# Patient Record
Sex: FEMALE | Race: WHITE | Employment: OTHER | URBAN - METROPOLITAN AREA
[De-identification: names, ages, dates, MRNs, and addresses within clinical notes are randomized per-mention and may not be internally consistent; named-entity substitution may affect disease eponyms.]

---

## 2017-01-24 ENCOUNTER — OFFICE VISIT (OUTPATIENT)
Dept: CARDIOLOGY | Facility: CLINIC | Age: 82
End: 2017-01-24
Attending: NURSE PRACTITIONER
Payer: MEDICARE

## 2017-01-24 VITALS
DIASTOLIC BLOOD PRESSURE: 60 MMHG | WEIGHT: 179 LBS | SYSTOLIC BLOOD PRESSURE: 118 MMHG | BODY MASS INDEX: 32.94 KG/M2 | HEART RATE: 68 BPM | HEIGHT: 62 IN

## 2017-01-24 DIAGNOSIS — I48.0 PAROXYSMAL ATRIAL FIBRILLATION (H): ICD-10-CM

## 2017-01-24 PROCEDURE — 99213 OFFICE O/P EST LOW 20 MIN: CPT | Mod: 25 | Performed by: INTERNAL MEDICINE

## 2017-01-24 PROCEDURE — 93000 ELECTROCARDIOGRAM COMPLETE: CPT | Performed by: INTERNAL MEDICINE

## 2017-01-24 NOTE — PROGRESS NOTES
HPI and Plan:   See dictation    Orders Placed This Encounter   Procedures     Follow-Up with Cardiac Advanced Practice Provider     EKG 12-lead complete w/read - Clinics (to be scheduled)       No orders of the defined types were placed in this encounter.       There are no discontinued medications.      Encounter Diagnosis   Name Primary?     Paroxysmal atrial fibrillation (H)        CURRENT MEDICATIONS:  Current Outpatient Prescriptions   Medication Sig Dispense Refill     rivaroxaban ANTICOAGULANT (XARELTO) 20 MG TABS tablet Take 1 tablet (20 mg) by mouth daily (with dinner) 90 tablet 0     flecainide acetate 150 MG TABS 1/2 tab bid 90 tablet 1     levothyroxine (SYNTHROID) 75 MCG tablet Take 1 tablet (75 mcg) by mouth daily 90 tablet 3     metoprolol (LOPRESSOR) 50 MG tablet Take 1.5 tablets (75 mg) by mouth 2 times daily 270 tablet 3     furosemide (LASIX) 20 MG tablet Take 1 tablet (20 mg) by mouth daily Pt taking one tab of 20mg furosemide every day and two tabs every other day if needed 181 tablet 3     traMADol (ULTRAM) 50 MG tablet Take 1 tablet (50 mg) by mouth every 6 hours as needed for pain 90 tablet 1     budesonide-formoterol (SYMBICORT) 160-4.5 MCG/ACT inhaler Inhale 2 puffs into the lungs 2 times daily         ALLERGIES     Allergies   Allergen Reactions     Clindamycin Hcl Other (See Comments)     Difficulty swallowing     Ampicillin Potassium      Rash nausea and vomiting       Celebrex [Celecoxib]      rash     Ciprofloxacin      rash     Erythromycin      rash     Indocin      Ended up [facility]( [facility] with severe head ache     Penicillins      Rash,nausea and vomiting     Vibramycin [Doxycycline Hyclate]      Rash      Xylocaine-Epinephrine [Epinephrine-Lidocaine-Na Metabisulfite]      Xylocaine with epi caused a rapid heart beat       PAST MEDICAL HISTORY:  Past Medical History   Diagnosis Date     Cysts, breast 1980s     bilat mastectomies     Hypothyroid 1995     Dr. Ortiz      Environmental allergies      Arthritis 99          Chest pain 2000     neg est echo, neg ct chest abd, pelvis Confucianism     PMR (polymyalgia rheumatica) (H) 2004     not active in years     Pacemaker 2011     afib with pauses and syncope     Treadmill stress test negative for angina pectoris 2011     nuclear est     Palpitations 2009     neg est     Urosepsis 2009     hospitalized     Urine incontinence      Dr. Westfall     Hematuria 2004     neg cysto and us     Dysphagia 2005     egd nl     nausea 2006     ct abd and ;pelvis neg     Dizzy 2007     ct neg, carotid us neg     Hemoptyses 2008     ct neg, bronch neg 2009     TIA (transient ischaemic attack) 2009     carotids neg, mri neg     Vision changes 2011     eval by neuro and ophtho and no cause found     Atrial fibrillation (H) 2011 and 2009     neg nuc est 2009, Dr. Vazquez, on coumadin     Osteopenia      fu dexa better 2011     Elevated blood sugar      Chronic LBP      HTN (hypertension)      COPD (chronic obstructive pulmonary disease) (H)      seen by pulmonary md Dr. Whitt, and given inhaler     Supraventricular premature beats      Hyperlipidemia      Hypertension      SOB (shortness of breath) 5/15     echo nl ef, 2+mr, cxr clear, seen by pulm and given inhaler     Mitral regurgitation 6/15     on echo       PAST SURGICAL HISTORY:  Past Surgical History   Procedure Laterality Date     Arthroscopy knee  1997     Mastectomy  1980     bilat, cysts     Breast implants       4x     Matthew not bso  70's     not for ca     Arthroplasty patello-femoral (knee)  1998 and 2002     Foot surgery  2003     Cataract  2011     Biopsy breast Right 9/23/2014     Procedure: BIOPSY BREAST;  Surgeon: David Carter MD;  Location: [facility]       FAMILY HISTORY:  Family History   Problem Relation Age of Onset     C.A.D. Father      Lymphoma Father      CANCER Mother      Lymphoma Brother      Breast Cancer Sister      OSTEOPOROSIS Sister      Myocardial Infarction  Sister      Unknown/Adopted Maternal Grandmother      Unknown/Adopted Maternal Grandfather      Unknown/Adopted Paternal Grandmother      Unknown/Adopted Paternal Grandfather        SOCIAL HISTORY:  Social History     Social History     Marital Status:      Spouse Name: N/A     Number of Children: 4     Years of Education: N/A     Social History Main Topics     Smoking status: Former Smoker     Types: Cigarettes     Quit date: 02/01/1955     Smokeless tobacco: Never Used      Comment: quit in 1955   had smoked about 2 cigarettes a day or more     Alcohol Use: 0.0 oz/week     0 Standard drinks or equivalent per week      Comment: occ wine     Drug Use: No     Sexual Activity: Not Currently     Other Topics Concern     Caffeine Concern No     coffee: 1 cup a week.  Occ green tea     Sleep Concern No     Stress Concern No     Weight Concern No     Special Diet No     Exercise Yes     walking daily     Seat Belt Yes     Social History Narrative       Review of Systems:  Skin:  Negative       Eyes:  Positive for glasses    ENT:  Negative      Respiratory:  Positive for dyspnea on exertion stairs   Cardiovascular:  Negative      Gastroenterology: Negative      Genitourinary:  Negative      Musculoskeletal:  Positive for arthritis;back pain    Neurologic:  Negative      Psychiatric:  Negative      Heme/Lymph/Imm:  Negative      Endocrine:  Positive for thyroid disorder      [id number]

## 2017-01-24 NOTE — Clinical Note
"1/24/2017    Jeffery Sherwood MD  [facility]   [address]  [facility] [address]    RE: Lindsey Canalesid Hol       Dear Colleague,    It was my pleasure seeing Ms. Lindsey Erickson, a delightful 89-year-old female, in follow-up of symptomatic paroxysmal atrial fibrillation, sinus node dysfunction with pacemaker and dyspnea on exertion.  For her AF, she was briefly on amiodarone which was discontinued because of abnormal pulmonary function tests.  She is currently on flecainide 75 mg b.i.d. and metoprolol 75 mg b.i.d.  She is on rivaroxaban for anticoagulation.      In coming in today, Ms. Erickson stated she felt well.  She came in the clinic with her daughter.  She lives independently and is able to take care of herself.  Her kids keep an eye on her.  She has had no palpitation, syncope or near-syncope.  She did have a 24-hour period of chest discomfort about 3 weeks ago.  The sensation was steady and lasted for 24 hours.  She went to bed with it, but by the next morning it was much better.  It was not aggravated by exertion and has not recurred since then.      PHYSICAL EXAMINATION:   VITAL SIGNS:  Blood pressure 118/60, pulse 68 and regular, weight 81 kg, height 160 cm.   GENERAL:  She is a delightful elderly woman who is alert, oriented and somewhat hard of hearing.   NECK:  Supple without apparent bruits.   LUNGS:  Clear.   CARDIOVASCULAR:  Regular rhythm.  No gallop, murmur or rub.   ABDOMEN:  Soft, nontender.   EXTREMITIES:  They are \"puffy\", although there is no pitting edema this morning.   NEUROLOGIC: alert.     DIAGNOSTIC STUDIES:    Her 12-lead ECG today showed an AV paced rhythm at 69 beats per minute.    Interrogations of her pacemaker over the past year have shown no significant episodes of atrial fibrillation.     Outpatient Encounter Prescriptions as of 1/24/2017   Medication Sig Dispense Refill     rivaroxaban ANTICOAGULANT (XARELTO) 20 MG TABS tablet Take 1 tablet (20 mg) by mouth " daily (with dinner) 90 tablet 0     flecainide acetate 150 MG TABS 1/2 tab bid 90 tablet 1     levothyroxine (SYNTHROID) 75 MCG tablet Take 1 tablet (75 mcg) by mouth daily 90 tablet 3     metoprolol (LOPRESSOR) 50 MG tablet Take 1.5 tablets (75 mg) by mouth 2 times daily 270 tablet 3     furosemide (LASIX) 20 MG tablet Take 1 tablet (20 mg) by mouth daily Pt taking one tab of 20mg furosemide every day and two tabs every other day if needed 181 tablet 3     traMADol (ULTRAM) 50 MG tablet Take 1 tablet (50 mg) by mouth every 6 hours as needed for pain 90 tablet 1     budesonide-formoterol (SYMBICORT) 160-4.5 MCG/ACT inhaler Inhale 2 puffs into the lungs 2 times daily       No facility-administered encounter medications on file as of 1/24/2017.      IMPRESSION:   1.  Paroxysmal atrial fibrillation.  Ms. Erickson is doing well on flecainide and metoprolol with virtually no atrial fibrillation in the past year.  She is on rivaroxaban for anticoagulation and will continue this at the current dose of 20 mg daily.  Her creatinine is still normal at 0.74.   2.  Episode of chest discomfort 3 weeks ago.  The cause is unclear.  The description of the discomfort did not suggest angina.  It has not recurred since and given her 89 years of age, I will not pursue extensive evaluation for this isolated event.  I did encourage the patient to call us if she has a recurrence.   3.  Sick sinus syndrome.  Her pacemaker is functioning well, projected battery longevity between 7 and 8 years.  Routine follow-up has been established in the [facility].      RECOMMENDATIONS:  Follow up in 1 year with Zaina and a 12-lead ECG.      It was my pleasure seeing Ms. Hale today.  Please feel free to contact me with questions or concerns.     Sincerely,    Efra Vazquez MD     [facility]

## 2017-01-24 NOTE — PROGRESS NOTES
"HISTORY OF PRESENT ILLNESS:    It was my pleasure seeing Ms. Lindsey Erickson, a delightful 89-year-old female, in follow-up of symptomatic paroxysmal atrial fibrillation, sinus node dysfunction with pacemaker and dyspnea on exertion.  For her AF, she was briefly on amiodarone which was discontinued because of abnormal pulmonary function tests.  She is currently on flecainide 75 mg b.i.d. and metoprolol 75 mg b.i.d.  She is on rivaroxaban for anticoagulation.      In coming in today, Ms. Erickson stated she felt well.  She came in the clinic with her daughter.  She lives independently and is able to take care of herself.  Her kids keep an eye on her.  She has had no palpitation, syncope or near-syncope.  She did have a 24-hour period of chest discomfort about 3 weeks ago.  The sensation was steady and lasted for 24 hours.  She went to bed with it, but by the next morning it was much better.  It was not aggravated by exertion and has not recurred since then.      PHYSICAL EXAMINATION:   VITAL SIGNS:  Blood pressure 118/60, pulse 68 and regular, weight 81 kg, height 160 cm.   GENERAL:  She is a delightful elderly woman who is alert, oriented and somewhat hard of hearing.   NECK:  Supple without apparent bruits.   LUNGS:  Clear.   CARDIOVASCULAR:  Regular rhythm.  No gallop, murmur or rub.   ABDOMEN:  Soft, nontender.   EXTREMITIES:  They are \"puffy\", although there is no pitting edema this morning.   NEUROLOGIC: alert.     DIAGNOSTIC STUDIES:    Her 12-lead ECG today showed an AV paced rhythm at 69 beats per minute.    Interrogations of her pacemaker over the past year have shown no significant episodes of atrial fibrillation.      IMPRESSION:   1.  Paroxysmal atrial fibrillation.  Ms. Erickson is doing well on flecainide and metoprolol with virtually no atrial fibrillation in the past year.  She is on rivaroxaban for anticoagulation and will continue this at the current dose of 20 mg daily.  Her creatinine is still normal at " 0.74.   2.  Episode of chest discomfort 3 weeks ago.  The cause is unclear.  The description of the discomfort did not suggest angina.  It has not recurred since and given her 89 years of age, I will not pursue extensive evaluation for this isolated event.  I did encourage the patient to call us if she has a recurrence.   3.  Sick sinus syndrome.  Her pacemaker is functioning well, projected battery longevity between 7 and 8 years.  Routine follow-up has been established in the Device Clinic.      RECOMMENDATIONS:  Follow up in 1 year with Zaina and a 12-lead ECG.      It was my pleasure seeing Ms. Priest today.  Please feel free to contact me with questions or concerns.         TORI JULIEN MD, [facility]             D: 2017 10:23   T: 2017 11:17   MT: RONALD      Name:     CASIMIRO PRIEST   MRN:      [id number]        Account:      [id number]   :      1925           Service Date: 2017      Document: [id number]

## 2017-02-21 DIAGNOSIS — I48.91 ATRIAL FIBRILLATION (H): ICD-10-CM

## 2017-02-22 RX ORDER — METOPROLOL TARTRATE 50 MG
75 TABLET ORAL 2 TIMES DAILY
Qty: 270 TABLET | Refills: 0 | Status: SHIPPED | OUTPATIENT
Start: 2017-02-22 | End: 2017-05-23

## 2017-03-08 ENCOUNTER — ALLIED HEALTH/NURSE VISIT (OUTPATIENT)
Dept: CARDIOLOGY | Facility: CLINIC | Age: 82
End: 2017-03-08
Payer: MEDICARE

## 2017-03-08 DIAGNOSIS — Z95.0 CARDIAC PACEMAKER IN SITU: Primary | ICD-10-CM

## 2017-03-08 PROCEDURE — 93296 REM INTERROG EVL PM/IDS: CPT | Performed by: INTERNAL MEDICINE

## 2017-03-08 PROCEDURE — 93294 REM INTERROG EVL PM/LDLS PM: CPT | Performed by: INTERNAL MEDICINE

## 2017-03-08 NOTE — PROGRESS NOTES
[facility] (D) Remote PPM Device Check  AP: 38 % : >99 %  Mode: DDDR        Presenting Rhythm: AS/, AP/  Heart Rate: Adequate rates per histogram  Sensing: Stable    Pacing Threshold: Stable    Impedance: Stable  Battery Status: 7 years  Atrial Arrhythmia: 1 brief mode switch episode. EGM shows As>Vs lasting 20 seconds, ventricular rates controlled.   Ventricular Arrhythmia: None     Care Plan: F/u PPM Merlin q 3 months. Gave patient results over the phone. Russel,CVT

## 2017-03-08 NOTE — MR AVS SNAPSHOT
After Visit Summary   3/8/2017    Lindsey Hale    MRN: [id number]           Patient Information     Date Of Birth          9/16/1925        Visit Information        Provider Department      3/8/2017 4:45 PM BANKS TECH1 [facility]        Today's Diagnoses     Cardiac pacemaker in situ    -  1       Follow-ups after your visit        Your next 10 appointments already scheduled     Mar 08, 2017  4:45 PM CST   Remote PPM Check with BANKS TECH1   [facility] HE[facility] ([facility])    [address]  [facility] [id number] [phone]           This appointment is for a remote check of your pacemaker.  This is not an appointment at the office.            Mar 09, 2017  1:00 PM CST   Office Visit with Jeffery Sherwood MD   [facility] ([facility])    [address] [id number] [phone]           Bring a current list of meds and any records pertaining to this visit.  For Physicals, please bring immunization records and any forms needing to be filled out.  Please arrive 10 minutes early to complete paperwork.            Apr 05, 2017  1:00 PM CDT   Evaluation with Bebe Tenorio OT   [facility]ational Therapy ([facility])    [address]  [address]  [facility] [id number] [phone]              Who to contact     If you have questions or need follow up information about today's clinic visit or your schedule please contact [facility] directly at [phone].  Normal or non-critical lab and imaging results will be communicated to you by MyChart, letter or phone within 4 business days after the clinic has received the results. If you do not hear from us within 7 days, please contact the clinic through MyChart or phone. If you have a critical or abnormal lab result, we will notify you by phone as  "soon as possible.  Submit refill requests through IPLogic or call your pharmacy and they will forward the refill request to us. Please allow 3 business days for your refill to be completed.          Additional Information About Your Visit        Mogihart Information     IPLogic lets you send messages to your doctor, view your test results, renew your prescriptions, schedule appointments and more. To sign up, go to [email or web address] . Click on \"Log in\" on the left side of the screen, which will take you to the Welcome page. Then click on \"Sign up Now\" on the right side of the page.     You will be asked to enter the access code listed below, as well as some personal information. Please follow the directions to create your username and password.     Your access code is: [email or web address]  Expires: 2017  3:21 PM     Your access code will  in 90 days. If you need help or a new code, please call your [place] clinic or [phone].        Care EveryWhere ID     This is your Care EveryWhere ID. This could be used by other organizations to access your [place] medical records  [id number]         Blood Pressure from Last 3 Encounters:   17 118/60   16 145/66   16 144/66    Weight from Last 3 Encounters:   17 81.2 kg (179 lb)   16 80.7 kg (178 lb)   16 80.7 kg (177 lb 14.4 oz)              We Performed the Following     INTERROGATION DEVICE EVAL REMOTE, PACER/ICD (57475)     PM DEVICE INTERROGATE REMOTE (32099)        Primary Care Provider Office Phone # Fax #    Jeffery Sherwood [phone]       [facility] [address]  [facility] [address]        Thank you!     Thank you for choosing [facility] [place]  for your care. Our goal is always to provide you with excellent care. Hearing back from our patients is one way we can continue to improve our services. Please take a few minutes to complete the " written survey that you may receive in the mail after your visit with us. Thank you!             Your Updated Medication List - Protect others around you: Learn how to safely use, store and throw away your medicines at www.disposemymeds.org.          This list is accurate as of: 3/8/17  3:21 PM.  Always use your most recent med list.                   Brand Name Dispense Instructions for use    budesonide-formoterol 160-4.5 MCG/ACT Inhaler    SYMBICORT     Inhale 2 puffs into the lungs 2 times daily       flecainide acetate 150 MG Tabs     90 tablet    1/2 tab bid       furosemide 20 MG tablet    LASIX    181 tablet    Take 1 tablet (20 mg) by mouth daily Pt taking one tab of 20mg furosemide every day and two tabs every other day if needed       levothyroxine 75 MCG tablet    SYNTHROID    90 tablet    Take 1 tablet (75 mcg) by mouth daily       metoprolol 50 MG tablet    LOPRESSOR    270 tablet    Take 1.5 tablets (75 mg) by mouth 2 times daily PLEASE SCHEDULE FOLLOW UP VISIT WITH PROVIDER FOR FURTHER REFILLS       rivaroxaban ANTICOAGULANT 20 MG Tabs tablet    XARELTO    90 tablet    Take 1 tablet (20 mg) by mouth daily (with dinner)       traMADol 50 MG tablet    ULTRAM    90 tablet    Take 1 tablet (50 mg) by mouth every 6 hours as needed for pain

## 2017-03-09 ENCOUNTER — OFFICE VISIT (OUTPATIENT)
Dept: FAMILY MEDICINE | Facility: CLINIC | Age: 82
End: 2017-03-09
Payer: MEDICARE

## 2017-03-09 VITALS
HEART RATE: 67 BPM | WEIGHT: 178 LBS | HEIGHT: 62 IN | SYSTOLIC BLOOD PRESSURE: 136 MMHG | TEMPERATURE: 97.9 F | OXYGEN SATURATION: 97 % | DIASTOLIC BLOOD PRESSURE: 60 MMHG | BODY MASS INDEX: 32.76 KG/M2

## 2017-03-09 DIAGNOSIS — J44.9 CHRONIC OBSTRUCTIVE PULMONARY DISEASE, UNSPECIFIED COPD TYPE (H): ICD-10-CM

## 2017-03-09 DIAGNOSIS — M35.3 PMR (POLYMYALGIA RHEUMATICA) (H): Primary | ICD-10-CM

## 2017-03-09 DIAGNOSIS — I48.0 PAROXYSMAL ATRIAL FIBRILLATION (H): ICD-10-CM

## 2017-03-09 DIAGNOSIS — H53.8 BLURRED VISION: ICD-10-CM

## 2017-03-09 DIAGNOSIS — R06.02 SOB (SHORTNESS OF BREATH): ICD-10-CM

## 2017-03-09 DIAGNOSIS — E03.9 HYPOTHYROIDISM, UNSPECIFIED TYPE: ICD-10-CM

## 2017-03-09 DIAGNOSIS — R73.9 ELEVATED BLOOD SUGAR: ICD-10-CM

## 2017-03-09 LAB
ANION GAP SERPL CALCULATED.3IONS-SCNC: 8 MMOL/L (ref 3–14)
BUN SERPL-MCNC: 25 MG/DL (ref 7–30)
CALCIUM SERPL-MCNC: 9.4 MG/DL (ref 8.5–10.1)
CHLORIDE SERPL-SCNC: 104 MMOL/L (ref 94–109)
CO2 SERPL-SCNC: 28 MMOL/L (ref 20–32)
CREAT SERPL-MCNC: 0.69 MG/DL (ref 0.52–1.04)
CRP SERPL-MCNC: <2.9 MG/L (ref 0–8)
ERYTHROCYTE [DISTWIDTH] IN BLOOD BY AUTOMATED COUNT: 13.5 % (ref 10–15)
ERYTHROCYTE [SEDIMENTATION RATE] IN BLOOD BY WESTERGREN METHOD: 23 MM/H (ref 0–30)
GFR SERPL CREATININE-BSD FRML MDRD: 79 ML/MIN/1.7M2
GLUCOSE SERPL-MCNC: 161 MG/DL (ref 70–99)
HBA1C MFR BLD: 6.1 % (ref 4.3–6)
HCT VFR BLD AUTO: 40.4 % (ref 35–47)
HGB BLD-MCNC: 13.1 G/DL (ref 11.7–15.7)
MCH RBC QN AUTO: 33.2 PG (ref 26.5–33)
MCHC RBC AUTO-ENTMCNC: 32.4 G/DL (ref 31.5–36.5)
MCV RBC AUTO: 102 FL (ref 78–100)
PLATELET # BLD AUTO: 298 10E9/L (ref 150–450)
POTASSIUM SERPL-SCNC: 4.2 MMOL/L (ref 3.4–5.3)
RBC # BLD AUTO: 3.95 10E12/L (ref 3.8–5.2)
SODIUM SERPL-SCNC: 140 MMOL/L (ref 133–144)
TSH SERPL DL<=0.005 MIU/L-ACNC: 0.59 MU/L (ref 0.4–4)
WBC # BLD AUTO: 10 10E9/L (ref 4–11)

## 2017-03-09 PROCEDURE — 85652 RBC SED RATE AUTOMATED: CPT | Performed by: INTERNAL MEDICINE

## 2017-03-09 PROCEDURE — 86140 C-REACTIVE PROTEIN: CPT | Performed by: INTERNAL MEDICINE

## 2017-03-09 PROCEDURE — 99214 OFFICE O/P EST MOD 30 MIN: CPT | Performed by: INTERNAL MEDICINE

## 2017-03-09 PROCEDURE — 80048 BASIC METABOLIC PNL TOTAL CA: CPT | Performed by: INTERNAL MEDICINE

## 2017-03-09 PROCEDURE — 83036 HEMOGLOBIN GLYCOSYLATED A1C: CPT | Performed by: INTERNAL MEDICINE

## 2017-03-09 PROCEDURE — 85027 COMPLETE CBC AUTOMATED: CPT | Performed by: INTERNAL MEDICINE

## 2017-03-09 PROCEDURE — 84443 ASSAY THYROID STIM HORMONE: CPT | Performed by: INTERNAL MEDICINE

## 2017-03-09 PROCEDURE — 36415 COLL VENOUS BLD VENIPUNCTURE: CPT | Performed by: INTERNAL MEDICINE

## 2017-03-09 ASSESSMENT — ANXIETY QUESTIONNAIRES
5. BEING SO RESTLESS THAT IT IS HARD TO SIT STILL: NOT AT ALL
1. FEELING NERVOUS, ANXIOUS, OR ON EDGE: NOT AT ALL
IF YOU CHECKED OFF ANY PROBLEMS ON THIS QUESTIONNAIRE, HOW DIFFICULT HAVE THESE PROBLEMS MADE IT FOR YOU TO DO YOUR WORK, TAKE CARE OF THINGS AT HOME, OR GET ALONG WITH OTHER PEOPLE: NOT DIFFICULT AT ALL
7. FEELING AFRAID AS IF SOMETHING AWFUL MIGHT HAPPEN: NOT AT ALL
GAD7 TOTAL SCORE: 0
3. WORRYING TOO MUCH ABOUT DIFFERENT THINGS: NOT AT ALL
2. NOT BEING ABLE TO STOP OR CONTROL WORRYING: NOT AT ALL
6. BECOMING EASILY ANNOYED OR IRRITABLE: NOT AT ALL

## 2017-03-09 ASSESSMENT — PATIENT HEALTH QUESTIONNAIRE - PHQ9: 5. POOR APPETITE OR OVEREATING: NOT AT ALL

## 2017-03-09 NOTE — PATIENT INSTRUCTIONS
I will let you know the results from today    If you start having more headaches or new symptoms call    Jeffery Sherwood M.D.

## 2017-03-09 NOTE — MR AVS SNAPSHOT
After Visit Summary   3/9/2017    Lindsey Hale    MRN: [id number]           Patient Information     Date Of Birth          9/16/1925        Visit Information        Provider Department      3/9/2017 1:00 PM Jeffery Sherwood MD [facility]        Today's Diagnoses     PMR (polymyalgia rheumatica) (H)    -  1    Chronic obstructive pulmonary disease, unspecified COPD type (H)        Paroxysmal atrial fibrillation (H)        SOB (shortness of breath)        Hypothyroidism, unspecified type        Elevated blood sugar        Blurred vision          Care Instructions    I will let you know the results from today    If you start having more headaches or new symptoms call    Jeffery Sherwood M.D.          Follow-ups after your visit        Your next 10 appointments already scheduled     Apr 05, 2017  1:00 PM CDT   Evaluation with Bebe Tenorio OT   [facility] Occupational Therapy ([facility])    [address]  [address]  [facility] [id number] [phone]            Jun 14, 2017  3:00 PM CDT   Remote PPM Check with BANKS TECH1   [facility] [place] ([facility])    [address] [place] [address] [address]  [facility] [id number] [phone]           This appointment is for a remote check of your pacemaker.  This is not an appointment at the office.              Who to contact     If you have questions or need follow up information about today's clinic visit or your schedule please contact [facility] directly at [phone].  Normal or non-critical lab and imaging results will be communicated to you by MyChart, letter or phone within 4 business days after the clinic has received the results. If you do not hear from us within 7 days, please contact the clinic through MyChart or phone. If you have a critical or abnormal lab result, we will notify you by phone as soon as possible.  Submit refill requests through Phloronolt or  "call your pharmacy and they will forward the refill request to us. Please allow 3 business days for your refill to be completed.          Additional Information About Your Visit        MyChart Information     [employer] Systemshart lets you send messages to your doctor, view your test results, renew your prescriptions, schedule appointments and more. To sign up, go to [email or web address] . Click on \"Log in\" on the left side of the screen, which will take you to the Welcome page. Then click on \"Sign up Now\" on the right side of the page.     You will be asked to enter the access code listed below, as well as some personal information. Please follow the directions to create your username and password.     Your access code is: [email or web address]  Expires: 2017  3:21 PM     Your access code will  in 90 days. If you need help or a new code, please call your [place] clinic or [phone].        Care EveryWhere ID     This is your [facility] EveryWhere ID. This could be used by other organizations to access your [place] medical records  [id number]        Your Vitals Were     Pulse Temperature Height Pulse Oximetry Breastfeeding? BMI (Body Mass Index)    67 97.9  F (36.6  C) (Oral) 5' 1.5\" (1.562 m) 97% No 33.09 kg/m2       Blood Pressure from Last 3 Encounters:   17 136/60   17 118/60   16 145/66    Weight from Last 3 Encounters:   17 178 lb (80.7 kg)   17 179 lb (81.2 kg)   16 178 lb (80.7 kg)              We Performed the Following     Basic metabolic panel     CBC with platelets     CRP, inflammation     ESR: Erythrocyte sedimentation rate     Hemoglobin A1c     TSH with free T4 reflex        Primary Care Provider Office Phone # Fax #    Jeffery Sherwood [phone]       [facility] [address] [facility] [address]  [place] [address]        Thank you!     Thank you for choosing [facility]  for your care. Our goal is always to provide you with excellent " care. Hearing back from our patients is one way we can continue to improve our services. Please take a few minutes to complete the written survey that you may receive in the mail after your visit with us. Thank you!             Your Updated Medication List - Protect others around you: Learn how to safely use, store and throw away your medicines at www.disposemymeds.org.          This list is accurate as of: 3/9/17  1:17 PM.  Always use your most recent med list.                   Brand Name Dispense Instructions for use    budesonide-formoterol 160-4.5 MCG/ACT Inhaler    SYMBICORT     Inhale 2 puffs into the lungs 2 times daily       flecainide acetate 150 MG Tabs     90 tablet    1/2 tab bid       furosemide 20 MG tablet    LASIX    181 tablet    Take 1 tablet (20 mg) by mouth daily Pt taking one tab of 20mg furosemide every day and two tabs every other day if needed       levothyroxine 75 MCG tablet    SYNTHROID    90 tablet    Take 1 tablet (75 mcg) by mouth daily       metoprolol 50 MG tablet    LOPRESSOR    270 tablet    Take 1.5 tablets (75 mg) by mouth 2 times daily PLEASE SCHEDULE FOLLOW UP VISIT WITH PROVIDER FOR FURTHER REFILLS       rivaroxaban ANTICOAGULANT 20 MG Tabs tablet    XARELTO    90 tablet    Take 1 tablet (20 mg) by mouth daily (with dinner)       traMADol 50 MG tablet    ULTRAM    90 tablet    Take 1 tablet (50 mg) by mouth every 6 hours as needed for pain

## 2017-03-09 NOTE — NURSING NOTE
"Chief Complaint   Patient presents with     Eye Problem       Initial /68  Pulse 67  Temp 97.9  F (36.6  C) (Oral)  Ht 5' 1.5\" (1.562 m)  Wt 178 lb (80.7 kg)  SpO2 97%  Breastfeeding? No  BMI 33.09 kg/m2 Estimated body mass index is 33.09 kg/(m^2) as calculated from the following:    Height as of this encounter: 5' 1.5\" (1.562 m).    Weight as of this encounter: 178 lb (80.7 kg).  Medication Reconciliation: complete   Deanna  KEREN, CMA\    "

## 2017-03-09 NOTE — PROGRESS NOTES
Lindsey Hale is a 89 year old female who presents for vision change, ha's.  The patient has prior h/o pmr as noted but not active for years.  She recently has had more visual blurring, left more then right eye and was seen by ophtho, Dr. Lo for that.  Not clear what was found. No diplopia.  No other neuro symptoms x slight ha's this weekend, better today.  NO temporal artery tend.  No f,c,s.  No jaw karlene or shoulder aches, hip pain.  NO med changes.  Has been fatigue.  No gi c/o.  NO f,c,s or weight loss.  No chest pain.  Has dyspnea on exertion which is not new but somewhat more.    In terms of the vision she notes it is blurry, does not sound like visual field cuts but she is vague.    No other c/o on review of systems.               Past Medical History:      Past Medical History   Diagnosis Date     Arthritis 99          Atrial fibrillation (H) 2011 and 2009     neg nuc est 2009, Dr. Vazquez, on coumadin     Chest pain 2000     neg est echo, neg ct chest abd, pelvis [facility]     Chronic LBP      COPD (chronic obstructive pulmonary disease) (H)      seen by pulmonary md Dr. Whitt, and given inhaler     Cysts, breast 1980s     bilat mastectomies     Dizzy 2007     ct neg, carotid us neg     Dysphagia 2005     egd nl     Elevated blood sugar      Environmental allergies      Hematuria 2004     neg cysto and us     Hemoptyses 2008     ct neg, bronch neg 2009     HTN (hypertension)      Hyperlipidemia      Hypertension      Hypothyroid 1995     Dr. Ortiz     Mitral regurgitation 6/15     on echo     nausea 2006     ct abd and ;pelvis neg     Osteopenia      fu dexa better 2011     Pacemaker 2011     afib with pauses and syncope     Palpitations 2009     neg est     PMR (polymyalgia rheumatica) (H) 2004     not active in years     SOB (shortness of breath) 5/15     echo nl ef, 2+mr, cxr clear, seen by pulm and given inhaler     Supraventricular premature beats      TIA (transient ischaemic  attack) 2009     carotids neg, mri neg     Treadmill stress test negative for angina pectoris 2011     nuclear est     Urine incontinence      Dr. Westfall     Urosepsis 2009     hospitalized     Vision changes 2011     eval by neuro and ophtho and no cause found             Past Surgical History:      Past Surgical History   Procedure Laterality Date     Arthroscopy knee  1997     Mastectomy  1980     bilat, cysts     Breast implants       4x     Matthew not bso  70's     not for ca     Arthroplasty patello-femoral (knee)  1998 and 2002     Foot surgery  2003     Cataract  2011     Biopsy breast Right 9/23/2014     Procedure: BIOPSY BREAST;  Surgeon: David Carter MD;  Location: [facility]             Social History:     Social History     Social History     Marital status:      Spouse name: N/A     Number of children: 4     Years of education: N/A     Occupational History     Not on file.     Social History Main Topics     Smoking status: Former Smoker     Types: Cigarettes     Quit date: 2/1/1955     Smokeless tobacco: Never Used      Comment: quit in 1955   had smoked about 2 cigarettes a day or more     Alcohol use 0.0 oz/week     0 Standard drinks or equivalent per week      Comment: occ wine     Drug use: No     Sexual activity: Not Currently     Other Topics Concern     Caffeine Concern No     coffee: 1 cup a week.  Occ green tea     Sleep Concern No     Stress Concern No     Weight Concern No     Special Diet No     Exercise Yes     walking daily     Seat Belt Yes     Social History Narrative             Family History:   reviewed         Allergies:     Allergies   Allergen Reactions     Clindamycin Hcl Other (See Comments)     Difficulty swallowing     Ampicillin Potassium      Rash nausea and vomiting       Celebrex [Celecoxib]      rash     Ciprofloxacin      rash     Erythromycin      rash     Indocin      Ended [facility]( [facility] with [facility] ache     Penicillins      Rash,nausea and  "vomiting     Vibramycin [Doxycycline Hyclate]      Rash      Xylocaine-Epinephrine [Epinephrine-Lidocaine-Na Metabisulfite]      Xylocaine with epi caused a rapid heart beat             Medications:     Current Outpatient Prescriptions   Medication Sig Dispense Refill     metoprolol (LOPRESSOR) 50 MG tablet Take 1.5 tablets (75 mg) by mouth 2 times daily PLEASE SCHEDULE FOLLOW UP VISIT WITH PROVIDER FOR FURTHER REFILLS 270 tablet 0     rivaroxaban ANTICOAGULANT (XARELTO) 20 MG TABS tablet Take 1 tablet (20 mg) by mouth daily (with dinner) 90 tablet 0     flecainide acetate 150 MG TABS 1/2 tab bid 90 tablet 1     levothyroxine (SYNTHROID) 75 MCG tablet Take 1 tablet (75 mcg) by mouth daily 90 tablet 3     furosemide (LASIX) 20 MG tablet Take 1 tablet (20 mg) by mouth daily Pt taking one tab of 20mg furosemide every day and two tabs every other day if needed 181 tablet 3     traMADol (ULTRAM) 50 MG tablet Take 1 tablet (50 mg) by mouth every 6 hours as needed for pain 90 tablet 1     budesonide-formoterol (SYMBICORT) 160-4.5 MCG/ACT inhaler Inhale 2 puffs into the lungs 2 times daily                 Review of Systems:   The 10 point Review of Systems is negative other than noted in the HPI           Physical Exam:   Blood pressure 136/60, pulse 67, temperature 97.9  F (36.6  C), temperature source Oral, height 5' 1.5\" (1.562 m), weight 178 lb (80.7 kg), SpO2 97 %, not currently breastfeeding.    Exam:  Constitutional: healthy appearing, alert and in no distress  Heent: Normocephalic. Head without obvious masses or lesions. PERRLDC, EOMI. Mouth exam within normal limits: tongue, mucous membranes, posterior pharynx all normal, no lesions or abnormalities seen.  Tm's and canals within normal limits bilaterally. Neck supple, no nuchal rigidity or masses. No supraclavicular, or cervical adenopathy. Thyroid symmetric, no masses.  Cardiovascular: irregular rate and rhythm, 1/6 sytolic murmer, rub or gallops.  JVP not " elevated, no edema.  Carotids within normal limits bilaterally, no bruits.  Respiratory: Normal respiratory effort.  Lungs clear, normal flow, no wheezing or crackles.  Gastrointestinal: Normal active bowel sounds.   Soft, not tender, no masses, guarding or rebound.  No hepatosplenomegaly.   Musculoskeletal: extremities normal, no gross deformities noted.  Skin: no suspicious lesions or rashes   Neurologic: Mental status within normal limits.  Speech fluent.  No gross motor abnormalities and gait intact.  Psychiatric: mentation appears normal and affect normal.         Data:   Labs sent        Assessment:   1. Blurred vision, ha, I doubt pmr or temporal arteritis, also doubt cvi.  I suspect more retinal cause.  I have call out to Dr. Lo to see what she saw.  Also check labs today  2. Copd, a bit more but not significantly worse  3. Afib, on xarelto, rate fine  4. Elevated sugar, hypothyroid, follow up labs  5. Prior tia         Plan:   Labs today  Call if changes  Awaiting call from Dr. Teresita Sherwood M.D.

## 2017-03-10 ASSESSMENT — PATIENT HEALTH QUESTIONNAIRE - PHQ9: SUM OF ALL RESPONSES TO PHQ QUESTIONS 1-9: 0

## 2017-03-10 ASSESSMENT — ANXIETY QUESTIONNAIRES: GAD7 TOTAL SCORE: 0

## 2017-03-13 ENCOUNTER — TELEPHONE (OUTPATIENT)
Dept: FAMILY MEDICINE | Facility: CLINIC | Age: 82
End: 2017-03-13

## 2017-03-13 NOTE — TELEPHONE ENCOUNTER
I called patient and left message to return our call at [phone].  Deanna Hills, [facility]    labs all normal, inflammatory tests normal.  I am awaiting call back from Dr. Lo, but I doubt this is due to polymylagia rheumatic or temporal arteritis.  If worsens call me

## 2017-03-14 ENCOUNTER — TELEPHONE (OUTPATIENT)
Dept: FAMILY MEDICINE | Facility: CLINIC | Age: 82
End: 2017-03-14

## 2017-03-14 DIAGNOSIS — H53.9 VISION CHANGES: Primary | ICD-10-CM

## 2017-03-14 NOTE — TELEPHONE ENCOUNTER
Please call patient re vision changes.  I received call from eye md, Dr. Lo re patient's vision changes and she rec doing mri brain to make sure no stroke as cause of vision changes.  I will order it.  If more vision changes call right away.  See me Thur or Friday for follow up office visit.    Jeffery Sherwood M.D.

## 2017-03-14 NOTE — TELEPHONE ENCOUNTER
I spoke with Dr. Lo re recent vision issues, she did not find signs of tia/cvi, likely macular related.  Given this I will not eval further.    Jeffery Sherwood M.D.

## 2017-03-15 NOTE — TELEPHONE ENCOUNTER
Patient called, transferred to nurse.  Reviewed order with patient, radiology to call her to schedule.  She will call us if any further changes to vision.  Patient has no questions.  Neida Crawford RN

## 2017-03-15 NOTE — TELEPHONE ENCOUNTER
Call to patient to discuss orders, advise call if more vision changes.  No answer, left message to call back.  Neida Crawford RN

## 2017-03-16 ENCOUNTER — TELEPHONE (OUTPATIENT)
Dept: FAMILY MEDICINE | Facility: CLINIC | Age: 82
End: 2017-03-16

## 2017-03-16 NOTE — TELEPHONE ENCOUNTER
Please see notes in chart re this, yes it was ordered, mri brain, needs to be done soon please    Jeffery Sherwood M.D.

## 2017-03-16 NOTE — TELEPHONE ENCOUNTER
Called patient states she can not have MRI done due to pacemaker.  Please advise with further recommendations for patient for testing.  Shauna Becker RN  [employer]

## 2017-03-16 NOTE — TELEPHONE ENCOUNTER
Reason for call: Other   Patient called regarding (reason for call): returning call  Additional comments: Pt states she thinks she received a call from Dr. Sherwood and that it may be regarding an MRI or Opthalmology. Please advise.     Phone Number Pt can be reached at: Home number on file [phone] (home)  Best Time: Open  Can we leave a detailed message on this number? YES

## 2017-03-17 ENCOUNTER — OFFICE VISIT (OUTPATIENT)
Dept: FAMILY MEDICINE | Facility: CLINIC | Age: 82
End: 2017-03-17
Payer: MEDICARE

## 2017-03-17 VITALS
WEIGHT: 178 LBS | HEIGHT: 62 IN | TEMPERATURE: 97.8 F | BODY MASS INDEX: 32.76 KG/M2 | SYSTOLIC BLOOD PRESSURE: 152 MMHG | HEART RATE: 74 BPM | DIASTOLIC BLOOD PRESSURE: 73 MMHG | OXYGEN SATURATION: 95 %

## 2017-03-17 DIAGNOSIS — G45.3 AMAUROSIS FUGAX: ICD-10-CM

## 2017-03-17 DIAGNOSIS — H53.9 VISION CHANGES: Primary | ICD-10-CM

## 2017-03-17 PROCEDURE — 99213 OFFICE O/P EST LOW 20 MIN: CPT | Performed by: INTERNAL MEDICINE

## 2017-03-17 NOTE — NURSING NOTE
"Chief Complaint   Patient presents with     RECHECK       Initial /73  Pulse 74  Temp 97.8  F (36.6  C) (Oral)  Ht 5' 1.5\" (1.562 m)  Wt 178 lb (80.7 kg)  SpO2 95%  Breastfeeding? No  BMI 33.09 kg/m2 Estimated body mass index is 33.09 kg/(m^2) as calculated from the following:    Height as of this encounter: 5' 1.5\" (1.562 m).    Weight as of this encounter: 178 lb (80.7 kg).  Medication Reconciliation: complete   Deanna VELIZ CMA      "

## 2017-03-17 NOTE — PROGRESS NOTES
Lindsey Hale is a 89 year old female who presents with neighbor for follow up vision change.  Patient is vague, sounds like feels as if there if a veil over her vision all the time, not focal, both eyes, no dipolpia.  Has ha top and back of head, not temp area, no f,c,s.  No chest pain or shortness of breath, no gi c/o, no motor changes.  Twice describes ?amaurosis, not clear that is what she is having.  Has pacer.  I spoke with Dr. Lo of ophtho and patient to follow up with Dr. Tomas hill for retinal eval.  Prior visoin changes 2011 and sounds ?similar, no cause found.  Is on xarelto.  No other c/o.  Prior labs fine as noted, normal signs vasculitis.    Past Medical History   Diagnosis Date     Arthritis 99          Atrial fibrillation (H) 2011 and 2009     neg nuc est 2009, Dr. Vazquez, on coumadin     Chest pain 2000     neg est echo, neg ct chest abd, pelvis [employer]     Chronic LBP      COPD (chronic obstructive pulmonary disease) (H)      seen by pulmonary md Dr. Whitt, and given inhaler     Cysts, breast 1980s     bilat mastectomies     Dizzy 2007     ct neg, carotid us neg     Dysphagia 2005     egd nl     Elevated blood sugar      Environmental allergies      Hematuria 2004     neg cysto and us     Hemoptyses 2008     ct neg, bronch neg 2009     HTN (hypertension)      Hyperlipidemia      Hypertension      Hypothyroid 1995     Dr. Ortiz     Mitral regurgitation 6/15     on echo     nausea 2006     ct abd and ;pelvis neg     Osteopenia      fu dexa better 2011     Pacemaker 2011     afib with pauses and syncope     Palpitations 2009     neg est     PMR (polymyalgia rheumatica) (H) 2004     not active in years     SOB (shortness of breath) 5/15     echo nl ef, 2+mr, cxr clear, seen by pulm and given inhaler     Supraventricular premature beats      TIA (transient ischaemic attack) 2009     carotids neg, mri neg     Treadmill stress test negative for angina pectoris 2011     nuclear  est     Urine incontinence      Dr. Westfall     Urosepsis 2009     hospitalized     Vision changes 2011     eval by neuro and ophtho and no cause found     Past Surgical History   Procedure Laterality Date     Arthroscopy knee  1997     Mastectomy  1980     bilat, cysts     Breast implants       4x     Matthew not bso  70's     not for ca     Arthroplasty patello-femoral (knee)  1998 and 2002     Foot surgery  2003     Cataract  2011     Biopsy breast Right 9/23/2014     Procedure: BIOPSY BREAST;  Surgeon: David Carter MD;  Location: [facility]     Social History     Social History     Marital status:      Spouse name: N/A     Number of children: 4     Years of education: N/A     Occupational History     Not on file.     Social History Main Topics     Smoking status: Former Smoker     Types: Cigarettes     Quit date: 2/1/1955     Smokeless tobacco: Never Used      Comment: quit in 1955   had smoked about 2 cigarettes a day or more     Alcohol use 0.0 oz/week     0 Standard drinks or equivalent per week      Comment: occ wine     Drug use: No     Sexual activity: Not Currently     Other Topics Concern     Caffeine Concern No     coffee: 1 cup a week.  Occ green tea     Sleep Concern No     Stress Concern No     Weight Concern No     Special Diet No     Exercise Yes     walking daily     Seat Belt Yes     Social History Narrative     Current Outpatient Prescriptions   Medication Sig Dispense Refill     metoprolol (LOPRESSOR) 50 MG tablet Take 1.5 tablets (75 mg) by mouth 2 times daily PLEASE SCHEDULE FOLLOW UP VISIT WITH PROVIDER FOR FURTHER REFILLS 270 tablet 0     rivaroxaban ANTICOAGULANT (XARELTO) 20 MG TABS tablet Take 1 tablet (20 mg) by mouth daily (with dinner) 90 tablet 0     flecainide acetate 150 MG TABS 1/2 tab bid 90 tablet 1     levothyroxine (SYNTHROID) 75 MCG tablet Take 1 tablet (75 mcg) by mouth daily 90 tablet 3     furosemide (LASIX) 20 MG tablet Take 1 tablet (20 mg) by mouth daily Pt taking  "one tab of 20mg furosemide every day and two tabs every other day if needed 181 tablet 3     traMADol (ULTRAM) 50 MG tablet Take 1 tablet (50 mg) by mouth every 6 hours as needed for pain 90 tablet 1     budesonide-formoterol (SYMBICORT) 160-4.5 MCG/ACT inhaler Inhale 2 puffs into the lungs 2 times daily       Allergies   Allergen Reactions     Clindamycin Hcl Other (See Comments)     Difficulty swallowing     Ampicillin Potassium      Rash nausea and vomiting       Celebrex [Celecoxib]      rash     Ciprofloxacin      rash     Erythromycin      rash     Indocin      Ended up going to( [facility] with severe head ache     Penicillins      Rash,nausea and vomiting     Vibramycin [Doxycycline Hyclate]      Rash      Xylocaine-Epinephrine [Epinephrine-Lidocaine-Na Metabisulfite]      Xylocaine with epi caused a rapid heart beat     FAMILY HISTORY NOTED AND REVIEWED    REVIEW OF SYSTEMS: above    PHYSICAL EXAM    /73  Pulse 74  Temp 97.8  F (36.6  C) (Oral)  Ht 5' 1.5\" (1.562 m)  Wt 178 lb (80.7 kg)  SpO2 95%  Breastfeeding? No  BMI 33.09 kg/m2    Patient appears non toxic  Carotids within normal limits  Lungs clear  cv reglar rate and rhythm no jvp or edema  Abdomen non-tender  Neuro non focal  No head tend or temp artery tend or enlargment    ASSESSMENT:  Vision change, doubt amaurosis, vasculitis, suspect more eye related, doubt post circ issue.  Hard to eval for given pacer    PLAN:  To er if more  Ct head  Cardiac echo  Follow up 3 weeks    Jeffery Sherwood M.D.        "

## 2017-03-17 NOTE — MR AVS SNAPSHOT
After Visit Summary   3/17/2017    Lindsey Hale    MRN: [id number]           Patient Information     Date Of Birth          9/16/1925        Visit Information        Provider Department      3/17/2017 2:00 PM Jeffery Sherwood MD [facility]        Today's Diagnoses     Vision changes    -  1      Care Instructions    Let's have you see Dr. Marin    I will have someone call you for the ct and the heart ultrasound.     If you have changing vision let me know    Follow up with me in 3 to 4 weeks    Jeffery Sherwood M.D.          Follow-ups after your visit        Your next 10 appointments already scheduled     Apr 05, 2017  1:00 PM CDT   Evaluation with Bebe Tenorio OT   [facility] ([facility])    [address]  [address]  [facility] [id number] [phone]            Jun 14, 2017  3:00 PM CDT   Remote PPM Check with BANKS TECH1   [facility] [place] ([facility])    [address]  [facility] [id number] [phone]           This appointment is for a remote check of your pacemaker.  This is not an appointment at the office.              Future tests that were ordered for you today     Open Future Orders        Priority Expected Expires Ordered    CT Head w/o Contrast Routine  3/17/2018 3/17/2017            Who to contact     If you have questions or need follow up information about today's clinic visit or your schedule please contact [facility] directly at [phone].  Normal or non-critical lab and imaging results will be communicated to you by MyChart, letter or phone within 4 business days after the clinic has received the results. If you do not hear from us within 7 days, please contact the clinic through MyChart or phone. If you have a critical or abnormal lab result, we will notify you by phone as soon as possible.  Submit refill requests through [employer] or call  "your pharmacy and they will forward the refill request to us. Please allow 3 business days for your refill to be completed.          Additional Information About Your Visit        MyChart Information     [employer] lets you send messages to your doctor, view your test results, renew your prescriptions, schedule appointments and more. To sign up, go to [email or web address] . Click on \"Log in\" on the left side of the screen, which will take you to the Welcome page. Then click on \"Sign up Now\" on the right side of the page.     You will be asked to enter the access code listed below, as well as some personal information. Please follow the directions to create your username and password.     Your access code is: [email or web address]  Expires: 2017  4:21 PM     Your access code will  in 90 days. If you need help or a new code, please call your [place] clinic or [phone].        Care EveryWhere ID     This is your [facility] EveryWhere ID. This could be used by other organizations to access your [place] medical records  [id number]        Your Vitals Were     Pulse Temperature Height Pulse Oximetry Breastfeeding? BMI (Body Mass Index)    74 97.8  F (36.6  C) (Oral) 5' 1.5\" (1.562 m) 95% No 33.09 kg/m2       Blood Pressure from Last 3 Encounters:   17 152/73   17 136/60   17 118/60    Weight from Last 3 Encounters:   17 178 lb (80.7 kg)   17 178 lb (80.7 kg)   17 179 lb (81.2 kg)               Primary Care Provider Office Phone # Fax #    Jeffery Sherwood [phone]       [facility] [address]  [facility] [address]        Thank you!     Thank you for choosing [facility]  for your care. Our goal is always to provide you with excellent care. Hearing back from our patients is one way we can continue to improve our services. Please take a few minutes to complete the written survey that you may receive in the mail after your visit " with us. Thank you!             Your Updated Medication List - Protect others around you: Learn how to safely use, store and throw away your medicines at www.disposemymeds.org.          This list is accurate as of: 3/17/17  2:23 PM.  Always use your most recent med list.                   Brand Name Dispense Instructions for use    budesonide-formoterol 160-4.5 MCG/ACT Inhaler    SYMBICORT     Inhale 2 puffs into the lungs 2 times daily       flecainide acetate 150 MG Tabs     90 tablet    1/2 tab bid       furosemide 20 MG tablet    LASIX    181 tablet    Take 1 tablet (20 mg) by mouth daily Pt taking one tab of 20mg furosemide every day and two tabs every other day if needed       levothyroxine 75 MCG tablet    SYNTHROID    90 tablet    Take 1 tablet (75 mcg) by mouth daily       metoprolol 50 MG tablet    LOPRESSOR    270 tablet    Take 1.5 tablets (75 mg) by mouth 2 times daily PLEASE SCHEDULE FOLLOW UP VISIT WITH PROVIDER FOR FURTHER REFILLS       rivaroxaban ANTICOAGULANT 20 MG Tabs tablet    XARELTO    90 tablet    Take 1 tablet (20 mg) by mouth daily (with dinner)       traMADol 50 MG tablet    ULTRAM    90 tablet    Take 1 tablet (50 mg) by mouth every 6 hours as needed for pain

## 2017-03-17 NOTE — PATIENT INSTRUCTIONS
Let's have you see Dr. Marin    I will have someone call you for the ct and the heart ultrasound.     If you have changing vision let me know    Follow up with me in 3 to 4 weeks    Jeffery Sherwood M.D.

## 2017-03-22 ENCOUNTER — HOSPITAL ENCOUNTER (OUTPATIENT)
Dept: CT IMAGING | Facility: CLINIC | Age: 82
Discharge: HOME | End: 2017-03-22
Attending: INTERNAL MEDICINE | Admitting: INTERNAL MEDICINE
Payer: MEDICARE

## 2017-03-22 DIAGNOSIS — H53.9 VISION CHANGES: ICD-10-CM

## 2017-03-22 PROCEDURE — 70450 CT HEAD/BRAIN W/O DYE: CPT

## 2017-03-22 NOTE — PROGRESS NOTES
Please call and let pt know that her CT brain is stable/shows nothing acute.  I believe she has an appointment coming up with her eye doctor

## 2017-03-22 NOTE — LETTER
[address]  [place] [address]  [phone]                                                                                               Date: 3/23/2017    Lindsey Nuñez [facility]                                                                               [address] [place] [address]              Dear Lindsey,    Your CT brain is stable/shows nothing acute  Enclosed is a copy of your results.      It was a pleasure to see you at your last appointment. If you have any questions, please feel free to call myself or my nurse at [phone].          Sincerely,    Jeffery Sherwood MD/ Deanna VELIZ CMA  Results for orders placed or performed during the hospital encounter of 03/22/17   CT Head w/o Contrast    Narrative    CT SCAN OF THE HEAD WITHOUT CONTRAST   3/22/2017 1:06 PM     HISTORY: Unspecified visual disturbance.    TECHNIQUE:  Axial images of the head and coronal reformations without  IV contrast material.  Radiation dose for this scan was reduced using  automated exposure control, adjustment of the mA and/or kV according  to patient size, or iterative reconstruction technique.    COMPARISON: 2/7/2012.    FINDINGS: Mild to moderate cerebral atrophy is present. Minimal patchy  white matter changes are present in both hemispheres without mass  effect. There is no evidence for intracranial hemorrhage, mass effect,  acute infarct, or skull fracture. Vascular calcifications are noted at  the skull base.      Impression    IMPRESSION: Chronic changes. No evidence for intracranial hemorrhage  or any acute process.    SYDNI ROMERO MD

## 2017-03-30 DIAGNOSIS — I48.91 ATRIAL FIBRILLATION (H): ICD-10-CM

## 2017-03-30 RX ORDER — FLECAINIDE ACETATE 150 MG/1
TABLET ORAL
Qty: 90 TABLET | Refills: 3 | Status: SHIPPED | OUTPATIENT
Start: 2017-03-30 | End: 2018-03-26

## 2017-03-30 NOTE — TELEPHONE ENCOUNTER
Received refill request for:  Flecainide  Last OV was: 1/24/2017 with Dr. Vazquez  Labs/EKG: last EKG 1/24/2017  F/U scheduled: orders in Epic for 2/2018  New script sent to: [facility]

## 2017-04-03 ENCOUNTER — TELEPHONE (OUTPATIENT)
Dept: FAMILY MEDICINE | Facility: CLINIC | Age: 82
End: 2017-04-03

## 2017-04-03 ENCOUNTER — HOSPITAL ENCOUNTER (OUTPATIENT)
Dept: CARDIOLOGY | Facility: CLINIC | Age: 82
Discharge: HOME | End: 2017-04-03
Attending: INTERNAL MEDICINE | Admitting: INTERNAL MEDICINE
Payer: MEDICARE

## 2017-04-03 DIAGNOSIS — G45.3 AMAUROSIS FUGAX: ICD-10-CM

## 2017-04-03 PROBLEM — R93.1 ABNORMAL ECHOCARDIOGRAM: Status: ACTIVE | Noted: 2017-04-01

## 2017-04-03 PROCEDURE — 93306 TTE W/DOPPLER COMPLETE: CPT

## 2017-04-03 PROCEDURE — 93306 TTE W/DOPPLER COMPLETE: CPT | Mod: 26 | Performed by: INTERNAL MEDICINE

## 2017-04-03 NOTE — TELEPHONE ENCOUNTER
Please call and tell patient her echo is fine, no signs of blood clots or any significant heart issues.  Be sure to follow up with me in next 2 to 3 weeks    Thanks      Jeffery Sherwood M.D.

## 2017-04-05 DIAGNOSIS — I48.91 ATRIAL FIBRILLATION (H): ICD-10-CM

## 2017-06-02 DIAGNOSIS — E03.9 HYPOTHYROIDISM, UNSPECIFIED TYPE: ICD-10-CM

## 2017-06-02 RX ORDER — LEVOTHYROXINE SODIUM 75 UG/1
TABLET ORAL
Qty: 90 TABLET | Refills: 2 | Status: SHIPPED | OUTPATIENT
Start: 2017-06-02 | End: 2017-11-17

## 2017-06-02 NOTE — TELEPHONE ENCOUNTER
Pending Prescriptions:                       Disp   Refills    levothyroxine (SYNTHROID/LEVOTHROID) 75 MC*90 tab*0        Sig: TAKE 1 TABLET BY MOUTH DAILY         Last Written Prescription Date: 05/16/2016  Last Quantity: 90, # refills: .  Last Office Visit with [facility], [facility] or [facility] prescribing provider: 03/17/2017        TSH   Date Value Ref Range Status   03/09/2017 0.59 0.40 - 4.00 mU/L Final

## 2017-06-02 NOTE — TELEPHONE ENCOUNTER
Prescription approved per [facility] Refill Protocol.    Isaura Jerry RN

## 2017-06-14 ENCOUNTER — ALLIED HEALTH/NURSE VISIT (OUTPATIENT)
Dept: CARDIOLOGY | Facility: CLINIC | Age: 82
End: 2017-06-14
Payer: MEDICARE

## 2017-06-14 DIAGNOSIS — Z95.0 CARDIAC PACEMAKER IN SITU: Primary | ICD-10-CM

## 2017-06-14 PROCEDURE — 93294 REM INTERROG EVL PM/LDLS PM: CPT | Performed by: INTERNAL MEDICINE

## 2017-06-14 PROCEDURE — 93296 REM INTERROG EVL PM/IDS: CPT | Performed by: INTERNAL MEDICINE

## 2017-06-14 NOTE — MR AVS SNAPSHOT
"              After Visit Summary   6/14/2017    Lindsey Hale    MRN: [id number]           Patient Information     Date Of Birth          9/16/1925        Visit Information        Provider Department      6/14/2017 3:00 PM [facility] TECH1 [facility] HEART AT [place]        Today's Diagnoses     Cardiac pacemaker in situ    -  1       Follow-ups after your visit        Additional Services     Follow-Up with Device Clinic                 Future tests that were ordered for you today     Open Future Orders        Priority Expected Expires Ordered    Follow-Up with Device Clinic Routine 9/15/2017 6/15/2018 6/15/2017            Who to contact     If you have questions or need follow up information about today's clinic visit or your schedule please contact [facility] directly at [phone].  Normal or non-critical lab and imaging results will be communicated to you by IActionablehart, letter or phone within 4 business days after the clinic has received the results. If you do not hear from us within 7 days, please contact the clinic through IActionablehart or phone. If you have a critical or abnormal lab result, we will notify you by phone as soon as possible.  Submit refill requests through [employer] or call your pharmacy and they will forward the refill request to us. Please allow 3 business days for your refill to be completed.          Additional Information About Your Visit        IActionablehart Information     [employer] lets you send messages to your doctor, view your test results, renew your prescriptions, schedule appointments and more. To sign up, go to [email or web address] . Click on \"Log in\" on the left side of the screen, which will take you to the Welcome page. Then click on \"Sign up Now\" on the right side of the page.     You will be asked to enter the access code listed below, as well as some personal information. Please follow the directions to create your " username and password.     Your access code is: [email or web address]  Expires: 2017  8:41 AM     Your access code will  in 90 days. If you need help or a new code, please call your [place] clinic or [phone].        Care EveryWhere ID     This is your Care EveryWhere ID. This could be used by other organizations to access your [place] medical records  [id number]         Blood Pressure from Last 3 Encounters:   17 152/73   17 136/60   17 118/60    Weight from Last 3 Encounters:   17 80.7 kg (178 lb)   17 80.7 kg (178 lb)   17 81.2 kg (179 lb)              We Performed the Following     INTERROGATION DEVICE EVAL REMOTE, PACER/ICD (16850)     PM DEVICE INTERROGATE REMOTE (18097)        Primary Care Provider Office Phone # Fax #    Jeffery Sherwood [phone]       [facility] [address]  [facility] [address]        Thank you!     Thank you for choosing [facility] PHYSICIANS HEART AT [place]  for your care. Our goal is always to provide you with excellent care. Hearing back from our patients is one way we can continue to improve our services. Please take a few minutes to complete the written survey that you may receive in the mail after your visit with us. Thank you!             Your Updated Medication List - Protect others around you: Learn how to safely use, store and throw away your medicines at www.disposemymeds.org.          This list is accurate as of: 17 11:59 PM.  Always use your most recent med list.                   Brand Name Dispense Instructions for use    budesonide-formoterol 160-4.5 MCG/ACT Inhaler    SYMBICORT     Inhale 2 puffs into the lungs 2 times daily       flecainide acetate 150 MG Tabs     90 tablet    1/2 tab bid       furosemide 20 MG tablet    LASIX    181 tablet    Take 1 tablet (20 mg) by mouth daily Pt taking one tab of 20mg furosemide every day and two tabs every other day if  needed       levothyroxine 75 MCG tablet    SYNTHROID/LEVOTHROID    90 tablet    TAKE 1 TABLET BY MOUTH DAILY       metoprolol 50 MG tablet    LOPRESSOR    270 tablet    TAKE 1 AND 1/2 TABLETS BY MOUTH TWICE DAILY       rivaroxaban ANTICOAGULANT 20 MG Tabs tablet    XARELTO    90 tablet    Take 1 tablet (20 mg) by mouth daily (with dinner)       traMADol 50 MG tablet    ULTRAM    90 tablet    Take 1 tablet (50 mg) by mouth every 6 hours as needed for pain

## 2017-06-15 NOTE — PROGRESS NOTES
[facility] [employer] (D) Remote PPM Device Check  AP: 47 % : >99 %  Mode: DDDR        Presenting Rhythm: AP/, AS/  Heart Rate: Adequate rates per histogram  Sensing: Stable    Pacing Threshold: Stable    Impedance: Stable  Battery Status: 7 years  Atrial Arrhythmia: None  Ventricular Arrhythmia: None     Care Plan: F/u Annual threshold in 3 months. LM with results.  RusselCVT     I have reviewed and interpreted the device interrogation, settings, programming and summary. The device is functioning within normal device parameters. I agree with the current findings, assessment and plan.

## 2017-07-18 ENCOUNTER — TELEPHONE (OUTPATIENT)
Dept: FAMILY MEDICINE | Facility: CLINIC | Age: 82
End: 2017-07-18

## 2017-07-18 ENCOUNTER — OFFICE VISIT (OUTPATIENT)
Dept: FAMILY MEDICINE | Facility: CLINIC | Age: 82
End: 2017-07-18
Payer: MEDICARE

## 2017-07-18 VITALS
OXYGEN SATURATION: 94 % | HEART RATE: 59 BPM | HEIGHT: 62 IN | WEIGHT: 180 LBS | SYSTOLIC BLOOD PRESSURE: 114 MMHG | BODY MASS INDEX: 33.13 KG/M2 | RESPIRATION RATE: 18 BRPM | TEMPERATURE: 98.1 F | DIASTOLIC BLOOD PRESSURE: 56 MMHG

## 2017-07-18 DIAGNOSIS — I48.0 PAROXYSMAL ATRIAL FIBRILLATION (H): ICD-10-CM

## 2017-07-18 DIAGNOSIS — R30.0 DYSURIA: Primary | ICD-10-CM

## 2017-07-18 DIAGNOSIS — N39.0 UTI (URINARY TRACT INFECTION), UNCOMPLICATED: ICD-10-CM

## 2017-07-18 LAB
ALBUMIN UR-MCNC: NEGATIVE MG/DL
APPEARANCE UR: CLEAR
BACTERIA #/AREA URNS HPF: ABNORMAL /HPF
BILIRUB UR QL STRIP: NEGATIVE
COLOR UR AUTO: YELLOW
GLUCOSE UR STRIP-MCNC: NEGATIVE MG/DL
HGB UR QL STRIP: ABNORMAL
KETONES UR STRIP-MCNC: NEGATIVE MG/DL
LEUKOCYTE ESTERASE UR QL STRIP: ABNORMAL
NITRATE UR QL: POSITIVE
PH UR STRIP: 5.5 PH (ref 5–7)
RBC #/AREA URNS AUTO: ABNORMAL /HPF (ref 0–2)
SP GR UR STRIP: 1.02 (ref 1–1.03)
URN SPEC COLLECT METH UR: ABNORMAL
UROBILINOGEN UR STRIP-ACNC: 0.2 EU/DL (ref 0.2–1)
WBC #/AREA URNS AUTO: ABNORMAL /HPF (ref 0–2)

## 2017-07-18 PROCEDURE — 81001 URINALYSIS AUTO W/SCOPE: CPT | Performed by: INTERNAL MEDICINE

## 2017-07-18 PROCEDURE — 99213 OFFICE O/P EST LOW 20 MIN: CPT | Performed by: INTERNAL MEDICINE

## 2017-07-18 PROCEDURE — 87086 URINE CULTURE/COLONY COUNT: CPT | Performed by: INTERNAL MEDICINE

## 2017-07-18 PROCEDURE — 87186 SC STD MICRODIL/AGAR DIL: CPT | Performed by: INTERNAL MEDICINE

## 2017-07-18 PROCEDURE — 87088 URINE BACTERIA CULTURE: CPT | Performed by: INTERNAL MEDICINE

## 2017-07-18 RX ORDER — SULFAMETHOXAZOLE/TRIMETHOPRIM 800-160 MG
1 TABLET ORAL 2 TIMES DAILY
Qty: 6 TABLET | Refills: 0 | Status: SHIPPED | OUTPATIENT
Start: 2017-07-18 | End: 2017-11-17

## 2017-07-18 NOTE — TELEPHONE ENCOUNTER
Reason for call:  Patient reporting a symptom    Symptom or request: Possible bladder infection. Using the bathroom frequently. Some pain associated with urination.    Duration (how long have symptoms been present): about 4 days.     Have you been treated for this before? Yes    Additional comments: Daughter Oxana hoping to get Lindsey in sometime this week. Had trouble finding open appointments.     Phone Number patient can be reached at:  Other phone number:  Stanford Daigle: [phone]    Best Time:  any    Can we leave a detailed message on this number:  YES    Call taken on 7/18/2017 at 12:20 PM by Lexie Robertson

## 2017-07-18 NOTE — TELEPHONE ENCOUNTER
Daughter santosh states Patient is having urinary frequency and discomfort with urination.  Recommended appt which daughter agrees and appt scheduled today.  Aurora Linn RN

## 2017-07-18 NOTE — PATIENT INSTRUCTIONS
Your urine test is positive for UTI.  Take antibiotics as prescribed  Follow up with your PCP as needed

## 2017-07-18 NOTE — PROGRESS NOTES
SUBJECTIVE:                                                    Lindsey Hale is a 89 year old female who presents to clinic today for the following health issues:    Pt. Presented to clinic with daughter, but is currently living independently  Pt. Says she get regular physical activity by swimming  Says she's been having symptoms for 4 days  Felt like she had to go to the bathroom constantly over the weekend  Pt. Has been experiencing chills  Reviewed medication        URINARY TRACT SYMPTOMS      Duration: 4 days    Description  dysuria and frequency    Intensity:  mild    Accompanying signs and symptoms:  Fever/chills: no   Flank pain no   Nausea and vomiting: no   Vaginal symptoms: Pressure and 1 episode incontinence  Abdominal/Pelvic Pain: no     History  History of frequent UTI's: no   History of kidney stones: no   Sexually Active: no   Possibility of pregnancy: No    Precipitating or alleviating factors: None    Therapies tried and outcome: increase fluid intake   Outcome: No relief           Problem list and histories reviewed & adjusted, as indicated.  Additional history: as documented    Patient Active Problem List   Diagnosis     Urine incontinence     Transient cerebral ischemia     Arthritis     Osteopenia     Pacemaker     Hyperlipidemia LDL goal <160     Elevated blood sugar     Chronic low back pain     Benign essential hypertension     PMR (polymyalgia rheumatica) (H)     Advanced directives, counseling/discussion     SOB (shortness of breath)     Mitral regurgitation     Hypothyroidism, unspecified type     Chronic obstructive pulmonary disease, unspecified COPD type (H)     Paroxysmal atrial fibrillation (H)     Abnormal echocardiogram     Past Surgical History:   Procedure Laterality Date     ARTHROPLASTY PATELLO-FEMORAL (KNEE)  1998 and 2002     ARTHROSCOPY KNEE  1997     BIOPSY BREAST Right 9/23/2014    Procedure: BIOPSY BREAST;  Surgeon: David Carter MD;  Location: [facility]      breast implants      4x     cataract  2011     FOOT SURGERY  2003     MASTECTOMY  1980    bilat, cysts     nj not bso  70's    not for ca       Social History   Substance Use Topics     Smoking status: Former Smoker     Types: Cigarettes     Quit date: 2/1/1955     Smokeless tobacco: Never Used      Comment: quit in 1955   had smoked about 2 cigarettes a day or more     Alcohol use 0.0 oz/week     0 Standard drinks or equivalent per week      Comment: occ wine     Family History   Problem Relation Age of Onset     C.A.D. Father      Lymphoma Father      CANCER Mother      Lymphoma Brother      Breast Cancer Sister      OSTEOPOROSIS Sister      Myocardial Infarction Sister      Unknown/Adopted Maternal Grandmother      Unknown/Adopted Maternal Grandfather      Unknown/Adopted Paternal Grandmother      Unknown/Adopted Paternal Grandfather          Current Outpatient Prescriptions   Medication Sig Dispense Refill     sulfamethoxazole-trimethoprim (BACTRIM DS/SEPTRA DS) 800-160 MG per tablet Take 1 tablet by mouth 2 times daily 6 tablet 0     levothyroxine (SYNTHROID/LEVOTHROID) 75 MCG tablet TAKE 1 TABLET BY MOUTH DAILY 90 tablet 2     metoprolol (LOPRESSOR) 50 MG tablet TAKE 1 AND 1/2 TABLETS BY MOUTH TWICE DAILY 270 tablet 0     rivaroxaban ANTICOAGULANT (XARELTO) 20 MG TABS tablet Take 1 tablet (20 mg) by mouth daily (with dinner) 90 tablet 2     flecainide acetate 150 MG TABS 1/2 tab bid 90 tablet 3     furosemide (LASIX) 20 MG tablet Take 1 tablet (20 mg) by mouth daily Pt taking one tab of 20mg furosemide every day and two tabs every other day if needed 181 tablet 3     traMADol (ULTRAM) 50 MG tablet Take 1 tablet (50 mg) by mouth every 6 hours as needed for pain 90 tablet 1     budesonide-formoterol (SYMBICORT) 160-4.5 MCG/ACT inhaler Inhale 2 puffs into the lungs 2 times daily       Allergies   Allergen Reactions     Clindamycin Hcl Other (See Comments)     Difficulty swallowing     Ampicillin Potassium       "Rash nausea and vomiting       Celebrex [Celecoxib]      rash     Ciprofloxacin      rash     Erythromycin      rash     Indocin      Ended up going to( [facility] hospital with severe head ache     Penicillins      Rash,nausea and vomiting     Vibramycin [Doxycycline Hyclate]      Rash      Xylocaine-Epinephrine [Epinephrine-Lidocaine-Na Metabisulfite]      Xylocaine with epi caused a rapid heart beat       ROS:  Constitutional, HEENT, cardiovascular, pulmonary, gi and gu systems are negative, except as otherwise noted.    This document serves as a record of the services and decisions personally performed and made by Anita Adamson MD. It was created on her behalf by Julisa Kong, a trained medical scribe. The creation of this document is based the provider's statements to the medical scribe.    Scribe Julisa Kong 4:02 PM, July 18, 2017      OBJECTIVE:                                                    /56 (BP Location: Right arm, Patient Position: Chair, Cuff Size: Adult Regular)  Pulse 59  Temp 98.1  F (36.7  C) (Oral)  Resp 18  Ht 1.562 m (5' 1.5\")  Wt 81.6 kg (180 lb)  SpO2 94%  BMI 33.46 kg/m2  Body mass index is 33.46 kg/(m^2).      GENERAL APPEARANCE: healthy, alert and no distress  Results for orders placed or performed in visit on 07/18/17   *UA reflex to Microscopic and Culture ([place] [facility] (except [place] and [place])   Result Value Ref Range    Color Urine Yellow     Appearance Urine Clear     Glucose Urine Negative NEG mg/dL    Bilirubin Urine Negative NEG    Ketones Urine Negative NEG mg/dL    Specific Gravity Urine 1.020 1.003 - 1.035    Blood Urine Trace (A) NEG    pH Urine 5.5 5.0 - 7.0 pH    Protein Albumin Urine Negative NEG mg/dL    Urobilinogen Urine 0.2 0.2 - 1.0 EU/dL    Nitrite Urine Positive (A) NEG    Leukocyte Esterase Urine Small (A) NEG    Source Midstream Urine    Urine Microscopic   Result Value Ref Range    WBC Urine 25-50  CLUMPS   (A) 0 - 2 /HPF    " RBC Urine O - 2 0 - 2 /HPF    Bacteria Urine Many (A) NEG /HPF                ASSESSMENT/PLAN:                                                        Lindsey was seen today for uti.    Diagnoses and all orders for this visit:    Dysuria  -     *UA reflex to Microscopic and Culture ([place] [facility] (except [place] and [place])  -     Urine Microscopic  -     Urine Culture Aerobic Bacterial    UTI (urinary tract infection), uncomplicated  -     sulfamethoxazole-trimethoprim (BACTRIM DS/SEPTRA DS) 800-160 MG per tablet; Take 1 tablet by mouth 2 times daily  Patient has taken that in the past and it worked  Educated her about that .    Paroxysmal atrial fibrillation (H):  HR under control and she is on xarelto.              Patient Instructions   Your urine test is positive for UTI.  Take antibiotics as prescribed  Follow up with your PCP as needed      The information in this document, created by the medical scribe for me, accurately reflects the services I personally performed and the decisions made by me. I have reviewed and approved this document for accuracy prior to leaving the patient care area.  Anita Adamson MD  4:02 PM, 07/18/17      Anita Adamson MD  [facility]

## 2017-07-18 NOTE — MR AVS SNAPSHOT
"              After Visit Summary   7/18/2017    Lindsey Hale    MRN: [id number]           Patient Information     Date Of Birth          9/16/1925        Visit Information        Provider Department      7/18/2017 3:30 PM Anita Adamson MD [facility]        Today's Diagnoses     Dysuria    -  1    UTI (urinary tract infection), uncomplicated        Paroxysmal atrial fibrillation (H)          Care Instructions    Your urine test is positive for UTI.  Take antibiotics as prescribed  Follow up with your PCP as needed          Follow-ups after your visit        Your next 10 appointments already scheduled     Sep 21, 2017  9:45 AM CDT   Pacemaker Check with DARREN VERDUGO   [facility] PHYSICIANS HEART AT [place] ([facility])    [address] [place], [place] [id number]  [facility] [address] [phone]              Who to contact     If you have questions or need follow up information about today's clinic visit or your schedule please contact [facility] directly at [phone].  Normal or non-critical lab and imaging results will be communicated to you by Conductivhart, letter or phone within 4 business days after the clinic has received the results. If you do not hear from us within 7 days, please contact the clinic through Conductivhart or phone. If you have a critical or abnormal lab result, we will notify you by phone as soon as possible.  Submit refill requests through [employer] or call your pharmacy and they will forward the refill request to us. Please allow 3 business days for your refill to be completed.          Additional Information About Your Visit        Conductivhart Information     [employer] lets you send messages to your doctor, view your test results, renew your prescriptions, schedule appointments and more. To sign up, go to [email or web address] . Click on \"Log in\" on the left side of the screen, which will take you to the Welcome page. Then click on \"Sign up Now\" on the right " "side of the page.     You will be asked to enter the access code listed below, as well as some personal information. Please follow the directions to create your username and password.     Your access code is: [email or web address]  Expires: 2017  8:41 AM     Your access code will  in 90 days. If you need help or a new code, please call your [place] clinic or [phone].        Care EveryWhere ID     This is your Care EveryWhere ID. This could be used by other organizations to access your [place] medical records  [id number]        Your Vitals Were     Pulse Temperature Respirations Height Pulse Oximetry BMI (Body Mass Index)    59 98.1  F (36.7  C) (Oral) 18 5' 1.5\" (1.562 m) 94% 33.46 kg/m2       Blood Pressure from Last 3 Encounters:   17 114/56   17 152/73   17 136/60    Weight from Last 3 Encounters:   17 180 lb (81.6 kg)   17 178 lb (80.7 kg)   17 178 lb (80.7 kg)              We Performed the Following     *UA reflex to Microscopic and Culture ([place] [facility] (except [place] and [place])     Urine Culture Aerobic Bacterial     Urine Microscopic          Today's Medication Changes          These changes are accurate as of: 17  4:12 PM.  If you have any questions, ask your nurse or doctor.               Start taking these medicines.        Dose/Directions    sulfamethoxazole-trimethoprim 800-160 MG per tablet   Commonly known as:  BACTRIM DS/SEPTRA DS   Used for:  UTI (urinary tract infection), uncomplicated   Started by:  Anita Adamson MD        Dose:  1 tablet   Take 1 tablet by mouth 2 times daily   Quantity:  6 tablet   Refills:  0            Where to get your medicines      These medications were sent to [facility] [place], [place] - [address], [address]  [address], [facility] [address], [facility] [address]     Phone:  [phone]     sulfamethoxazole-trimethoprim 800-160 MG per tablet                Primary Care " Provider Office Phone # Fax #    Jeffery Sherwood [phone]       [facility] [address] [facility] [address]  [facility] [address]        Equal Access to Services     PALLAVI TERRY : Hadii aad ku hadjayo Sorheaali, waaxda luqadaha, qaybta kaalmada adeegyada, waxdonal hunterin jaredn jovon snell judy quesada. So [facility] [phone].    ATENCIÓN: Si habla español, tiene a rolon disposición servicios gratuitos de asistencia lingüística. Llame al [phone].    We comply with applicable federal civil rights laws and [place] laws. We do not discriminate on the basis of race, color, national origin, age, disability sex, sexual orientation or gender identity.            Thank you!     Thank you for choosing [facility]  for your care. Our goal is always to provide you with excellent care. Hearing back from our patients is one way we can continue to improve our services. Please take a few minutes to complete the written survey that you may receive in the mail after your visit with us. Thank you!             Your Updated Medication List - Protect others around you: Learn how to safely use, store and throw away your medicines at www.disposemymeds.org.          This list is accurate as of: 7/18/17  4:12 PM.  Always use your most recent med list.                   Brand Name Dispense Instructions for use Diagnosis    budesonide-formoterol 160-4.5 MCG/ACT Inhaler    SYMBICORT     Inhale 2 puffs into the lungs 2 times daily        flecainide acetate 150 MG Tabs     90 tablet    1/2 tab bid    Atrial fibrillation (H)       furosemide 20 MG tablet    LASIX    181 tablet    Take 1 tablet (20 mg) by mouth daily Pt taking one tab of 20mg furosemide every day and two tabs every other day if needed    Atrial fibrillation, unspecified       levothyroxine 75 MCG tablet    SYNTHROID/LEVOTHROID    90 tablet    TAKE 1 TABLET BY MOUTH DAILY    Hypothyroidism, unspecified type       metoprolol 50 MG tablet     LOPRESSOR    270 tablet    TAKE 1 AND 1/2 TABLETS BY MOUTH TWICE DAILY    Paroxysmal atrial fibrillation (H)       rivaroxaban ANTICOAGULANT 20 MG Tabs tablet    XARELTO    90 tablet    Take 1 tablet (20 mg) by mouth daily (with dinner)    Atrial fibrillation (H)       sulfamethoxazole-trimethoprim 800-160 MG per tablet    BACTRIM DS/SEPTRA DS    6 tablet    Take 1 tablet by mouth 2 times daily    UTI (urinary tract infection), uncomplicated       traMADol 50 MG tablet    ULTRAM    90 tablet    Take 1 tablet (50 mg) by mouth every 6 hours as needed for pain    Arthritis

## 2017-07-18 NOTE — NURSING NOTE
"Chief Complaint   Patient presents with     UTI       Initial /56 (BP Location: Right arm, Patient Position: Chair, Cuff Size: Adult Regular)  Pulse 59  Temp 98.1  F (36.7  C) (Oral)  Resp 18  Ht 5' 1.5\" (1.562 m)  Wt 180 lb (81.6 kg)  SpO2 94%  BMI 33.46 kg/m2 Estimated body mass index is 33.46 kg/(m^2) as calculated from the following:    Height as of this encounter: 5' 1.5\" (1.562 m).    Weight as of this encounter: 180 lb (81.6 kg).  Medication Reconciliation: complete   Ro Balderas CMA (AAMA)      "

## 2017-07-19 LAB
BACTERIA SPEC CULT: ABNORMAL
MICRO REPORT STATUS: ABNORMAL
MICROORGANISM SPEC CULT: ABNORMAL
SPECIMEN SOURCE: ABNORMAL

## 2017-07-20 NOTE — PROGRESS NOTES
Please Notify the patient  Urine culture grew E.coli  Bactrim(sulfa drug) will work   Take bactrim as prescribed

## 2017-08-26 DIAGNOSIS — I48.0 PAROXYSMAL ATRIAL FIBRILLATION (H): ICD-10-CM

## 2017-08-26 NOTE — TELEPHONE ENCOUNTER
Pending Prescriptions:                       Disp   Refills    metoprolol (LOPRESSOR) 50 MG tablet [Phar*270 ta*0            Sig: TAKE 1 AND 1/2 TABLETS BY MOUTH TWICE DAILY          Last Written Prescription Date: 5/23/17  Last Fill Quantity: 270, # refills: 0    [facility] Office Visit with [facility] or [facility] prescribing provider:  7/18/17 Soomar   Future Office Visit:        BP Readings from Last 3 Encounters:   07/18/17 114/56   03/17/17 152/73   03/09/17 136/60     RT Geronimo(R)

## 2017-08-29 NOTE — TELEPHONE ENCOUNTER
Routing to TC to contact patient to schedule OV with PCP (per 4/3/17 encounter, pt to follow up with PCP 2-3 weeks)    Rasheeda VELIZ RN

## 2017-08-30 RX ORDER — METOPROLOL TARTRATE 50 MG
TABLET ORAL
Qty: 270 TABLET | Refills: 2 | Status: SHIPPED | OUTPATIENT
Start: 2017-08-30 | End: 2017-11-17

## 2017-08-30 NOTE — TELEPHONE ENCOUNTER
PCP,  You wanted pt follow up in 2-3 weeks on 4/3/17 due to Echocardiogram. Pt has since seen team for uti.  Are you still wanting a face to face with this pt, or anything we can follow up on?  Did just fill Metoprolol RX per protocol.  Iris Child RN

## 2017-08-30 NOTE — TELEPHONE ENCOUNTER
The patients daughter Oxana Briggs called to find out why her mother needed to schedule an appointment for the refill  Please call her at [phone]  Consent to communicate on file but I could not open it

## 2017-08-31 ENCOUNTER — HOSPITAL ENCOUNTER (OUTPATIENT)
Dept: OCCUPATIONAL THERAPY | Facility: CLINIC | Age: 82
Setting detail: THERAPIES SERIES
End: 2017-08-31
Payer: MEDICARE

## 2017-08-31 PROCEDURE — 97535 SELF CARE MNGMENT TRAINING: CPT | Mod: GO | Performed by: REHABILITATION PRACTITIONER

## 2017-08-31 PROCEDURE — G8987 SELF CARE CURRENT STATUS: HCPCS | Mod: GO,CJ | Performed by: REHABILITATION PRACTITIONER

## 2017-08-31 PROCEDURE — 97166 OT EVAL MOD COMPLEX 45 MIN: CPT | Mod: GO | Performed by: REHABILITATION PRACTITIONER

## 2017-08-31 PROCEDURE — G8988 SELF CARE GOAL STATUS: HCPCS | Mod: GO,CI | Performed by: REHABILITATION PRACTITIONER

## 2017-08-31 PROCEDURE — 40000249 ZZH STATISTIC VISIT LOW VISION CLINIC: Performed by: REHABILITATION PRACTITIONER

## 2017-08-31 ASSESSMENT — VISUAL ACUITY
OD: 20/30
OS: 20/25

## 2017-08-31 ASSESSMENT — ACTIVITIES OF DAILY LIVING (ADL): PRIOR_ADL/IADL_STATUS: IMPAIRED PRIOR TO ONSET

## 2017-08-31 NOTE — PROGRESS NOTES
[facility]          OUTPATIENT OCCUPATIONALTHERAPY  EVALUATION  PLAN OF TREATMENT FOR OUTPATIENT REHABILITATION  (COMPLETE FOR INITIAL CLAIMS ONLY)  Patient's Last Name, First Name, M.I.  YOB: 1925  Lindsey Hale      Provider's Name  [facility] Medical Record No.  [id number]   Onset Date:  2/27/17 Start of Care Date:  08/31/17   Type:     ___PT  _X_OT   ___SLP Medical Diagnosis:  Mild vision loss   Therapy Diagnosis: Impaired ADL/IADL with deficits in Reading based ADL, Written communication, Home management, Financial management (Community reintegration)    Visits from SOC: 1     _________________________________________________________________________________  Plan of Treatment/Functional Goals:  Planned Interventions: Scotoma awareness, Low vision compensatory training for reading, Low vision compensatory training for written communication, Low vision compensatory trainging for financial management, Low vision compensatory training for object identification, Instruction in environmental adaptations for glare, Instruction in environmental adaptations for contrast, Instruction in environmental adaptations for lighting, Optical device/ADL device instruction and training, Instruction in community resources   Plan Comments: Pt will benefit from skilled low vision services to improve I with ADL's.     Goals  1. Patient will verbalize awareness of visual field Loss and demonstrate improved use of visual skills/adaptive equipment for increased independence in reading-based activities of daily living, written communication and detail ADL tasks.         Target Date: 10/26/17      2.                  3.  Patient will demonstrate understanding of the impact of lighting, contrast and glare on ADL activities, in conjunction with environmentally-based ADL modifications          Target Date: 10/26/17      4. Patient will verbalize awareness of community resources for the following:, Audio access to print materials, Access to large print materials, Access to low vision devices, Transportation alternatives         Target Date: 10/26/17      5.                   6.                    7.  Pt will demonstrate modified I with writing note to self, reading written note and writing out check with environmental adaptations and AE.     Target Date: 10/26/17        8.                            Therapy Frequency/Duration:  1x/week x 8 weeks    Bebe Tenorio OT       I CERTIFY THE NEED FOR THESE SERVICES FURNISHED UNDER        THIS PLAN OF TREATMENT AND WHILE UNDER MY CARE .             Physician Signature               Date    X_____________________________________________________                          Certification date from: 08/31/17 Certification date to: 10/26/17          Referring Physician: Dr. Lo     Initial Assessment        See Epic Evaluation Start Of Care Date: 08/31/17     Bebe Tenorio

## 2017-08-31 NOTE — PROGRESS NOTES
08/31/17 0900   Visit Type   Type of Visit Initial       Present No   General Information   Start Of Care Date 08/31/17   Referring Physician Dr. Lo   Orders Evaluate And Treat As Indicated   Orders Comment Visual Rehab Services   Date of Order 02/27/17   Medical Diagnosis AMD   Onset Of Illness/injury Or Date Of Surgery 2/27/17   Surgical/Medical history reviewed Yes   Additional Occupational Profile Info/Pertinent History of Current Problem Pt has COPD and bilateral knee replacements   Prior ADL/IADL status Impaired prior to onset   Prior Status Comment Impaired balance due to knee arthritis, unable to get down into the tub to bathe.    Others present at visit Child (elena)  ([place])   Patient/family Goals Statement Improve reading, be able to read writing   General information comments Pt has an appointment with Dr. Crespo in 2 weeks for another shot.  Pt reports she has had 2 shots in her right eye resulting in fluctuation of vision.     Social History/Home Environment   Living Environment [place]   Current Community Support Family/friend caregiver   Patient Role/employment History  Retired   Avocational Pt used to walk a lot and does not now, loves to read (can't any longer), used to enjoy cooking.  Pt reports she has overall slowed down all over.     Social/Environment Comment Pt has been living in home for 53 years.  Pt drives within 12 blocks and has self limited driving.  Pt reports she does not drive when she isn't seeing well on a particular day   Pain Assessment   Pain Reported Yes   Pain Location back   Pain Scale 6/10   Comments Stenosis in the back   Fall Risk Screen   Fall screen completed by OT   Have you fallen 2 or more times in the last year? No   Have you fallen and had an injury in the past year? No   Comments No falls in past 2 years   Cognitive/Behavioral   Communication Impaired   Cognitive Status Intact for evaluation process   Behavior Appropriate    Patient/family aware of diagnosis Yes   How well do you understand your eye condition? Well   Vision related restrictions on visiting with family/friends None   Reported emotional impact of visual impairment Mild   Adjustment to disability Good   Cognitive/Behavioral Comment Pt is [place]   Physical Status/Equipment   Physical Status Weakness   Physical Status/Equipment comments Uses walk in shower.   Visual Report   Functional Complaints Reading;Writing   Visual Complaints Scotoma Hindrance right eye;Scotoma Hindrance left eye   Darryl Bonnet Symptoms? No   Reading glasses Trifocal   Technology (tv, cell phone)   Equipment comments Pt owns jitterbug   Lighting and Glare   Is your lighting adequate? Yes/ at home   Visual Acuity   Acuity right eye 20/30   Acuity left eye 20/25   Acuity both eyes together Reading acuity is 20/80   Contrast Sensitivity   Contrast sensitivity (score/25) 15/25   Preferred Retinal Locus   Right eye Ring scotoma   Left eye Central scotoma   Left eye eccentric viewing position Superior   [place] Read   Smallest print size read 1.3M   Critical print size 2.0M   Words per minute at critical print size 107.9   Functional Reading Screen   Current optical aids used Reading glasses   Identifies medication bottles No   Reads bills No   Reads recipes No   Reads directions Yes   SRAFVP SCORING   # relevant measures 37   Composite score 59   Percentage of disability (%) 20.27   Clinical Impression, OT Eval   Criteria for Skilled Therapeutic Interventions Met yes;treatment indicated   Therapy  Diagnosis: Impaired ADL/IADL with deficits in Reading based ADL;Written communication;Home management;Financial management  (Community reintegration)   Assessment of Occupational Performance 3-5 Performance Deficits   Identified Performance Deficits Impaired safety with mobility, impaired community mobility, safety with transfers, reading based ADL, writing based ADL   Clinical Decision Making (Complexity) Moderate  complexity   Clinical Impression Comments Eval modified due to vision loss   OT Visual Rehabilitation Evaluation Plan   Therapy Plan Occupational therapy intervention   Planned Interventions Scot[place] awareness;Low vision compensatory training for reading;Low vision compensatory training for written communication;Low vision compensatory trainging for financial management;Low vision compensatory training for object identification;Instruction in environmental adaptations for glare;Instruction in environmental adaptations for contrast;Instruction in environmental adaptations for lighting;Optical device/ADL device instruction and training;Instruction in community resources   Frequency / Duration 1x/week x 8 weeks   Risks and Benefits of Treatment have been explained. Yes   Patient, Family in agreement with plan of care Yes   Plan Comments Pt will benefit from skilled low vision services to improve I with ADL's.    GOALS   Goals Environmental Modification;Resource Education;Near Vision;7   Goal 1 - Near Vision   Goal Description: Near Vision Patient will verbalize awareness of visual field Loss and demonstrate improved use of visual skills/adaptive equipment for increased independence in reading-based activities of daily living, written communication and detail ADL tasks.   Target Date 10/26/17   Goal 3 - Environmental modification   Goal Description: Environment modification Patient will demonstrate understanding of the impact of lighting, contrast and glare on ADL activities, in conjunction with environmentally-based ADL modifications   Target Date 10/26/17   Goal 4 - Resource education   Goal Description: Resource education Patient will verbalize awareness of community resources for the following:;Audio access to print materials;Access to large print materials;Access to low vision devices;Transportation alternatives   Target Date 10/26/17   Goal 7   Goal Description Pt will demonstrate modified I with writing note to  self, reading written note and writing out check with environmental adaptations and AE.   Target Date 10/26/17   Total Evaluation Time   Total Evaluation Time 30   Therapy Certification   Certification date from 08/31/17   Certification date to 10/26/17   Medical Diagnosis Mild vision loss   Certification I certify the need for these services furnished under this plan of treatment and while under my care.  (Physician co-signature of this document indicates review and certification of the therapy plan).

## 2017-09-21 ENCOUNTER — ALLIED HEALTH/NURSE VISIT (OUTPATIENT)
Dept: CARDIOLOGY | Facility: CLINIC | Age: 82
End: 2017-09-21
Payer: MEDICARE

## 2017-09-21 ENCOUNTER — TRANSFERRED RECORDS (OUTPATIENT)
Dept: HEALTH INFORMATION MANAGEMENT | Facility: CLINIC | Age: 82
End: 2017-09-21

## 2017-09-21 DIAGNOSIS — I49.5 SSS (SICK SINUS SYNDROME) (H): Primary | ICD-10-CM

## 2017-09-21 DIAGNOSIS — Z95.0 CARDIAC PACEMAKER IN SITU: ICD-10-CM

## 2017-09-21 PROCEDURE — 93280 PM DEVICE PROGR EVAL DUAL: CPT | Performed by: INTERNAL MEDICINE

## 2017-09-21 NOTE — PROGRESS NOTES
[employer] [facility] Accent (D) Pacemaker Device Check  AP: 44% : >99 %  Mode: DDDR         Underlying Rhythm: CHB with no junctional escape at VVI 30 - this is a change from 2016.   Heart Rate: stable with good variability   Sensing: A-WNL, V-dependent      Pacing Threshold: WNL   Impedance: WNL  Battery Status: estimated 7 years longevity remaining  Atrial Arrhythmia: 0  Ventricular Arrhythmia: 0  Setting Change: no changes made today    Care Plan: Return to Q3 month remote checks in December. Osmani/Katie

## 2017-09-25 ENCOUNTER — HOSPITAL ENCOUNTER (OUTPATIENT)
Dept: OCCUPATIONAL THERAPY | Facility: CLINIC | Age: 82
Setting detail: THERAPIES SERIES
End: 2017-09-25
Payer: MEDICARE

## 2017-09-25 PROCEDURE — 97535 SELF CARE MNGMENT TRAINING: CPT | Mod: GO | Performed by: REHABILITATION PRACTITIONER

## 2017-09-25 PROCEDURE — 40000249 ZZH STATISTIC VISIT LOW VISION CLINIC: Performed by: REHABILITATION PRACTITIONER

## 2017-10-19 ENCOUNTER — RADIANT APPOINTMENT (OUTPATIENT)
Dept: GENERAL RADIOLOGY | Facility: CLINIC | Age: 82
End: 2017-10-19
Attending: NURSE PRACTITIONER
Payer: MEDICARE

## 2017-10-19 ENCOUNTER — OFFICE VISIT (OUTPATIENT)
Dept: FAMILY MEDICINE | Facility: CLINIC | Age: 82
End: 2017-10-19
Payer: MEDICARE

## 2017-10-19 VITALS
SYSTOLIC BLOOD PRESSURE: 150 MMHG | OXYGEN SATURATION: 98 % | TEMPERATURE: 98.3 F | DIASTOLIC BLOOD PRESSURE: 66 MMHG | HEART RATE: 73 BPM | WEIGHT: 177 LBS | BODY MASS INDEX: 32.9 KG/M2

## 2017-10-19 DIAGNOSIS — J44.9 CHRONIC OBSTRUCTIVE PULMONARY DISEASE, UNSPECIFIED COPD TYPE (H): ICD-10-CM

## 2017-10-19 DIAGNOSIS — R05.9 COUGH: Primary | ICD-10-CM

## 2017-10-19 DIAGNOSIS — R05.9 COUGH: ICD-10-CM

## 2017-10-19 PROCEDURE — 71020 XR CHEST 2 VW: CPT

## 2017-10-19 PROCEDURE — 99213 OFFICE O/P EST LOW 20 MIN: CPT | Performed by: NURSE PRACTITIONER

## 2017-10-19 RX ORDER — IPRATROPIUM BROMIDE AND ALBUTEROL SULFATE 2.5; .5 MG/3ML; MG/3ML
1 SOLUTION RESPIRATORY (INHALATION) EVERY 4 HOURS PRN
Qty: 90 VIAL | Refills: 0 | Status: SHIPPED | OUTPATIENT
Start: 2017-10-19 | End: 2017-11-17

## 2017-10-19 RX ORDER — PREDNISONE 20 MG/1
40 TABLET ORAL DAILY
Qty: 10 TABLET | Refills: 0 | Status: SHIPPED | OUTPATIENT
Start: 2017-10-19 | End: 2017-10-24

## 2017-10-19 RX ORDER — DOXYCYCLINE 100 MG/1
100 CAPSULE ORAL 2 TIMES DAILY
Qty: 14 CAPSULE | Refills: 0 | Status: SHIPPED | OUTPATIENT
Start: 2017-10-19 | End: 2017-10-26

## 2017-10-19 RX ORDER — IPRATROPIUM BROMIDE AND ALBUTEROL SULFATE 2.5; .5 MG/3ML; MG/3ML
1 SOLUTION RESPIRATORY (INHALATION) ONCE
Qty: 1 VIAL | Refills: 0
Start: 2017-10-19 | End: 2017-10-19

## 2017-10-19 NOTE — NURSING NOTE
The following nebulizer treatment was given:     MEDICATION: Duoneb  : [employer]  LOT #: [id number]  EXPIRATION DATE:  5/2019  NDC # 0699-9106-82  TRI Parisi

## 2017-10-19 NOTE — MR AVS SNAPSHOT
After Visit Summary   10/19/2017    Lindsey Hale    MRN: [id number]           Patient Information     Date Of Birth          9/16/1925        Visit Information        Provider Department      10/19/2017 2:30 PM Dakota Ledbetter APRN CNP [facility]        Today's Diagnoses     Cough    -  1    Chronic obstructive pulmonary disease, unspecified COPD type (H)          Care Instructions    Use the nebulizer every 4 hours until your cough improved   STart the prednisone tomorrow if your symptoms are not getting any better  Wait over the weekend and start the antibiotic on Monday if you are not getting better.   If anything becomes severe and you get fevers, start the antibiotic earlier or do not hesitate to go to the ER           Follow-ups after your visit        Your next 10 appointments already scheduled     Oct 24, 2017 10:30 AM CDT   L/Vision Treatment with Bebe Tenorio OT   [facility] ([facility])    [address]  [address]  [facility] [id number] [phone]            Dec 28, 2017  2:00 PM CST   Remote PPM Check with BANKS TECH1   [facility] [place] ([facility])    [address] [place] [address] [address]  [facility] [address]   [phone]           This appointment is for a remote check of your pacemaker.  This is not an appointment at the office.              Who to contact     If you have questions or need follow up information about today's clinic visit or your schedule please contact [facility] directly at [phone].  Normal or non-critical lab and imaging results will be communicated to you by MyChart, letter or phone within 4 business days after the clinic has received the results. If you do not hear from us within 7 days, please contact the clinic through MyChart or phone. If you have a critical or abnormal lab result, we will notify you by phone as soon as possible.  Submit  "refill requests through [employer] or call your pharmacy and they will forward the refill request to us. Please allow 3 business days for your refill to be completed.          Additional Information About Your Visit        [employer] Information     [employer] lets you send messages to your doctor, view your test results, renew your prescriptions, schedule appointments and more. To sign up, go to [email or web address] . Click on \"Log in\" on the left side of the screen, which will take you to the Welcome page. Then click on \"Sign up Now\" on the right side of the page.     You will be asked to enter the access code listed below, as well as some personal information. Please follow the directions to create your username and password.     Your access code is: [email or web address]  Expires: 2017 10:18 AM     Your access code will  in 90 days. If you need help or a new code, please call your [place] clinic or [phone].        Care EveryWhere ID     This is your Care EveryWhere ID. This could be used by other organizations to access your [place] medical records  [id number]        Your Vitals Were     Pulse Temperature Pulse Oximetry Breastfeeding? BMI (Body Mass Index)       73 98.3  F (36.8  C) (Oral) 98% No 32.9 kg/m2        Blood Pressure from Last 3 Encounters:   10/19/17 150/66   17 114/56   17 152/73    Weight from Last 3 Encounters:   10/19/17 177 lb (80.3 kg)   17 180 lb (81.6 kg)   17 178 lb (80.7 kg)                 Today's Medication Changes          These changes are accurate as of: 10/19/17  3:30 PM.  If you have any questions, ask your nurse or doctor.               Start taking these medicines.        Dose/Directions    doxycycline monohydrate 100 MG capsule   Used for:  Cough, Chronic obstructive pulmonary disease, unspecified COPD type (H)   Started by:  Dakota Ledbetter APRN CNP        Dose:  100 mg   Take 1 capsule (100 mg) by mouth 2 times daily for 7 days   Quantity:  " 14 capsule   Refills:  0       * ipratropium - albuterol 0.5 mg/2.5 mg/3 mL 0.5-2.5 (3) MG/3ML neb solution   Commonly known as:  DUONEB   Used for:  Cough   Started by:  Dakota Ledbetter APRN CNP        Dose:  1 vial   Take 1 vial (3 mLs) by nebulization once for 1 dose   Quantity:  1 vial   Refills:  0       * ipratropium - albuterol 0.5 mg/2.5 mg/3 mL 0.5-2.5 (3) MG/3ML neb solution   Commonly known as:  DUONEB   Used for:  Chronic obstructive pulmonary disease, unspecified COPD type (H)   Started by:  Dakota Ledbetter APRN CNP        Dose:  1 vial   Take 1 vial (3 mLs) by nebulization every 4 hours as needed for shortness of breath / dyspnea or wheezing   Quantity:  90 vial   Refills:  0       order for DME   Used for:  Cough, Chronic obstructive pulmonary disease, unspecified COPD type (H)   Started by:  Dakota Ledbetter APRN CNP        Nebulizer machine with tubing   Quantity:  1 each   Refills:  0       predniSONE 20 MG tablet   Commonly known as:  DELTASONE   Used for:  Cough, Chronic obstructive pulmonary disease, unspecified COPD type (H)   Started by:  Dakota Ledbetter APRN CNP        Dose:  40 mg   Take 2 tablets (40 mg) by mouth daily for 5 days   Quantity:  10 tablet   Refills:  0       * Notice:  This list has 2 medication(s) that are the same as other medications prescribed for you. Read the directions carefully, and ask your doctor or other care provider to review them with you.         Where to get your medicines      These medications were sent to [facility] [address]  [address], [place] [address], [facility] [address]     Phone:  [phone]     ipratropium - albuterol 0.5 mg/2.5 mg/3 mL 0.5-2.5 (3) MG/3ML neb solution    predniSONE 20 MG tablet         Some of these will need a paper prescription and others can be bought over the counter.  Ask your nurse if you have questions.     Bring a paper prescription for each of  these medications     doxycycline monohydrate 100 MG capsule    order for DME       You don't need a prescription for these medications     ipratropium - albuterol 0.5 mg/2.5 mg/3 mL 0.5-2.5 (3) MG/3ML neb solution                Primary Care Provider Office Phone # Fax #    Jeffery Marvin Sherwood [phone] [address] [facility] [place] [address] [address]        Equal Access to Services     PALLAVI TERRY : Hadii aad ku hadasho Soomaali, waaxda luqadaha, qaybta kaalmada adeegyada, waxay idiin hayaan adeeg sigridarash la'andren . So [facility] [phone].    ATENCIÓN: Si habla español, tiene a rolon disposición servicios gratuitos de asistencia lingüística. [facility] [phone].    We comply with applicable federal civil rights laws and [place] laws. We do not discriminate on the basis of race, color, national origin, age, disability, sex, sexual orientation, or gender identity.            Thank you!     Thank you for choosing [facility]  for your care. Our goal is always to provide you with excellent care. Hearing back from our patients is one way we can continue to improve our services. Please take a few minutes to complete the written survey that you may receive in the mail after your visit with us. Thank you!             Your Updated Medication List - Protect others around you: Learn how to safely use, store and throw away your medicines at www.disposemymeds.org.          This list is accurate as of: 10/19/17  3:30 PM.  Always use your most recent med list.                   Brand Name Dispense Instructions for use Diagnosis    budesonide-formoterol 160-4.5 MCG/ACT Inhaler    SYMBICORT     Inhale 2 puffs into the lungs 2 times daily        doxycycline monohydrate 100 MG capsule     14 capsule    Take 1 capsule (100 mg) by mouth 2 times daily for 7 days    Cough, Chronic obstructive pulmonary disease, unspecified COPD type (H)       flecainide acetate 150 MG Tabs     90 tablet    1/2 tab bid     Atrial fibrillation (H)       furosemide 20 MG tablet    LASIX    181 tablet    Take 1 tablet (20 mg) by mouth daily Pt taking one tab of 20mg furosemide every day and two tabs every other day if needed    Atrial fibrillation, unspecified       * ipratropium - albuterol 0.5 mg/2.5 mg/3 mL 0.5-2.5 (3) MG/3ML neb solution    DUONEB    1 vial    Take 1 vial (3 mLs) by nebulization once for 1 dose    Cough       * ipratropium - albuterol 0.5 mg/2.5 mg/3 mL 0.5-2.5 (3) MG/3ML neb solution    DUONEB    90 vial    Take 1 vial (3 mLs) by nebulization every 4 hours as needed for shortness of breath / dyspnea or wheezing    Chronic obstructive pulmonary disease, unspecified COPD type (H)       levothyroxine 75 MCG tablet    SYNTHROID/LEVOTHROID    90 tablet    TAKE 1 TABLET BY MOUTH DAILY    Hypothyroidism, unspecified type       metoprolol 50 MG tablet    LOPRESSOR    270 tablet    TAKE 1 AND 1/2 TABLETS BY MOUTH TWICE DAILY    Paroxysmal atrial fibrillation (H)       order for DME     1 each    Nebulizer machine with tubing    Cough, Chronic obstructive pulmonary disease, unspecified COPD type (H)       predniSONE 20 MG tablet    DELTASONE    10 tablet    Take 2 tablets (40 mg) by mouth daily for 5 days    Cough, Chronic obstructive pulmonary disease, unspecified COPD type (H)       rivaroxaban ANTICOAGULANT 20 MG Tabs tablet    XARELTO    90 tablet    Take 1 tablet (20 mg) by mouth daily (with dinner)    Atrial fibrillation (H)       sulfamethoxazole-trimethoprim 800-160 MG per tablet    BACTRIM DS/SEPTRA DS    6 tablet    Take 1 tablet by mouth 2 times daily    UTI (urinary tract infection), uncomplicated       traMADol 50 MG tablet    ULTRAM    90 tablet    Take 1 tablet (50 mg) by mouth every 6 hours as needed for pain    Arthritis       * Notice:  This list has 2 medication(s) that are the same as other medications prescribed for you. Read the directions carefully, and ask your doctor or other care provider to review  them with you.

## 2017-10-19 NOTE — PROGRESS NOTES
HPI    SUBJECTIVE:   Lindsey Hale is a 89 year old female who presents to clinic today for the following health issues:      RESPIRATORY SYMPTOMS      Duration: started 1.5 weeks     Description  cough, fatigue/malaise and mucus     Severity: severe    Accompanying signs and symptoms: None    History (predisposing factors):  COPD    Precipitating or alleviating factors: None    Therapies tried and outcome:  oral decongestant, robitussin and Symbicort        Started with throat pain, nasal drainage about 10 days ago   Cough started a couple days later   The last couple days have been bad   SOB worse than normal. Has baseline SOB with COPD   No fever       Past Medical History:   Diagnosis Date     Abnormal echocardiogram 04/2017    mild mr, ar, mitral stenosis, nl ef, lvh.  Done for episode of vision change     Arthritis 99         Atrial fibrillation (H) 2011 and 2009    neg nuc est 2009, Dr. Vazquze, on coumadin     Chest pain 2000    neg est echo, neg ct chest abd, pelvis Zoroastrian     Chronic LBP      COPD (chronic obstructive pulmonary disease) (H)     seen by pulmonary md Dr. Whitt, and given inhaler     Cysts, breast 1980s    bilat mastectomies     Dizzy 2007    ct neg, carotid us neg     Dysphagia 2005    egd nl     Elevated blood sugar      Environmental allergies      Hematuria 2004    neg cysto and us     Hemoptyses 2008    ct neg, bronch neg 2009     HTN (hypertension)      Hyperlipidemia      Hypertension      Hypothyroid 1995    Dr. Ortiz     Mitral regurgitation 6/15    on echo     nausea 2006    ct abd and ;pelvis neg     Osteopenia     fu dexa better 2011     Pacemaker 2011    afib with pauses and syncope     Palpitations 2009    neg est     PMR (polymyalgia rheumatica) (H) 2004    not active in years     SOB (shortness of breath) 5/15    echo nl ef, 2+mr, cxr clear, seen by pulm and given inhaler     Supraventricular premature beats      TIA (transient ischaemic attack) 2009     carotids neg, mri neg     Treadmill stress test negative for angina pectoris 2011    nuclear est     Urine incontinence     Dr. Westfall     Urosepsis 2009    hospitalized     Vision changes 2011    eval by neuro and ophtho and no cause found     Past Surgical History:   Procedure Laterality Date     ARTHROPLASTY PATELLO-FEMORAL (KNEE)  1998 and 2002     ARTHROSCOPY KNEE  1997     BIOPSY BREAST Right 9/23/2014    Procedure: BIOPSY BREAST;  Surgeon: David Carter MD;  Location: [facility]     breast implants      4x     cataract  2011     FOOT SURGERY  2003     MASTECTOMY  1980    bilat, cysts     nj not bso  70's    not for ca     Social History   Substance Use Topics     Smoking status: Former Smoker     Types: Cigarettes     Quit date: 2/1/1955     Smokeless tobacco: Never Used      Comment: quit in 1955   had smoked about 2 cigarettes a day or more     Alcohol use 0.0 oz/week     0 Standard drinks or equivalent per week      Comment: occ wine     Current Outpatient Prescriptions   Medication Sig Dispense Refill     order for DME Nebulizer machine with tubing 1 each 0     ipratropium - albuterol 0.5 mg/2.5 mg/3 mL (DUONEB) 0.5-2.5 (3) MG/3ML neb solution Take 1 vial (3 mLs) by nebulization every 4 hours as needed for shortness of breath / dyspnea or wheezing 90 vial 0     metoprolol (LOPRESSOR) 50 MG tablet TAKE 1 AND 1/2 TABLETS BY MOUTH TWICE DAILY 270 tablet 2     sulfamethoxazole-trimethoprim (BACTRIM DS/SEPTRA DS) 800-160 MG per tablet Take 1 tablet by mouth 2 times daily 6 tablet 0     levothyroxine (SYNTHROID/LEVOTHROID) 75 MCG tablet TAKE 1 TABLET BY MOUTH DAILY 90 tablet 2     rivaroxaban ANTICOAGULANT (XARELTO) 20 MG TABS tablet Take 1 tablet (20 mg) by mouth daily (with dinner) 90 tablet 2     flecainide acetate 150 MG TABS 1/2 tab bid 90 tablet 3     furosemide (LASIX) 20 MG tablet Take 1 tablet (20 mg) by mouth daily Pt taking one tab of 20mg furosemide every day and two tabs every other day if  needed 181 tablet 3     traMADol (ULTRAM) 50 MG tablet Take 1 tablet (50 mg) by mouth every 6 hours as needed for pain 90 tablet 1     budesonide-formoterol (SYMBICORT) 160-4.5 MCG/ACT inhaler Inhale 2 puffs into the lungs 2 times daily       Allergies   Allergen Reactions     Clindamycin Hcl Other (See Comments)     Difficulty swallowing     Ampicillin Potassium      Rash nausea and vomiting       Celebrex [Celecoxib]      rash     Ciprofloxacin      rash     Erythromycin      rash     Indocin      [facility] with [facility]     Penicillins      Rash,nausea and vomiting     Vibramycin [Doxycycline Hyclate]      Rash      Xylocaine-Epinephrine [Epinephrine-Lidocaine-Na Metabisulfite]      Xylocaine with epi caused a rapid heart beat       Reviewed and updated as needed this visit by clinical staff and provider      ROS  Detailed as above       /66 (BP Location: Right arm, Cuff Size: Adult Large)  Pulse 73  Temp 98.3  F (36.8  C) (Oral)  Wt 177 lb (80.3 kg)  SpO2 98%  Breastfeeding? No  BMI 32.9 kg/m2      Physical Exam   Constitutional: She is well-developed, well-nourished, and in no distress.   HENT:   Head: Normocephalic.   Eyes: Conjunctivae are normal.   Cardiovascular: Normal rate, regular rhythm and normal heart sounds.    No murmur heard.  Pulmonary/Chest: Effort normal. No respiratory distress. She has wheezes (throughout).   Harsh moist cough   Neurological: She is alert.   Psychiatric: Mood and affect normal.   Vitals reviewed.      Assessment and Plan:       ICD-10-CM    1. Cough R05 XR Chest 2 Views     ipratropium - albuterol 0.5 mg/2.5 mg/3 mL (DUONEB) 0.5-2.5 (3) MG/3ML neb solution     order for DME     predniSONE (DELTASONE) 20 MG tablet     doxycycline monohydrate 100 MG capsule   2. Chronic obstructive pulmonary disease, unspecified COPD type (H) J44.9 XR Chest 2 Views     order for DME     ipratropium - albuterol 0.5 mg/2.5 mg/3 mL (DUONEB) 0.5-2.5 (3)  MG/3ML neb solution     predniSONE (DELTASONE) 20 MG tablet     doxycycline monohydrate 100 MG capsule       Symptoms, subjective and objective, improved after neb in office  Sent with instructions below   CXR without acute findings.    Call or rtc with no improvement in 1 week        Patient Instructions   Use the nebulizer every 4 hours until your cough improved   STart the prednisone tomorrow if your symptoms are not getting any better  Wait over the weekend and start the antibiotic on Monday if you are not getting better.   If anything becomes severe and you get fevers, start the antibiotic earlier or do not hesitate to go to the ER         MERCEDEZ Campa, CNP  [facility]

## 2017-10-19 NOTE — PATIENT INSTRUCTIONS
Use the nebulizer every 4 hours until your cough improved   STart the prednisone tomorrow if your symptoms are not getting any better  Wait over the weekend and start the antibiotic on Monday if you are not getting better.   If anything becomes severe and you get fevers, start the antibiotic earlier or do not hesitate to go to the ER

## 2017-10-19 NOTE — NURSING NOTE
"Chief Complaint   Patient presents with     Cough       Initial /66 (BP Location: Right arm, Cuff Size: Adult Large)  Pulse 73  Temp 98.3  F (36.8  C) (Oral)  Wt 177 lb (80.3 kg)  SpO2 98%  Breastfeeding? No  BMI 32.9 kg/m2 Estimated body mass index is 32.9 kg/(m^2) as calculated from the following:    Height as of 7/18/17: 5' 1.5\" (1.562 m).    Weight as of this encounter: 177 lb (80.3 kg).  Medication Reconciliation: complete.  TRI Parisi      "

## 2017-11-01 ENCOUNTER — TRANSFERRED RECORDS (OUTPATIENT)
Dept: HEALTH INFORMATION MANAGEMENT | Facility: CLINIC | Age: 82
End: 2017-11-01

## 2017-11-17 ENCOUNTER — OFFICE VISIT (OUTPATIENT)
Dept: FAMILY MEDICINE | Facility: CLINIC | Age: 82
End: 2017-11-17
Payer: MEDICARE

## 2017-11-17 VITALS
BODY MASS INDEX: 32.02 KG/M2 | OXYGEN SATURATION: 98 % | HEART RATE: 69 BPM | WEIGHT: 174 LBS | HEIGHT: 62 IN | DIASTOLIC BLOOD PRESSURE: 64 MMHG | TEMPERATURE: 98.5 F | SYSTOLIC BLOOD PRESSURE: 152 MMHG

## 2017-11-17 DIAGNOSIS — M35.3 PMR (POLYMYALGIA RHEUMATICA) (H): Primary | ICD-10-CM

## 2017-11-17 DIAGNOSIS — I48.91 ATRIAL FIBRILLATION, UNSPECIFIED TYPE (H): ICD-10-CM

## 2017-11-17 DIAGNOSIS — J44.9 CHRONIC OBSTRUCTIVE PULMONARY DISEASE, UNSPECIFIED COPD TYPE (H): ICD-10-CM

## 2017-11-17 DIAGNOSIS — I10 BENIGN ESSENTIAL HYPERTENSION: ICD-10-CM

## 2017-11-17 DIAGNOSIS — R73.9 ELEVATED BLOOD SUGAR: ICD-10-CM

## 2017-11-17 DIAGNOSIS — E03.9 HYPOTHYROIDISM, UNSPECIFIED TYPE: ICD-10-CM

## 2017-11-17 DIAGNOSIS — E78.5 HYPERLIPIDEMIA LDL GOAL <160: ICD-10-CM

## 2017-11-17 LAB
ERYTHROCYTE [DISTWIDTH] IN BLOOD BY AUTOMATED COUNT: 14.4 % (ref 10–15)
HBA1C MFR BLD: 7.1 % (ref 4.3–6)
HCT VFR BLD AUTO: 39.5 % (ref 35–47)
HGB BLD-MCNC: 13.2 G/DL (ref 11.7–15.7)
MCH RBC QN AUTO: 33.9 PG (ref 26.5–33)
MCHC RBC AUTO-ENTMCNC: 33.4 G/DL (ref 31.5–36.5)
MCV RBC AUTO: 102 FL (ref 78–100)
PLATELET # BLD AUTO: 296 10E9/L (ref 150–450)
RBC # BLD AUTO: 3.89 10E12/L (ref 3.8–5.2)
WBC # BLD AUTO: 13.5 10E9/L (ref 4–11)

## 2017-11-17 PROCEDURE — 99214 OFFICE O/P EST MOD 30 MIN: CPT | Performed by: INTERNAL MEDICINE

## 2017-11-17 PROCEDURE — 85027 COMPLETE CBC AUTOMATED: CPT | Performed by: INTERNAL MEDICINE

## 2017-11-17 PROCEDURE — 36415 COLL VENOUS BLD VENIPUNCTURE: CPT | Performed by: INTERNAL MEDICINE

## 2017-11-17 PROCEDURE — 83036 HEMOGLOBIN GLYCOSYLATED A1C: CPT | Performed by: INTERNAL MEDICINE

## 2017-11-17 PROCEDURE — 84443 ASSAY THYROID STIM HORMONE: CPT | Performed by: INTERNAL MEDICINE

## 2017-11-17 PROCEDURE — 80048 BASIC METABOLIC PNL TOTAL CA: CPT | Performed by: INTERNAL MEDICINE

## 2017-11-17 RX ORDER — LEVOTHYROXINE SODIUM 75 UG/1
75 TABLET ORAL DAILY
Qty: 90 TABLET | Refills: 3 | Status: SHIPPED | OUTPATIENT
Start: 2017-11-17 | End: 2018-11-11

## 2017-11-17 RX ORDER — METOPROLOL TARTRATE 50 MG
TABLET ORAL
Qty: 270 TABLET | Refills: 3 | Status: SHIPPED | OUTPATIENT
Start: 2017-11-17 | End: 2018-11-11

## 2017-11-17 NOTE — LETTER
[place] [address] [facility]  [address]  [place]  [address]  Tel: [phone]    November 20, 2017    Lindsey Hale  [address] [place] [address]        Dear Ms. Hale,    It was a pleasure seeing you.  I wanted to get back to you with your test results.  I have enclosed a copy for your records.    Overall your labs are fine.  Your thyroid test is normal and your kidney tests or creatinine are fine.  Your diabetes test or a1c is a bit higher this time but not a problem.  I will monitor that at your next office visit.      Your white count is high which I suspect is due to your recent steroid use and the carbon dioxide is also high.  I am not worried about either of these, but to be safe let's have you come back to the lab just to repeat these in 3 to 4 weeks.  You do not need to see me or fast but call ahead of time.    If you have any questions please call me.      Sincerely,    Jeffery Sherwood MD/ave    Results for orders placed or performed in visit on 11/17/17   CBC with platelets   Result Value Ref Range    WBC 13.5 (H) 4.0 - 11.0 10e9/L    RBC Count 3.89 3.8 - 5.2 10e12/L    Hemoglobin 13.2 11.7 - 15.7 g/dL    Hematocrit 39.5 35.0 - 47.0 %     (H) 78 - 100 fl    MCH 33.9 (H) 26.5 - 33.0 pg    MCHC 33.4 31.5 - 36.5 g/dL    RDW 14.4 10.0 - 15.0 %    Platelet Count 296 150 - 450 10e9/L   Basic metabolic panel   Result Value Ref Range    Sodium 137 133 - 144 mmol/L    Potassium 4.0 3.4 - 5.3 mmol/L    Chloride 103 94 - 109 mmol/L    Carbon Dioxide 19 (L) 20 - 32 mmol/L    Anion Gap 15 (H) 3 - 14 mmol/L    Glucose 157 (H) 70 - 99 mg/dL    Urea Nitrogen 16 7 - 30 mg/dL    Creatinine 0.70 0.52 - 1.04 mg/dL    GFR Estimate 79 >60 mL/min/1.7m2    GFR Estimate If Black >90 >60 mL/min/1.7m2    Calcium 8.9 8.5 - 10.1 mg/dL   Hemoglobin A1c   Result Value Ref Range    Hemoglobin A1C 7.1 (H) 4.3 - 6.0 %   TSH with free T4 reflex   Result Value Ref Range    TSH 0.87 0.40 - 4.00 mU/L            Enclosure: Lab Results

## 2017-11-17 NOTE — NURSING NOTE
"Chief Complaint   Patient presents with     Recheck Medication       Initial /74 (BP Location: Right arm, Patient Position: Sitting, Cuff Size: Adult Regular)  Pulse 69  Temp 98.5  F (36.9  C) (Oral)  Ht 5' 1.5\" (1.562 m)  Wt 174 lb (78.9 kg)  SpO2 98%  Breastfeeding? No  BMI 32.34 kg/m2 Estimated body mass index is 32.34 kg/(m^2) as calculated from the following:    Height as of this encounter: 5' 1.5\" (1.562 m).    Weight as of this encounter: 174 lb (78.9 kg).  Medication Reconciliation: complete   Siomara Yates- PRAFUL      "

## 2017-11-17 NOTE — PROGRESS NOTES
Lindsey Hale is a 89 year old female who presents for follow up mmp    Here with daughter today    1. Follow up cough, had for 5 weeks, seen here and then by [place] [facility], notes reviewed, on prednisone taper now and mostly gone, no f,c,s.  NO sinus symptoms, ear pain or s.t.  No chest pain or shortness of breath    2. Follow up pmr, prior steroid use, off it for long time.  No ha, no fevers, chills or night sweats or weight loss, no jt pains    3. Follow up copd, as noted above recent cough, using symbicort and stable    4. Follow up hypertension, blood pressure a bit high    5. Follow up chronic low back paiin, using infreq tramadol, not new, no signs abuse, no side effects with it    6. Follow up elevated sugar, to get labs today    She otherwise feels fine, has hearing loss and mac degen.  No chest pain or fever.  Some slight bowel movement change, thinner, smaller, no abdomen pain or nausea or vomiting, no b/b stools.      Past Medical History:   Diagnosis Date     Abnormal echocardiogram 04/2017    mild mr, ar, mitral stenosis, nl ef, lvh.  Done for episode of vision change     Arthritis 99         Atrial fibrillation (H) 2011 and 2009    neg nuc est 2009, Dr. Vazquez, on coumadin     Chest pain 2000    neg est echo, neg ct chest abd, pelvis Christianity     Chronic LBP      COPD (chronic obstructive pulmonary disease) (H)     seen by pulmonary md Dr. Whitt, and given inhaler     Cysts, breast 1980s    bilat mastectomies     Dizzy 2007    ct neg, carotid us neg     Dysphagia 2005    egd nl     Elevated blood sugar      Environmental allergies      Hematuria 2004    neg cysto and us     Hemoptyses 2008    ct neg, bronch neg 2009     HTN (hypertension)      Hyperlipidemia      Hypertension      Hypothyroid 1995    Dr. Ortiz     Mitral regurgitation 6/15    on echo     nausea 2006    ct abd and ;pelvis neg     Osteopenia     fu dexa better 2011     Pacemaker 2011    afib with pauses and syncope      Palpitations 2009    neg est     PMR (polymyalgia rheumatica) (H) 2004    not active in years     SOB (shortness of breath) 5/15    echo nl ef, 2+mr, cxr clear, seen by pulm and given inhaler     Supraventricular premature beats      TIA (transient ischaemic attack) 2009    carotids neg, mri neg     Treadmill stress test negative for angina pectoris 2011    nuclear est     Urine incontinence     Dr. Westfall     Urosepsis 2009    hospitalized     Vision changes 2011    eval by neuro and ophtho and no cause found     Past Surgical History:   Procedure Laterality Date     ARTHROPLASTY PATELLO-FEMORAL (KNEE)  1998 and 2002     ARTHROSCOPY KNEE  1997     BIOPSY BREAST Right 9/23/2014    Procedure: BIOPSY BREAST;  Surgeon: David Carter MD;  Location: [facility]     breast implants      4x     cataract  2011     FOOT SURGERY  2003     MASTECTOMY  1980    bilat, cysts     nj not bso  70's    not for ca     Social History     Social History     Marital status:      Spouse name: N/A     Number of children: 4     Years of education: N/A     Occupational History     Not on file.     Social History Main Topics     Smoking status: Former Smoker     Types: Cigarettes     Quit date: 2/1/1955     Smokeless tobacco: Never Used      Comment: quit in 1955   had smoked about 2 cigarettes a day or more     Alcohol use 0.0 oz/week     0 Standard drinks or equivalent per week      Comment: occ wine     Drug use: No     Sexual activity: Not Currently     Other Topics Concern     Caffeine Concern No     coffee: 1 cup a week.  Occ green tea     Sleep Concern No     Stress Concern No     Weight Concern No     Special Diet No     Exercise Yes     walking daily     Seat Belt Yes     Social History Narrative     Current Outpatient Prescriptions   Medication Sig Dispense Refill     metoprolol (LOPRESSOR) 50 MG tablet TAKE 1 AND 1/2 TABLETS BY MOUTH TWICE DAILY 270 tablet 3     levothyroxine (SYNTHROID/LEVOTHROID) 75 MCG tablet Take 1  "tablet (75 mcg) by mouth daily 90 tablet 3     rivaroxaban ANTICOAGULANT (XARELTO) 20 MG TABS tablet Take 1 tablet (20 mg) by mouth daily (with dinner) 90 tablet 2     flecainide acetate 150 MG TABS 1/2 tab bid 90 tablet 3     furosemide (LASIX) 20 MG tablet Take 1 tablet (20 mg) by mouth daily Pt taking one tab of 20mg furosemide every day and two tabs every other day if needed 181 tablet 3     traMADol (ULTRAM) 50 MG tablet Take 1 tablet (50 mg) by mouth every 6 hours as needed for pain 90 tablet 1     budesonide-formoterol (SYMBICORT) 160-4.5 MCG/ACT inhaler Inhale 2 puffs into the lungs 2 times daily       [DISCONTINUED] metoprolol (LOPRESSOR) 50 MG tablet TAKE 1 AND 1/2 TABLETS BY MOUTH TWICE DAILY 270 tablet 2     [DISCONTINUED] levothyroxine (SYNTHROID/LEVOTHROID) 75 MCG tablet TAKE 1 TABLET BY MOUTH DAILY 90 tablet 2     [DISCONTINUED] rivaroxaban ANTICOAGULANT (XARELTO) 20 MG TABS tablet Take 1 tablet (20 mg) by mouth daily (with dinner) 90 tablet 2     Allergies   Allergen Reactions     Clindamycin Hcl Other (See Comments)     Difficulty swallowing     Ampicillin Potassium      Rash nausea and vomiting       Celebrex [Celecoxib]      rash     Ciprofloxacin      rash     Erythromycin      rash     Indocin      Ended up [facility] to( [facility] with severe head ache     Penicillins      Rash,nausea and vomiting     Vibramycin [Doxycycline Hyclate]      Rash      Xylocaine-Epinephrine [Epinephrine-Lidocaine-Na Metabisulfite]      Xylocaine with epi caused a rapid heart beat     FAMILY HISTORY NOTED AND REVIEWED    REVIEW OF SYSTEMS: above    PHYSICAL EXAM    /64  Pulse 69  Temp 98.5  F (36.9  C) (Oral)  Ht 5' 1.5\" (1.562 m)  Wt 174 lb (78.9 kg)  SpO2 98%  Breastfeeding? No  BMI 32.34 kg/m2    Patient appears non toxic  Mouth and eyes within normal limits  Neck within normal limits  No supraclavicular, cervical or axillary lymphadenopathy  Lungs clear  cv reglar rate and rhythm, 1/6 sm, no jvp, " trace bilat ankle edema  Abdomen non-tender, no mgr, no hepatosplenomegaly    Labs sent    ASSESSMENT:  1. Pmr, no signs active dz  2. Afib, reg, on xarelto  3. Hypothyroid, follow up tsh  4. Copd, stable  5. Elevated sugar, follow up labs  6. Hypertension, no need for too tight control  7. Bowel movement change, discussed with patient eval but does not want    PLAN:  Labs today  Up to date flu shot  Call if changes  Office visit 6 months    Jeffery Sherwood M.D.

## 2017-11-17 NOTE — MR AVS SNAPSHOT
After Visit Summary   11/17/2017    Lindsey Hale    MRN: [id number]           Patient Information     Date Of Birth          9/16/1925        Visit Information        Provider Department      11/17/2017 11:00 AM Jeffery Sherwood MD [facility]        Today's Diagnoses     PMR (polymyalgia rheumatica) (H)    -  1    Atrial fibrillation, unspecified type (H)        Chronic obstructive pulmonary disease, unspecified COPD type (H)        Hypothyroidism, unspecified type        Benign essential hypertension        Elevated blood sugar        Hyperlipidemia LDL goal <160           Follow-ups after your visit        Your next 10 appointments already scheduled     Dec 28, 2017  2:00 PM CST   Remote PPM Check with BANKS TECH1   [facility] ([facility])    [address]  [place] [place] [facility] [place] [address] [phone]           This appointment is for a remote check of your pacemaker.  This is not an appointment at the office.              Who to contact     If you have questions or need follow up information about today's clinic visit or your schedule please contact [facility] directly at [phone].  Normal or non-critical lab and imaging results will be communicated to you by [employer], letter or phone within 4 business days after the clinic has received the results. If you do not hear from us within 7 days, please contact the clinic through [employer] or phone. If you have a critical or abnormal lab result, we will notify you by phone as soon as possible.  Submit refill requests through [facility] or call your pharmacy and they will forward the refill request to us. Please allow 3 business days for your refill to be completed.          Additional Information About Your Visit        [employer] Information     [facility] lets you send messages to your doctor, view your test results, renew your prescriptions, schedule appointments and more.  "To sign up, go to [email or web address] . Click on \"Log in\" on the left side of the screen, which will take you to the Welcome page. Then click on \"Sign up Now\" on the right side of the page.     You will be asked to enter the access code listed below, as well as some personal information. Please follow the directions to create your username and password.     Your access code is: [email or web address]  Expires: 2017  9:18 AM     Your access code will  in 90 days. If you need help or a new code, please call your [facility] clinic or [phone].        Care EveryWhere ID     This is your Care EveryWhere ID. This could be used by other organizations to access your [facility] medical records  [id number]        Your Vitals Were     Pulse Temperature Height Pulse Oximetry Breastfeeding? BMI (Body Mass Index)    69 98.5  F (36.9  C) (Oral) 5' 1.5\" (1.562 m) 98% No 32.34 kg/m2       Blood Pressure from Last 3 Encounters:   17 152/64   10/19/17 150/66   17 114/56    Weight from Last 3 Encounters:   17 174 lb (78.9 kg)   10/19/17 177 lb (80.3 kg)   17 180 lb (81.6 kg)              We Performed the Following     Basic metabolic panel     CBC with platelets     Hemoglobin A1c     TSH with free T4 reflex          Today's Medication Changes          These changes are accurate as of: 17 11:24 AM.  If you have any questions, ask your nurse or doctor.               These medicines have changed or have updated prescriptions.        Dose/Directions    levothyroxine 75 MCG tablet   Commonly known as:  SYNTHROID/LEVOTHROID   This may have changed:  See the new instructions.   Used for:  Hypothyroidism, unspecified type   Changed by:  Jeffery Sherwood MD        Dose:  75 mcg   Take 1 tablet (75 mcg) by mouth daily   Quantity:  90 tablet   Refills:  3       metoprolol 50 MG tablet   Commonly known as:  LOPRESSOR   This may have changed:  See the new instructions.   Used for:  Atrial fibrillation, " unspecified type (H), Benign essential hypertension   Changed by:  Jeffery Sherwood MD        TAKE 1 AND 1/2 TABLETS BY MOUTH TWICE DAILY   Quantity:  270 tablet   Refills:  3            Where to get your medicines      These medications were sent to [id number] - [place], [place] - [address] AT [facility] [address]  [address], [place] [address]     Phone:  [phone]     levothyroxine 75 MCG tablet    metoprolol 50 MG tablet    rivaroxaban ANTICOAGULANT 20 MG Tabs tablet                Primary Care Provider Office Phone # Fax #    Jeffery Sherwood [phone] [address]  [place] [address]        Equal Access to Services     PALLAVI TERRY AH: Hadii darrian ruiz hadasho Soomaali, waaxda luqadaha, qaybta kaalmada adeegyada, waxdonal hunterin haypalak kim . So [facility] [phone].    ATENCIÓN: Si habla español, tiene a rolon disposición servicios gratuitos de asistencia lingüística. [facility] [phone].    We comply with applicable federal civil rights laws and [place] laws. We do not discriminate on the basis of race, color, national origin, age, disability, sex, sexual orientation, or gender identity.            Thank you!     Thank you for choosing [facility]  for your care. Our goal is always to provide you with excellent care. Hearing back from our patients is one way we can continue to improve our services. Please take a few minutes to complete the written survey that you may receive in the mail after your visit with us. Thank you!             Your Updated Medication List - Protect others around you: Learn how to safely use, store and throw away your medicines at www.disposemymeds.org.          This list is accurate as of: 11/17/17 11:24 AM.  Always use your most recent med list.                   Brand Name Dispense Instructions for use Diagnosis    budesonide-formoterol 160-4.5 MCG/ACT Inhaler    SYMBICORT     Inhale 2  puffs into the lungs 2 times daily        flecainide acetate 150 MG Tabs     90 tablet    1/2 tab bid    Atrial fibrillation (H)       furosemide 20 MG tablet    LASIX    181 tablet    Take 1 tablet (20 mg) by mouth daily Pt taking one tab of 20mg furosemide every day and two tabs every other day if needed    Atrial fibrillation, unspecified       levothyroxine 75 MCG tablet    SYNTHROID/LEVOTHROID    90 tablet    Take 1 tablet (75 mcg) by mouth daily    Hypothyroidism, unspecified type       metoprolol 50 MG tablet    LOPRESSOR    270 tablet    TAKE 1 AND 1/2 TABLETS BY MOUTH TWICE DAILY    Atrial fibrillation, unspecified type (H), Benign essential hypertension       rivaroxaban ANTICOAGULANT 20 MG Tabs tablet    XARELTO    90 tablet    Take 1 tablet (20 mg) by mouth daily (with dinner)    Atrial fibrillation, unspecified type (H)       traMADol 50 MG tablet    ULTRAM    90 tablet    Take 1 tablet (50 mg) by mouth every 6 hours as needed for pain    Arthritis

## 2017-11-18 LAB
ANION GAP SERPL CALCULATED.3IONS-SCNC: 15 MMOL/L (ref 3–14)
BUN SERPL-MCNC: 16 MG/DL (ref 7–30)
CALCIUM SERPL-MCNC: 8.9 MG/DL (ref 8.5–10.1)
CHLORIDE SERPL-SCNC: 103 MMOL/L (ref 94–109)
CO2 SERPL-SCNC: 19 MMOL/L (ref 20–32)
CREAT SERPL-MCNC: 0.7 MG/DL (ref 0.52–1.04)
GFR SERPL CREATININE-BSD FRML MDRD: 79 ML/MIN/1.7M2
GLUCOSE SERPL-MCNC: 157 MG/DL (ref 70–99)
POTASSIUM SERPL-SCNC: 4 MMOL/L (ref 3.4–5.3)
SODIUM SERPL-SCNC: 137 MMOL/L (ref 133–144)
TSH SERPL DL<=0.005 MIU/L-ACNC: 0.87 MU/L (ref 0.4–4)

## 2017-11-19 NOTE — PROGRESS NOTES
It was a pleasure seeing you.  I wanted to get back to you with your test results.  I have enclosed a copy for your records.    Overall your labs are fine.  Your thyroid test is normal and your kidney tests or creatinine are fine.  Your diabetes test or a1c is a bit higher this time but not a problem.  I will monitor that at your next office visit.      Your white count is high which I suspect is due to your recent steroid use and the carbon dioxide is also high.  I am not worried about either of these, but to be safe let's have you come back to the lab just to repeat these in 3 to 4 weeks.  You do not need to see me or fast but call ahead of time.    If you have any questions please call me.

## 2017-12-04 ENCOUNTER — APPOINTMENT (OUTPATIENT)
Dept: GENERAL RADIOLOGY | Facility: CLINIC | Age: 82
DRG: 470 | End: 2017-12-04
Attending: EMERGENCY MEDICINE
Payer: MEDICARE

## 2017-12-04 ENCOUNTER — HOSPITAL ENCOUNTER (INPATIENT)
Facility: CLINIC | Age: 82
LOS: 4 days | Discharge: NURSING, REHAB OR LONG-TERM CARE | DRG: 470 | End: 2017-12-08
Attending: EMERGENCY MEDICINE | Admitting: INTERNAL MEDICINE
Payer: MEDICARE

## 2017-12-04 DIAGNOSIS — S72.002A CLOSED FRACTURE OF NECK OF LEFT FEMUR, INITIAL ENCOUNTER (H): ICD-10-CM

## 2017-12-04 LAB
ABO + RH BLD: NORMAL
ABO + RH BLD: NORMAL
ANION GAP SERPL CALCULATED.3IONS-SCNC: 10 MMOL/L (ref 3–14)
BLD GP AB SCN SERPL QL: NORMAL
BLOOD BANK CMNT PATIENT-IMP: NORMAL
BUN SERPL-MCNC: 17 MG/DL (ref 7–30)
CALCIUM SERPL-MCNC: 8.9 MG/DL (ref 8.5–10.1)
CHLORIDE SERPL-SCNC: 106 MMOL/L (ref 94–109)
CO2 SERPL-SCNC: 24 MMOL/L (ref 20–32)
CREAT SERPL-MCNC: 0.78 MG/DL (ref 0.52–1.04)
ERYTHROCYTE [DISTWIDTH] IN BLOOD BY AUTOMATED COUNT: 14.6 % (ref 10–15)
GFR SERPL CREATININE-BSD FRML MDRD: 69 ML/MIN/1.7M2
GLUCOSE BLDC GLUCOMTR-MCNC: 114 MG/DL (ref 70–99)
GLUCOSE BLDC GLUCOMTR-MCNC: 127 MG/DL (ref 70–99)
GLUCOSE SERPL-MCNC: 196 MG/DL (ref 70–99)
HCT VFR BLD AUTO: 35.9 % (ref 35–47)
HGB BLD-MCNC: 11.9 G/DL (ref 11.7–15.7)
INR PPP: 1.24 (ref 0.86–1.14)
INTERPRETATION ECG - MUSE: NORMAL
MCH RBC QN AUTO: 33.3 PG (ref 26.5–33)
MCHC RBC AUTO-ENTMCNC: 33.1 G/DL (ref 31.5–36.5)
MCV RBC AUTO: 101 FL (ref 78–100)
PLATELET # BLD AUTO: 340 10E9/L (ref 150–450)
POTASSIUM SERPL-SCNC: 4.1 MMOL/L (ref 3.4–5.3)
RBC # BLD AUTO: 3.57 10E12/L (ref 3.8–5.2)
SODIUM SERPL-SCNC: 140 MMOL/L (ref 133–144)
SPECIMEN EXP DATE BLD: NORMAL
WBC # BLD AUTO: 6.5 10E9/L (ref 4–11)

## 2017-12-04 PROCEDURE — 86850 RBC ANTIBODY SCREEN: CPT | Performed by: EMERGENCY MEDICINE

## 2017-12-04 PROCEDURE — 86901 BLOOD TYPING SEROLOGIC RH(D): CPT | Performed by: EMERGENCY MEDICINE

## 2017-12-04 PROCEDURE — 99285 EMERGENCY DEPT VISIT HI MDM: CPT | Mod: 25

## 2017-12-04 PROCEDURE — 96361 HYDRATE IV INFUSION ADD-ON: CPT

## 2017-12-04 PROCEDURE — A9270 NON-COVERED ITEM OR SERVICE: HCPCS | Mod: GY | Performed by: PHYSICIAN ASSISTANT

## 2017-12-04 PROCEDURE — 25000128 H RX IP 250 OP 636: Performed by: EMERGENCY MEDICINE

## 2017-12-04 PROCEDURE — 85027 COMPLETE CBC AUTOMATED: CPT | Performed by: EMERGENCY MEDICINE

## 2017-12-04 PROCEDURE — 96376 TX/PRO/DX INJ SAME DRUG ADON: CPT

## 2017-12-04 PROCEDURE — 96374 THER/PROPH/DIAG INJ IV PUSH: CPT

## 2017-12-04 PROCEDURE — 99207 ZZC CDG-CHARGE REQUIRED MANUAL ENTRY: CPT | Performed by: INTERNAL MEDICINE

## 2017-12-04 PROCEDURE — 00000146 ZZHCL STATISTIC GLUCOSE BY METER IP

## 2017-12-04 PROCEDURE — 72170 X-RAY EXAM OF PELVIS: CPT

## 2017-12-04 PROCEDURE — 99223 1ST HOSP IP/OBS HIGH 75: CPT | Mod: AI | Performed by: INTERNAL MEDICINE

## 2017-12-04 PROCEDURE — 86900 BLOOD TYPING SEROLOGIC ABO: CPT | Performed by: EMERGENCY MEDICINE

## 2017-12-04 PROCEDURE — 25000132 ZZH RX MED GY IP 250 OP 250 PS 637: Mod: GY | Performed by: PHYSICIAN ASSISTANT

## 2017-12-04 PROCEDURE — 12000000 ZZH R&B MED SURG/OB

## 2017-12-04 PROCEDURE — 73552 X-RAY EXAM OF FEMUR 2/>: CPT | Mod: LT

## 2017-12-04 PROCEDURE — 85610 PROTHROMBIN TIME: CPT | Performed by: EMERGENCY MEDICINE

## 2017-12-04 PROCEDURE — 25000128 H RX IP 250 OP 636: Performed by: PHYSICIAN ASSISTANT

## 2017-12-04 PROCEDURE — 80048 BASIC METABOLIC PNL TOTAL CA: CPT | Performed by: EMERGENCY MEDICINE

## 2017-12-04 PROCEDURE — 71010 XR CHEST 1 VW: CPT

## 2017-12-04 PROCEDURE — 99207 ZZC APP CREDIT; MD BILLING SHARED VISIT: CPT | Performed by: PHYSICIAN ASSISTANT

## 2017-12-04 RX ORDER — POTASSIUM CHLORIDE 29.8 MG/ML
20 INJECTION INTRAVENOUS
Status: DISCONTINUED | OUTPATIENT
Start: 2017-12-04 | End: 2017-12-04

## 2017-12-04 RX ORDER — HYDROMORPHONE HYDROCHLORIDE 1 MG/ML
0.2 INJECTION, SOLUTION INTRAMUSCULAR; INTRAVENOUS; SUBCUTANEOUS
Status: DISCONTINUED | OUTPATIENT
Start: 2017-12-04 | End: 2017-12-05

## 2017-12-04 RX ORDER — NICOTINE POLACRILEX 4 MG
15-30 LOZENGE BUCCAL
Status: DISCONTINUED | OUTPATIENT
Start: 2017-12-04 | End: 2017-12-08 | Stop reason: HOSPADM

## 2017-12-04 RX ORDER — SODIUM CHLORIDE 9 MG/ML
INJECTION, SOLUTION INTRAVENOUS ONCE
Status: COMPLETED | OUTPATIENT
Start: 2017-12-04 | End: 2017-12-04

## 2017-12-04 RX ORDER — SODIUM CHLORIDE 9 MG/ML
INJECTION, SOLUTION INTRAVENOUS CONTINUOUS
Status: DISCONTINUED | OUTPATIENT
Start: 2017-12-04 | End: 2017-12-05

## 2017-12-04 RX ORDER — AMOXICILLIN 250 MG
2 CAPSULE ORAL 2 TIMES DAILY PRN
Status: DISCONTINUED | OUTPATIENT
Start: 2017-12-04 | End: 2017-12-08 | Stop reason: HOSPADM

## 2017-12-04 RX ORDER — ONDANSETRON 2 MG/ML
4 INJECTION INTRAMUSCULAR; INTRAVENOUS EVERY 6 HOURS PRN
Status: DISCONTINUED | OUTPATIENT
Start: 2017-12-04 | End: 2017-12-05

## 2017-12-04 RX ORDER — POLYETHYLENE GLYCOL 3350 17 G/17G
17 POWDER, FOR SOLUTION ORAL DAILY PRN
Status: DISCONTINUED | OUTPATIENT
Start: 2017-12-04 | End: 2017-12-08 | Stop reason: HOSPADM

## 2017-12-04 RX ORDER — NALOXONE HYDROCHLORIDE 0.4 MG/ML
.1-.4 INJECTION, SOLUTION INTRAMUSCULAR; INTRAVENOUS; SUBCUTANEOUS
Status: DISCONTINUED | OUTPATIENT
Start: 2017-12-04 | End: 2017-12-08 | Stop reason: HOSPADM

## 2017-12-04 RX ORDER — B-COMPLEX WITH VITAMIN C
1 TABLET ORAL DAILY
COMMUNITY
End: 2018-12-22

## 2017-12-04 RX ORDER — MORPHINE SULFATE 2 MG/ML
2 INJECTION, SOLUTION INTRAMUSCULAR; INTRAVENOUS ONCE
Status: COMPLETED | OUTPATIENT
Start: 2017-12-04 | End: 2017-12-04

## 2017-12-04 RX ORDER — LIDOCAINE 40 MG/G
CREAM TOPICAL
Status: DISCONTINUED | OUTPATIENT
Start: 2017-12-04 | End: 2017-12-08 | Stop reason: HOSPADM

## 2017-12-04 RX ORDER — ACETAMINOPHEN 650 MG/1
650 SUPPOSITORY RECTAL EVERY 4 HOURS PRN
Status: DISCONTINUED | OUTPATIENT
Start: 2017-12-04 | End: 2017-12-08 | Stop reason: HOSPADM

## 2017-12-04 RX ORDER — VITS A,C,E/LUTEIN/MINERALS 300MCG-200
1 TABLET ORAL DAILY
COMMUNITY
End: 2018-01-11

## 2017-12-04 RX ORDER — ACETAMINOPHEN 325 MG/1
650 TABLET ORAL EVERY 4 HOURS PRN
Status: DISCONTINUED | OUTPATIENT
Start: 2017-12-04 | End: 2017-12-05

## 2017-12-04 RX ORDER — CARBOXYMETHYLCELLULOSE SODIUM 5 MG/ML
1 SOLUTION/ DROPS OPHTHALMIC 3 TIMES DAILY PRN
COMMUNITY
End: 2018-12-22

## 2017-12-04 RX ORDER — FLECAINIDE ACETATE 150 MG/1
75 TABLET ORAL 2 TIMES DAILY
Status: DISCONTINUED | OUTPATIENT
Start: 2017-12-04 | End: 2017-12-04

## 2017-12-04 RX ORDER — ONDANSETRON 4 MG/1
4 TABLET, ORALLY DISINTEGRATING ORAL EVERY 6 HOURS PRN
Status: DISCONTINUED | OUTPATIENT
Start: 2017-12-04 | End: 2017-12-05

## 2017-12-04 RX ORDER — LEVOTHYROXINE SODIUM 75 UG/1
75 TABLET ORAL DAILY
Status: DISCONTINUED | OUTPATIENT
Start: 2017-12-05 | End: 2017-12-08 | Stop reason: HOSPADM

## 2017-12-04 RX ORDER — POTASSIUM CHLORIDE 1500 MG/1
20-40 TABLET, EXTENDED RELEASE ORAL
Status: DISCONTINUED | OUTPATIENT
Start: 2017-12-04 | End: 2017-12-08 | Stop reason: HOSPADM

## 2017-12-04 RX ORDER — DEXTROSE MONOHYDRATE 25 G/50ML
25-50 INJECTION, SOLUTION INTRAVENOUS
Status: DISCONTINUED | OUTPATIENT
Start: 2017-12-04 | End: 2017-12-08 | Stop reason: HOSPADM

## 2017-12-04 RX ORDER — DOCUSATE SODIUM 100 MG/1
100 CAPSULE, LIQUID FILLED ORAL 2 TIMES DAILY PRN
COMMUNITY
End: 2017-12-12 | Stop reason: ALTCHOICE

## 2017-12-04 RX ORDER — POTASSIUM CL/LIDO/0.9 % NACL 10MEQ/0.1L
10 INTRAVENOUS SOLUTION, PIGGYBACK (ML) INTRAVENOUS
Status: DISCONTINUED | OUTPATIENT
Start: 2017-12-04 | End: 2017-12-08 | Stop reason: HOSPADM

## 2017-12-04 RX ORDER — POTASSIUM CHLORIDE 1.5 G/1.58G
20-40 POWDER, FOR SOLUTION ORAL
Status: DISCONTINUED | OUTPATIENT
Start: 2017-12-04 | End: 2017-12-08 | Stop reason: HOSPADM

## 2017-12-04 RX ORDER — AMOXICILLIN 250 MG
1 CAPSULE ORAL 2 TIMES DAILY PRN
Status: DISCONTINUED | OUTPATIENT
Start: 2017-12-04 | End: 2017-12-08 | Stop reason: HOSPADM

## 2017-12-04 RX ORDER — POTASSIUM CHLORIDE 7.45 MG/ML
10 INJECTION INTRAVENOUS
Status: DISCONTINUED | OUTPATIENT
Start: 2017-12-04 | End: 2017-12-08 | Stop reason: HOSPADM

## 2017-12-04 RX ORDER — BISACODYL 10 MG
10 SUPPOSITORY, RECTAL RECTAL DAILY PRN
Status: DISCONTINUED | OUTPATIENT
Start: 2017-12-04 | End: 2017-12-08 | Stop reason: HOSPADM

## 2017-12-04 RX ADMIN — MORPHINE SULFATE 2 MG: 2 INJECTION, SOLUTION INTRAMUSCULAR; INTRAVENOUS at 13:46

## 2017-12-04 RX ADMIN — Medication 75 MG: at 20:58

## 2017-12-04 RX ADMIN — Medication 5 MG: at 20:59

## 2017-12-04 RX ADMIN — ACETAMINOPHEN 650 MG: 325 TABLET, FILM COATED ORAL at 20:59

## 2017-12-04 RX ADMIN — ACETAMINOPHEN 650 MG: 325 TABLET, FILM COATED ORAL at 16:37

## 2017-12-04 RX ADMIN — Medication 0.2 MG: at 21:56

## 2017-12-04 RX ADMIN — SODIUM CHLORIDE: 9 INJECTION, SOLUTION INTRAVENOUS at 13:45

## 2017-12-04 RX ADMIN — MORPHINE SULFATE 2 MG: 2 INJECTION, SOLUTION INTRAMUSCULAR; INTRAVENOUS at 12:03

## 2017-12-04 RX ADMIN — METOPROLOL TARTRATE 75 MG: 50 TABLET ORAL at 20:59

## 2017-12-04 ASSESSMENT — ACTIVITIES OF DAILY LIVING (ADL)
BATHING: 0-->INDEPENDENT
COGNITION: 0 - NO COGNITION ISSUES REPORTED
RETIRED_EATING: 0-->INDEPENDENT
SWALLOWING: 0-->SWALLOWS FOODS/LIQUIDS WITHOUT DIFFICULTY
NUMBER_OF_TIMES_PATIENT_HAS_FALLEN_WITHIN_LAST_SIX_MONTHS: 1
RETIRED_COMMUNICATION: 0-->UNDERSTANDS/COMMUNICATES WITHOUT DIFFICULTY
TOILETING: 0-->INDEPENDENT
FALL_HISTORY_WITHIN_LAST_SIX_MONTHS: YES
DRESS: 0-->INDEPENDENT
AMBULATION: 1-->ASSISTIVE EQUIPMENT
TRANSFERRING: 0-->INDEPENDENT

## 2017-12-04 ASSESSMENT — ENCOUNTER SYMPTOMS: NUMBNESS: 0

## 2017-12-04 NOTE — IP AVS SNAPSHOT
[address] [facility]    [address] [place] [address]    Phone:  [phone]                                       After Visit Summary   12/4/2017    Lindsey Hale    MRN: [id number]           After Visit Summary Signature Page     I have received my discharge instructions, and my questions have been answered. I have discussed any challenges I see with this plan with the nurse or doctor.    ..........................................................................................................................................  Patient/Patient Representative Signature      ..........................................................................................................................................  Patient Representative Print Name and Relationship to Patient    ..................................................               ................................................  Date                                            Time    ..........................................................................................................................................  Reviewed by Signature/Title    ...................................................              ..............................................  Date                                                            Time

## 2017-12-04 NOTE — IP AVS SNAPSHOT
"          [place] [address] [facility]: [phone]                                              INTERAGENCY TRANSFER FORM - LAB / IMAGING / EKG / EMG RESULTS   2017                    Hospital Admission Date: 2017  CASIMIRO PRIEST   : 1925  Sex: Female        Attending Provider: Osito Crowder MD     Allergies:  Clindamycin Hcl, Ampicillin Potassium, Celebrex [Celecoxib], Ciprofloxacin, Erythromycin, Indocin, Penicillins, Vibramycin [Doxycycline Hyclate], Xylocaine-epinephrine [Epinephrine-lidocaine-na Metabisulfite]    Infection:  None   Service:  HOSPITALIST    Ht:  1.562 m (5' 1.5\")   Wt:  79.3 kg (174 lb 13.2 oz)   Admission Wt:  78.9 kg (174 lb)    BMI:  32.5 kg/m 2   BSA:  1.85 m 2            Patient PCP Information     Provider PCP Type    Jeffery Sherwood MD General         Lab Results - 3 Days      Transferrin [[id number]] (Abnormal)  Resulted: 17 1520, Result status: Final result    Ordering provider: Lisa Tavera PA-C  17 0710 Resulting lab: [facility]    Specimen Information    Type Source Collected On     17 0710          Components       Value Reference Range Flag Lab   Transferrin 174 210 - 360 mg/dL L 51            Glucose by meter [[id number]] (Abnormal)  Resulted: 17 1325, Result status: Final result    Ordering provider: Osito Crowder MD  17 1315 Resulting lab: [facility], GLUCOSE    Specimen Information    Type Source Collected On     17 1315          Components       Value Reference Range Flag Lab   Glucose 161 70 - 99 mg/dL H 170            Ferritin [[id number]] (Abnormal)  Resulted: 17 1028, Result status: Final result    Ordering provider: Lisa Tavera PA-C  17 0710 Resulting lab: [facility]    Specimen Information    Type Source Collected On     17 0710          Components       Value Reference Range Flag Lab "   Ferritin 302 8 - 252 ng/mL H FrStHsLb            Iron and iron binding capacity [[id number]] (Abnormal)  Resulted: 12/07/17 1028, Result status: Final result    Ordering provider: Lisa Tavera PA-C  12/07/17 0710 Resulting lab: [facility]    Specimen Information    Type Source Collected On     12/07/17 0710          Components       Value Reference Range Flag Lab   Iron 20 35 - 180 ug/dL L FrStHsLb   Iron Binding Cap 219 240 - 430 ug/dL L FrStHsLb   Iron Saturation Index 9 15 - 46 % L FrStHsLb            Glucose by meter [[id number]] (Abnormal)  Resulted: 12/07/17 0920, Result status: Final result    Ordering provider: Osito Crowder MD  12/07/17 0917 Resulting lab: [facility] CARE TEST, GLUCOSE    Specimen Information    Type Source Collected On     12/07/17 0917          Components       Value Reference Range Flag Lab   Glucose 129 70 - 99 mg/dL H 170            Creatinine [[id number]]  Resulted: 12/07/17 0744, Result status: Final result    Ordering provider: Osito Crowder MD  12/07/17 0000 Resulting lab: [facility]    Specimen Information    Type Source Collected On   Blood  12/07/17 0710          Components       Value Reference Range Flag Lab   Creatinine 0.71 0.52 - 1.04 mg/dL  FrStHsLb   GFR Estimate 77 >60 mL/min/1.7m2  FrStHsLb   Comment:  Non  GFR Calc   GFR Estimate If Black >90 >60 mL/min/1.7m2  FrStHsLb   Comment:   GFR Calc            Hemoglobin [[id number]] (Abnormal)  Resulted: 12/07/17 0723, Result status: Final result    Ordering provider: Lisa Tavera PA-C  12/07/17 0000 Resulting lab: [facility]    Specimen Information    Type Source Collected On   Blood  12/07/17 0710          Components       Value Reference Range Flag Lab   Hemoglobin 8.9 11.7 - 15.7 g/dL L FrStHsLb            Glucose by meter [[id number]] (Abnormal)  Resulted: 12/07/17 0211, Result status: Final result     Ordering provider: Osito Crowder MD  12/07/17 0205 Resulting lab: POINT OF CARE TEST, GLUCOSE    Specimen Information    Type Source Collected On     12/07/17 0205          Components       Value Reference Range Flag Lab   Glucose 171 70 - 99 mg/dL H 170            Glucose by meter [[id number]] (Abnormal)  Resulted: 12/06/17 2131, Result status: Final result    Ordering provider: Osito Crowder MD  12/06/17 2127 Resulting lab: [facility]ARE TEST, GLUCOSE    Specimen Information    Type Source Collected On     12/06/17 2127          Components       Value Reference Range Flag Lab   Glucose 142 70 - 99 mg/dL H 170            Glucose by meter [[id number]] (Abnormal)  Resulted: 12/06/17 1746, Result status: Final result    Ordering provider: Osito Crowder MD  12/06/17 1737 Resulting lab: [facility]INT [facility] CARE TEST, GLUCOSE    Specimen Information    Type Source Collected On     12/06/17 1737          Components       Value Reference Range Flag Lab   Glucose 156 70 - 99 mg/dL H 170            Glucose by meter [[id number]] (Abnormal)  Resulted: 12/06/17 1256, Result status: Final result    Ordering provider: Osito Crowder MD  12/06/17 1247 Resulting lab: [facility] CARE TEST, GLUCOSE    Specimen Information    Type Source Collected On     12/06/17 1247          Components       Value Reference Range Flag Lab   Glucose 164 70 - 99 mg/dL H 170            Glucose by meter [[id number]] (Abnormal)  Resulted: 12/06/17 0921, Result status: Final result    Ordering provider: Osito Crowder MD  12/06/17 0912 Resulting lab: [facility] CARE TEST, GLUCOSE    Specimen Information    Type Source Collected On     12/06/17 0912          Components       Value Reference Range Flag Lab   Glucose 107 70 - 99 mg/dL H 170            Hemoglobin [[id number]] (Abnormal)  Resulted: 12/06/17 0649, Result status: Final result    Ordering provider: Lisa Tavera PA-C  12/06/17 0001 Resulting lab: [facility]     Specimen Information    Type Source Collected On   Blood  12/06/17 0639          Components       Value Reference Range Flag Lab   Hemoglobin 9.8 11.7 - 15.7 g/dL L FrStHsLb            Glucose by meter [[id number]] (Abnormal)  Resulted: 12/06/17 0236, Result status: Final result    Ordering provider: Osito Crowder MD  12/06/17 0231 Resulting lab: POINT OF CARE TEST, GLUCOSE    Specimen Information    Type Source Collected On     12/06/17 0231          Components       Value Reference Range Flag Lab   Glucose 105 70 - 99 mg/dL H 170            Glucose by meter [[id number]] (Abnormal)  Resulted: 12/05/17 2136, Result status: Final result    Ordering provider: Osito Crowder MD  12/05/17 2131 Resulting lab: POINT OF CARE TEST, GLUCOSE    Specimen Information    Type Source Collected On     12/05/17 2131          Components       Value Reference Range Flag Lab   Glucose 110 70 - 99 mg/dL H 170            Glucose by meter [[id number]] (Abnormal)  Resulted: 12/05/17 1201, Result status: Final result    Ordering provider: Osito Crowder MD  12/05/17 1156 Resulting lab: POINT OF CARE TEST, GLUCOSE    Specimen Information    Type Source Collected On     12/05/17 1156          Components       Value Reference Range Flag Lab   Glucose 131 70 - 99 mg/dL H 170            Glucose by meter [[id number]] (Abnormal)  Resulted: 12/05/17 0855, Result status: Final result    Ordering provider: Osito Crowder MD  12/05/17 0846 Resulting lab: POINT OF CARE TEST, GLUCOSE    Specimen Information    Type Source Collected On     12/05/17 0846          Components       Value Reference Range Flag Lab   Glucose 117 70 - 99 mg/dL H 170            Glucose by meter [[id number]] (Abnormal)  Resulted: 12/05/17 0222, Result status: Final result    Ordering provider: Osito Crowder MD  12/05/17 0201 Resulting lab: POINT OF CARE TEST, GLUCOSE    Specimen Information    Type Source Collected On     12/05/17 0201           Components       Value Reference Range Flag Lab   Glucose 126 70 - 99 mg/dL H 170            Glucose by meter [[id number]] (Abnormal)  Resulted: 12/04/17 2135, Result status: Final result    Ordering provider: Osito Crowder MD  12/04/17 2125 Resulting lab: POINT OF CARE TEST, GLUCOSE    Specimen Information    Type Source Collected On     12/04/17 2125          Components       Value Reference Range Flag Lab   Glucose 114 70 - 99 mg/dL H 170            Glucose by meter [[id number]] (Abnormal)  Resulted: 12/04/17 1933, Result status: Final result    Ordering provider: Osito Crowder MD  12/04/17 1659 Resulting lab: POINT OF CARE TEST, GLUCOSE    Specimen Information    Type Source Collected On     12/04/17 1659          Components       Value Reference Range Flag Lab   Glucose 127 70 - 99 mg/dL H 170            Basic metabolic panel [[id number]] (Abnormal)  Resulted: 12/04/17 1321, Result status: Final result    Ordering provider: Brittanie Perez MD  12/04/17 1245 Resulting lab: [facility]    Specimen Information    Type Source Collected On   Blood  12/04/17 1146          Components       Value Reference Range Flag Lab   Sodium 140 133 - 144 mmol/L  FrStHsLb   Potassium 4.1 3.4 - 5.3 mmol/L  FrStHsLb   Chloride 106 94 - 109 mmol/L  FrStHsLb   Carbon Dioxide 24 20 - 32 mmol/L  FrStHsLb   Anion Gap 10 3 - 14 mmol/L  FrStHsLb   Glucose 196 70 - 99 mg/dL H FrStHsLb   Urea Nitrogen 17 7 - 30 mg/dL  FrStHsLb   Creatinine 0.78 0.52 - 1.04 mg/dL  FrStHsLb   GFR Estimate 69 >60 mL/min/1.7m2  FrStHsLb   Comment:  Non  GFR Calc   GFR Estimate If Black 83 >60 mL/min/1.7m2  FrStHsLb   Comment:  African American GFR Calc   Calcium 8.9 8.5 - 10.1 mg/dL  FrStHsLb            INR [[id number]] (Abnormal)  Resulted: 12/04/17 1307, Result status: Final result    Ordering provider: Brittanie Perez MD  12/04/17 1245 Resulting lab: [facility]    Specimen Information   "  Type Source Collected On   Blood  12/04/17 1146          Components       Value Reference Range Flag Lab   INR 1.24 0.86 - 1.14 H FrStHsLb            CBC (+ platelets, no diff) [[id number]] (Abnormal)  Resulted: 12/04/17 1257, Result status: Final result    Ordering provider: Brittanie Perez MD  12/04/17 1245 Resulting lab: [facility]    Specimen Information    Type Source Collected On   Blood  12/04/17 1146          Components       Value Reference Range Flag Lab   WBC 6.5 4.0 - 11.0 10e9/L  FrStHsLb   RBC Count 3.57 3.8 - 5.2 10e12/L L FrStHsLb   Hemoglobin 11.9 11.7 - 15.7 g/dL  FrStHsLb   Hematocrit 35.9 35.0 - 47.0 %  FrStHsLb    78 - 100 fl H FrStHsLb   MCH 33.3 26.5 - 33.0 pg H FrStHsLb   MCHC 33.1 31.5 - 36.5 g/dL  FrStHsLb   RDW 14.6 10.0 - 15.0 %  FrStHsLb   Platelet Count 340 150 - 450 10e9/L  FrStHsLb            Testing Performed By     Lab - Abbreviation Name Director Address Valid Date Range    14 - FrStHsLb [facility] Unknown [address] [place] [address] 05/08/15 1057 - Present    51 - Unknown [facility] Unknown [address] [facility] [address] 12/31/14 1010 - Present    170 - Unknown POINT OF CARE TEST, GLUCOSE Unknown Unknown 10/31/11 1114 - Present            Unresulted Labs (24h ago through future)    Start       Ordered    12/08/17 0600  Hemoglobin  AM DRAW,   Routine     Comments:  Last Lab Result: Hemoglobin (g/dL)       Date                     Value                 12/07/2017               8.9 (L)          ----------    12/07/17 0946    12/08/17 0600  Basic metabolic panel  AM DRAW,   Routine      12/07/17 1346    12/07/17 0600  Creatinine  EVERY 72 HOURS,   Timed     Comments:  Last Lab Result: Creatinine (mg/dL)       Date                     Value                 12/04/2017               0.78             ----------    12/05/17 1912    Unscheduled  Potassium  (Potassium Replacement - \"Standard\" - For " K levels less than 3.4 mmol/L - UU,UR,UA,RH,SH,PH,WY )  CONDITIONAL (SPECIFY),   Routine     Comments:  Obtain Potassium Level for these conditions:  *IF no potassium result within 24 hours before initiation of order set, draw potassium level with next lab collect.    *2 HOURS AFTER last IV potassium replacement dose and 4 hours after an oral replacement dose.  *Next morning after potassium dose.     Repeat Potassium Replacement if necessary.    12/04/17 1458    Unscheduled  Hemoglobin  CONDITIONAL X 2,   Routine     Comments:  IF morning Hemoglobin result is LESS than 8.0 on POD 1 and/or POD 2, release order and recheck Hemoglobin in the evening at 1700.  Notify Provider if second Hemoglobin result is LESS than 7.5.    12/05/17 1848         Imaging Results - 3 Days      XR Pelvis w Hip Port Left 1 View [[id number]]  Resulted: 12/06/17 1006, Result status: Final result    Ordering provider: Lisa Tavera PA-C  12/05/17 1712 Resulted by: Shane Galloway MD    Performed: 12/05/17 1754 - 12/05/17 1809 Resulting lab: RADIOLOGY RESULTS    Narrative:       XR PELVIS AND HIP PORTABLE LEFT 1 VIEW  12/5/2017 6:09 PM     HISTORY:  Post Op Hip Arthroplasty;     COMPARISON: Film dated 12/4/2017.    FINDINGS:  2 views. Left hip arthroplasty has been performed.  Components appear to be in proper position. No postoperative  complications.    GIORGI GALLOWAY MD      XR Chest 1 View [[id number]]  Resulted: 12/04/17 1244, Result status: Final result    Ordering provider: Brittanie Perez MD  12/04/17 1229 Resulted by: Cortez Lozano MD    Performed: 12/04/17 1230 - 12/04/17 1241 Resulting lab: RADIOLOGY RESULTS    Narrative:       XR CHEST 1 VW 12/4/2017 12:41 PM    HISTORY: Pain.    COMPARISON: October 19, 2017.      Impression:       IMPRESSION: Left-sided cardiac device in place with leads unchanged in  position. Mild diffuse bilateral interstitial prominence, possibly  mild edema. No pleural effusions or  pneumothorax. Mild cardiomegaly.    CORTEZ ROACH MD      XR Pelvis 1/2 Views [[id number]]  Resulted: 12/04/17 1244, Result status: Final result    Ordering provider: Brittanie Perez MD  12/04/17 1156 Resulted by: Cortez Roach MD    Performed: 12/04/17 1218 - 12/04/17 1241 Resulting lab: RADIOLOGY RESULTS    Narrative:       XR PELVIS 1/2 VW 12/4/2017 12:41 PM    HISTORY: Pain.    COMPARISON: None.      Impression:       IMPRESSION: Moderately angulated fracture of the left femoral neck.    CORTEZ ROACH MD      Femur XR,  2 views, left [[id number]]  Resulted: 12/04/17 1244, Result status: Final result    Ordering provider: Brittanie Perez MD  12/04/17 1156 Resulted by: Cortez Roach MD    Performed: 12/04/17 1218 - 12/04/17 1241 Resulting lab: RADIOLOGY RESULTS    Narrative:       XR FEMUR LT 2 VW 12/4/2017 12:41 PM    HISTORY: Pain.    COMPARISON: None.      Impression:       IMPRESSION: Moderately angulated fracture of the left femoral neck.    CORTEZ ROACH MD      Testing Performed By     Lab - Abbreviation Name Director Address Valid Date Range    104 - Rad Rslts RADIOLOGY RESULTS Unknown Unknown 02/16/05 1553 - Present            Encounter-Level Documents:     There are no encounter-level documents.      Order-Level Documents:     There are no order-level documents.

## 2017-12-04 NOTE — ED PROVIDER NOTES
"  History     Chief Complaint:  Hip Pain    HPI  Lindsey Hale is a 89 year old female, anticoagulated on Xarelto, who presents via EMS with hip pain. This morning, the patient was getting up off the toilet, and while bent over developed sudden left hip pain. Per son-in-law, she was initially able to walk, but pain worsened to the point that she was unable to bear weight. Here, she complains of significant left hip pain, but no other pain. Of note, the patient did the \"splits\" a few days ago but was able to walk after this. She was fine over the weekend, however, and able to do her normal activities. Denies numbness or tingling.     Allergies:  Clindamycin HCl  Ampicillin potassium  Celebrex  Ciprofloxacin  Erythromycin  Indocin  Penicillins  Vibramycin  Xylocaine     Medications:    Metoprolol  Levothyroxine  Xarelto  Flecainide acetate  Lasix  Ultram  Symbicort    Past Medical History:    Arthritis  Atrial fibrillation  COPD  HTN  HLD  Hypothyroid  Mitral regurgitation  Osteopenia  Polymyalgia rheumatica  TIA    Past Surgical History:    Knee arthroplasty  Breast biopsy  Breast implants  Cataract  Foot surgery  Mastectomy  STEFAN    Family History:    CAD  Lymphoma  Breast cancer  Osteoporosis  MI    Social History:  Relationship status:   Tobacco use: Former smoker (Quit 1955)  Alcohol use: Positive  The patient presents via EMS.      Review of Systems   Musculoskeletal:        Positive for hip pain.   Neurological: Negative for numbness.   All other systems reviewed and are negative.      Physical Exam   First Vitals:  BP: 144/76  Pulse: 76  Heart Rate: 76  Temp: 99.1  F (37.3  C)  Resp: 14  Weight: 78.9 kg (174 lb)  SpO2: 97 %    Physical Exam   General: Resting comfortably on the gurney  Eyes:  The pupils are equal and round    Conjunctivae and sclerae are normal  ENT:    No head trauma  Neck:  Normal range of motion  CV:  Regular rate and rhythm    Skin warm and well perfused     DP/PT pulses 2+ " bilaterally  Resp:  Lungs are clear    Non-labored    No rales    No wheezing   GI:  Abdomen is soft, there is no rigidity    No distension    No rebound tenderness    No abdominal tenderness   MS:  Normal muscular tone    Tenderness to the left hip and proximal left femur.     Left leg appears shortened and externally rotated.  Skin:  No rash or acute skin lesions noted  Neuro:   Awake, alert.      Speech is normal and fluent.    Face is symmetric.     Moves all extremities though moving left leg less due to pain    SILT on bilateral LE  Psych:  Normal affect.  Appropriate interactions.    Emergency Department Course     Imaging:  Radiographic findings were communicated with the patient who voiced understanding of the findings.    Chest XR, per radiology:  Left-sided cardiac device in place with leads unchanged in position. Mild diffuse bilateral interstitial prominence, possibly mild edema. No pleural effusions or pneumothorax. Mild cardiomegaly.    Left Femur XR, per radiology:  Moderately angulated fracture of the left femoral neck.    Pelvis XR, per radiology:   Moderately angulated fracture of the left femoral neck.    Laboratory:  CBC: WBC 6.5, HGB 11.9,   BMP: Glucose 196 (H), o/w WNL (Creatinine 0.78)  1146: INR: 1.24 (H)  ABO/Rh: ABO A, Rh Pos, Antibody screen negative    Interventions:  1203: Morphine, 2 mg, IV injection  1346: Morphine, 2 mg, IV injection  1345: Normal saline, 125 mL/hr, IV    Emergency Department Course:  Nursing notes and vitals reviewed.  I performed an exam of the patient as documented above.  The above workup was undertaken.   I rechecked the patient and discussed results.  1304: I discussed the patient with Dr. Nathan of orthopedics.   1307: I discussed the patient with Dr. Crowder of the hospitalist service.  1343: I rechecked the patient.    Findings and plan explained to the Patient who consents to admission. Discussed the patient with Dr. Crowder, who will admit the  patient to an ortho bed for further monitoring, evaluation, and treatment.    Impression & Plan      Medical Decision Making:  Lindsey Hale is a 89 year old female who presents to the ED with left hip pain. Patient complaining of pain in the left hip. XR shows femoral neck fracture. Unclear if this happened Friday or today. She is neurovascularly intact, and this is her only injury. She was mildly hypoxic after being given morphine for pain, but was not hypoxic before this. I spoke with orthopedics. May do surgery later tonight or tomorrow, will keep NPO till definitive time known. Recommended hospitalist admission, and spoke with the hospitalist, who agreed to accept the patient.     Diagnosis:    ICD-10-CM    1. Closed fracture of neck of left femur, initial encounter (H) S72.002A Basic metabolic panel     CBC (+ platelets, no diff)     INR     ABO/Rh type and screen     Disposition:  Admit to an ortho bed under the care of Dr. Crowder.    I, Sudhakar Pat, am serving as a scribe on 12/4/2017 at 11:51 AM to personally document services performed by Brittanie Perez MD, based on my observations and the provider's statements to me.     EMERGENCY DEPARTMENT       Brittanie Perez MD  12/04/17 3093

## 2017-12-04 NOTE — ED NOTES
Bed: ED23  Expected date:   Expected time:   Means of arrival:   Comments:  Mariela 1  Left hip pain  1140

## 2017-12-04 NOTE — IP AVS SNAPSHOT
` `     [facility] ORTHO SPECIALTY UNIT: [phone]                 INTERAGENCY TRANSFER FORM - NOTES (H&P, Discharge Summary, Consults, Procedures, Therapies)   2017                    Hospital Admission Date: 2017  LINDSEY HALE   : 1925  Sex: Female        Patient PCP Information     Provider PCP Type    Jeffery Sherwood MD General      History & Physicals     No notes of this type exist for this encounter.      Discharge Summaries     No notes of this type exist for this encounter.         Consult Notes      Consults signed by Darell Nathan MD at 2017 12:12 PM      Author:  Darell Nathan MD Service:  Orthopedics Author Type:  Physician    Filed:  2017 12:12 PM Date of Service:  2017 10:01 PM Creation Time:  2017 10:20 PM    Status:  Signed :  Darell Nathan MD (Physician)     Consult Orders:    1. Orthopedics IP Consult: Patient to be seen: Routine - within 24 hours; left femoral neck fracture; Consultant may enter orders: Yes [[id number]] ordered by Barthell, Joanna Kersten Ulmen, PA-C at 17 1335                ORTHOPEDIC SURGERY CONSULTATION       DATE OF SERVICE:  2017        CHIEF COMPLAINT:  Left hip pain.      REQUESTING PHYSICIAN:   Dr. Brittanie Perez from [facility] Emergency Department.      HISTORY OF PRESENT ILLNESS:  Lindsey Hale is a 89-year-old female with multiple medical comorbidities including history of atrial fibrillation, sick sinus syndrome, polymyalgia rheumatica and COPD who presents after having a mechanical fall at home.  She states that she fell approximately 4 days ago onto her left side.  She had little pain at that time.  Recently then today after standing she developed significant left-sided hip pain which progressively got worse, unable to bear weight.  She has been to the emergency department where x-rays were obtained which showed a displaced left femoral neck fracture.  She was admitted for  operative management.  Overall, she is doing well on the bed.  Her family is with her today.  She describes no other pain other than her hip pain.      PAST MEDICAL HISTORY:   1.  Atrial fibrillation.   2.  Sick sinus syndrome.   3.  Polymyalgia rheumatica.    4.  Chronic obstructive pulmonary disease.   5.  Hypertension.   6.  Moderate regurgitation.   7.  History of TIAs.    8.  Hypothyroidism.      PAST SURGICAL HISTORY:   1.  Hysterectomy.   2.  Mastectomy.   3.  Cataracts.   4.  Patellofemoral arthroplasties.      FAMILY HISTORY:  This is reviewed and noncontributory.      SOCIAL HISTORY:  The patient's quit smoking a long time ago.  She is .      ALLERGIES:   1.  Clindamycin.   2.  Ampicillin.   3.  Celebrex.   4.  Ciprofloxacin.   5.  Erythromycin.   6.  Simvastatin.   7.  Penicillin.   8.  Vibramycin.   9.  Xylocaine and epinephrine due to rapid heartbeat and heart rate.      REVIEW OF SYSTEMS:  A 10 point review of systems review was negative except for musculoskeletal.  Please see HPI.      MEDICATIONS:  Patient is on multiple medications.  Please see H&P for details.  Of note, the patient is on Xarelto.      PHYSICAL EXAMINATION:     GENERAL:  Shows a healthy-appearing of 89-year-old female in no acute distress.  Awake, alert and oriented x3.     EXTREMITIES:  Focused examination of the left hip shows shortening and externally rotated.  Neurovascular intact distally.  No pain with palpation over the distal femur, proximal tibia or ankle.      X-RAYS:  X-rays of her left hip showed displaced left femoral neck fracture.      IMPRESSION:  Left displaced femoral neck fracture.      PLAN:  The patient was admitted to the hospitalist service.  She will be n.p.o. after midnight tonight for surgical management tomorrow.  We discussed risks of surgery versus nonoperative management.  She would like to proceed with surgery.  This would be in the form of hemiarthroplasty.  We discussed the risks of surgery  including but not limited to infection, nerve or blood vessel injury, persistent pain, dislocation, limb length discrepancy, cardiovascular, respiratory complication related to anesthetic, DVT and death.  She understands these risks.  We will proceed with surgery tomorrow.  This will be delayed to allow for the Xarelto to clear.         CHELSEA BROWNE MD             D: 2017 22:01   T: 2017 22:19   MT: CD      Name:     CASIMIRO HALE   MRN:      [id number]        Account:       [id number]   :      1925           Consult Date:  2017      Document: [id number][BB1.1]         Revision History        User Key Date/Time User Provider Type Action    > BB1.1 2017 12:12 PM Chelsea Browne MD Physician Sign                     Progress Notes - Physician (Notes from 17 through 17)      Progress Notes by Ayanna Resendiz MSW at 2017  3:44 PM     Author:  Ayanna Resendiz MSW Service:  (none) Author Type:      Filed:  2017  3:44 PM Date of Service:  2017  3:44 PM Creation Time:  2017  3:44 PM    Status:  Signed :  Ayanna Resendiz MSW ()         SW    D: Patient was accepted at [facility], which is the family's first choice.     P: Continue to follow.[NO1.1]     Revision History        User Key Date/Time User Provider Type Action    > NO1.1 2017  3:44 PM Ayanna Resendiz MSW  Sign            Progress Notes by Lisa Tavera PA-C at 2017 10:56 AM     Author:  Lisa Tavera PA-C Service:  Orthopedics Author Type:  Physician Assistant - C    Filed:  2017 11:11 AM Date of Service:  2017 10:56 AM Creation Time:  2017 10:56 AM    Status:  Addendum :  Lisa Tavera PA-C (Physician Assistant - C)         Casimiro Hale  s/p left hip hermes-arthroplasty  DOS 17    S: Patient doing well post-operatively. Patient reports pain is well controlled on current  pain regimen. Patient reports physical therapy is going well. Patient performing therapy at this time. States she feels good sitting up. Complains of severe left knee pain today. Patient denies fevers, chills or night sweats.    O: @Temp: 98.7  F (37.1  C) Temp src: Oral BP: 138/75 Pulse: 76 Heart Rate: 82 Resp: 16 SpO2: 94 % O2 Device: Nasal cannula Oxygen Delivery: 1 LPM    Left hip dressings c/d/i  Moves all toes  Good cap refill in all toes  No calf tenderness bilaterally  Soft compartments    Labs:   Hemoglobin   Date Value Ref Range Status   12/07/2017 8.9 (L) 11.7 - 15.7 g/dL Final   ]    A:  POD #2 left hip hermes-arthroplasty    P:  - PT/OT, WBAT, strict posterior hip precautions x6 weeks    - falls precautions[KN1.1]  - XR of the left knee ([KN1.2]2[KN1.3] views) ordered due to patient pain level[KN1.2]  - Hgb   - dressing change[KN1.1] today[KN1.2] per nursing, island dressing  - Xarelto and SCDs for DVT prophylaxis x3 weeks post-op  - disp: Likely TCU in[KN1.1] 1-2[KN1.2] days pending medical clearance  - Follow up in office 4 weeks post-op       Ale Tavera PA-C  12/[KN1.1]7[KN1.2]/2017[KN1.1]         Revision History        User Key Date/Time User Provider Type Action    > KN1.3 12/7/2017 11:11 AM Lisa Tavera PA-C Physician Assistant - ADY Addend     KN1.2 12/7/2017 11:10 AM Lisa Tavera PA-C Physician Assistant Silver GARSIA Sign     KN1.1 12/7/2017 10:56 AM Lisa Tavera PA-C Physician Assistant - ADY             Progress Notes by Osito Crowder MD at 12/7/2017  9:45 AM     Author:  Osito Crowder MD Service:  Hospitalist Author Type:  Physician    Filed:  12/7/2017  9:50 AM Date of Service:  12/7/2017  9:45 AM Creation Time:  12/7/2017  9:45 AM    Status:  Signed :  Osito Crowder MD (Physician)         [facility]    Hospitalist Progress Note    Date of Service (when I saw the patient): 12/07/2017    Assessment & Plan   Lindsey  Savannah Hale is a very pleasant 89-year-old female with a history of hypothyroidism, pre-diabetes, TIA, polymyalgia rheumatica, HTN, mild-to-moderate mitral regurgitation, COPD, sick sinus node s/p PPM, and paroxysmal atrial fibrillation who presented with progressive left hip pain after a mechanical fall 4 days prior to admission found to have a moderately-angulated left femoral neck fracture.      Left femoral neck fracture secondary to mechanical fall s/p left hip hemiarthroplasty 12/5.  Tripped on rug while leaning to left to  hearing aid battery. Fxound on left femur/pelvis XRs. Ambulatory with 4-pronged cane at baseline. Remote hx left upper extremity DVT following bilateral mastectomy. EBL per OR report 200ml.  - Appreciate Orthopedic Surgery recommendations  - Pain control and DVT prophylaxis deferred to ortho post-operatively.  - Judicious use pain control to reduce risk for delirium.  - Hgb 9.8 to 8.9 12/7, no clinical bleeds, recheck in AM and iron studies ordered      Paroxysmal atrial fibrillation.  Sick sinus syndrome s/p PPM.  HTN.  Follows with Dr. Vazquez.  Last pacemaker check was approximately 2 months ago and appeared to be functioning well.  - c/w PTA flecainide and metoprolol.  - Lasix 20mg IV x 2 today, then resume PTA lasix 20mg po daily starting 12/8  - Xarelto reduced dose of 10mg daily per ortho.      COPD.  Last PFTs 11/2017 consistent with mild obstructive impairment.  Follows with [facility].  She had recent bronchitis, but that resolved approximately 2 weeks ago following two prednisone tapers.   - c/w PTA Symbicort.  - Albuterol nebulizer available PRN.  - Encourage IS and OOB post-op.      Type II diabetes.  Hgb A1c 7.1 (11/17/2017). Not on medication therapy given goals of care and age.  - SSI while admitted       Polymyalgia rheumatica.  Manifested as chronic low back pain.  The patient does not believe she has ever been on steroid agents for this.  Per recent office visit  notes this appears to have been rather inactive for quite some time.   - monitor  - PT/OT given above      Hypothyroidism.  Last TSH wnl 0.87.  - c/w PTA levothyroxine.     DVT Prophylaxis: Xarelto.  Code Status: DNR/DNI    Disposition: Expected discharge 12/8.    Osito Crowder MD    Interval History   No new complaints. Passing gas, no BM. Tolerating diet and therapy.    -Data reviewed today: I reviewed all new labs and imaging results over the last 24 hours. I personally reviewed no images or EKG's today.    Physical Exam   Temp: 98.7  F (37.1  C) Temp src: Oral BP: 138/75 Pulse: 76 Heart Rate: 82 Resp: 16 SpO2: 94 % O2 Device: Nasal cannula Oxygen Delivery: 1 LPM  Vitals:    12/04/17 1141 12/05/17 0614   Weight: 78.9 kg (174 lb) 79.3 kg (174 lb 13.2 oz)     Vital Signs with Ranges  Temp:  [98  F (36.7  C)-99  F (37.2  C)] 98.7  F (37.1  C)  Pulse:  [76] 76  Heart Rate:  [66-82] 82  Resp:  [16-18] 16  BP: (119-148)/(41-75) 138/75  SpO2:  [93 %-98 %] 94 %  I/O last 3 completed shifts:  In: 150 [P.O.:150]  Out: 570 [Urine:570]    Constitutional: Awake, alert, cooperative, no apparent distress  Respiratory: Clear to auscultation bilaterally, no crackles or wheezing  Cardiovascular: Regular rate and rhythm, normal S1 and S2, and no murmur noted  GI: Normal bowel sounds, soft, non-distended, non-tender  Skin/Integumen: No rashes, no cyanosis, trace pedal edema b/l    Medications[SN1.1]        furosemide  20 mg Intravenous Once     [START ON 12/8/2017] furosemide  20 mg Oral Daily     rivaroxaban ANTICOAGULANT  10 mg Oral Q24H     acetaminophen  975 mg Oral Q8H     senna-docusate  1-2 tablet Oral BID     sodium chloride (PF)  3 mL Intracatheter Q8H     insulin aspart  1-7 Units Subcutaneous TID AC     insulin aspart  1-5 Units Subcutaneous At Bedtime     fluticasone-vilanterol  1 puff Inhalation Daily     levothyroxine  75 mcg Oral Daily     metoprolol  75 mg Oral BID     flecainide  75 mg Oral BID[SN1.2]        Data[SN1.1]     Recent Labs  Lab 12/07/17  0710 12/06/17  0639 12/04/17  1146   WBC  --   --  6.5   HGB 8.9* 9.8* 11.9   MCV  --   --  101*   PLT  --   --  340   INR  --   --  1.24*   NA  --   --  140   POTASSIUM  --   --  4.1   CHLORIDE  --   --  106   CO2  --   --  24   BUN  --   --  17   CR 0.71  --  0.78   ANIONGAP  --   --  10   FRANCISCO  --   --  8.9   GLC  --   --  196*[SN1.2]       No results found for this or any previous visit (from the past 24 hour(s)).[SN1.1]     Revision History        User Key Date/Time User Provider Type Action    > SN1.2 12/7/2017  9:50 AM Osito Crowder MD Physician Sign     SN1.1 12/7/2017  9:45 AM Osito Crowder MD Physician             Progress Notes by Ranjit Lockhart PT at 12/6/2017 10:16 AM     Author:  Ranjit Lcokhart, PT Service:  (none) Author Type:  Physical Therapist    Filed:  12/6/2017  9:22 PM Date of Service:  12/6/2017 10:16 AM Creation Time:  12/6/2017  9:21 PM    Status:  Signed :  Ranjit Lockhart PT (Physical Therapist)          12/06/17 1016   Quick Adds   Type of Visit Initial PT Evaluation   Living Environment   Lives With alone   Living Arrangements house  (1 story with LL)   Home Accessibility ramps present at home  (no hand rail at present.)   Number of Stairs to Enter Home (Would be 4 steps without ramp.)   Number of Stairs Within Home (Full flight to LL)   Stair Railings at Home inside, present on left side   Transportation Available car;family or friend will provide   Self-Care   Dominant Hand right   Usual Activity Tolerance good   Current Activity Tolerance poor   Regular Exercise yes   Activity/Exercise Type swimming;walking   Exercise Amount/Frequency 30 mins;daily   Equipment Currently Used at Home cane, straight   Functional Level Prior   Ambulation 0-->independent   Transferring 0-->independent   Toileting 0-->independent   Bathing 0-->independent   Dressing 0-->independent   Eating 0-->independent   Communication  0-->understands/communicates without difficulty   Swallowing 0-->swallows foods/liquids without difficulty   Cognition 0 - no cognition issues reported   Fall history within last six months yes   Number of times patient has fallen within last six months 1   Which of the above functional risks had a recent onset or change? ambulation;transferring;toileting;bathing;dressing   General Information   Onset of Illness/Injury or Date of Surgery - Date 12/05/17   Referring Physician Darell Nathan   Patient/Family Goals Statement Disch to TCU prior to returning home alone.   Pertinent History of Current Problem (include personal factors and/or comorbidities that impact the POC) Patient fell and suffered L femoral neck fracture, for whiich she underwent L hip hemiarthroplasty on 12/5/17.   Precautions/Limitations fall precautions;left hip precautions  (P-L KSENIA precautions.)   Weight-Bearing Status - LLE weight-bearing as tolerated   Cognitive Status Examination   Orientation orientation to person, place and time   Level of Consciousness alert   Follows Commands and Answers Questions 100% of the time;able to follow multistep instructions   Personal Safety and Judgment intact   Memory intact   Pain Assessment   Patient Currently in Pain (5/10 L hip pain.)   Integumentary/Edema   Integumentary/Edema Comments Surgical site covered by postop dressing.   Posture    Posture Forward head position   Posture Comments Tends to slouch with sitting and standing.   Range of Motion (ROM)   ROM Comment L hip ROM limited by postop pain and edema.   Strength   Strength Comments L hip strength inhibited by postop pain and edema.   Bed Mobility   Bed Mobility Bed mobility analysis   Bed Mobility Comments Needs modA and vc for bed mobility, supine to sit at EOB.   Bed Mobility Analysis   Bed Mobility Limitations decreased ability to use legs for bridging/pushing   Impairments Contributing to Impaired Bed Mobility decreased  flexibility;pain;decreased ROM;decreased strength   Transfer Skills   Transfer Comments Needs modA and vc for transfers, sit to stand and back to sit, WBAT L, with FWW.   Gait   Gait Gait Analysis   Gait Comments Needs modA of 2 and vc for gait with FWW, WBAT L, 2' from EOB to w/c at patient's R.   Gait Analysis   Gait Pattern Used 3-point gait   Gait Deviations Noted decreased courtney;increased time in double stance;decreased velocity of limb motion;decreased step length   Impairments Contributing to Gait Deviations decreased flexibility;pain;decreased ROM;decreased strength   Balance   Balance other (describe)   Balance Comments Tends to be retropulsive with sitting and standing.   Sensory Examination   Sensory Perception no deficits were identified   Coordination   Coordination no deficits were identified   Muscle Tone   Muscle Tone no deficits were identified   Modality Interventions   Planned Modality Interventions Cryotherapy   General Therapy Interventions   Planned Therapy Interventions bed mobility training;gait training;ROM;strengthening;transfer training;risk factor education;home program guidelines;progressive activity/exercise   Clinical Impression   Criteria for Skilled Therapeutic Intervention yes, treatment indicated   PT Diagnosis Impaired mobility and gait.   Influenced by the following impairments Pain, edema, weakness.   Functional limitations due to impairments Limited mobility and gait.   Clinical Presentation Stable/Uncomplicated   Clinical Presentation Rationale Functional assessment and clinical judgement.   Clinical Decision Making (Complexity) Low complexity   Therapy Frequency` daily   Predicted Duration of Therapy Intervention (days/wks) 3 days.   Anticipated Equipment Needs at Discharge walker;front wheeled walker   Anticipated Discharge Disposition Transitional Care Facility   Risk & Benefits of therapy have been explained Yes   Patient, Family & other staff in agreement with plan of  "care Yes   [facility] AM-PAC TM \"6 Clicks\"   2016, Trustees of [facility], under license to [employer].  All rights reserved.   6 Clicks Short Forms Basic Mobility Inpatient Short Form   [facility] AM-PAC  \"6 Clicks\" V.2 Basic Mobility Inpatient Short Form   1. Turning from your back to your side while in a flat bed without using bedrails? 2 - A Lot   2. Moving from lying on your back to sitting on the side of a flat bed without using bedrails? 2 - A Lot   3. Moving to and from a bed to a chair (including a wheelchair)? 2 - A Lot   4. Standing up from a chair using your arms (e.g., wheelchair, or bedside chair)? 2 - A Lot   5. To walk in hospital room? 2 - A Lot   6. Climbing 3-5 steps with a railing? 1 - Total   Basic Mobility Raw Score (Score out of 24.Lower scores equate to lower levels of function) 11   Total Evaluation Time   Total Evaluation Time (Minutes) 15[CL1.1]        Revision History        User Key Date/Time User Provider Type Action    > CL1.1 12/6/2017  9:22 PM Ranjit Lockhart, PT Physical Therapist Sign            Progress Notes by Ayanna Resendiz MSW at 12/6/2017  4:37 PM     Author:  Ayanna Resendiz MSW Service:  (none) Author Type:      Filed:  12/6/2017  4:41 PM Date of Service:  12/6/2017  4:37 PM Creation Time:  12/6/2017  4:37 PM    Status:  Signed :  Ayanna Resendiz MSW ()         Care Transition Initial Assessment -   Reason For Consult: discharge planning  Met with: Reviewed medical record    Active Problems:    * No active hospital problems. *         DATA  Lives With: alone  Living Arrangements: house     Identified issues/concerns regarding health management: SW was consulted for discharge planning. Patient is Savannah, a 89 year-old female who was admitted on 12/4 for a left displaced femoral neck fracture. Her tentative discharge date is 12/8. SW reviewed medical record. Per medical record, the recommendation is TCU. Patient " gave bedside nurse her TCU choices: [facility] or [facility]. SW placed these referrals via DOD.        Transportation Available: car, family or friend will provide      ASSESSMENT  Cognitive Status:  Did not meet with patient.  Concerns to be addressed: Discharge planning.     PLAN  Financial costs for the patient includes N/A.  Patient given options and choices for discharge TCU choices.  Patient/family is agreeable to the plan?  Yes  Patient Goals and Preferences: TCU.  Patient anticipates discharging to:  TCU.[NO1.1]    Ayanna Resendiz[NO1.2], SHAN MARTINEZ[NO1.1]             Revision History        User Key Date/Time User Provider Type Action    > NO1.2 12/6/2017  4:41 PM Ayanna Resendiz MSW  Sign     NO1.1 12/6/2017  4:37 PM Ayanna Resendiz MSW              Progress Notes by Lisa Tavera PA-C at 12/6/2017 12:55 PM     Author:  Lisa Tavera PA-C Service:  Orthopedics Author Type:  Physician Assistant - C    Filed:  12/6/2017 12:57 PM Date of Service:  12/6/2017 12:55 PM Creation Time:  12/6/2017 12:55 PM    Status:  Signed :  Lisa Tavera PA-C (Physician Assistant - C)         Lindsey Hale  s/p left hip hermes-arthroplasty  DOS 12/05/17    S: Patient doing well post-operatively. Patient reports pain is well controlled on current pain regimen. Patient reports physical therapy is going well. Discussed hip motion precautions/restrictions. Patient denies fevers, chills or night sweats.    O: @Temp: 99  F (37.2  C) Temp src: Oral BP: 129/56 Pulse: 74 Heart Rate: 75 Resp: 18 SpO2: 94 % O2 Device: Nasal cannula Oxygen Delivery: 1 LPM    Left hip dressings c/d/i  Moves all toes  Good cap refill in all toes  No calf tenderness bilaterally  Soft compartments    Labs:   Hemoglobin   Date Value Ref Range Status   12/06/2017 9.8 (L) 11.7 - 15.7 g/dL Final   ]    A:  POD #1 left hip hermes-arthroplasty    P:  - PT/OT, WBAT, strict posterior hip precautions x6  weeks   - falls precautions  - Hgb   - dressing change POD 2 per nursing, island dressing  - Xarelto and SCDs for DVT prophylaxis x3 weeks post-op  - disp: Likely TCU in 2-3 days pending medical clearance  - Follow up in office 4 weeks post-op       Ale Tavera PA-C  12/6/2017[KN1.1]         Revision History        User Key Date/Time User Provider Type Action    > KN1.1 12/6/2017 12:57 PM Lisa Tavera PA-C Physician Assistant - C Sign            Progress Notes by Osito Crowder MD at 12/6/2017 10:30 AM     Author:  Osito Crowder MD Service:  Hospitalist Author Type:  Physician    Filed:  12/6/2017 10:38 AM Date of Service:  12/6/2017 10:30 AM Creation Time:  12/6/2017 10:30 AM    Status:  Signed :  Osito Crowder MD (Physician)         [facility]    Hospitalist Progress Note    Date of Service (when I saw the patient):[SN1.1] 12/06/2017    Assessment & Plan[SN1.2]   Lindsey Hale is a very pleasant 89-year-old female with a history of hypothyroidism, pre-diabetes, TIA, polymyalgia rheumatica, HTN, mild-to-moderate mitral regurgitation, COPD, sick sinus node s/p PPM, and paroxysmal atrial fibrillation who presented with progressive left hip pain after a mechanical fall 4 days prior to admission found to have a moderately-angulated left femoral neck fracture.     Left femoral neck fracture secondary to mechanical fall s/p left hip hemiarthroplasty 12/5.  Tripped on rug while leaning to left to  hearing aid battery. Fxound on left femur/pelvis XRs. Ambulatory with 4-pronged cane at baseline. Remote hx left upper extremity DVT following bilateral mastectomy. EBL per OR report 200ml.  - Appreciate Orthopedic Surgery recommendations  - Pain control and DVT prophylaxis deferred to ortho post-operatively.  - Judicious use pain control to reduce risk for delirium.  - Hgb 9.8 post-op, likely from IVF and stress response, recheck in AM, no clinical  bleeds     Paroxysmal atrial fibrillation.  Sick sinus syndrome s/p PPM.  HTN.  Follows with Dr. Vazquez.  Last pacemaker check was approximately 2 months ago and appeared to be functioning well.  - c/w PTA flecainide and metoprolol.  - Hold PTA Lasix, d/c'ed IVF 12/6 as tolerating diet so far  - Resume Xarelto when OK with ortho.     COPD.  Last PFTs 11/2017 consistent with mild obstructive impairment.  Follows with [facility].  She had recent bronchitis, but that resolved approximately 2 weeks ago following two prednisone tapers.   - c/w PTA Symbicort.  - Albuterol nebulizer available PRN.  - Encourage IS and OOB post-op.     Type II diabetes.  Hgb A1c 7.1 (11/17/2017). Not on medication therapy given goals of care and age.  - SSI while admitted      Polymyalgia rheumatica.  Manifested as chronic low back pain.  The patient does not believe she has ever been on steroid agents for this.  Per recent office visit notes this appears to have been rather inactive for quite some time.   - monitor  - PT/OT given above     Hypothyroidism.  Last TSH wnl 0.87.  - c/w PTA levothyroxine.     DVT Prophylaxis: Xarelto/  Code Status:[SN1.1] Prior[SN1.2]    Disposition: Expected discharge in 1-2 days.[SN1.1]    Osito Crowder[SN1.2], MD[SN1.1]    Interval History[SN1.2]   No new complaints. No BM but passing gas. Pain 5-6/10 in left hip.    -Data reviewed today: I reviewed all new labs and imaging results over the last 24 hours. I personally reviewed no images or EKG's today.[SN1.1]    Physical Exam   Temp: 99  F (37.2  C) Temp src: Oral BP: 129/56 Pulse: 74 Heart Rate: 75 Resp: 18 SpO2: 94 % O2 Device: Nasal cannula Oxygen Delivery: 1 LPM  Vitals:    12/04/17 1141 12/05/17 0614   Weight: 78.9 kg (174 lb) 79.3 kg (174 lb 13.2 oz)[SN1.2]     Vital Signs with Ranges[SN1.1]  Temp:  [97.4  F (36.3  C)-99  F (37.2  C)] 99  F (37.2  C)  Pulse:  [74] 74  Heart Rate:  [63-75] 75  Resp:  [10-24] 18  BP: (112-161)/()  129/56  SpO2:  [89 %-97 %] 94 %  I/O last 3 completed shifts:  In: 2930 [P.O.:350; I.V.:2580]  Out: 1775 [Urine:1575; Blood:200][SN1.2]    Constitutional: Awake, alert, cooperative, no apparent distress  Respiratory: Clear to auscultation bilaterally, no crackles or wheezing  Cardiovascular: Regular rate and rhythm, normal S1 and S2, and no murmur noted  GI: Normal bowel sounds, soft, non-distended, non-tender  Skin/Integumen: No rashes, no cyanosis, no edema  Other: Left hip bandage clean, no drainage.[SN1.1]    Medications     NaCl 75 mL/hr (12/06/17 0850)[SN1.2]       rivaroxaban ANTICOAGULANT  10 mg Oral Q24H     vancomycin (VANCOCIN) IV  1,000 mg Intravenous Q24H     acetaminophen  975 mg Oral Q8H     senna-docusate  1-2 tablet Oral BID     sodium chloride (PF)  3 mL Intracatheter Q8H     insulin aspart  1-7 Units Subcutaneous TID AC     insulin aspart  1-5 Units Subcutaneous At Bedtime     fluticasone-vilanterol  1 puff Inhalation Daily     levothyroxine  75 mcg Oral Daily     metoprolol  75 mg Oral BID     flecainide  75 mg Oral BID[SN1.3]       Data[SN1.2]     Recent Labs  Lab 12/06/17  0639 12/04/17  1146   WBC  --  6.5   HGB 9.8* 11.9   MCV  --  101*   PLT  --  340   INR  --  1.24*   NA  --  140   POTASSIUM  --  4.1   CHLORIDE  --  106   CO2  --  24   BUN  --  17   CR  --  0.78   ANIONGAP  --  10   FRANCISCO  --  8.9   GLC  --  196*[SN1.3]       Recent Results (from the past 24 hour(s))   XR Pelvis w Hip Port Left 1 View    Narrative    XR PELVIS AND HIP PORTABLE LEFT 1 VIEW  12/5/2017 6:09 PM     HISTORY:  Post Op Hip Arthroplasty;     COMPARISON: Film dated 12/4/2017.    FINDINGS:  2 views. Left hip arthroplasty has been performed.  Components appear to be in proper position. No postoperative  complications.    GIORGI HARMON MD[SN1.2]        Revision History        User Key Date/Time User Provider Type Action    > SN1.3 12/6/2017 10:38 AM Osito Crowder MD Physician Sign     SN1.2 12/6/2017 10:31 AM  Osito Crowder MD Physician      SN1.1 12/6/2017 10:30 AM Osito Crowder MD Physician             Progress Notes by Neri Agustin RN at 12/5/2017 11:53 AM     Author:  Neri Agustin RN Service:  Nursing Author Type:  Registered Nurse    Filed:  12/5/2017 11:53 AM Date of Service:  12/5/2017 11:53 AM Creation Time:  12/5/2017 11:53 AM    Status:  Signed :  Neri Agustin RN (Registered Nurse)         Report given to pre-op at 1153.[DC1.1]      Revision History        User Key Date/Time User Provider Type Action    > DC1.1 12/5/2017 11:53 AM Neri Agustin RN Registered Nurse Sign            Progress Notes by Hamilton Conn at 12/5/2017 11:11 AM     Author:  Hamilton Conn Service:  Spiritual Health Author Type:      Filed:  12/5/2017 11:11 AM Date of Service:  12/5/2017 11:11 AM Creation Time:  12/5/2017 11:11 AM    Status:  Signed :  Hamilton Conn ()         SPIRITUAL HEALTH SERVICES Progress Note  [facility]    SH, referral visit. The patient was sleeping.      will follow up today or tomorrow.        Hamilton Conn  Chaplain Resident[DC1.1]     Revision History        User Key Date/Time User Provider Type Action    > DC1.1 12/5/2017 11:11 AM Hamilton Conn  Sign            ED Provider Notes by Brittanie Perez MD at 12/4/2017 11:38 AM     Author:  Brittanie Perez MD Service:  Emergency Medicine Author Type:  Physician    Filed:  12/4/2017  5:48 PM Date of Service:  12/4/2017 11:38 AM Creation Time:  12/4/2017 11:51 AM    Status:  Signed :  Brittanie Perez MD (Physician)           History     Chief Complaint:  Hip Pain    HPI  Lindsey Hale is a 89 year old female[JP1.1], anticoagulated on Xarelto,[JP1.2] who presents via EMS with hip pain. This morning, the patient was getting up off the toilet, and while bent over developed sudden left hip pain. Per son-in-law, she was initially able to walk, but pain worsened to the point that she was  "unable to bear weight. Here, she complains of significant left hip pain, but no other pain. Of note, the patient[JP1.1] did the \"splits\" a few days ago but was able to walk after this.[MG1.1] She was fine over the weekend, however, and able to do her normal activities. Denies numbness or tingling.     Allergies:  Clindamycin HCl  Ampicillin potassium  Celebrex  Ciprofloxacin  Erythromycin  Indocin  Penicillins  Vibramycin  Xylocaine     Medications:    Metoprolol  Levothyroxine  Xarelto  Flecainide acetate  Lasix  Ultram  Symbicort    Past Medical History:    Arthritis  Atrial fibrillation  COPD  HTN  HLD  Hypothyroid  Mitral regurgitation  Osteopenia  Polymyalgia rheumatica  TIA    Past Surgical History:    Knee arthroplasty  Breast biopsy  Breast implants  Cataract  Foot surgery  Mastectomy  STEFAN    Family History:    CAD  Lymphoma  Breast cancer  Osteoporosis  MI    Social History:  Relationship status:   Tobacco use: Former smoker (Quit 1955)  Alcohol use: Positive  The patient presents via EMS.      Review of Systems   Musculoskeletal:        Positive for hip pain.   Neurological: Negative for numbness.   All other systems reviewed and are negative.      Physical Exam   First Vitals:  BP: 144/76  Pulse: 76  Heart Rate: 76  Temp: 99.1  F (37.3  C)  Resp: 14  Weight: 78.9 kg (174 lb)  SpO2: 97 %    Physical Exam[JP1.1]   General: Resting comfortably on the gurney  Eyes:  The pupils are equal and round    Conjunctivae and sclerae are normal  ENT:[JP1.3]    No head trauma[MG1.1]  Neck:  Normal range of motion  CV:  Regular rate and rhythm    Skin warm and well perfused[JP1.3]     DP/PT pulses 2+ bilaterally[MG1.1]  Resp:  Lungs are clear    Non-labored    No rales    No wheezing   GI:  Abdomen is soft, there is no rigidity    No distension    No rebound tenderness[JP1.3]    No abdominal tenderness[MG1.1]   MS:  Normal muscular tone    Tenderness to the left hip and proximal left femur.[JP1.3]     Left leg " appears shortened and externally rotated.[MG1.1]  Skin:  No rash or acute skin lesions noted  Neuro:   Awake, alert.      Speech is normal and fluent.    Face is symmetric.     Moves all extremities[JP1.3] though moving left leg less due to pain    SILT on bilateral LE[MG1.1]  Psych:  Normal affect.  Appropriate interactions.[JP1.3]    Emergency Department Course[JP1.1]     Imaging:  Radiographic findings were communicated with the patient who voiced understanding of the findings.    Chest XR, per radiology:  Left-sided cardiac device in place with leads unchanged in position. Mild diffuse bilateral interstitial prominence, possibly mild edema. No pleural effusions or pneumothorax. Mild cardiomegaly.    Left Femur XR, per radiology:  Moderately angulated fracture of the left femoral neck.    Pelvis XR, per radiology:   Moderately angulated fracture of the left femoral neck.[JP1.4]    Laboratory:[JP1.1]  CBC: WBC[JP1.2] 6.5[JP1.4], HGB[JP1.2] 11.9[JP1.4], PLT[JP1.2] 340[JP1.4]  BMP:[JP1.2] Glucose 196 (H)[JP1.4], o/w WNL (Creatinine[JP1.2] 0.78[JP1.4])[JP1.2]  1146[JP1.4]: INR:[JP1.2] 1.24 (H)[JP1.4]  ABO/Rh: ABO[JP1.2] A[JP1.3],[JP1.2] Rh Pos, Antibody screen negative[JP1.3]    Interventions:[JP1.1]  1203: Morphine, 2 mg, IV injection  1346: Morphine, 2 mg, IV injection  1345: Normal saline, 125 mL/hr, IV[JP1.5]    Emergency Department Course:  Nursing notes and vitals reviewed.  I performed an exam of the patient as documented above.  The above workup was undertaken.   I rechecked the patient and discussed results.[JP1.1]  1304: I discussed the patient with Dr. Nathan of orthopedics.   1307: I discussed the patient with Dr. Crowder of the hospitalist service.  1343: I rechecked the patient.    Findings and plan explained to the[JP1.5] Patient[JP1.3] who consents to admission. Discussed the patient with [JP1.5] Lakesha[JP1.3], who will admit the patient to a[JP1.5]n ortho[JP1.3] bed for further monitoring,  evaluation, and treatment.[JP1.5]    Impression & Plan      Medical Decision Making:[JP1.1]  Lindsey Hale is a 89 year old female who presents to the ED with left hip pain. Patient complaining of pain in the left hip. XR shows femoral neck fracture. Unclear if this happened Friday or today. She is neurovascularly intact, and this is her only injury. She was mildly hypoxic after being given morphine for pain, but was not hypoxic before this. I spoke with orthopedics[JP1.3]. May do surgery later tonight or tomorrow, will keep NPO till definitive time known.[MG1.1] Recommend[JP1.3]ed[MG1.1] hospital[JP1.3]ist[MG1.1] admission, and spoke with the hospitalist, who agreed to accept the patient.[JP1.3]     Diagnosis:[JP1.1]    ICD-10-CM    1. Closed fracture of neck of left femur, initial encounter (H) S72.002A Basic metabolic panel     CBC (+ platelets, no diff)     INR     ABO/Rh type and screen[JP1.3]     Disposition:[JP1.1]  Admit to an ortho bed under the care of Dr. Crowder.[JP1.3]    I, Sudhakar Pat, am serving as a scribe on 12/4/2017 at 11:51 AM to personally document services performed by Brittanie Perez MD, based on my observations and the provider's statements to me.     EMERGENCY DEPARTMENT[JP1.1]       Brittanie Perez MD  12/04/17 1748  [MG1.2]     Revision History        User Key Date/Time User Provider Type Action    > MG1.2 12/4/2017  5:48 PM Brittanie Perez MD Physician Sign     MG1.1 12/4/2017  5:45 PM Brittanie Perez MD Physician      JP1.3 12/4/2017  3:56 PM Sudhakar Pat Scribe Share     JP1.5 12/4/2017  2:12 PM Sudhakar Pat Scribe Share     JP1.4 12/4/2017  1:25 PM Sudhakar aPtibguille Share     JP1.2 12/4/2017  1:14 PM PollSudhakar ceballos     JP1.1 12/4/2017 11:59 AM Sudhakar Pat            Progress Notes by Barthell, Joanna Kersten Ulmen, PA-C at 12/4/2017  3:10 PM     Author:  Barthell, Joanna Kersten Ulmen, PA-C Service:   Hospitalist Author Type:  Physician Assistant    Filed:  12/4/2017  3:11 PM Date of Service:  12/4/2017  3:10 PM Creation Time:  12/4/2017  2:50 PM    Status:  Signed :  Barthell, Joanna Kersten Ulmen, PA-C (Physician Assistant)         Hospitalist admission note dictated. Confirmation #: [id number].    JoAnna Barthell, PA-C  12/4/2017   3:10 PM[JB1.1]     Revision History        User Key Date/Time User Provider Type Action    > JB1.1 12/4/2017  3:11 PM Barthell, Joanna Kersten Ulmen, PA-C Physician Assistant Sign            Progress Notes by Darell Nathan MD at 12/4/2017  1:55 PM     Author:  Darell Nathan MD Service:  Orthopedics Author Type:  Physician    Filed:  12/4/2017  1:57 PM Date of Service:  12/4/2017  1:55 PM Creation Time:  12/4/2017  1:55 PM    Status:  Signed :  Darell Nathan MD (Physician)         Ortho  Full consult note to follow    A:  89 yo female with left displaced femoral neck fracture    P:  - will see patient later tonight to discuss options.  Will recommend surgery   - surgery likely tomorrow afternoon due to patient taking her recent dose of Xarelto  - surgery would be a hemiarthroplasty  - NPO after midnight  - hold all anticoags    Darell Nathan MD[BB1.1]     Revision History        User Key Date/Time User Provider Type Action    > BB1.1 12/4/2017  1:57 PM Darell Nathan MD Physician Sign            ED Notes by Susi Shaffer RN at 12/4/2017  1:24 PM     Author:  Susi Shaffer RN Service:  (none) Author Type:  Registered Nurse    Filed:  12/4/2017  1:28 PM Date of Service:  12/4/2017  1:24 PM Creation Time:  12/4/2017  1:24 PM    Status:  Signed :  Susi Shaffer RN (Registered Nurse)         [facility]  ED Nurse Handoff Report    ED Chief complaint: Hip Pain (pt brought in by EMS. pt fell on friday and almost did the splits, pt was able to ambulate after. today pt was seated in chair and twisted and had sudden onset of left hip  "pain)      ED Diagnosis:   Final diagnoses:   Closed fracture of neck of left femur, initial encounter (H)       Code Status: Full Code    Allergies:   Allergies   Allergen Reactions     Clindamycin Hcl Other (See Comments)     Difficulty swallowing     Ampicillin Potassium      Rash nausea and vomiting       Celebrex [Celecoxib]      rash     Ciprofloxacin      rash     Erythromycin      rash     Indocin      Ended up going to( [facility] hospital with severe head ache     Penicillins      Rash,nausea and vomiting     Vibramycin [Doxycycline Hyclate]      Rash      Xylocaine-Epinephrine [Epinephrine-Lidocaine-Na Metabisulfite]      Xylocaine with epi caused a rapid heart beat       Activity level - Baseline/Home:  Independent    Activity Level - Current:   Unable to Assess     Needed?: No    Isolation: No  Infection: Not Applicable    Bariatric?: No    Vital Signs:   Vitals:    12/04/17 1141 12/04/17 1200 12/04/17 1300 12/04/17 1313   BP: 144/76  161/72    Pulse: 76      Resp: 14      Temp: 99.1  F (37.3  C)      SpO2: 97% 97% (!) 86% 93%   Weight: 78.9 kg (174 lb)          Cardiac Rhythm: ,        Pain level: 0-10 Pain Scale: 4    Is this patient confused?: No    Patient Report: Initial Complaint: Leg pain  Focused Assessment: Pt brought by EMS d/t L sudden hip pain.  Reported to have done \"the splits\" after slipping 3 days ago, patient states she was able to walk all weekend.  This AM she was getting off the toilet and when bending over had sudden L side hip pain.  XRAY +femoral head fx.  Given 2mg MS for pain.  Applied 2L NC d/t occ. Dropping sats.  [place] placed.  On xarelto for a fib.  Potential surgery tonight or tomorrow.  NPO since 0800.  Normally independent and lives alone in home.  Daughter and son-in-law @ beside.  Patient a/o x4.   Tests Performed: blood work/EKG/XR  Abnormal Results: XR (see above)  Treatments provided: MS for pain    Family Comments: @ bedside    OBS brochure/video " discussed/provided to patient: N/A    ED Medications:   Medications   morphine (PF) injection 2 mg (2 mg Intravenous Given 12/4/17 1203)       Drips infusing?:  No      ED NURSE PHONE NUMBER: *[id number][SS1.1]            Revision History        User Key Date/Time User Provider Type Action    > SS1.1 12/4/2017  1:28 PM Susi Shaffer, RN Registered Nurse Sign            ED Notes signed by Norbert Non-Provider at 12/4/2017  1:26 PM      Author:  Norbert, Non-Provider Service:  (none) Author Type:  (none)    Filed:  12/4/2017  1:26 PM Date of Service:  12/4/2017 12:34 PM Creation Time:  12/4/2017  1:26 PM    Status:  Signed :  Norbert Non-Provider     Scan on 12/4/2017  1:26 PM by Norbert Non-Provider : BRICE AARON DEPRATMENT 1          Revision History        User Key Date/Time User Provider Type Action    > [N/A] 12/4/2017  1:26 PM Scan, Non-Provider (none) Sign            ED Notes by Oxana Mccurdy RN at 12/4/2017 11:48 AM     Author:  Oxana Mccurdy RN Service:  (none) Author Type:  Registered Nurse    Filed:  12/4/2017 11:48 AM Date of Service:  12/4/2017 11:48 AM Creation Time:  12/4/2017 11:48 AM    Status:  Signed :  Oxana Mccurdy RN (Registered Nurse)         Bed: ED23  Expected date:   Expected time:   Means of arrival:   Comments:  [place] [facility]  Left hip pain  1140     Revision History        User Key Date/Time User Provider Type Action    > MV1.1 12/4/2017 11:48 AM Oxana Mccurdy, RN Registered Nurse Sign                  Procedure Notes     No notes of this type exist for this encounter.         Progress Notes - Therapies (Notes from 12/04/17 through 12/07/17)      Progress Notes by Ranjit Lockhart PT at 12/6/2017 10:16 AM     Author:  Ranjit Lockhart, PT Service:  (none) Author Type:  Physical Therapist    Filed:  12/6/2017  9:22 PM Date of Service:  12/6/2017 10:16 AM Creation Time:  12/6/2017  9:21 PM    Status:  Signed :  Ranjit Lockhart, PT (Physical Therapist)           12/06/17 1016   Quick Adds   Type of Visit Initial PT Evaluation   Living Environment   Lives With alone   Living Arrangements house  (1 story with LL)   Home Accessibility ramps present at home  (no hand rail at present.)   Number of Stairs to Enter Home (Would be 4 steps without ramp.)   Number of Stairs Within Home (Full flight to LL)   Stair Railings at Home inside, present on left side   Transportation Available car;family or friend will provide   Self-Care   Dominant Hand right   Usual Activity Tolerance good   Current Activity Tolerance poor   Regular Exercise yes   Activity/Exercise Type swimming;walking   Exercise Amount/Frequency 30 mins;daily   Equipment Currently Used at Home cane, straight   Functional Level Prior   Ambulation 0-->independent   Transferring 0-->independent   Toileting 0-->independent   Bathing 0-->independent   Dressing 0-->independent   Eating 0-->independent   Communication 0-->understands/communicates without difficulty   Swallowing 0-->swallows foods/liquids without difficulty   Cognition 0 - no cognition issues reported   Fall history within last six months yes   Number of times patient has fallen within last six months 1   Which of the above functional risks had a recent onset or change? ambulation;transferring;toileting;bathing;dressing   General Information   Onset of Illness/Injury or Date of Surgery - Date 12/05/17   Referring Physician Darell Nathan   Patient/Family Goals Statement Disch to TCU prior to returning home alone.   Pertinent History of Current Problem (include personal factors and/or comorbidities that impact the POC) Patient fell and suffered L femoral neck fracture, for whiich she underwent L hip hemiarthroplasty on 12/5/17.   Precautions/Limitations fall precautions;left hip precautions  (P-L KSENIA precautions.)   Weight-Bearing Status - LLE weight-bearing as tolerated   Cognitive Status Examination   Orientation orientation to person, place and time    Level of Consciousness alert   Follows Commands and Answers Questions 100% of the time;able to follow multistep instructions   Personal Safety and Judgment intact   Memory intact   Pain Assessment   Patient Currently in Pain (5/10 L hip pain.)   Integumentary/Edema   Integumentary/Edema Comments Surgical site covered by postop dressing.   Posture    Posture Forward head position   Posture Comments Tends to slouch with sitting and standing.   Range of Motion (ROM)   ROM Comment L hip ROM limited by postop pain and edema.   Strength   Strength Comments L hip strength inhibited by postop pain and edema.   Bed Mobility   Bed Mobility Bed mobility analysis   Bed Mobility Comments Needs modA and vc for bed mobility, supine to sit at EOB.   Bed Mobility Analysis   Bed Mobility Limitations decreased ability to use legs for bridging/pushing   Impairments Contributing to Impaired Bed Mobility decreased flexibility;pain;decreased ROM;decreased strength   Transfer Skills   Transfer Comments Needs modA and vc for transfers, sit to stand and back to sit, WBAT L, with FWW.   Gait   Gait Gait Analysis   Gait Comments Needs modA of 2 and vc for gait with FWW, WBAT L, 2' from EOB to w/c at patient's R.   Gait Analysis   Gait Pattern Used 3-point gait   Gait Deviations Noted decreased courtney;increased time in double stance;decreased velocity of limb motion;decreased step length   Impairments Contributing to Gait Deviations decreased flexibility;pain;decreased ROM;decreased strength   Balance   Balance other (describe)   Balance Comments Tends to be retropulsive with sitting and standing.   Sensory Examination   Sensory Perception no deficits were identified   Coordination   Coordination no deficits were identified   Muscle Tone   Muscle Tone no deficits were identified   Modality Interventions   Planned Modality Interventions Cryotherapy   General Therapy Interventions   Planned Therapy Interventions bed mobility training;gait  "training;ROM;strengthening;transfer training;risk factor education;home program guidelines;progressive activity/exercise   Clinical Impression   Criteria for Skilled Therapeutic Intervention yes, treatment indicated   PT Diagnosis Impaired mobility and gait.   Influenced by the following impairments Pain, edema, weakness.   Functional limitations due to impairments Limited mobility and gait.   Clinical Presentation Stable/Uncomplicated   Clinical Presentation Rationale Functional assessment and clinical judgement.   Clinical Decision Making (Complexity) Low complexity   Therapy Frequency` daily   Predicted Duration of Therapy Intervention (days/wks) 3 days.   Anticipated Equipment Needs at Discharge walker;front wheeled walker   Anticipated Discharge Disposition Transitional Care Facility   Risk & Benefits of therapy have been explained Yes   Patient, Family & other staff in agreement with plan of care Yes   [facility] TM \"6 Clicks\"   2016, Trustees of [facility], under license to [employer].  All rights reserved.   6 Clicks Short Forms Basic Mobility Inpatient Short Form   [facility] AM-PAC  \"6 Clicks\" V.2 Basic Mobility Inpatient Short Form   1. Turning from your back to your side while in a flat bed without using bedrails? 2 - A Lot   2. Moving from lying on your back to sitting on the side of a flat bed without using bedrails? 2 - A Lot   3. Moving to and from a bed to a chair (including a wheelchair)? 2 - A Lot   4. Standing up from a chair using your arms (e.g., wheelchair, or bedside chair)? 2 - A Lot   5. To walk in hospital room? 2 - A Lot   6. Climbing 3-5 steps with a railing? 1 - Total   Basic Mobility Raw Score (Score out of 24.Lower scores equate to lower levels of function) 11   Total Evaluation Time   Total Evaluation Time (Minutes) 15[CL1.1]        Revision History        User Key Date/Time User Provider Type Action    > CL1.1 12/6/2017  9:22 PM Ranjit Lockhart, PT " Physical Therapist Sign

## 2017-12-04 NOTE — PHARMACY-ADMISSION MEDICATION HISTORY
Admission medication history interview status for the 12/4/2017  admission is complete. See EPIC admission navigator for prior to admission medications     Medication history source reliability:Good    Actions taken by pharmacist (provider contacted, etc):interviewed patient and called [facility] for name/instructions for antibiotic for dental procedure.     Additional medication history information not noted on PTA med list :None    Medication reconciliation/reorder completed by provider prior to medication history? No    Time spent in this activity: 15 minutes    Prior to Admission medications    Medication Sig Last Dose Taking? Auth Provider   Carboxymethylcellulose Sod PF (REFRESH PLUS) 0.5 % SOLN ophthalmic solution 1 drop 3 times daily as needed for dry eyes Past Week at Unknown time Yes Unknown, Entered By History   multivitamin (OCUVITE) TABS tablet Take 1 tablet by mouth daily as needed Past Week at Unknown time Yes Unknown, Entered By History   IBUPROFEN PO Take 400 mg by mouth every 8 hours as needed for moderate pain 12/4/2017 at am Yes Unknown, Entered By History   docusate sodium (STOOL SOFTENER) 100 MG capsule Take 100 mg by mouth 2 times daily as needed for constipation 12/3/2017 at Unknown time Yes Unknown, Entered By History   VITAMIN D, CHOLECALCIFEROL, PO Take 1,000 Units by mouth daily 12/3/2017 at Unknown time Yes Unknown, Entered By History   VITAMIN B COMPLEX-C CAPS Take 1 capsule by mouth daily 12/3/2017 at Unknown time Yes Unknown, Entered By History   CEPHALEXIN PO Take 500 mg by mouth every other day This is a pre-procedure med. Instructions are to take 1 capsule every other day for 3 days starting 1 day prior to procedure. 12/4/2017 at am Yes Unknown, Entered By History   metoprolol (LOPRESSOR) 50 MG tablet TAKE 1 AND 1/2 TABLETS BY MOUTH TWICE DAILY 12/4/2017 at am Yes Jeffery Sherwood MD   levothyroxine (SYNTHROID/LEVOTHROID) 75 MCG tablet Take 1 tablet (75 mcg) by mouth daily  12/4/2017 at am Yes Jeffery Sherwood MD   rivaroxaban ANTICOAGULANT (XARELTO) 20 MG TABS tablet Take 1 tablet (20 mg) by mouth daily (with dinner) 12/3/2017 at pm Yes Jeffery Sherwood MD   flecainide acetate 150 MG TABS 1/2 tab bid 12/4/2017 at am Yes Efra Vazquez MD   furosemide (LASIX) 20 MG tablet Take 1 tablet (20 mg) by mouth daily Pt taking one tab of 20mg furosemide every day and two tabs every other day if needed Past Week at Unknown time Yes Jeffery Sherwood MD   traMADol (ULTRAM) 50 MG tablet Take 1 tablet (50 mg) by mouth every 6 hours as needed for pain Past Month at Unknown time Yes Jeffery Sherwood MD   budesonide-formoterol (SYMBICORT) 160-4.5 MCG/ACT inhaler Inhale 2 puffs into the lungs 2 times daily 12/4/2017 at am Yes Reported, Patient

## 2017-12-04 NOTE — IP AVS SNAPSHOT
` [place] [address] [facility]: [phone]            Medication Administration Report for Lindsey Hale as of 12/08/17 1329   Legend:    Given Hold Not Given Due Canceled Entry Other Actions    Time Time (Time) Time  Time-Action       Inactive    Active    Linked        Medications 12/02/17 12/03/17 12/04/17 12/05/17 12/06/17 12/07/17 12/08/17    acetaminophen (TYLENOL) Suppository 650 mg  Dose: 650 mg Freq: EVERY 4 HOURS PRN Route: RE  PRN Reason: mild pain  Start: 12/04/17 1458   Admin Instructions: Alternate ibuprofen (if ordered) with acetaminophen.  Maximum acetaminophen dose from all sources = 75 mg/kg/day not to exceed 4 grams/day.        1223-Auto Hold       1846-Unhold              albuterol neb solution 2.5 mg  Dose: 2.5 mg Freq: EVERY 6 HOURS PRN Route: NEBULIZATION  PRN Reason: wheezing  Start: 12/05/17 1848              bisacodyl (DULCOLAX) Suppository 10 mg  Dose: 10 mg Freq: DAILY PRN Route: RE  PRN Reason: constipation  Start: 12/04/17 1458   Admin Instructions: Hold for loose stools.  This is the third step of a three step constipation treatment protocol.        1223-Auto Hold       1846-Unhold              diphenhydrAMINE (BENADRYL) solution 12.5 mg  Dose: 12.5 mg Freq: EVERY 6 HOURS PRN Route: PO  PRN Reason: itching  Start: 12/05/17 1848   Admin Instructions: Caution to be used when administering multiple CNS depressing meds within a short time frame.              Or  diphenhydrAMINE (BENADRYL) injection 12.5 mg  Dose: 12.5 mg Freq: EVERY 6 HOURS PRN Route: IV  PRN Reason: itching  PRN Comment: Only give if patient unable to take PO.  Start: 12/05/17 1848   Admin Instructions: Caution to be used when administering multiple CNS depressing meds within a short time frame.  For ordered doses up to 50 mg, give IV Push undiluted. Give each 25mg over a minimum of 1 minute. Extend in non-emergency               flecainide (TAMBOCOR) half-tab 75 mg  Dose: 75 mg Freq: 2 TIMES  DAILY Route: PO  Start: 12/04/17 2100      2058 (75 mg)-Given        0831 (75 mg)-Given       1223-Auto Hold       1846-Unhold       2233 (75 mg)-Given        0940 (75 mg)-Given       2120 (75 mg)-Given        0943 (75 mg)-Given       2141 (75 mg)-Given        0837 (75 mg)-Given       [ ] 2100           fluticasone-vilanterol (BREO ELLIPTA) 200-25 MCG/INH oral inhaler 1 puff  Dose: 1 puff Freq: DAILY Route: IN  Start: 12/05/17 0900   Admin Instructions: *Do not use more frequently than twice daily.*  Rinse mouth after use.<br>Therapeutic interchange for Symbicort.<br>        0830 (1 puff)-Given       1223-Auto Hold       1846-Unhold        1146 (1 puff)-Given        1105 (1 puff)-Given        0841 (1 puff)-Given           furosemide (LASIX) tablet 20 mg  Dose: 20 mg Freq: DAILY Route: PO  Start: 12/08/17 0900   Admin Instructions: Hold for sbp<90           0838 (20 mg)-Given           glucose 40 % gel 15-30 g  Dose: 15-30 g Freq: EVERY 15 MIN PRN Route: PO  PRN Reason: low blood sugar  Start: 12/04/17 1513   Admin Instructions: Give 15 g for BG 51 to 69 mg/dL IF patient is conscious and able to swallow. Give 30 g for BG less than or equal to 50 mg/dL IF patient is conscious and able to swallow. Do NOT give glucose gel via enteral tube.  IF patient has enteral tube: give apple juice 120 mL (4 oz or 15 g of CHO) via enteral tube for BG 51 to 69 mg/dL.  Give apple juice 240 mL (8 oz or 30 g of CHO) via enteral tube for BG less than or equal to 50 mg/dL.    ~Oral gel is preferable for conscious and able to swallow patient.   ~IF gel unavailable or patient refuses may provide apple juice 120 mL (4 oz or 15 g of CHO). Document juice on I and O flowsheet.        1223-Auto Hold       1846-Unhold             Or  dextrose 50 % injection 25-50 mL  Dose: 25-50 mL Freq: EVERY 15 MIN PRN Route: IV  PRN Reason: low blood sugar  Start: 12/04/17 1513   Admin Instructions: Use if have IV access, BG less than 70 mg/dL and meet dose  criteria below:  Dose if conscious and alert (or disorientated) and NPO = 25 mL  Dose if unconscious / not alert = 50 mL  Vesicant. For ordered doses up to 25 mg, give IV Push undiluted. Give each 5g over 1 minute.        1223-Auto Hold       1846-Unhold             Or  glucagon injection 1 mg  Dose: 1 mg Freq: EVERY 15 MIN PRN Route: SC  PRN Reason: low blood sugar  PRN Comment: May repeat x 1 only  Start: 12/04/17 1513   Admin Instructions: May give SQ or IM. ONLY use glucagon IF patient has NO IV access AND is UNABLE to swallow AND blood glucose is LESS than or EQUAL to 50 mg/dL.  Give IV Push over 1 minute. Reconstitute with 1mL sterile water.        1223-Auto Hold       1846-Unhold              HYDROmorphone (PF) (DILAUDID) injection 0.2 mg  Dose: 0.2 mg Freq: EVERY 2 HOURS PRN Route: IV  PRN Reason: severe pain  PRN Comment: or if patient unable to take PO  Start: 12/05/17 1848   Admin Instructions: Hold while on PCA.  For ordered doses up to 4 mg give IV Push undiluted. Administer each 2mg over 2-5 minutes.               insulin aspart (NovoLOG) inj (RAPID ACTING)  Dose: 1-5 Units Freq: AT BEDTIME Route: SC  Start: 12/04/17 2200   Admin Instructions: MEDIUM INSULIN RESISTANCE DOSING    Do Not give Bedtime Correction Insulin if BG less than  200.   For  - 249 give 1 units.   For  - 299 give 2 units.   For  - 349 give 3 units.   For  -399 give 4 units.   For BG greater than or equal to 400 give 5 units.  Notify provider if glucose greater than or equal to 350 mg/dL after administration of correction dose.  If given at mealtime, must be administered 5 min before meal or immediately after.       (2334)-Not Given        1223-Auto Hold       1846-Unhold       (2143)-Not Given        (2202)-Not Given        (2141)-Not Given        [ ] 2200           insulin aspart (NovoLOG) inj (RAPID ACTING)  Dose: 1-7 Units Freq: 3 TIMES DAILY BEFORE MEALS Route: SC  Start: 12/04/17 1700   Admin  "Instructions: Correction Scale - MEDIUM INSULIN RESISTANCE DOSING     Do Not give Correction Insulin if Pre-Meal BG less than 140.   For Pre-Meal  - 189 give 1 unit.   For Pre-Meal  - 239 give 2 units.   For Pre-Meal  - 289 give 3 units.   For Pre-Meal  - 339 give 4 units.   For Pre-Meal - 399 give 5 units.   For Pre-Meal -449 give 6 units  For Pre-Meal BG greater than or equal to 450 give 7 units.   To be given with prandial insulin, and based on pre-meal blood glucose.    Notify provider if glucose greater than or equal to 350 mg/dL after administration of correction dose.  If given at mealtime, must be administered 5 min before meal or immediately after.       (1737)-Not Given [C]        (0846)-Not Given       (1222)-Not Given       1223-Auto Hold       1700-Automatically Held       1846-Unhold        (0930)-Not Given       1351 (1 Units)-Given       1748 (1 Units)-Given [C]        (0943)-Not Given       1316 (1 Units)-Given       (1835)-Not Given        (0840)-Not Given       1205 (1 Units)-Given       [ ] 1700           levothyroxine (SYNTHROID/LEVOTHROID) tablet 75 mcg  Dose: 75 mcg Freq: DAILY Route: PO  Start: 12/05/17 0900   Admin Instructions: Separate oral administration of iron- or calcium-containing products and levothyroxine by at least 4 hours.        0831 (75 mcg)-Given       1223-Auto Hold       1846-Unhold        0940 (75 mcg)-Given        0943 (75 mcg)-Given        0838 (75 mcg)-Given           lidocaine (LMX4) cream  Freq: EVERY 1 HOUR PRN Route: Top  PRN Reason: pain  PRN Comment: with VAD insertion or accessing implanted port.  Start: 12/04/17 1458   Admin Instructions: Do NOT give if patient has a history of allergy to any local anesthetic or any \"jovanna\" product.   Apply 30 minutes prior to VAD insertion or port access.  MAX Dose:  2.5 g (  of 5 g tube)        1223-Auto Hold       1846-Unhold              lidocaine 1 % 1 mL  Dose: 1 mL Freq: EVERY 1 HOUR PRN " "Route: OTHER  PRN Comment: mild pain with VAD insertion or accessing implanted port  Start: 12/04/17 1458   Admin Instructions: Do NOT give if patient has a history of allergy to any local anesthetic or any \"jovanna\" product. MAX dose 1 mL subcutaneous OR intradermal in divided doses.        1223-Auto Hold       1846-Unhold              melatonin tablet 1 mg  Dose: 1 mg Freq: AT BEDTIME PRN Route: PO  PRN Reason: sleep  Start: 12/04/17 1458   Admin Instructions: Do not give unless at least 6 hours of uninterrupted sleep is expected.        1223-Auto Hold       1846-Unhold              metoprolol (LOPRESSOR) tablet 75 mg  Dose: 75 mg Freq: 2 TIMES DAILY Route: PO  Start: 12/04/17 2100   Admin Instructions: Hold for SBP <95 or HR <55.       2059 (75 mg)-Given        0831 (75 mg)-Given       1223-Auto Hold       1846-Unhold       2143 (75 mg)-Given        0940 (75 mg)-Given       2121 (75 mg)-Given        0944 (75 mg)-Given       2141 (75 mg)-Given        0837 (75 mg)-Given       [ ] 2100           naloxone (NARCAN) injection 0.1-0.4 mg  Dose: 0.1-0.4 mg Freq: EVERY 2 MIN PRN Route: IV  PRN Reason: opioid reversal  Start: 12/04/17 1458   Admin Instructions: For respiratory rate LESS than or EQUAL to 8.  Partial reversal dose:  0.1 mg titrated q 2 minutes for Analgesia Side Effects Monitoring Sedation Level of 3 (frequently drowsy, arousable, drifts to sleep during conversation).Full reversal dose:  0.4 mg bolus for Analgesia Side Effects Monitoring Sedation Level of 4 (somnolent, minimal or no response to stimulation).  For ordered doses up to 2mg give IVP. Give each 0.4mg over 15 seconds in emergency situations. For non-emergent situations further dilute in 9mL of NS to facilitate titration of response.        1223-Auto Hold       1846-Unhold              ondansetron (ZOFRAN-ODT) ODT tab 4 mg  Dose: 4 mg Freq: EVERY 6 HOURS PRN Route: PO  PRN Reasons: nausea,vomiting  Start: 12/05/17 1848   Admin Instructions: This is " Step 1 of nausea and vomiting management.  If nausea not resolved in 15 minutes, go to Step 2 prochlorperazine (COMPAZINE). Do not push through foil backing. Peel back foil and gently remove. Place on tongue immediately. Administration with liquid unnecessary              Or  ondansetron (ZOFRAN) injection 4 mg  Dose: 4 mg Freq: EVERY 6 HOURS PRN Route: IV  PRN Reasons: nausea,vomiting  Start: 12/05/17 1848   Admin Instructions: This is Step 1 of nausea and vomiting management.  If nausea not resolved in 15 minutes, go to Step 2 prochlorperazine (COMPAZINE).  Irritant. For ordered doses up to 4 mg, give IV Push undiluted over 2-5 minutes.               oxyCODONE IR (ROXICODONE) tablet 5 mg  Dose: 5 mg Freq: EVERY 3 HOURS PRN Route: PO  PRN Reason: moderate to severe pain  Start: 12/05/17 1848   Admin Instructions: IF CrCl is UNKNOWN start at lowest end of dosing range.  Hold while on PCA or with regular IV opioid dosing.        1936 (5 mg)-Given       2233 (5 mg)-Given        0548 (5 mg)-Given       0940 (5 mg)-Given       1351 (5 mg)-Given        0105 (5 mg)-Given       0454 (5 mg)-Given       0943 (5 mg)-Given       1316 (5 mg)-Given       1942 (5 mg)-Given        0014 (5 mg)-Given       0607 (5 mg)-Given           polyethylene glycol (MIRALAX/GLYCOLAX) Packet 17 g  Dose: 17 g Freq: DAILY PRN Route: PO  PRN Reason: constipation  Start: 12/04/17 1458   Admin Instructions: Give in 8oz of  water, juice, or soda. Hold for loose stools.  This is the second step of a three step constipation treatment protocol.  1 Packet = 17 grams. Mixed prescribed dose in 8 ounces of water. Follow with 8 oz. of water.        1223-Auto Hold       1846-Unhold              potassium chloride (KLOR-CON) Packet 20-40 mEq  Dose: 20-40 mEq Freq: EVERY 2 HOURS PRN Route: ORAL OR FEED  PRN Reason: potassium supplementation  Start: 12/04/17 1458   Admin Instructions: Use if unable to tolerate tablets.  If Serum K+ 3.0-3.3, dose = 60 mEq po total  dose (40 mEq x1 followed in 2 hours by 20 mEq x1). Recheck K+ level 4 hours after dose and the next AM.  If Serum K+ 2.5-2.9, dose = 80 mEq po total dose (40 mEq Q2H x2). Recheck K+ level 4 hours after dose and the next AM.  If Serum K+ less than 2.5, See IV order.  Dissolve packet contents in 4-8 ounces of cold water or juice.        1223-Auto Hold       1846-Unhold              potassium chloride 10 mEq in 100 mL intermittent infusion with 10 mg lidocaine  Dose: 10 mEq Freq: EVERY 1 HOUR PRN Route: IV  PRN Reason: potassium supplementation  Start: 12/04/17 1458   Admin Instructions: Infuse via PERIPHERAL LINE. Use potassium with lidocaine for pain with peripheral administration.  If Serum K+ 3.0-3.3, dose = 10 mEq/hr x4 doses (40 mEq IV total dose). Recheck K+ level 2 hours after dose and the next AM.  If Serum K+ less than 3.0, dose = 10 mEq/hr x6 doses (60 mEq IV total dose). Recheck K+ level 2 hours after dose and the next AM.        1223-Auto Hold       1846-Unhold              potassium chloride 10 mEq in 100 mL sterile water intermittent infusion (premix)  Dose: 10 mEq Freq: EVERY 1 HOUR PRN Route: IV  PRN Reason: potassium supplementation  Start: 12/04/17 1458   Admin Instructions: Infuse via PERIPHERAL LINE or CENTRAL LINE. Use for central line replacement if patient weight less than 65 kg, if patient is on TPN with high potassium content or if unit does not stock 20 mEq bags.   If Serum K+ 3.0-3.3, dose = 10 mEq/hr x4 doses (40 mEq IV total dose). Recheck K+ level 2 hours after dose and the next AM.   If Serum K+ less than 3.0, dose = 10 mEq/hr x6 doses (60 mEq IV total dose). Recheck K+ level 2 hours after dose and the next AM.        1223-Auto Hold       1846-Unhold              potassium chloride SA (K-DUR/KLOR-CON M) CR tablet 20-40 mEq  Dose: 20-40 mEq Freq: EVERY 2 HOURS PRN Route: PO  PRN Reason: potassium supplementation  Start: 12/04/17 1458   Admin Instructions: Use if able to take PO.   If  Serum K+ 3.0-3.3, dose = 60 mEq po total dose (40 mEq x1 followed in 2 hours by 20 mEq x1). Recheck K+ level 4 hours after dose and the next AM.  If Serum K+ 2.5-2.9, dose = 80 mEq po total dose (40 mEq Q2H x2). Recheck K+ level 4 hours after dose and the next AM.  If Serum K+ less than 2.5, See IV order.  DO NOT CRUSH        1223-Auto Hold       1846-Unhold                Dose: 10 mg Freq: EVERY 24 HOURS Route: PO  Start: 12/06/17 1500   End: 12/08/17 1306   Admin Instructions: Not recommended if CrCl less than 30 mL/min.<br><br>         1536 (10 mg)-Given        1537 (10 mg)-Given        1306-Med Discontinued       rivaroxaban ANTICOAGULANT (XARELTO) tablet 20 mg  Dose: 20 mg Freq: EVERY 24 HOURS Route: PO  Start: 12/08/17 1500   Admin Instructions: Not recommended if CrCl less than 30 mL/min.<br><br>           1321 (20 mg)-Given                  senna-docusate (SENOKOT-S;PERICOLACE) 8.6-50 MG per tablet 1 tablet  Dose: 1 tablet Freq: 2 TIMES DAILY PRN Route: PO  PRN Reason: constipation  Start: 12/04/17 1458   Admin Instructions: If no bowel movement in 24 hours, increase to 2 tablets PO.  Hold for loose stools.        1223-Auto Hold       1846-Unhold             Or  senna-docusate (SENOKOT-S;PERICOLACE) 8.6-50 MG per tablet 2 tablet  Dose: 2 tablet Freq: 2 TIMES DAILY PRN Route: PO  PRN Reason: constipation  Start: 12/04/17 1458   Admin Instructions: Hold for loose stools.        1223-Auto Hold       1846-Unhold              senna-docusate (SENOKOT-S;PERICOLACE) 8.6-50 MG per tablet 1-2 tablet  Dose: 1-2 tablet Freq: 2 TIMES DAILY Route: PO  Start: 12/05/17 2100   Admin Instructions: Start with 1 tablet PO BID, If no bowel movement in 24 hours, increase to 2 tablets PO BID.  Hold for loose stools.        2143 (1 tablet)-Given        0940 (2 tablet)-Given       2120 (2 tablet)-Given        0944 (2 tablet)-Given       2140 (2 tablet)-Given        0837 (1 tablet)-Given       [ ] 2100           sodium chloride (PF)  0.9% PF flush 3 mL  Dose: 3 mL Freq: EVERY 8 HOURS Route: IK  Start: 12/04/17 1500   Admin Instructions: And Q1H PRN, to lock peripheral IV dormant line.       (1513)-Not Given        0145 (3 mL)-Given       (0640)-Not Given       1223-Auto Hold       1500-Automatically Held       1846-Unhold       2233 (3 mL)-Given               (1536)-Not Given       (2201)-Not Given        0556 (3 mL)-Given              (1835)-Not Given        0019 (3 mL)-Given       0607 (3 mL)-Given       0841 (3 mL)-Given              [ ] 2300           sodium chloride (PF) 0.9% PF flush 3 mL  Dose: 3 mL Freq: EVERY 1 HOUR PRN Route: IK  PRN Reason: line flush  PRN Comment: for peripheral IV flush post IV meds  Start: 12/04/17 1458       1223-Auto Hold       1846-Unhold         1106 (3 mL)-Given            traMADol (ULTRAM) tablet 50 mg  Dose: 50 mg Freq: EVERY 6 HOURS PRN Route: PO  PRN Reason: moderate pain  Start: 12/05/17 1848             Future Medications  Medications 12/02/17 12/03/17 12/04/17 12/05/17 12/06/17 12/07/17 12/08/17       acetaminophen (TYLENOL) tablet 650 mg  Dose: 650 mg Freq: EVERY 4 HOURS PRN Route: PO  PRN Reason: other  PRN Comment: surgical pain  Start: 12/08/17 2200   Admin Instructions: May give first dose 4 hours after last scheduled dose of acetaminophen.  Maximum acetaminophen dose from all sources = 75 mg/kg/day not to exceed 4 grams/day.              Completed Medications  Medications 12/02/17 12/03/17 12/04/17 12/05/17 12/06/17 12/07/17 12/08/17         Dose: 975 mg Freq: EVERY 8 HOURS Route: PO  Start: 12/05/17 2200   End: 12/08/17 1321   Admin Instructions: Do not use if patient has an active opioid/acetaminophen analgesic order for pain  Maximum acetaminophen dose from all sources = 75 mg/kg/day not to exceed 4 grams/day.        2142 (975 mg)-Given        0549 (975 mg)-Given       1351 (975 mg)-Given       2121 (975 mg)-Given        0556 (975 mg)-Given       1537 (975 mg)-Given       2140 (975  mg)-Given        0607 (975 mg)-Given       1321 (975 mg)-Given             Dose: 20 mg Freq: ONCE Route: IV  Start: 12/07/17 1000   End: 12/07/17 1108   Admin Instructions: For ordered doses up to 40 mg, give IV Push undiluted over 1-2 minutes.          1106 (20 mg)-Given              Dose: 1,000 mg Freq: EVERY 24 HOURS Route: IV  Indications of Use: PERIOPERATIVE PHARMACOPROPHYLAXIS  Last Dose: 1,000 mg (12/06/17 1421)  Start: 12/06/17 1400   End: 12/06/17 1521   Admin Instructions: Dose adjusted per renal protocol for crcl = 44.4--TS  Vesicant.         1421 (1,000 mg)-New Bag               Dose: 1,000 mg Freq: PRE-OP/PRE-PROCEDURE Route: IV  Indications of Use: PERIOPERATIVE PHARMACOPROPHYLAXIS  Start: 12/05/17 0816   End: 12/05/17 1439   Admin Instructions: Give dose within 60 minutes PRIOR to incision. Infuse over 60 minutes. If patient has experienced Red Man's Syndrome with vancomycin (VANCOCIN) infusion, prolong the infusion to 120 minutes.  Vesicant.        1301 (1,000 mg)-Handoff       1439 (1 g)-Given             Discontinued Medications  Medications 12/02/17 12/03/17 12/04/17 12/05/17 12/06/17 12/07/17 12/08/17         Rate: 75 mL/hr Freq: CONTINUOUS Route: IV  Last Dose: 75 mL/hr (12/06/17 0850)  Start: 12/05/17 1900   End: 12/06/17 1033   Admin Instructions: Change to saline lock when PO well tolerated.        1911 ( )-New Bag        0850 (75 mL/hr)-New Bag       1033-Med Discontinued           Rate: 100 mL/hr Freq: CONTINUOUS Route: IV  Start: 12/04/17 2345   End: 12/05/17 1908       0145 ( )-New Bag       0539 ( )-Rate/Dose Verify       0819 ( )-Rate/Dose Verify       1037 ( )-New Bag       1908-Med Discontinued            Dose: 650 mg Freq: EVERY 4 HOURS PRN Route: PO  PRN Reason: mild pain  Start: 12/04/17 1458   End: 12/05/17 1901   Admin Instructions: Alternate ibuprofen (if ordered) with acetaminophen.  Maximum acetaminophen dose from all sources = 75 mg/kg/day not to exceed 4 grams/day.        1637 (650 mg)-Given       2059 (650 mg)-Given        0831 (650 mg)-Given       1223-Auto Hold       1846-Unhold       1903-Med Discontinued            Dose: 25-50 mcg Freq: EVERY 2 MIN PRN Route: IV  PRN Reason: other  PRN Comment: acute pain  Start: 12/05/17 1712   End: 12/05/17 1846   Admin Instructions: MAX cumulative dose = 250 mcg.    Use Fentanyl initially, as a short acting agent for acute pain control.  If insufficient, or a longer acting agent is needed, begin Morphine or Hydromorphone if ordered.  For ordered doses up to 100 mcg give IV Push undiluted over a minimum of 3-5 minutes.        1846-Med Discontinued            Dose: 25-50 mcg Freq: PRE-OP/PRE-PROCEDURE Route: IV  Start: 12/05/17 1854   End: 12/05/17 1906   Admin Instructions: Give in pre-op  For ordered doses up to 100 mcg give IV Push undiluted over a minimum of 3-5 minutes.        1906-Med Discontinued            Dose: 0.2 mg Freq: EVERY 2 HOURS PRN Route: IV  PRN Reason: severe pain  Start: 12/04/17 1458   End: 12/05/17 1908   Admin Instructions: Hold while on PCA.  For ordered doses up to 4 mg give IV Push undiluted. Administer each 2mg over 2-5 minutes.       2156 (0.2 mg)-Given        0937 (0.2 mg)-Given       1223-Auto Hold       1846-Unhold       1908-Med Discontinued            Dose: 0.3-0.5 mg Freq: EVERY 5 MIN PRN Route: IV  PRN Reason: other  PRN Comment: acute pain.  May administer if Respiratory Rate is greater than 10  Start: 12/05/17 1712   End: 12/05/17 1846   Admin Instructions: If fentanyl is also ordered, use HYDROmorphone if pain control insufficient with fentanyl or a longer acting agent is needed.   Max cumulative dose = 2 mg  For ordered doses up to 4 mg give IV Push undiluted. Administer each 2mg over 2-5 minutes.        1846-Med Discontinued            Rate: 100 mL/hr Freq: CONTINUOUS Route: IV  Start: 12/05/17 1900   End: 12/05/17 1906       1906-Med Discontinued                   Rate: 25 mL/hr Freq: CONTINUOUS Route:  "IV  Start: 12/05/17 0830   End: 12/05/17 1906   Admin Instructions: IF patient NOT on dialysis.        (1223)-Not Given [C]       1301 ( )-New Bag       1605 ( )-New Bag       1906-Med Discontinued            Dose: 1 mL Freq: EVERY 1 HOUR PRN Route: OTHER  PRN Comment: mild pain with VAD insertion or accessing implanted port  Start: 12/05/17 0816   End: 12/05/17 1906   Admin Instructions: Do NOT give if patient has a history of allergy to any local anesthetic or any \"jovanna\" product. MAX dose 1 mL subcutaneous OR intradermal in divided doses.        1906-Med Discontinued            Dose: 12.5 mg Freq: EVERY 5 MIN PRN Route: IV  PRN Comment: post anesthesia shivering if Respiratory Rate greater than 10  Start: 12/05/17 1712   End: 12/05/17 1846   Admin Instructions: Give IV Push undiluted. 10-40 mg over 2-3 minutes, up to 125 mg over 3-15 minutes        1846-Med Discontinued            Dose: 2 mg Freq: PRE-OP/PRE-PROCEDURE Route: IV  Start: 12/05/17 1854   End: 12/05/17 1906   Admin Instructions: In Pre Op for anxiety/sedation.  IF patient receives midazolam (VERSED) place patient on continuous pulse oximetry PRE OP.  For ordered doses up to 2.5 mg give IV Push slowly titrated over a minimum of 2 minutes. Dilute each 1mg in 4mL of NS.        1906-Med Discontinued            Dose: 4 mg Freq: EVERY 30 MIN PRN Route: PO  PRN Reason: nausea  Start: 12/05/17 1712   End: 12/05/17 1846   Admin Instructions: MAX total dose = 8 mg, including OR dosing. If not resolved in 15 minutes, then go to step 2 (Prochlorperazine if ordered).        1846-Med Discontinued         Or    Dose: 4 mg Freq: EVERY 30 MIN PRN Route: IV  PRN Reason: nausea  Start: 12/05/17 1712   End: 12/05/17 1846   Admin Instructions: MAX total dose = 8 mg, including OR dosing. If not resolved in 15 minutes, then go to step 2 (Prochlorperazine if ordered).  Irritant. For ordered doses up to 4 mg, give IV Push undiluted over 2-5 minutes.        1846-Med " Discontinued            Dose: 4 mg Freq: EVERY 6 HOURS PRN Route: PO  PRN Reasons: nausea,vomiting  Start: 12/04/17 1458   End: 12/05/17 1907   Admin Instructions: This is Step 1 of nausea and vomiting management.  If nausea not resolved in 15 minutes, go to Step 2 prochlorperazine (COMPAZINE). Do not push through foil backing. Peel back foil and gently remove. Place on tongue immediately. Administration with liquid unnecessary               1223-Auto Hold       1846-Unhold       1907-Med Discontinued         Or    Dose: 4 mg Freq: EVERY 6 HOURS PRN Route: IV  PRN Reasons: nausea,vomiting  Start: 12/04/17 1458   End: 12/05/17 1907   Admin Instructions: This is Step 1 of nausea and vomiting management.  If nausea not resolved in 15 minutes, go to Step 2 prochlorperazine (COMPAZINE).  Irritant. For ordered doses up to 4 mg, give IV Push undiluted over 2-5 minutes.        0841 (4 mg)-Given       1223-Auto Hold       1846-Unhold       1907-Med Discontinued            Dose: 2.5-5 mg Freq: EVERY 3 HOURS PRN Route: PO  PRN Reason: moderate to severe pain  Start: 12/04/17 1458   End: 12/05/17 1905   Admin Instructions: Start with the lowest dose.  May adjust dose by 5 mg every 3 hours as needed for pain control or improvement in physical function.  Hold dose for analgesic side effects.  Notify provider to assess for uncontrolled pain or analgesic side effects.       2059 (5 mg)-Given        0151 (2.5 mg)-Given       0831 (5 mg)-Given       1223-Auto Hold       1846-Unhold       1905-Med Discontinued            Dose: 517 ml given Freq: PRN  Start: 12/05/17 1550   End: 12/05/17 1846       1550 (517 ml given)-Given       1846-Med Discontinued            Dose: 5 mg Freq: EVERY 6 HOURS PRN Route: IV  PRN Reasons: nausea,vomiting  Start: 12/05/17 1712   End: 12/05/17 1846   Admin Instructions: This is Step 2 of the nausea and vomiting protocol.   If nausea not resolved in 15 minutes, give Metoclopramide if ordered (step 3 of  nausea and vomiting protocol)    For ordered doses up to 10 mg, give IV Push undiluted. Each 5mg over 1 minute.        1846-Med Discontinued            Freq: PRN  Start: 12/05/17 1500   End: 12/05/17 1846       1500 (3,000 mL)-Given       1846-Med Discontinued            Freq: PRN  Start: 12/05/17 1500   End: 12/05/17 1846       1500 (500 mL)-Given       1846-Med Discontinued            Dose: 1,000 mg Freq: SEE ADMIN INSTRUCTIONS Route: IV  Indications of Use: PERIOPERATIVE PHARMACOPROPHYLAXIS  Start: 12/05/17 0816   End: 12/05/17 1907   Admin Instructions: Intra Op Dose. First dose due 8 hours after pre-op dose. ONLY give if CrCl greater than 50 mL/min. Infuse over 60 minutes. If patient has experienced Red Man's Syndrome with vancomycin (VANCOCIN) infusion, prolong the infusion to 120 minutes.  Give every 8 hours while patient in OR.  Vesicant.        1907-Med Discontinued       Medications 12/02/17 12/03/17 12/04/17 12/05/17 12/06/17 12/07/17 12/08/17

## 2017-12-04 NOTE — IP AVS SNAPSHOT
` ` Patient Information     Patient Name Sex     Lindsey Hale ([id number]) Female 1925       Room Bed    5564 3530-64      Patient Demographics     Address Phone    [address] [address] [phone] (Home)  none (Work)  none (Mobile)      Patient Ethnicity & Race     Ethnic Group Patient Race    American White      Emergency Contact(s)     Name Relation Home Work Mobile    PATRICE GUNN Daughter [phone]    Telly Hale Son [phone]        Documents on File        Status Date Received Description       Documents for the Patient    Consent for Services - CIM Received () 11     Privacy Notice - CIM Received 11     Insurance Card Received () 11 MEDICARE    Insurance Card Received () 11 BCBS    Immunization Record   CIM IMMUNIZATION REPORT    HIM JADEN Authorization - File Only   CIM -  JADEN    Privacy Notice - Tye Received 11     External Medication Information Consent Accepted () 11     Patient ID Received 12     Consent for Services - Hospital/Clinic Received () 11     CMS IM for Patient Signature       Insurance Card Received () 12     Insurance Card Received () 12     Consent to Communicate Received () 12     Consent for Services - Hospital/Clinic Received () 12     HIM JADEN Authorization   ORIGINAL CHART GIVEN TO DR. PARTIDA    External Medication Information Consent Accepted 12     Consent for Services - Hospital/Clinic Received () 13     Consent for EHR Access  13 Copied from existing Consent for services - C/HOD collected on 2013    [facility] Specified Other       Insurance Card Received () 13 bcbs    Insurance Card Received () 13 medicare    Patient ID Received 14 DL    Consent for Services - Hospital/Clinic Received () 14     Consent for  Services - Hospital/Clinic Received () 14     Insurance Card Received 14 Medicare    Insurance Card Received 14     Insurance Card Received 14 EDVIN Pena    Consent for EHR Access Received 14     Consent for EHR Access       Advance Directives and Living Will Not Received  VALIDATION OF AD  12    Advance Directives and Living Will Received  HEALTH CARE DIRECTIVE  12    Consent to Communicate Received 10/06/14     Consent for Services - Hospital/Clinic Received () 10/07/14     Insurance Card Received 07/22/15     HIM AJDEN Authorization - File Only  07/24/15 [facility]-14    Consent for Services - Hospital/Clinic Received () 16     Consent for Services - Hospital/Clinic       Consent for Services/Privacy Notice - Hospital/Clinic Received () 16     Insurance Card Received 16     Insurance Card Received 17 BCBS & Medicare    Consent for Services/Privacy Notice - Hospital/Clinic Received 17     Patient ID Received 17 [place] 2020    Occupational Therapy Certification Received 17-10/23    Care Everywhere Prospective Auth Received 10/19/17        Documents for the Encounter    CMS IM for Patient Signature Received 17   1MM    EMS/Ambulance Record  17 [facility]      Admission Information     Attending Provider Admitting Provider Admission Type Admission Date/Time    Osito Crowder MD Nalawadi, Shantesh, MD Emergency 17  1148    Discharge Date Hospital Service Auth/Cert Status Service Area     Hospitalist Incomplete [facility] SERVICES    Unit Room/Bed Admission Status        55 ORTHO SPEC UNIT 5530/5530-01 Admission (Confirmed)       Admission     Complaint    Fracture of femoral neck, left (H)      Hospital Account     Name Acct ID Class Status Primary Coverage    Lindsey Hale [place] [id number] Inpatient Open MEDICARE - MEDICARE             Guarantor Account (for Hospital Account #[id number])     Name Relation to Pt Service Area Active? Acct Type    Lindsey Hale  FCS Yes Personal/Family    Address Phone          [address] [address] [phone](H)  NONE(O)              Coverage Information (for Hospital Account #[id number])     1. MEDICARE/MEDICARE     F/O Payor/Plan Precert #    MEDICARE/MEDICARE     Subscriber Subscriber #    Alexia Lindsey Nuñez [id number]    Address Phone    [employer] CLAIM[employer]  [address]  [place] [address] [phone]          2. [place]     F/O Payor/Plan Precert #    [place]     Subscriber Subscriber #    Alexia Lindsey [place] [id number]    Address Phone    [address] [address]  [place], [place] [id number] [phone]

## 2017-12-04 NOTE — PROGRESS NOTES
Ortho  Full consult note to follow    A:  89 yo female with left displaced femoral neck fracture    P:  - will see patient later tonight to discuss options.  Will recommend surgery   - surgery likely tomorrow afternoon due to patient taking her recent dose of Xarelto  - surgery would be a hemiarthroplasty  - NPO after midnight  - hold all anticoags    Darell Nathan MD

## 2017-12-04 NOTE — PROGRESS NOTES
Hospitalist admission note dictated. Confirmation #: [id number].    JoAnna Barthell, PA-C  12/4/2017   3:10 PM

## 2017-12-04 NOTE — IP AVS SNAPSHOT
"[place] [address] ORTHO [facility] UNIT: [phone]                                              INTERAGENCY TRANSFER FORM - PHYSICIAN ORDERS   2017                    Hospital Admission Date: 2017  CASIMIRO PRIEST   : 1925  Sex: Female        Attending Provider: Osito Crowder MD     Allergies:  Clindamycin Hcl, Ampicillin Potassium, Celebrex [Celecoxib], Ciprofloxacin, Erythromycin, Indocin, Penicillins, Vibramycin [Doxycycline Hyclate], Xylocaine-epinephrine [Epinephrine-lidocaine-na Metabisulfite]    Infection:  None   Service:  HOSPITALIST    Ht:  1.562 m (5' 1.5\")   Wt:  86.7 kg (191 lb 1.6 oz)   Admission Wt:  78.9 kg (174 lb)    BMI:  35.52 kg/m 2   BSA:  1.94 m 2            Patient PCP Information     Provider PCP Type    Jeffery Sherwood MD General      ED Clinical Impression     Diagnosis Description Comment Added By Time Added    Closed fracture of neck of left femur, initial encounter (H) [S72.002A] Closed fracture of neck of left femur, initial encounter (H) [S72.002A]  Brittanie Perez MD 2017 12:55 PM      Hospital Problems as of 2017     None      Non-Hospital Problems as of 2017              Priority Class Noted    Urine incontinence Medium  Unknown    Transient cerebral ischemia Medium  Unknown    Arthritis Medium  Unknown    Osteopenia Medium  Unknown    Pacemaker Medium  Unknown    Hyperlipidemia LDL goal <160 Medium  2013    Elevated blood sugar Medium  Unknown    Chronic low back pain Medium  Unknown    Benign essential hypertension Medium  Unknown    PMR (polymyalgia rheumatica) (H) Medium  Unknown    Advanced directives, counseling/discussion Medium  2014    SOB (shortness of breath) Medium  Unknown    Mitral regurgitation Medium  Unknown    Hypothyroidism, unspecified type Medium  2016    Chronic obstructive pulmonary disease, unspecified COPD type (H) Medium  2016    Paroxysmal atrial fibrillation (H) Medium  " 9/20/2016    Abnormal echocardiogram Medium  4/1/2017      Code Status History     Date Active Date Inactive Code Status Order ID Comments User Context    12/8/2017 12:27 PM  DNR/DNI [id number]  Osito Crowder MD Outpatient    12/6/2017  3:17 PM 12/8/2017 12:27 PM DNR/DNI [id number]  Osito Crowder MD Inpatient    9/16/2014 11:00 AM 12/6/2017  3:17 PM DNR/DNI [id number]  Jeffery Sherwood MD Outpatient         Medication Review      START taking        Dose / Directions Comments    acetaminophen 325 MG tablet   Commonly known as:  TYLENOL   Used for:  Closed fracture of neck of left femur, initial encounter (H)        Dose:  650 mg   Take 2 tablets (650 mg) by mouth every 4 hours as needed for other (surgical pain)   Quantity:  40 tablet   Refills:  0        oxyCODONE IR 5 MG tablet   Commonly known as:  ROXICODONE   Used for:  Closed fracture of neck of left femur, initial encounter (H)        Dose:  5 mg   Take 1 tablet (5 mg) by mouth every 3 hours as needed for moderate to severe pain   Quantity:  20 tablet   Refills:  0          CONTINUE these medications which have NOT CHANGED        Dose / Directions Comments    budesonide-formoterol 160-4.5 MCG/ACT Inhaler   Commonly known as:  SYMBICORT        Dose:  2 puff   Inhale 2 puffs into the lungs 2 times daily   Refills:  0        Carboxymethylcellulose Sod PF 0.5 % Soln ophthalmic solution   Commonly known as:  REFRESH PLUS        Dose:  1 drop   1 drop 3 times daily as needed for dry eyes   Refills:  0        flecainide acetate 150 MG Tabs   Used for:  Atrial fibrillation (H)        1/2 tab bid   Quantity:  90 tablet   Refills:  3        furosemide 20 MG tablet   Commonly known as:  LASIX   Used for:  Atrial fibrillation, unspecified        Dose:  20 mg   Take 1 tablet (20 mg) by mouth daily Pt taking one tab of 20mg furosemide every day and two tabs every other day if needed   Quantity:  181 tablet   Refills:  3        levothyroxine 75 MCG tablet    Commonly known as:  SYNTHROID/LEVOTHROID   Used for:  Hypothyroidism, unspecified type        Dose:  75 mcg   Take 1 tablet (75 mcg) by mouth daily   Quantity:  90 tablet   Refills:  3        metoprolol 50 MG tablet   Commonly known as:  LOPRESSOR   Used for:  Atrial fibrillation, unspecified type (H), Benign essential hypertension        TAKE 1 AND 1/2 TABLETS BY MOUTH TWICE DAILY   Quantity:  270 tablet   Refills:  3        multivitamin Tabs tablet        Dose:  1 tablet   Take 1 tablet by mouth daily as needed   Refills:  0        rivaroxaban ANTICOAGULANT 20 MG Tabs tablet   Commonly known as:  XARELTO   Used for:  Atrial fibrillation, unspecified type (H)        Dose:  20 mg   Take 1 tablet (20 mg) by mouth daily (with dinner)   Quantity:  90 tablet   Refills:  2        STOOL SOFTENER 100 MG capsule   Generic drug:  docusate sodium        Dose:  100 mg   Take 100 mg by mouth 2 times daily as needed for constipation   Refills:  0        VITAMIN B COMPLEX-C Caps        Dose:  1 capsule   Take 1 capsule by mouth daily   Refills:  0        VITAMIN D (CHOLECALCIFEROL) PO        Dose:  1000 Units   Take 1,000 Units by mouth daily   Refills:  0          STOP taking     CEPHALEXIN PO           IBUPROFEN PO           traMADol 50 MG tablet   Commonly known as:  ULTRAM                   Summary of Visit     Reason for your hospital stay       Further management of left femoral neck fracture.             After Care     Activity - Up with assistive device       WBAT LLE. Walker for ambulation assistance. Strict posterior hip precautions x4-6 weeks. No hip IR, no hip flexion greater than 80 degrees, no crossing legs. Abduction pillow in place at all times while laying down, resting or sleeping.       Advance Diet as Tolerated       Follow this diet upon discharge: Orders Placed This Encounter      Room Service      Moderate Consistent CHO Diet       Daily weights       Call Provider for weight gain of more than 2 pounds  per day or 5 pounds per week.       Fall precautions           General info for SNF       Length of Stay Estimate: Short Term Care: Estimated # of Days <30  Condition at Discharge: Improving  Level of care:skilled   Rehabilitation Potential: Fair  Admission H&P remains valid and up-to-date: Yes  Recent Chemotherapy: N/A  Use Nursing Home Standing Orders: Yes       Intake and output       Every shift       Mantoux instructions       Give two-step Mantoux (PPD) Per Facility Policy Yes       Weight bearing status       WBAT LLE. Walker for ambulation assistance. Strict posterior hip precautions x4-6 weeks. No hip IR, no hip flexion greater than 80 degrees, no crossing legs. Abduction pillow in place at all times while laying down, resting or sleeping.       Wound care (specify)       Site: left posterolateral hip  Instructions: Dressing change to new island dressing as needed. Leave sutures and surgical glue in place over incision(s). Okay to shower, and replace with new island dressing after. Do not submerge incision in water until at least 4 weeks post-op.             Referrals     Occupational Therapy Adult Consult       Evaluate and treat as clinically indicated.    Reason:  deconditioned       Physical Therapy Adult Consult       Evaluate and treat as clinically indicated.    Reason:  deconditioned             Supplies     AntiEmbolism Stockings       Bilateral below knee length.On in the morning, off at night             Your next 10 appointments already scheduled     Dec 28, 2017  2:00 PM CST   Remote PPM Check with [facility]   [facility] ([facility])    [address] [place] [address] [phone]           This appointment is for a remote check of your pacemaker.  This is not an appointment at the office.              Follow-Up Appointment Instructions     Future Labs/Procedures    Follow Up and recommended labs and tests     Comments:     Patient to follow up with Dr. Darell Nathan at 4 weeks post-op. Follow up at [facility] location. Address is [address] [place] [address].    Please call Dr. Nathan's care coordinator, Colette, at [phone] to schedule an appointment.    Follow Up and recommended labs and tests     Comments:    Follow up with halfway physician.  The following labs/tests are recommended: Hgb.  F/u Ortho as previously arranged.      Follow-Up Appointment Instructions     Follow Up and recommended labs and tests       Patient to follow up with Dr. Darell Nathan at 4 weeks post-op. Follow up at [facility] location. Address is [address] [place] [address].    Please call Dr. Nathan's care coordinator, Colette, at [phone] to schedule an appointment.       Follow Up and recommended labs and tests       Follow up with halfway physician.  The following labs/tests are recommended: Hgb.  F/u Ortho as previously arranged.             Statement of Approval     Ordered          12/08/17 1228  I have reviewed and agree with all the recommendations and orders detailed in this document.  EFFECTIVE NOW     Approved and electronically signed by:  Osito Crowder MD

## 2017-12-04 NOTE — IP AVS SNAPSHOT
"` `     [place] [address] [facility]: [phone]                                              INTERAGENCY TRANSFER FORM - NURSING   2017                    Hospital Admission Date: 2017  CASIMIRO PRIEST   : 1925  Sex: Female        Attending Provider: Osito Crowder MD     Allergies:  Clindamycin Hcl, Ampicillin Potassium, Celebrex [Celecoxib], Ciprofloxacin, Erythromycin, Indocin, Penicillins, Vibramycin [Doxycycline Hyclate], Xylocaine-epinephrine [Epinephrine-lidocaine-na Metabisulfite]    Infection:  None   Service:  HOSPITALIST    Ht:  1.562 m (5' 1.5\")   Wt:  86.7 kg (191 lb 1.6 oz)   Admission Wt:  78.9 kg (174 lb)    BMI:  35.52 kg/m 2   BSA:  1.94 m 2            Patient PCP Information     Provider PCP Type    Jeffery Sherwood MD General      Current Code Status     Date Active Code Status Order ID Comments User Context       Prior      Code Status History     Date Active Date Inactive Code Status Order ID Comments User Context    2017 12:27 PM  DNR/DNI [id number]  Osito Crowder MD Outpatient    2017  3:17 PM 2017 12:27 PM DNR/DNI [id number]  Osito Crowder MD Inpatient    2014 11:00 AM 2017  3:17 PM DNR/DNI [id number]  Jeffery Sherwood MD Outpatient      Advance Directives        Does patient have a scanned Advance Directive/ACP document in EPIC?           Yes        Hospital Problems as of 2017     None      Non-Hospital Problems as of 2017              Priority Class Noted    Urine incontinence Medium  Unknown    Transient cerebral ischemia Medium  Unknown    Arthritis Medium  Unknown    Osteopenia Medium  Unknown    Pacemaker Medium  Unknown    Hyperlipidemia LDL goal <160 Medium  2013    Elevated blood sugar Medium  Unknown    Chronic low back pain Medium  Unknown    Benign essential hypertension Medium  Unknown    PMR (polymyalgia rheumatica) (H) Medium  Unknown    Advanced directives, counseling/discussion " Medium  9/16/2014    SOB (shortness of breath) Medium  Unknown    Mitral regurgitation Medium  Unknown    Hypothyroidism, unspecified type Medium  9/20/2016    Chronic obstructive pulmonary disease, unspecified COPD type (H) Medium  9/20/2016    Paroxysmal atrial fibrillation (H) Medium  9/20/2016    Abnormal echocardiogram Medium  4/1/2017      Immunizations     Name Date      Influenza (High Dose) 3 valent vaccine 10/03/17     Influenza (High Dose) 3 valent vaccine 09/20/16     Influenza (High Dose) 3 valent vaccine 09/18/15     Influenza (IIV3) PF 09/26/13     Influenza (IIV3) PF 10/11/12     Influenza (IIV3) PF 09/22/11     Pneumococcal (PCV 13) 09/18/15     Pneumococcal 23 valent 11/01/91     TDAP Vaccine (Adacel) 05/30/13          END      ASSESSMENT     Discharge Profile Flowsheet     EXPECTED DISCHARGE     SKIN      Expected Discharge Date  12/08/17 (TCU) 12/08/17 0700   Inspection of bony prominences  Full 12/08/17 0935    DISCHARGE NEEDS ASSESSMENT     Full except areas not inspected   Buttock, left;Buttock, right;Sacrum;Coccyx 12/05/17 2356    Equipment Currently Used at Home  cane, straight 12/06/17 1030   Procedural focused assessment (identify areas inspected)   Nose;Cheek, left;Cheek, right;Elbow, left;Elbow, right;Abdomen;Knee, left;Knee, right;Ankle, left;Ankle, right 12/05/17 1657    Transportation Available  car;family or friend will provide 12/06/17 1030   Inspection under devices  Full 12/08/17 0935    GASTROINTESTINAL (ADULT,PEDIATRIC,OB)     Skin WDL  ex 12/08/17 0935    GI WDL  WDL 12/08/17 0935   Skin Color/Characteristics  bruised (ecchymotic) 12/08/17 0935    All Quadrants Bowel Sounds  audible and active in all quadrants 12/07/17 1645   Skin Temperature  warm 12/06/17 2201    Last Bowel Movement  12/04/17 12/07/17 2048   Skin Moisture  flaky;dry 12/08/17 0232    GI Signs/Symptoms  constipation 12/07/17 2048   Skin Integrity  incision(s);wound(s) 12/08/17 0935    Passing flatus  yes  "12/07/17 1645   SAFETY      COMMUNICATION ASSESSMENT     Safety WDL  WDL 12/08/17 0935    Patient's communication style  spoken language (English or Bilingual) 12/04/17 1139   All Alarms  alarm(s) activated and audible 12/08/17 0935                 Assessment WDL (Within Defined Limits) Definitions           Safety WDL     Effective: 09/28/15    Row Information: <b>WDL Definition:</b> Bed in low position, wheels locked; call light in reach; upper side rails up x 2; ID band on<br> <font color=\"gray\"><i>Item=AS safety wdl>>List=AS safety wdl>>Version=F14</i></font>      Skin WDL     Effective: 09/28/15    Row Information: <b>WDL Definition:</b> Warm; dry; intact; elastic; without discoloration; pressure points without redness<br> <font color=\"gray\"><i>Item=AS skin wdl>>List=AS skin wdl>>Version=F14</i></font>      Vitals     Vital Signs Flowsheet     VITAL SIGNS     Pain Management Interventions  cold applied 12/08/17 0226    Temp  98.6  F (37  C) 12/08/17 0817   Pain Intervention(s)  Medication (See eMAR);Cold applied 12/08/17 0706    Temp src  Oral 12/08/17 0817   Response to Interventions  Absence of nonverbal indicators of pain 12/08/17 0706    Resp  16 12/08/17 0817   Additional Documentation  number (Numeric Rating Pain Scale) (Group) 12/04/17 1206    Pulse  83 12/07/17 2141   ANALGESIA SIDE EFFECTS MONITORING      Heart Rate  64 12/08/17 0817   Side Effects Monitoring: Respiratory Quality  R 12/08/17 0706    Pulse/Heart Rate Source  Monitor 12/08/17 0401   Side Effects Monitoring: Respiratory Depth  N 12/08/17 0706    BP  120/54 12/08/17 0817   Side Effects Monitoring: Sedation Level  S 12/08/17 0706    BP Location  Left arm 12/08/17 0401   HEIGHT AND WEIGHT      Patient Position  Lying 12/05/17 1743   Weight  86.7 kg (191 lb 1.6 oz) 12/08/17 0402    OXYGEN THERAPY     Weight Method  Bed scale 12/08/17 0402    SpO2  91 % 12/08/17 0817   NIK COMA SCALE      O2 Device  None (Room air) 12/08/17 0817   Best Eye " Response  4-->(E4) spontaneous 12/05/17 2205    Oxygen Delivery  -- 12/08/17 0224   Best Motor Response  6-->(M6) obeys commands 12/05/17 2205    RESPIRATORY MONITORING     Best Verbal Response  5-->(V5) oriented 12/05/17 2205    Respiratory Monitoring (EtCO2)  35 mmHg 12/06/17 1214   [place] Coma Scale Score  15 12/05/17 2205    Integrated Pulmonary Index (IPI)  8-9 12/06/17 1214   DAILY CARE      PACEMAKER     Activity Management  activity adjusted per tolerance;dorsiflexion, plantar flexion encouraged 12/07/17 2048    Pacemaker  Permanent 12/08/17 0935   Activity Assistance Provided  assistance, 2 people 12/07/17 2048    PAIN/COMFORT     Assistive Device Utilized  gait belt;walker 12/07/17 2048    Patient Currently in Pain  sleeping: patient not able to self report 12/08/17 0706   POSITIONING      Preferred Pain Scale  number (Numeric Rating Pain Scale) 12/07/17 0614   Body Position  refuses positioning 12/08/17 0706    Patient's Stated Pain Goal  3 12/06/17 0640   Head of Bed (HOB)  HOB at 60 degrees (eating breakfast) 12/08/17 0935    0-10 Pain Scale  5 12/08/17 0607   Positioning/Transfer Devices  pillows 12/07/17 1645    FACES Pain Rating  0-->no hurt 12/05/17 1744   Chair  Upright in chair 12/08/17 1013    Pain Location  Hip 12/08/17 0706   ECG      Pain Orientation  Right 12/08/17 0706   ECG Rhythm  Paced rhythm 12/05/17 1758    Pain Descriptors  Aching 12/07/17 0433                 Patient Lines/Drains/Airways Status    Active LINES/DRAINS/AIRWAYS     Name: Placement date: Placement time: Site: Days: Last dressing change:    Peripheral IV 12/04/17 Right Upper forearm 12/04/17   1148   Upper forearm   4     Peripheral IV 12/05/17 Right Hand 12/05/17   1305   Hand   3     Incision/Surgical Site 09/23/14 Right Breast 09/23/14   1036    1172     Incision/Surgical Site 12/05/17 Left Hip 12/05/17   1531    2             Patient Lines/Drains/Airways Status    Active PICC/CVC     None            Intake/Output  Detail Report     Date Intake     Output   Net    Shift P.O. I.V. IV Piggyback Total Urine Blood Total       Noc 12/06/17 2300 - 12/07/17 0659 -- -- -- -- 270 -- 270 -270    Day 12/07/17 0700 - 12/07/17 1459 -- -- -- -- 375 -- 375 -375    Sonya 12/07/17 1500 - 12/07/17 2259 150 -- -- 150 250 -- 250 -100    Noc 12/07/17 2300 - 12/08/17 0659 100 -- -- 100 250 -- 250 -150    Day 12/08/17 0700 - 12/08/17 1459 -- -- -- -- 350 -- 350 -350      Last Void/BM       Most Recent Value    Urine Occurrence 1 at 12/07/2017 2202    Stool Occurrence       Case Management/Discharge Planning     Case Management/Discharge Planning Flowsheet     REFERRAL INFORMATION     EXPECTED DISCHARGE      Did the Initial Social Work Assessment result in a Social Work Case?  Yes 12/06/17 1507   Expected Discharge Date  12/08/17 (TCU) 12/08/17 0700    Admission Type  inpatient 12/06/17 1507   DISCHARGE PLANNING      Arrived From  home or self-care 12/06/17 1507   Transportation Available  car;family or friend will provide 12/06/17 1030    Referral Source  nursing 12/06/17 1507   FINAL RESOURCES      Reason For Consult  discharge planning 12/06/17 1507   Equipment Currently Used at Home  canejon 12/06/17 1030    Record Reviewed  medical record 12/06/17 1507   ABUSE RISK SCREEN      CTS Assigned to Case  Ayanna Resendiz 12/06/17 1507   QUESTION TO PATIENT:  Has a member of your family or a partner(now or in the past) intimidated, hurt, manipulated, or controlled you in any way?  no 12/04/17 1146    LIVING ENVIRONMENT     QUESTION TO PATIENT: Do you feel safe going back to the place where you are living?  yes 12/04/17 1146    Lives With  alone 12/06/17 1507   OBSERVATION: Is there reason to believe there has been maltreatment of a vulnerable adult (ie. Physical/Sexual/Emotional abuse, self neglect, lack of adequate food, shelter, medical care, or financial exploitation)?  no 12/04/17 1146    Living Arrangements  house 12/06/17 1507   (R) MENTAL  HEALTH SUICIDE RISK      Able to Return to Prior Living Arrangements  no 12/06/17 1507   Are you depressed or being treated for depression?  No 12/04/17 1740    COPING/STRESS     HOMICIDE RISK      Major Change/Loss/Stressor  none 12/04/17 1741   Feels Like Hurting Others  no 12/04/17 114

## 2017-12-04 NOTE — ED NOTES
"[facility] [place]  ED Nurse Handoff Report    ED Chief complaint: Hip Pain (pt brought in by EMS. pt fell on friday and almost did the splits, pt was able to ambulate after. today pt was seated in chair and twisted and had sudden onset of left hip pain)      ED Diagnosis:   Final diagnoses:   Closed fracture of neck of left femur, initial encounter (H)       Code Status: Full Code    Allergies:   Allergies   Allergen Reactions     Clindamycin Hcl Other (See Comments)     Difficulty swallowing     Ampicillin Potassium      Rash nausea and vomiting       Celebrex [Celecoxib]      rash     Ciprofloxacin      rash     Erythromycin      rash     Indocin      Ended up going to( [facility] hospital with severe head ache     Penicillins      Rash,nausea and vomiting     Vibramycin [Doxycycline Hyclate]      Rash      Xylocaine-Epinephrine [Epinephrine-Lidocaine-Na Metabisulfite]      Xylocaine with epi caused a rapid heart beat       Activity level - Baseline/Home:  Independent    Activity Level - Current:   Unable to Assess     Needed?: No    Isolation: No  Infection: Not Applicable    Bariatric?: No    Vital Signs:   Vitals:    12/04/17 1141 12/04/17 1200 12/04/17 1300 12/04/17 1313   BP: 144/76  161/72    Pulse: 76      Resp: 14      Temp: 99.1  F (37.3  C)      SpO2: 97% 97% (!) 86% 93%   Weight: 78.9 kg (174 lb)          Cardiac Rhythm: ,        Pain level: 0-10 Pain Scale: 4    Is this patient confused?: No    Patient Report: Initial Complaint: Leg pain  Focused Assessment: Pt brought by EMS d/t L sudden hip pain.  Reported to have done \"the splits\" after slipping 3 days ago, patient states she was able to walk all weekend.  This AM she was getting off the toilet and when bending over had sudden L side hip pain.  XRAY +femoral head fx.  Given 2mg MS for pain.  Applied 2L NC d/t occ. Dropping sats.  [place] placed.  On xarelto for a fib.  Potential surgery tonight or tomorrow.  NPO since 0800.  Normally " independent and lives alone in home.  Daughter and son-in-law @ beside.  Patient a/o x4.   Tests Performed: blood work/EKG/XR  Abnormal Results: XR (see above)  Treatments provided: MS for pain    Family Comments: @ bedside    OBS brochure/video discussed/provided to patient: N/A    ED Medications:   Medications   morphine (PF) injection 2 mg (2 mg Intravenous Given 12/4/17 1203)       Drips infusing?:  No      ED NURSE PHONE NUMBER: *[phone]

## 2017-12-05 ENCOUNTER — ANESTHESIA (OUTPATIENT)
Dept: SURGERY | Facility: CLINIC | Age: 82
DRG: 470 | End: 2017-12-05
Payer: MEDICARE

## 2017-12-05 ENCOUNTER — APPOINTMENT (OUTPATIENT)
Dept: GENERAL RADIOLOGY | Facility: CLINIC | Age: 82
DRG: 470 | End: 2017-12-05
Attending: PHYSICIAN ASSISTANT
Payer: MEDICARE

## 2017-12-05 ENCOUNTER — ANESTHESIA EVENT (OUTPATIENT)
Dept: SURGERY | Facility: CLINIC | Age: 82
DRG: 470 | End: 2017-12-05
Payer: MEDICARE

## 2017-12-05 LAB
GLUCOSE BLDC GLUCOMTR-MCNC: 110 MG/DL (ref 70–99)
GLUCOSE BLDC GLUCOMTR-MCNC: 117 MG/DL (ref 70–99)
GLUCOSE BLDC GLUCOMTR-MCNC: 126 MG/DL (ref 70–99)
GLUCOSE BLDC GLUCOMTR-MCNC: 131 MG/DL (ref 70–99)

## 2017-12-05 PROCEDURE — A9270 NON-COVERED ITEM OR SERVICE: HCPCS | Mod: GY | Performed by: PHYSICIAN ASSISTANT

## 2017-12-05 PROCEDURE — 25800025 ZZH RX 258: Performed by: ORTHOPAEDIC SURGERY

## 2017-12-05 PROCEDURE — 25000128 H RX IP 250 OP 636: Performed by: NURSE ANESTHETIST, CERTIFIED REGISTERED

## 2017-12-05 PROCEDURE — 71000012 ZZH RECOVERY PHASE 1 LEVEL 1 FIRST HR: Performed by: ORTHOPAEDIC SURGERY

## 2017-12-05 PROCEDURE — C9399 UNCLASSIFIED DRUGS OR BIOLOG: HCPCS | Performed by: NURSE ANESTHETIST, CERTIFIED REGISTERED

## 2017-12-05 PROCEDURE — 25000132 ZZH RX MED GY IP 250 OP 250 PS 637: Mod: GY | Performed by: PHYSICIAN ASSISTANT

## 2017-12-05 PROCEDURE — 99232 SBSQ HOSP IP/OBS MODERATE 35: CPT | Performed by: PHYSICIAN ASSISTANT

## 2017-12-05 PROCEDURE — 25000128 H RX IP 250 OP 636: Performed by: ORTHOPAEDIC SURGERY

## 2017-12-05 PROCEDURE — 36000093 ZZH SURGERY LEVEL 4 1ST 30 MIN: Performed by: ORTHOPAEDIC SURGERY

## 2017-12-05 PROCEDURE — 37000009 ZZH ANESTHESIA TECHNICAL FEE, EACH ADDTL 15 MIN: Performed by: ORTHOPAEDIC SURGERY

## 2017-12-05 PROCEDURE — 40000986 XR PELVIS AND HIP PORTABLE LEFT 1 VIEW

## 2017-12-05 PROCEDURE — 12000007 ZZH R&B INTERMEDIATE

## 2017-12-05 PROCEDURE — C1776 JOINT DEVICE (IMPLANTABLE): HCPCS | Performed by: ORTHOPAEDIC SURGERY

## 2017-12-05 PROCEDURE — 25000125 ZZHC RX 250: Performed by: NURSE ANESTHETIST, CERTIFIED REGISTERED

## 2017-12-05 PROCEDURE — 27210995 ZZH RX 272: Performed by: ORTHOPAEDIC SURGERY

## 2017-12-05 PROCEDURE — 25000125 ZZHC RX 250: Performed by: ORTHOPAEDIC SURGERY

## 2017-12-05 PROCEDURE — 27210794 ZZH OR GENERAL SUPPLY STERILE: Performed by: ORTHOPAEDIC SURGERY

## 2017-12-05 PROCEDURE — 27810169 ZZH OR IMPLANT GENERAL: Performed by: ORTHOPAEDIC SURGERY

## 2017-12-05 PROCEDURE — 36000063 ZZH SURGERY LEVEL 4 EA 15 ADDTL MIN: Performed by: ORTHOPAEDIC SURGERY

## 2017-12-05 PROCEDURE — 37000008 ZZH ANESTHESIA TECHNICAL FEE, 1ST 30 MIN: Performed by: ORTHOPAEDIC SURGERY

## 2017-12-05 PROCEDURE — 25000128 H RX IP 250 OP 636: Performed by: ANESTHESIOLOGY

## 2017-12-05 PROCEDURE — 25000128 H RX IP 250 OP 636: Performed by: PHYSICIAN ASSISTANT

## 2017-12-05 PROCEDURE — 40000169 ZZH STATISTIC PRE-PROCEDURE ASSESSMENT I: Performed by: ORTHOPAEDIC SURGERY

## 2017-12-05 PROCEDURE — 00000146 ZZHCL STATISTIC GLUCOSE BY METER IP

## 2017-12-05 PROCEDURE — 25000566 ZZH SEVOFLURANE, EA 15 MIN: Performed by: ORTHOPAEDIC SURGERY

## 2017-12-05 PROCEDURE — 0SRS0J9 REPLACEMENT OF LEFT HIP JOINT, FEMORAL SURFACE WITH SYNTHETIC SUBSTITUTE, CEMENTED, OPEN APPROACH: ICD-10-PCS | Performed by: ORTHOPAEDIC SURGERY

## 2017-12-05 DEVICE — IMP SPACER OSTEONICS DISTAL CEMENT 10MM 1067-0010: Type: IMPLANTABLE DEVICE | Site: HIP | Status: FUNCTIONAL

## 2017-12-05 DEVICE — IMPLANTABLE DEVICE: Type: IMPLANTABLE DEVICE | Site: HIP | Status: FUNCTIONAL

## 2017-12-05 DEVICE — BONE CEMENT RESTRICTOR BUCK FEMORAL 18.5MM 129418: Type: IMPLANTABLE DEVICE | Site: HIP | Status: FUNCTIONAL

## 2017-12-05 DEVICE — IMP HEAD STRK FEMORAL UHR BIPOLAR 28X44MM UH1-44-28: Type: IMPLANTABLE DEVICE | Site: HIP | Status: FUNCTIONAL

## 2017-12-05 DEVICE — IMP STEM COMP STRK HIP 132DEG #5 6070-0525A: Type: IMPLANTABLE DEVICE | Site: HIP | Status: FUNCTIONAL

## 2017-12-05 DEVICE — BONE CEMENT SIMPLEX FULL DOSE 6191-1-001: Type: IMPLANTABLE DEVICE | Site: HIP | Status: FUNCTIONAL

## 2017-12-05 RX ORDER — DIPHENHYDRAMINE HCL 12.5MG/5ML
12.5 LIQUID (ML) ORAL EVERY 6 HOURS PRN
Status: DISCONTINUED | OUTPATIENT
Start: 2017-12-05 | End: 2017-12-08 | Stop reason: HOSPADM

## 2017-12-05 RX ORDER — ONDANSETRON 2 MG/ML
4 INJECTION INTRAMUSCULAR; INTRAVENOUS EVERY 30 MIN PRN
Status: DISCONTINUED | OUTPATIENT
Start: 2017-12-05 | End: 2017-12-05 | Stop reason: HOSPADM

## 2017-12-05 RX ORDER — VANCOMYCIN HYDROCHLORIDE 1 G/200ML
1000 INJECTION, SOLUTION INTRAVENOUS
Status: COMPLETED | OUTPATIENT
Start: 2017-12-05 | End: 2017-12-05

## 2017-12-05 RX ORDER — MEPERIDINE HYDROCHLORIDE 25 MG/ML
12.5 INJECTION INTRAMUSCULAR; INTRAVENOUS; SUBCUTANEOUS EVERY 5 MIN PRN
Status: DISCONTINUED | OUTPATIENT
Start: 2017-12-05 | End: 2017-12-05 | Stop reason: HOSPADM

## 2017-12-05 RX ORDER — HYDROMORPHONE HYDROCHLORIDE 1 MG/ML
0.2 INJECTION, SOLUTION INTRAMUSCULAR; INTRAVENOUS; SUBCUTANEOUS
Status: DISCONTINUED | OUTPATIENT
Start: 2017-12-05 | End: 2017-12-08 | Stop reason: HOSPADM

## 2017-12-05 RX ORDER — DIPHENHYDRAMINE HYDROCHLORIDE 50 MG/ML
12.5 INJECTION INTRAMUSCULAR; INTRAVENOUS EVERY 6 HOURS PRN
Status: DISCONTINUED | OUTPATIENT
Start: 2017-12-05 | End: 2017-12-08 | Stop reason: HOSPADM

## 2017-12-05 RX ORDER — ONDANSETRON 2 MG/ML
INJECTION INTRAMUSCULAR; INTRAVENOUS PRN
Status: DISCONTINUED | OUTPATIENT
Start: 2017-12-05 | End: 2017-12-05

## 2017-12-05 RX ORDER — SODIUM CHLORIDE, SODIUM LACTATE, POTASSIUM CHLORIDE, CALCIUM CHLORIDE 600; 310; 30; 20 MG/100ML; MG/100ML; MG/100ML; MG/100ML
INJECTION, SOLUTION INTRAVENOUS CONTINUOUS
Status: DISCONTINUED | OUTPATIENT
Start: 2017-12-05 | End: 2017-12-05

## 2017-12-05 RX ORDER — ONDANSETRON 4 MG/1
4 TABLET, ORALLY DISINTEGRATING ORAL EVERY 30 MIN PRN
Status: DISCONTINUED | OUTPATIENT
Start: 2017-12-05 | End: 2017-12-05 | Stop reason: HOSPADM

## 2017-12-05 RX ORDER — ACETAMINOPHEN 325 MG/1
650 TABLET ORAL EVERY 4 HOURS PRN
Status: DISCONTINUED | OUTPATIENT
Start: 2017-12-08 | End: 2017-12-08 | Stop reason: HOSPADM

## 2017-12-05 RX ORDER — VANCOMYCIN HYDROCHLORIDE 1 G/200ML
1000 INJECTION, SOLUTION INTRAVENOUS EVERY 24 HOURS
Status: COMPLETED | OUTPATIENT
Start: 2017-12-06 | End: 2017-12-06

## 2017-12-05 RX ORDER — LIDOCAINE HYDROCHLORIDE 20 MG/ML
INJECTION, SOLUTION INFILTRATION; PERINEURAL PRN
Status: DISCONTINUED | OUTPATIENT
Start: 2017-12-05 | End: 2017-12-05

## 2017-12-05 RX ORDER — OXYCODONE HYDROCHLORIDE 5 MG/1
5 TABLET ORAL
Status: DISCONTINUED | OUTPATIENT
Start: 2017-12-05 | End: 2017-12-08 | Stop reason: HOSPADM

## 2017-12-05 RX ORDER — ALBUTEROL SULFATE 0.83 MG/ML
2.5 SOLUTION RESPIRATORY (INHALATION) EVERY 6 HOURS PRN
Status: DISCONTINUED | OUTPATIENT
Start: 2017-12-05 | End: 2017-12-08 | Stop reason: HOSPADM

## 2017-12-05 RX ORDER — SODIUM CHLORIDE 9 MG/ML
INJECTION, SOLUTION INTRAVENOUS CONTINUOUS
Status: DISCONTINUED | OUTPATIENT
Start: 2017-12-05 | End: 2017-12-06

## 2017-12-05 RX ORDER — FENTANYL CITRATE 50 UG/ML
25-50 INJECTION, SOLUTION INTRAMUSCULAR; INTRAVENOUS
Status: DISCONTINUED | OUTPATIENT
Start: 2017-12-05 | End: 2017-12-05

## 2017-12-05 RX ORDER — TRAMADOL HYDROCHLORIDE 50 MG/1
50 TABLET ORAL EVERY 6 HOURS PRN
Status: DISCONTINUED | OUTPATIENT
Start: 2017-12-05 | End: 2017-12-08 | Stop reason: HOSPADM

## 2017-12-05 RX ORDER — ACETAMINOPHEN 325 MG/1
975 TABLET ORAL EVERY 8 HOURS
Status: COMPLETED | OUTPATIENT
Start: 2017-12-05 | End: 2017-12-08

## 2017-12-05 RX ORDER — ONDANSETRON 2 MG/ML
4 INJECTION INTRAMUSCULAR; INTRAVENOUS EVERY 6 HOURS PRN
Status: DISCONTINUED | OUTPATIENT
Start: 2017-12-05 | End: 2017-12-08 | Stop reason: HOSPADM

## 2017-12-05 RX ORDER — FENTANYL CITRATE 0.05 MG/ML
25-50 INJECTION, SOLUTION INTRAMUSCULAR; INTRAVENOUS
Status: DISCONTINUED | OUTPATIENT
Start: 2017-12-05 | End: 2017-12-05 | Stop reason: HOSPADM

## 2017-12-05 RX ORDER — PROPOFOL 10 MG/ML
INJECTION, EMULSION INTRAVENOUS PRN
Status: DISCONTINUED | OUTPATIENT
Start: 2017-12-05 | End: 2017-12-05

## 2017-12-05 RX ORDER — AMOXICILLIN 250 MG
1-2 CAPSULE ORAL 2 TIMES DAILY
Status: DISCONTINUED | OUTPATIENT
Start: 2017-12-05 | End: 2017-12-08 | Stop reason: HOSPADM

## 2017-12-05 RX ORDER — ONDANSETRON 4 MG/1
4 TABLET, ORALLY DISINTEGRATING ORAL EVERY 6 HOURS PRN
Status: DISCONTINUED | OUTPATIENT
Start: 2017-12-05 | End: 2017-12-08 | Stop reason: HOSPADM

## 2017-12-05 RX ORDER — FENTANYL CITRATE 50 UG/ML
INJECTION, SOLUTION INTRAMUSCULAR; INTRAVENOUS PRN
Status: DISCONTINUED | OUTPATIENT
Start: 2017-12-05 | End: 2017-12-05

## 2017-12-05 RX ORDER — NEOSTIGMINE METHYLSULFATE 1 MG/ML
VIAL (ML) INJECTION PRN
Status: DISCONTINUED | OUTPATIENT
Start: 2017-12-05 | End: 2017-12-05

## 2017-12-05 RX ORDER — HYDROMORPHONE HYDROCHLORIDE 1 MG/ML
.3-.5 INJECTION, SOLUTION INTRAMUSCULAR; INTRAVENOUS; SUBCUTANEOUS EVERY 5 MIN PRN
Status: DISCONTINUED | OUTPATIENT
Start: 2017-12-05 | End: 2017-12-05 | Stop reason: HOSPADM

## 2017-12-05 RX ORDER — MAGNESIUM HYDROXIDE 1200 MG/15ML
LIQUID ORAL PRN
Status: DISCONTINUED | OUTPATIENT
Start: 2017-12-05 | End: 2017-12-05 | Stop reason: HOSPADM

## 2017-12-05 RX ORDER — GLYCOPYRROLATE 0.2 MG/ML
INJECTION, SOLUTION INTRAMUSCULAR; INTRAVENOUS PRN
Status: DISCONTINUED | OUTPATIENT
Start: 2017-12-05 | End: 2017-12-05

## 2017-12-05 RX ORDER — VANCOMYCIN HYDROCHLORIDE 1 G/200ML
1000 INJECTION, SOLUTION INTRAVENOUS SEE ADMIN INSTRUCTIONS
Status: DISCONTINUED | OUTPATIENT
Start: 2017-12-05 | End: 2017-12-05

## 2017-12-05 RX ADMIN — Medication 75 MG: at 22:33

## 2017-12-05 RX ADMIN — LIDOCAINE HYDROCHLORIDE 40 MG: 20 INJECTION, SOLUTION INFILTRATION; PERINEURAL at 14:47

## 2017-12-05 RX ADMIN — ACETAMINOPHEN 650 MG: 325 TABLET, FILM COATED ORAL at 08:31

## 2017-12-05 RX ADMIN — ACETAMINOPHEN 975 MG: 325 TABLET, FILM COATED ORAL at 21:42

## 2017-12-05 RX ADMIN — PHENYLEPHRINE HYDROCHLORIDE 100 MCG: 10 INJECTION INTRAVENOUS at 15:39

## 2017-12-05 RX ADMIN — NEOSTIGMINE METHYLSULFATE 4 MG: 1 INJECTION INTRAMUSCULAR; INTRAVENOUS; SUBCUTANEOUS at 16:51

## 2017-12-05 RX ADMIN — Medication 2.5 MG: at 01:51

## 2017-12-05 RX ADMIN — FLUTICASONE FUROATE AND VILANTEROL TRIFENATATE 1 PUFF: 200; 25 POWDER RESPIRATORY (INHALATION) at 08:30

## 2017-12-05 RX ADMIN — PHENYLEPHRINE HYDROCHLORIDE 100 MCG: 10 INJECTION INTRAVENOUS at 15:54

## 2017-12-05 RX ADMIN — PHENYLEPHRINE HYDROCHLORIDE 100 MCG: 10 INJECTION INTRAVENOUS at 15:02

## 2017-12-05 RX ADMIN — Medication 75 MG: at 08:31

## 2017-12-05 RX ADMIN — SUGAMMADEX 100 MG: 100 INJECTION, SOLUTION INTRAVENOUS at 17:02

## 2017-12-05 RX ADMIN — Medication 0.2 MG: at 09:37

## 2017-12-05 RX ADMIN — PHENYLEPHRINE HYDROCHLORIDE 100 MCG: 10 INJECTION INTRAVENOUS at 15:50

## 2017-12-05 RX ADMIN — CISATRACURIUM BESYLATE 2 MG: 2 INJECTION INTRAVENOUS at 16:12

## 2017-12-05 RX ADMIN — GLYCOPYRROLATE 0.8 MG: 0.2 INJECTION, SOLUTION INTRAMUSCULAR; INTRAVENOUS at 16:51

## 2017-12-05 RX ADMIN — OXYCODONE HYDROCHLORIDE 5 MG: 5 TABLET ORAL at 19:36

## 2017-12-05 RX ADMIN — PHENYLEPHRINE HYDROCHLORIDE 100 MCG: 10 INJECTION INTRAVENOUS at 15:11

## 2017-12-05 RX ADMIN — SODIUM CHLORIDE: 9 INJECTION, SOLUTION INTRAVENOUS at 19:11

## 2017-12-05 RX ADMIN — METOPROLOL TARTRATE 75 MG: 50 TABLET ORAL at 21:43

## 2017-12-05 RX ADMIN — ROCURONIUM BROMIDE 50 MG: 10 INJECTION INTRAVENOUS at 14:49

## 2017-12-05 RX ADMIN — SODIUM CHLORIDE, POTASSIUM CHLORIDE, SODIUM LACTATE AND CALCIUM CHLORIDE: 600; 310; 30; 20 INJECTION, SOLUTION INTRAVENOUS at 13:01

## 2017-12-05 RX ADMIN — FENTANYL CITRATE 100 MCG: 50 INJECTION, SOLUTION INTRAMUSCULAR; INTRAVENOUS at 14:47

## 2017-12-05 RX ADMIN — SENNOSIDES AND DOCUSATE SODIUM 1 TABLET: 8.6; 5 TABLET ORAL at 21:43

## 2017-12-05 RX ADMIN — Medication 5 MG: at 08:31

## 2017-12-05 RX ADMIN — PROPOFOL 160 MG: 10 INJECTION, EMULSION INTRAVENOUS at 14:47

## 2017-12-05 RX ADMIN — OXYCODONE HYDROCHLORIDE 5 MG: 5 TABLET ORAL at 22:33

## 2017-12-05 RX ADMIN — HYDROMORPHONE HYDROCHLORIDE 0.5 MG: 1 INJECTION, SOLUTION INTRAMUSCULAR; INTRAVENOUS; SUBCUTANEOUS at 15:35

## 2017-12-05 RX ADMIN — ONDANSETRON 4 MG: 2 INJECTION INTRAMUSCULAR; INTRAVENOUS at 16:23

## 2017-12-05 RX ADMIN — VANCOMYCIN HYDROCHLORIDE 1 G: 1 INJECTION, SOLUTION INTRAVENOUS at 14:39

## 2017-12-05 RX ADMIN — SODIUM CHLORIDE: 9 INJECTION, SOLUTION INTRAVENOUS at 10:37

## 2017-12-05 RX ADMIN — DEXMEDETOMIDINE HYDROCHLORIDE 20 MCG: 100 INJECTION, SOLUTION INTRAVENOUS at 15:21

## 2017-12-05 RX ADMIN — PHENYLEPHRINE HYDROCHLORIDE 100 MCG: 10 INJECTION INTRAVENOUS at 15:46

## 2017-12-05 RX ADMIN — PHENYLEPHRINE HYDROCHLORIDE 100 MCG: 10 INJECTION INTRAVENOUS at 15:44

## 2017-12-05 RX ADMIN — PHENYLEPHRINE HYDROCHLORIDE 100 MCG: 10 INJECTION INTRAVENOUS at 15:14

## 2017-12-05 RX ADMIN — ONDANSETRON 4 MG: 2 SOLUTION INTRAMUSCULAR; INTRAVENOUS at 08:41

## 2017-12-05 RX ADMIN — PHENYLEPHRINE HYDROCHLORIDE 0.25 MCG/KG/MIN: 10 INJECTION INTRAVENOUS at 15:53

## 2017-12-05 RX ADMIN — PHENYLEPHRINE HYDROCHLORIDE 200 MCG: 10 INJECTION INTRAVENOUS at 15:58

## 2017-12-05 RX ADMIN — METOPROLOL TARTRATE 75 MG: 50 TABLET ORAL at 08:31

## 2017-12-05 RX ADMIN — SODIUM CHLORIDE, POTASSIUM CHLORIDE, SODIUM LACTATE AND CALCIUM CHLORIDE: 600; 310; 30; 20 INJECTION, SOLUTION INTRAVENOUS at 16:05

## 2017-12-05 RX ADMIN — LEVOTHYROXINE SODIUM 75 MCG: 75 TABLET ORAL at 08:31

## 2017-12-05 RX ADMIN — SODIUM CHLORIDE: 9 INJECTION, SOLUTION INTRAVENOUS at 01:45

## 2017-12-05 ASSESSMENT — COPD QUESTIONNAIRES
CAT_SEVERITY: MILD
COPD: 1

## 2017-12-05 ASSESSMENT — ENCOUNTER SYMPTOMS: DYSRHYTHMIAS: 1

## 2017-12-05 NOTE — CONSULTS
ORTHOPEDIC SURGERY CONSULTATION       DATE OF SERVICE:  12/04/2017        CHIEF COMPLAINT:  Left hip pain.      REQUESTING PHYSICIAN:   Dr. Brittanie Perez from [facility] Emergency Department.      HISTORY OF PRESENT ILLNESS:  Lindsey Hale is a 89-year-old female with multiple medical comorbidities including history of atrial fibrillation, sick sinus syndrome, polymyalgia rheumatica and COPD who presents after having a mechanical fall at home.  She states that she fell approximately 4 days ago onto her left side.  She had little pain at that time.  Recently then today after standing she developed significant left-sided hip pain which progressively got worse, unable to bear weight.  She has been to the emergency department where x-rays were obtained which showed a displaced left femoral neck fracture.  She was admitted for operative management.  Overall, she is doing well on the bed.  Her family is with her today.  She describes no other pain other than her hip pain.      PAST MEDICAL HISTORY:   1.  Atrial fibrillation.   2.  Sick sinus syndrome.   3.  Polymyalgia rheumatica.    4.  Chronic obstructive pulmonary disease.   5.  Hypertension.   6.  Moderate regurgitation.   7.  History of TIAs.    8.  Hypothyroidism.      PAST SURGICAL HISTORY:   1.  Hysterectomy.   2.  Mastectomy.   3.  Cataracts.   4.  Patellofemoral arthroplasties.      FAMILY HISTORY:  This is reviewed and noncontributory.      SOCIAL HISTORY:  The patient's quit smoking a long time ago.  She is .      ALLERGIES:   1.  Clindamycin.   2.  Ampicillin.   3.  Celebrex.   4.  Ciprofloxacin.   5.  Erythromycin.   6.  Simvastatin.   7.  Penicillin.   8.  Vibramycin.   9.  Xylocaine and epinephrine due to rapid heartbeat and heart rate.      REVIEW OF SYSTEMS:  A 10 point review of systems review was negative except for musculoskeletal.  Please see HPI.      MEDICATIONS:  Patient is on multiple medications.  Please see H&P for details.  Of  note, the patient is on Xarelto.      PHYSICAL EXAMINATION:     GENERAL:  Shows a healthy-appearing of 89-year-old female in no acute distress.  Awake, alert and oriented x3.     EXTREMITIES:  Focused examination of the left hip shows shortening and externally rotated.  Neurovascular intact distally.  No pain with palpation over the distal femur, proximal tibia or ankle.      X-RAYS:  X-rays of her left hip showed displaced left femoral neck fracture.      IMPRESSION:  Left displaced femoral neck fracture.      PLAN:  The patient was admitted to the hospitalist service.  She will be n.p.o. after midnight tonight for surgical management tomorrow.  We discussed risks of surgery versus nonoperative management.  She would like to proceed with surgery.  This would be in the form of hemiarthroplasty.  We discussed the risks of surgery including but not limited to infection, nerve or blood vessel injury, persistent pain, dislocation, limb length discrepancy, cardiovascular, respiratory complication related to anesthetic, DVT and death.  She understands these risks.  We will proceed with surgery tomorrow.  This will be delayed to allow for the Xarelto to clear.         CHELSEA BROWNE MD             D: 2017 22:01   T: 2017 22:19   MT: CONCEPCION      Name:     CASIMIRO PRIEST   MRN:      [id number]        Account:       [id number]   :      1925           Consult Date:  2017      Document: [id number]

## 2017-12-05 NOTE — BRIEF OP NOTE
[facility] Brief Operative Note    Pre-operative diagnosis: LEFT FEMORAL NECK FRACTURE   Post-operative diagnosis Left displaced femoral neck fracture   Procedure: Procedure(s):  LEFT HIP ARBEN ARTHROPLASTY(SORAYA) - Wound Class: I-Clean   Surgeon(s): Surgeon(s) and Role:     * Darell Nathan MD - Primary     * Lisa Tavera PA-C - Assisting   Estimated blood loss: 200 mL    Specimens: * No specimens in log *   Findings: See operative note.

## 2017-12-05 NOTE — ANESTHESIA PREPROCEDURE EVALUATION
Anesthesia Evaluation     . Pt has had prior anesthetic.     No history of anesthetic complications          ROS/MED HX    ENT/Pulmonary:     (+)mild COPD, , . .   (-) sleep apnea   Neurologic:     (+)TIA     Cardiovascular: Comment: LBBB    (+) hypertension----. : . . . pacemaker Reason placed: sick sinus:- Patient is dependent on pacemaker . dysrhythmias a-fib, valvular problems/murmurs type: MR mild-mod ef 60-65% 4/2017:.       METS/Exercise Tolerance:  3 - Able to walk 1-2 blocks without stopping   Hematologic:     (+) History of blood clots -      Musculoskeletal:         GI/Hepatic:        (-) GERD and hepatitis   Renal/Genitourinary:         Endo:     (+) thyroid problem hypothyroidism, Obesity, .      Psychiatric:         Infectious Disease:         Malignancy:         Other:                     Physical Exam  Normal systems: dental    Airway   Mallampati: III  TM distance: >3 FB  Neck ROM: full    Dental     Cardiovascular   Rhythm and rate: regular and normal      Pulmonary    breath sounds clear to auscultation                    Anesthesia Plan      History & Physical Review  History and physical reviewed and following examination; no interval change.    ASA Status:  3 .    NPO Status:  > 8 hours    Plan for General and ETT with Intravenous induction. Maintenance will be Balanced.    PONV prophylaxis:  Ondansetron (or other 5HT-3)  Additional equipment: Videolaryngoscope No versed  magnet      Postoperative Care      Consents  Anesthetic plan, risks, benefits and alternatives discussed with:  Patient..                          .   labor/delivery

## 2017-12-05 NOTE — PLAN OF CARE
Problem: Patient Care Overview  Goal: Plan of Care/Patient Progress Review  Outcome: No Change  A&O x4, VSS on room air.  NPO for surgery today, pt on bedrest.  [place], hearing aids at the bedside. Pain in left hip relieved with Oxy x1. [place]ster patent and intact.  NS at 100 mL/hr.  .  Pulses/sensation adequate BLE.  Nursing will continue to monitor.

## 2017-12-05 NOTE — PLAN OF CARE
Problem: Patient Care Overview  Goal: Plan of Care/Patient Progress Review  Outcome: No Change  Care Plan Summary Note: vss, alert x4, moderate to severe pain to left hip with movement. PRN oxycodone/tylenol/dilaudid given x1 this am with relief. Unable to turn, writer had been helping pt shifting wt while in bedrest. Ice pack applied to left hip also helped. Pre-op anna wipe done to left leg and hip. /131. NPO since midnight, took meds with sips of water this am. C/o nausea after oral meds, PRN zofran IVP given once with relief. PIV patet and SL. IV vancomycin will be sent with pt to pre-op. Lugns clear, on RA, +BS. Reported last BM 12/4/2017. Children at bedside (Real and Oxana), were instructed to take all personal belongings with in case pt were to be admitted to ortho floor. Inhaler and insulin pen given to daughter, will give to floor nurse after surgery. Pt left floor for surgery at 1230 in stable condition.

## 2017-12-05 NOTE — PROGRESS NOTES
SPIRITUAL [facility] SERVICES Progress Note  [facility]    , referral visit. The patient was sleeping.      will follow up today or tomorrow.        Hamilton Conn  Chaplain Resident

## 2017-12-05 NOTE — PROGRESS NOTES
[facility]    Hospitalist Progress Note    Assessment & Plan   Lindsey Hale is a very pleasant 89-year-old female with a history of hypothyroidism, pre-diabetes, TIA, polymyalgia rheumatica, HTN, mild-to-moderate mitral regurgitation, COPD, sick sinus node s/p PPM, and paroxysmal atrial fibrillation who presented with progressive left hip pain after a mechanical fall 4 days prior to admission found to have a moderately-angulated left femoral neck fracture.    Left femoral neck fracture secondary to mechanical fall.  Tripped on rug while leaning to left to  hearing aid battery. Fxound on left femur/pelvis XRs. Ambulatory with 4-pronged cane at baseline. Remote hx left upper extremity DVT following bilateral mastectomy.  - Appreciate Orthopedic Surgery consultation.  - Tentative plans for hemiarthroplasty today.  - Resume Xarelto when OK with ortho.  - Pain control and DVT prophylaxis deferred to ortho post-operatively.  - Judicious use pain control to reduce risk for delirium.    Paroxysmal atrial fibrillation.  Sick sinus syndrome s/p PPM.  HTN.  Follows with Dr. Vazquez.  Last pacemaker check was approximately 2 months ago and appeared to be functioning well. HR currently irreg, but with controlled rate.  - Continues on PTA flecainide and metoprolol.  - Hold PTA Lasix in anticipation of surgical intervention and IV fluids.   - As above resume Xarelto anticoagulation when OK.    COPD.  Last PFTs 11/2017 consistent with mild obstructive impairment.  Follows with [facility].  She had recent bronchitis, but that resolved approximately 2 weeks ago following two prednisone tapers.   - Continue PTA Symbicort.  - Albuterol nebulizer available PRN.  - Encourage IS and OOB post-op.    Type II diabetes.  Hgb A1c 7.1 (11/17/2017). Not on medication therapy given goals of care and age.  - Cover with sliding scale insulin while she is an inpatient to better optimize comorbid conditions.      Polymyalgia rheumatica.  Manifested as chronic low back pain.  The patient does not believe she has ever been on steroid agents for this.  Per recent office visit notes this appears to have been rather inactive for quite some time.     Hypothyroidism.  Last TSH wnl 0.87.  - Chronic and stable on PTA levothyroxine.     DVT prophylaxis: Pneumatic compression devices for now. Resume Xarelto anticoagulation when OK with Orthopedic Service.  Code Status: DNR/DNI discussed with patient on day of admission.    This patient was discussed with Dr. Crowder of the Hospitalist Service who agrees with current plans as outlined above.    Disposition: Expected discharge to TCU on POD #3.    JoAnna K. Barthell, PA-C    Interval History   Poor sleep d/t pain control, better this AM. Plans for surgical repair in next 30min. No cp, palps, sob. Dtr and son-in-law at bedside.    -Data reviewed today: I reviewed all new labs and imaging results over the last 24 hours. I personally reviewed no images or EKG's today.    Physical Exam   Temp: (P) 97.5  F (36.4  C) Temp src: (P) Temporal BP: (P) 154/77   Heart Rate: (P) 67 Resp: 16 SpO2: (!) (P) 89 % (94% on 2 liters) O2 Device: (P) None (Room air) Oxygen Delivery: 3 LPM  Vitals:    12/04/17 1141 12/05/17 0614   Weight: 78.9 kg (174 lb) 79.3 kg (174 lb 13.2 oz)     Vital Signs with Ranges  Temp:  [97.5  F (36.4  C)-98.6  F (37  C)] (P) 97.5  F (36.4  C)  Heart Rate:  [67-82] (P) 67  Resp:  [16-18] 16  BP: (138-158)/(61-76) (P) 154/77  SpO2:  [89 %-96 %] (P) 89 %  I/O last 3 completed shifts:  In: 347 [I.V.:347]  Out: 800 [Urine:800]  Constitutional: Very pleasant elderly female appears stated age. Looks comfortable laying flat.  Respiratory: Breath sounds CTA. No increased work of breathing on room air.  Cardiovascular: Irreg, irreg. No rub or murmur. No peripheral edema.  GI: Soft, non-tender, non-distended. Bowel sounds present.  Skin/Integumen: Warm, dry. Ecchymotic area over right  knee/prox shin.  MSK: No ecchymosis or obvious deformity LLE. Distal CMS lower extremities intact.    Medications     lactated ringers 25 mL/hr at 12/05/17 1301     NaCl 100 mL/hr at 12/05/17 1037       vancomycin (VANCOCIN) IV  1,000 mg Intravenous Pre-Op/Pre-procedure x 1 dose     vancomycin (VANCOCIN) IV  1,000 mg Intravenous See Admin Instructions     [Auto Hold] sodium chloride (PF)  3 mL Intracatheter Q8H     [Auto Hold] insulin aspart  1-7 Units Subcutaneous TID AC     [Auto Hold] insulin aspart  1-5 Units Subcutaneous At Bedtime     [Auto Hold] fluticasone-vilanterol  1 puff Inhalation Daily     [Auto Hold] levothyroxine  75 mcg Oral Daily     [Auto Hold] metoprolol  75 mg Oral BID     [Auto Hold] flecainide  75 mg Oral BID       Data     Recent Labs  Lab 12/04/17  1146   WBC 6.5   HGB 11.9   *      INR 1.24*      POTASSIUM 4.1   CHLORIDE 106   CO2 24   BUN 17   CR 0.78   ANIONGAP 10   FRANCISCO 8.9   *       Imaging:  No results found for this or any previous visit (from the past 24 hour(s)).

## 2017-12-05 NOTE — PLAN OF CARE
Problem: Patient Care Overview  Goal: Plan of Care/Patient Progress Review  Outcome: No Change  Pt A&Ox4. On RA. VSS. Pain 6-9 in L hip from fall and fracture. Pain was controlled most of shift, pain increased with repositioning. Pt ate 50% this evening. [place] patent and intact. , 114. Pt to be [facility] at [facility] for partial hip replacement tomorrow mid day. Bedrest at this time. Fluids to start this evening. Bed alarm on for safety. Calling appropriately.

## 2017-12-05 NOTE — ANESTHESIA CARE TRANSFER NOTE
Patient: Lindsey Hale    Procedure(s):  LEFT HIP ARBEN ARTHROPLASTY(SORAYA) - Wound Class: I-Clean    Diagnosis: LEFT FEMORAL NECK FRACTURE  Diagnosis Additional Information: No value filed.    Anesthesia Type:   General, ETT     Note:  Airway :Face Mask  Patient transferred to:PACU  Handoff Report: Identifed the Patient, Identified the Reponsible Provider, Reviewed the pertinent medical history, Discussed the surgical course, Reviewed Intra-OP anesthesia mangement and issues during anesthesia, Set expectations for post-procedure period and Allowed opportunity for questions and acknowledgement of understanding      Vitals: (Last set prior to Anesthesia Care Transfer)    CRNA VITALS  12/5/2017 1636 - 12/5/2017 1712      12/5/2017             Pulse: 68    SpO2: 99 %    Resp Rate (set): 10                Electronically Signed By: MERCEDEZ Grewal CRNA  December 5, 2017  5:12 PM

## 2017-12-05 NOTE — H&P
CHIEF COMPLAINT:  Left hip pain.      HISTORY OF PRESENT ILLNESS:  The patient Lindsey Hale is a 89-year-old female with a history of pre-diabetes, transient ischemic attack, hypothyroidism, hypertension, polymyalgia rheumatica, chronic obstructive pulmonary disease, sick sinus node, status post pacemaker implantation (PPM) and paroxysmal atrial fibrillation.  She presented to the Emergency Department with progressive left-sided hip pain over the course of the day.  Four days prior to admission the patient suffered a mechanical fall while bending over to reach for a hearing aid battery, and she fell onto her left side.  She had little pain and no changes in her ability to ambulate over the course of the next 3 days.  However, today after standing from a seated position on the toilet she developed left-sided hip pain that became progressively worse throughout the day to the point where she is no longer able to bear weight on her left leg.      In the Emergency Department the patient was evaluated by Dr. Brittanie Perez.  Basic laboratory studies were obtained and demonstrated no sinister abnormalities.  Her INR was 1.24.  X-rays of her pelvis and chest showed a moderately-angulated left femoral neck fracture.  The patient was discussed with Dr. Darell Nathan who has not yet seen the patient, but on the basis of imaging will likely recommend a surgical procedure.        Presently the patient is evaluated in the Emergency Department.  Her pain is better controlled, but still present.  She did hit her head during the fall, but it was only a slight bump.  She has not had headache, vision changes, speech changes or behavior changes over the past 4 days.  She is anticoagulated with Xarelto for paroxysmal atrial fibrillation.  She has tried Advil for pain with little relief.  She denies recent chest pain, difficulty breathing, palpitations, abdominal pain, nausea or vomiting.  She did have bronchitis lasting approximately  4-6 weeks in October-November 2017.  This was treated with 2 prednisone tapers and has resolved at this time.  She has chronic obstructive pulmonary disease with recent pulmonary function tests that are consistent with mild obstruction.  She also has a history of a blood clot in her left upper extremity after having bilateral mastectomy surgery.  She does not remember the cause of the clot.      PAST MEDICAL HISTORY:   1.  Paroxysmal atrial fibrillation, medically managed with Flecainide and metoprolol.   2.  Sick sinus syndrome, status post pacemaker implantation (PPM), follows with Dr. Vazquez.   3.  Polymyalgia rheumatica manifested by chronic low back pain.  Patient does not believe she has ever taken steroids for this condition.   4.  Chronic obstructive pulmonary disease with last pulmonary function tests in 11/2017 consistent with mild obstructive impairment.   5.  Hypertension.   6.  Mild moderate regurgitation (per echocardiogram 04/2017).   7.  Elevated blood sugars.   8.  History of transient ischemic attack.   9.  Hypothyroidism.      PAST SURGICAL HISTORY:   1.  Abdominal hysterectomy, ovaries spared   2.  Bilateral mastectomy.   3.  Foot surgery.   4.  Cataract repairs.   5.  Breast implants.   6.  Patellofemoral arthroplasties.      FAMILY HISTORY:     1.  Mother with multiple myeloma.   2.  Father with non-Hodgkin's lymphoma.     3.  Brother with Hodgkin's lymphoma.   4.  Sister with breast cancer.     5.  Another brother with a glioblastoma.      SOCIAL HISTORY:  The patient quit smoking in 1955.  She is .  She drinks 1 glass of wine twice a week.  She has a very close relationship with her daughter Oxana and with Oxana's .  She has 3 sons.  She lives in a 1-level house and has a hired cleaning lady.  She performs her own cooking and bathing.      [id number] MEDICATIONS:  Prescriptions Prior to Admission   Medication Sig Dispense Refill Last Dose     Carboxymethylcellulose Sod PF  (REFRESH PLUS) 0.5 % SOLN ophthalmic solution 1 drop 3 times daily as needed for dry eyes   Past Week at Unknown time     multivitamin (OCUVITE) TABS tablet Take 1 tablet by mouth daily as needed   Past Week at Unknown time     IBUPROFEN PO Take 400 mg by mouth every 8 hours as needed for moderate pain   12/4/2017 at am     docusate sodium (STOOL SOFTENER) 100 MG capsule Take 100 mg by mouth 2 times daily as needed for constipation   12/3/2017 at Unknown time     VITAMIN D, CHOLECALCIFEROL, PO Take 1,000 Units by mouth daily   12/3/2017 at Unknown time     VITAMIN B COMPLEX-C CAPS Take 1 capsule by mouth daily   12/3/2017 at Unknown time     CEPHALEXIN PO Take 500 mg by mouth every other day This is a pre-procedure med. Instructions are to take 1 capsule every other day for 3 days starting 1 day prior to procedure.   12/4/2017 at am     metoprolol (LOPRESSOR) 50 MG tablet TAKE 1 AND 1/2 TABLETS BY MOUTH TWICE DAILY 270 tablet 3 12/4/2017 at am     levothyroxine (SYNTHROID/LEVOTHROID) 75 MCG tablet Take 1 tablet (75 mcg) by mouth daily 90 tablet 3 12/4/2017 at am     rivaroxaban ANTICOAGULANT (XARELTO) 20 MG TABS tablet Take 1 tablet (20 mg) by mouth daily (with dinner) 90 tablet 2 12/3/2017 at pm     flecainide acetate 150 MG TABS 1/2 tab bid 90 tablet 3 12/4/2017 at am     furosemide (LASIX) 20 MG tablet Take 1 tablet (20 mg) by mouth daily Pt taking one tab of 20mg furosemide every day and two tabs every other day if needed 181 tablet 3 Past Week at Unknown time     budesonide-formoterol (SYMBICORT) 160-4.5 MCG/ACT inhaler Inhale 2 puffs into the lungs 2 times daily   12/4/2017 at am     [DISCONTINUED] traMADol (ULTRAM) 50 MG tablet Take 1 tablet (50 mg) by mouth every 6 hours as needed for pain 90 tablet 1 Past Month at Unknown time      ALLERGIES:     Allergies   Allergen Reactions     Clindamycin Hcl Other (See Comments)     Difficulty swallowing     Ampicillin Potassium      Rash nausea and vomiting        Celebrex [Celecoxib]      rash     Ciprofloxacin      rash     Erythromycin      rash     Indocin      Ended up going to( .[facility] with severe head ache     Penicillins      Rash,nausea and vomiting     Vibramycin [Doxycycline Hyclate]      Rash      Xylocaine-Epinephrine [Epinephrine-Lidocaine-Na Metabisulfite]      Xylocaine with epi caused a rapid heart beat     REVIEW OF SYSTEMS:  A complete review was performed and is negative except as noted above in the History of Present Illness.      PHYSICAL EXAMINATION:   VITAL SIGNS:  Blood pressure 144/76, heart rate 76, temperature 99.1, respirations 16, oxygen saturations 95%, weight 78.9 kg, BMI 32.34.   GENERAL:  The patient is a very pleasant elderly female who appears her stated age.  She looks comfortable sitting in bed.   SKIN:  Warm and dry with a large area of ecchymosis over the right knee and proximal shin and lipomatous skin diffusely.   HEENT:  Head normocephalic, atraumatic, EOMs grossly intact, PERRLA, moist mucous membranes.   NECK:  Supple, no cervical lymphadenopathy or carotid bruits appreciated.   RESPIRATORY:  Lungs clear to auscultation, no increased work of breathing on 3 liters of supplemental O2 via nasal cannula.   CARDIOVASCULAR:  Regular rate and rhythm, no rub or murmur appreciated, no peripheral edema.   ABDOMEN:  Soft, nontender, nondistended.   MUSCULOSKELETAL:  The patient moves all 4 extremities with the exception of the left lower extremity which is externally rotated.  Distal circulation, motion and sensation are intact.   NEUROLOGIC:  Cranial nerves II-XII grossly intact.      LABORATORY AND IMAGING RESULTS:    1.  Creatinine 0.78, GFR 69, glucose 196, creatinine clearance 44.3.   2.  Hemoglobin 11.9, platelets 340,000.   3.  INR 1.24.   4.  Chest x-ray demonstrates mild diffuse bilateral interstitial prominence, mild cardiomegaly, no effusion, pneumothorax or infiltrate present.  I personally reviewed the film and agree with  the interpretation.   5.  EKG demonstrates atrial fibrillation with controlled rate and left bundle branch block.   6.  X-rays of pelvis and left femur demonstrates moderately-angulated fracture of the left femoral neck.  I personally reviewed the film and agree with the Radiologist's interpretation.      IMPRESSION:  The patient Lindsey Hale is a very pleasant 89-year-old female with a history of hypothyroidism, pre-diabetes, transient ischemic attack, polymyalgia rheumatica, hypertension, mild-to-moderate mitral regurgitation, chronic obstructive pulmonary disease, sick sinus node, status post pacemaker implantation and paroxysmal atrial fibrillation.  She presented to the Emergency Department with progressive left hip pain after suffering a fall 4 days prior to admission.  She was found to have a moderately-angulated left femoral neck fracture.      ASSESSMENT AND PLAN:       Left femoral neck fracture secondary to mechanical fall.  Tripped on rug while leaning to left to  hearing aid battery. Found on left femur/pelvis x-ray.  - Appreciate Orthopedic Surgery consultation.     - Note tentative plans for surgical recommendation tomorrow.   - Hold Xarelto, last dose of the medication was on 12/03/2017.   - The patient will be n.p.o. after midnight.     Preoperative clearance.  No known history of myocardial infarction or congestive heart failure.  The surgery is not high risk.     She does have a history of a transient ischemic attack and what appears to be untreated diabetes based on a hemoglobin A1c of 7.1. The patient has had no recent difficulty breathing, chest pain or other worrisome signs or symptoms.  She does have a remote history of a left upper extremity deep vein thrombus following bilateral mastectomy surgery. Her revised cardiac index is calculated at Class II risk giving her a 0.9% risk of major cardiac events. I informed the patient and her family that given her age and other comorbidities  she is likely at a higher risk for perioperative complications.  EKG shows atrial fibrillation with a left bundle branch block which has been previously observed.  - Recommend incentive spirometry and out of bed postoperatively.    - We discussed likely recommendation for transitional care unit stay following hospitalization:   - Appears medically optimized and would recommend proceeding with surgical intervention if that is the mutual decision between the Surgeon and the Patient.     Paroxysmal atrial fibrillation.  Sick sinus syndrome s/p PPM.  Hypertension.  Follows with Dr. Vazquez.  Last pacemaker check was approximately 2 months ago.  The device appeared to be functioning well.  The patient is currently in atrial fibrillation with a controlled rate.   - Continue jzoyl-dk-nipcblkje flecainide and metoprolol.   - Hold Xarelto anticoagulation given likely surgical intervention tomorrow.  Ideally this medication would be held for at least 2-5 days prior to surgical intervention.   - Hold zkkjd-sz-luzayofnk Lasix in anticipation of surgical intervention and IV fluids.    COPD.  Last PFTs 11/2017 consistent with mild obstructive impairment.  The patient follows with [facility].  She had recent bronchitis, but that resolved approximately 2 weeks ago following 2 prednisone tapers.   - Continue pnmtm-ru-gkiakuqvi Symbicort.   - We will have albuterol nebulizer available p.r.n.     History of elevated blood sugars now consistent with Type 2 diabetes.  Hemoglobin A1c was 7.1 (11/17/2017).  The patient has not been treated medically given goals of care and age.  - Cover with sliding scale insulin while she is an inpatient to better optimize comorbid conditions.     Polymyalgia rheumatica.  Manifested as chronic low back pain.  The patient does not believe she has ever been on steroid agents for this.  Per recent office visit notes this appears to have been rather inactive for quite some time.     Hypothyroidism.  Last  TSH within normal limits at 0.87, chronic and stable on aatso-tl-usaummmtk levothyroxine.     Deep venous thrombosis prophylaxis. Pneumatic compression devices for now. Resume Xarelto anticoagulation when okayed by Orthopedic Service.     Code status.  DNR/DNI as discussed with patient at time of admission.     Disposition with discharge likely on postoperative day #3 pending clinical course.     Attestation.  The patient was discussed with Dr. Osito Crowder of the Hospitalist Service who is in agreement with the assessment and plan of care as above.         JOANNA ULMEN BARTHELL, PA-C             D: 2017 15:10   T: 2017 19:16   MT: [id number]      Name:     CASIMIRO PRIEST   MRN:      [id number]        Account:      [id number]   :      1925           Admitted:     [id number]      Document: [id number]       cc: Darell Sherwood MD

## 2017-12-06 ENCOUNTER — APPOINTMENT (OUTPATIENT)
Dept: PHYSICAL THERAPY | Facility: CLINIC | Age: 82
DRG: 470 | End: 2017-12-06
Attending: PHYSICIAN ASSISTANT
Payer: MEDICARE

## 2017-12-06 LAB
GLUCOSE BLDC GLUCOMTR-MCNC: 105 MG/DL (ref 70–99)
GLUCOSE BLDC GLUCOMTR-MCNC: 107 MG/DL (ref 70–99)
GLUCOSE BLDC GLUCOMTR-MCNC: 142 MG/DL (ref 70–99)
GLUCOSE BLDC GLUCOMTR-MCNC: 156 MG/DL (ref 70–99)
GLUCOSE BLDC GLUCOMTR-MCNC: 164 MG/DL (ref 70–99)
HGB BLD-MCNC: 9.8 G/DL (ref 11.7–15.7)

## 2017-12-06 PROCEDURE — 97530 THERAPEUTIC ACTIVITIES: CPT | Mod: GP | Performed by: PHYSICAL THERAPIST

## 2017-12-06 PROCEDURE — 25000131 ZZH RX MED GY IP 250 OP 636 PS 637: Mod: GY | Performed by: PHYSICIAN ASSISTANT

## 2017-12-06 PROCEDURE — 12000007 ZZH R&B INTERMEDIATE

## 2017-12-06 PROCEDURE — A9270 NON-COVERED ITEM OR SERVICE: HCPCS | Mod: GY | Performed by: PHYSICIAN ASSISTANT

## 2017-12-06 PROCEDURE — 00000146 ZZHCL STATISTIC GLUCOSE BY METER IP

## 2017-12-06 PROCEDURE — 25000128 H RX IP 250 OP 636: Performed by: PHYSICIAN ASSISTANT

## 2017-12-06 PROCEDURE — 99232 SBSQ HOSP IP/OBS MODERATE 35: CPT | Performed by: INTERNAL MEDICINE

## 2017-12-06 PROCEDURE — 25000132 ZZH RX MED GY IP 250 OP 250 PS 637: Mod: GY | Performed by: PHYSICIAN ASSISTANT

## 2017-12-06 PROCEDURE — 40000193 ZZH STATISTIC PT WARD VISIT: Performed by: PHYSICAL THERAPIST

## 2017-12-06 PROCEDURE — 97110 THERAPEUTIC EXERCISES: CPT | Mod: GP | Performed by: PHYSICAL THERAPIST

## 2017-12-06 PROCEDURE — 85018 HEMOGLOBIN: CPT | Performed by: PHYSICIAN ASSISTANT

## 2017-12-06 PROCEDURE — 97161 PT EVAL LOW COMPLEX 20 MIN: CPT | Mod: GP | Performed by: PHYSICAL THERAPIST

## 2017-12-06 PROCEDURE — 36415 COLL VENOUS BLD VENIPUNCTURE: CPT | Performed by: PHYSICIAN ASSISTANT

## 2017-12-06 RX ADMIN — FLUTICASONE FUROATE AND VILANTEROL TRIFENATATE 1 PUFF: 200; 25 POWDER RESPIRATORY (INHALATION) at 11:46

## 2017-12-06 RX ADMIN — OXYCODONE HYDROCHLORIDE 5 MG: 5 TABLET ORAL at 13:51

## 2017-12-06 RX ADMIN — Medication 75 MG: at 21:20

## 2017-12-06 RX ADMIN — SODIUM CHLORIDE 75 ML/HR: 9 INJECTION, SOLUTION INTRAVENOUS at 08:50

## 2017-12-06 RX ADMIN — LEVOTHYROXINE SODIUM 75 MCG: 75 TABLET ORAL at 09:40

## 2017-12-06 RX ADMIN — INSULIN ASPART 1 UNITS: 100 INJECTION, SOLUTION INTRAVENOUS; SUBCUTANEOUS at 13:51

## 2017-12-06 RX ADMIN — METOPROLOL TARTRATE 75 MG: 50 TABLET ORAL at 09:40

## 2017-12-06 RX ADMIN — SENNOSIDES AND DOCUSATE SODIUM 2 TABLET: 8.6; 5 TABLET ORAL at 21:20

## 2017-12-06 RX ADMIN — VANCOMYCIN HYDROCHLORIDE 1000 MG: 1 INJECTION, SOLUTION INTRAVENOUS at 14:21

## 2017-12-06 RX ADMIN — ACETAMINOPHEN 975 MG: 325 TABLET, FILM COATED ORAL at 21:21

## 2017-12-06 RX ADMIN — ACETAMINOPHEN 975 MG: 325 TABLET, FILM COATED ORAL at 13:51

## 2017-12-06 RX ADMIN — ACETAMINOPHEN 975 MG: 325 TABLET, FILM COATED ORAL at 05:49

## 2017-12-06 RX ADMIN — SENNOSIDES AND DOCUSATE SODIUM 2 TABLET: 8.6; 5 TABLET ORAL at 09:40

## 2017-12-06 RX ADMIN — OXYCODONE HYDROCHLORIDE 5 MG: 5 TABLET ORAL at 05:48

## 2017-12-06 RX ADMIN — METOPROLOL TARTRATE 75 MG: 50 TABLET ORAL at 21:21

## 2017-12-06 RX ADMIN — INSULIN ASPART 1 UNITS: 100 INJECTION, SOLUTION INTRAVENOUS; SUBCUTANEOUS at 17:48

## 2017-12-06 RX ADMIN — Medication 75 MG: at 09:40

## 2017-12-06 RX ADMIN — RIVAROXABAN 10 MG: 10 TABLET, FILM COATED ORAL at 15:36

## 2017-12-06 RX ADMIN — OXYCODONE HYDROCHLORIDE 5 MG: 5 TABLET ORAL at 09:40

## 2017-12-06 NOTE — PROGRESS NOTES
Care Transition Initial Assessment - [place]  Reason For Consult: discharge planning  Met with: Reviewed medical record    Active Problems:    * No active hospital problems. *         DATA  Lives With: alone  Living Arrangements: house     Identified issues/concerns regarding health management: SW was consulted for discharge planning. Patient is Savannah, a 89 year-old female who was admitted on 12/4 for a left displaced femoral neck fracture. Her tentative discharge date is 12/8. SW reviewed medical record. Per medical record, the recommendation is TCU. Patient gave bedside nurse her TCU choices: [facility] or [facility]. SW placed these referrals via DOD.        Transportation Available: car, family or friend will provide      ASSESSMENT  Cognitive Status:  Did not meet with patient.  Concerns to be addressed: Discharge planning.     PLAN  Financial costs for the patient includes N/A.  Patient given options and choices for discharge TCU choices.  Patient/family is agreeable to the plan?  Yes  Patient Goals and Preferences: TCU.  Patient anticipates discharging to:  TCU.    Ayanna Resendiz, JUAN, LGSW

## 2017-12-06 NOTE — PLAN OF CARE
Problem: Patient Care Overview  Goal: Plan of Care/Patient Progress Review  Outcome: No Change  Vss, AOx4. Surgical site is covered with surgical dressing, cdi, no drainage. Pain in L hip at 6/10, managed with scheduled tylenol and prn oxycodone. Denies nausea. Cms is intact. [place]. [place] draining adequate. Regular diet. Repositioned q2h.

## 2017-12-06 NOTE — PROGRESS NOTES
Lindsey Hale  s/p left hip hermes-arthroplasty  DOS 12/05/17    S: Patient doing well post-operatively. Patient reports pain is well controlled on current pain regimen. Patient reports physical therapy is going well. Discussed hip motion precautions/restrictions. Patient denies fevers, chills or night sweats.    O: @Temp: 99  F (37.2  C) Temp src: Oral BP: 129/56 Pulse: 74 Heart Rate: 75 Resp: 18 SpO2: 94 % O2 Device: Nasal cannula Oxygen Delivery: 1 LPM    Left hip dressings c/d/i  Moves all toes  Good cap refill in all toes  No calf tenderness bilaterally  Soft compartments    Labs:   Hemoglobin   Date Value Ref Range Status   12/06/2017 9.8 (L) 11.7 - 15.7 g/dL Final   ]    A:  POD #1 left hip hermes-arthroplasty    P:  - PT/OT, WBAT, strict posterior hip precautions x6 weeks   - falls precautions  - Hgb   - dressing change POD 2 per nursing, island dressing  - Xarelto and SCDs for DVT prophylaxis x3 weeks post-op  - disp: Likely TCU in 2-3 days pending medical clearance  - Follow up in office 4 weeks post-op       Ale Tavera PA-C  12/6/2017

## 2017-12-06 NOTE — PLAN OF CARE
Problem: Patient Care Overview  Goal: Plan of Care/Patient Progress Review  Outcome: No Change  Patent A&O. VSS on 1 L O2. Capnography overnight, shallow breather when sleeping. Oxycodone and scheduled tylenol given for 5/10 pain. CMS intact, slight weakness to LLE. IVF infusing. Tolerating regular diet. Will continue to monitor.

## 2017-12-06 NOTE — PLAN OF CARE
Problem: Patient Care Overview  Goal: Plan of Care/Patient Progress Review  Outcome: Improving  Pain managed with Oxycodone 5 mg and scheduled Tylenol, appetite fair, VSS on 1L/NC, CMS intact, bilat LE edema > on right, A+O, very pleasant and cooperative, up with strong assist of 2 or ceiling lift, plans to discharge to TCU.

## 2017-12-06 NOTE — ANESTHESIA POSTPROCEDURE EVALUATION
Patient: Lindsey Hale    Procedure(s):  LEFT HIP ARBEN ARTHROPLASTY(SORAYA) - Wound Class: I-Clean    Diagnosis:LEFT FEMORAL NECK FRACTURE  Diagnosis Additional Information: No value filed.    Anesthesia Type:  General, ETT    Note:  Anesthesia Post Evaluation    Patient location during evaluation: PACU  Patient participation: Able to fully participate in evaluation  Level of consciousness: awake and alert  Pain management: satisfactory to patient  Airway patency: patent  Cardiovascular status: hemodynamically stable  Respiratory status: acceptable and unassisted  Hydration status: balanced  PONV: none     Anesthetic complications: None          Last vitals:  Vitals:    12/05/17 2032 12/05/17 2135 12/05/17 2142   BP: 133/67 143/65    Pulse:      Resp: 16 17 16   Temp: 36.9  C (98.5  F) 36.9  C (98.4  F)    SpO2: 95% 94%          Electronically Signed By: Avery Sullivan MD  December 5, 2017  10:50 PM

## 2017-12-06 NOTE — OP NOTE
DATE OF PROCEDURE:  12/05/2017       PREOPERATIVE DIAGNOSIS:  Left displaced femoral neck fracture.      POSTOPERATIVE DIAGNOSES:  Left displaced femoral neck fracture.      PROCEDURE:  Left hip hemiarthroplasty.      ASSISTANT:  Lisa Tavera PA-C       COMPLICATIONS:  None.        IMPLANTS  USED:  [employer] size 5 Omnifit cemented femoral stem with a 44 mm bipolar head with a 28 mm inner head +7.5.      ESTIMATED BLOOD LOSS:  200 mL.      INDICATIONS FOR PROCEDURE:  Lindsey Hale is a 89-year-old female who had a mechanical fall, injuring her left hip.  Risks and benefits of surgery were discussed.  The patient elected to proceed.      DESCRIPTION OF THE PROCEDURE:  The  patient was identified in the preop holding area.  The left hip surgical site was marked.  The patient was brought to the operating suite and underwent general anesthesia.  She was placed in lateral decubitus left side up.  All bony prominences well padded.  Her left hip was prepped and draped in normal sterile fashion.  Timeout was taken per hospital policy to identify the patient, procedure and limb.  The patient received IV antibiotics within 30 minutes of incision.      We then proceeded on with making an incision over the lateral aspect of her hip for a posterolateral approach.  Dissected down through subcutaneous tissue to the level of the IT band and gluteal fascia.  These were incised in line with their fibers.  East-West retractor was placed.  Short external rotators were identified.  These were taken down in a separate layer from the capsule.  These were tagged for later closure.      We then proceeded with removing the femoral head from the socket.  There was a significant amount of comminution at the calcar region.  The femoral neck was freshened and a new cut was made.      We then proceeded on with sequentially reaming and broaching up to a size 5 cemented stem.  Due to her significant osteoporosis, we then cemented a size 5 femoral  stem into place.  We then trialed after the cement was allowed to cure.  Again, this was cemented after copiously irrigating the canal and cleaning of all blood components.  During insertion of the stem, the centralizer fell, but was lodged within the canal and cemented into place.  After the cement cured, we then proceeded on with trialing a 44 bipolar head.  We went up to a +7.5 to restore limb lengths.  With 7.5, there was stability with hip flexion, internal rotation, adduction and full extension external rotation.  We then proceeded on with placing the +7.5 44-mm bipolar head onto a dry [place] taper.  The hip was then reduced.  It was brought through a full range of motion.      The wounds were then copiously irrigated.  Short external rotators and IT band were closed through drill holes through the greater trochanter.  Wound was copiously irrigated again.  The IT band was closed with #1 Ethibond.  Gluteal fascia was closed with a running 0 Vicryl.  Subcutaneous tissue was closed with 2-0 Vicryl in an interrupted fashion.  Skin was closed with a running 3-0 Monocryl in subcuticular layer.  Dermabond was applied.  Soft dressing was applied.  The patient was awakened from anesthesia and brought to recovery in stable condition.      POSTOPERATIVE PLAN:  The patient will be admitted back to the hospital.  She will be placed back on her Xarelto tomorrow for DVT prophylaxis, antibiotics for 24 hours.         CHELSEA BROWNE MD             D: 2017 09:31   T: 2017 12:04   MT: AARON#[id number]      Name:     CASIMIRO PRIEST   MRN:      [id number]        Account:        [id number]   :      1925           Procedure Date: 2017      Document: [id number]

## 2017-12-06 NOTE — PROGRESS NOTES
[facility]    Hospitalist Progress Note    Date of Service (when I saw the patient): 12/06/2017    Assessment & Plan   Lindsey Hale is a very pleasant 89-year-old female with a history of hypothyroidism, pre-diabetes, TIA, polymyalgia rheumatica, HTN, mild-to-moderate mitral regurgitation, COPD, sick sinus node s/p PPM, and paroxysmal atrial fibrillation who presented with progressive left hip pain after a mechanical fall 4 days prior to admission found to have a moderately-angulated left femoral neck fracture.     Left femoral neck fracture secondary to mechanical fall s/p left hip hemiarthroplasty 12/5.  Tripped on rug while leaning to left to  hearing aid battery. Fxound on left femur/pelvis XRs. Ambulatory with 4-pronged cane at baseline. Remote hx left upper extremity DVT following bilateral mastectomy. EBL per OR report 200ml.  - Appreciate Orthopedic Surgery recommendations  - Pain control and DVT prophylaxis deferred to ortho post-operatively.  - Judicious use pain control to reduce risk for delirium.  - Hgb 9.8 post-op, likely from IVF and stress response, recheck in AM, no clinical bleeds     Paroxysmal atrial fibrillation.  Sick sinus syndrome s/p PPM.  HTN.  Follows with Dr. Vazquez.  Last pacemaker check was approximately 2 months ago and appeared to be functioning well.  - c/w PTA flecainide and metoprolol.  - Hold PTA Lasix, d/c'ed IVF 12/6 as tolerating diet so far  - Resume Xarelto when OK with ortho.     COPD.  Last PFTs 11/2017 consistent with mild obstructive impairment.  Follows with [facility].  She had recent bronchitis, but that resolved approximately 2 weeks ago following two prednisone tapers.   - c/w PTA Symbicort.  - Albuterol nebulizer available PRN.  - Encourage IS and OOB post-op.     Type II diabetes.  Hgb A1c 7.1 (11/17/2017). Not on medication therapy given goals of care and age.  - SSI while admitted      Polymyalgia rheumatica.  Manifested as  chronic low back pain.  The patient does not believe she has ever been on steroid agents for this.  Per recent office visit notes this appears to have been rather inactive for quite some time.   - monitor  - PT/OT given above     Hypothyroidism.  Last TSH wnl 0.87.  - c/w PTA levothyroxine.     DVT Prophylaxis: Xarelto/  Code Status: Prior    Disposition: Expected discharge in 1-2 days.    Osito Crowder MD    Interval History   No new complaints. No BM but passing gas. Pain 5-6/10 in left hip.    -Data reviewed today: I reviewed all new labs and imaging results over the last 24 hours. I personally reviewed no images or EKG's today.    Physical Exam   Temp: 99  F (37.2  C) Temp src: Oral BP: 129/56 Pulse: 74 Heart Rate: 75 Resp: 18 SpO2: 94 % O2 Device: Nasal cannula Oxygen Delivery: 1 LPM  Vitals:    12/04/17 1141 12/05/17 0614   Weight: 78.9 kg (174 lb) 79.3 kg (174 lb 13.2 oz)     Vital Signs with Ranges  Temp:  [97.4  F (36.3  C)-99  F (37.2  C)] 99  F (37.2  C)  Pulse:  [74] 74  Heart Rate:  [63-75] 75  Resp:  [10-24] 18  BP: (112-161)/() 129/56  SpO2:  [89 %-97 %] 94 %  I/O last 3 completed shifts:  In: 2930 [P.O.:350; I.V.:2580]  Out: 1775 [Urine:1575; Blood:200]    Constitutional: Awake, alert, cooperative, no apparent distress  Respiratory: Clear to auscultation bilaterally, no crackles or wheezing  Cardiovascular: Regular rate and rhythm, normal S1 and S2, and no murmur noted  GI: Normal bowel sounds, soft, non-distended, non-tender  Skin/Integumen: No rashes, no cyanosis, no edema  Other: Left hip bandage clean, no drainage.    Medications     NaCl 75 mL/hr (12/06/17 0850)       rivaroxaban ANTICOAGULANT  10 mg Oral Q24H     vancomycin (VANCOCIN) IV  1,000 mg Intravenous Q24H     acetaminophen  975 mg Oral Q8H     senna-docusate  1-2 tablet Oral BID     sodium chloride (PF)  3 mL Intracatheter Q8H     insulin aspart  1-7 Units Subcutaneous TID AC     insulin aspart  1-5 Units Subcutaneous At  Bedtime     fluticasone-vilanterol  1 puff Inhalation Daily     levothyroxine  75 mcg Oral Daily     metoprolol  75 mg Oral BID     flecainide  75 mg Oral BID       Data     Recent Labs  Lab 12/06/17  0639 12/04/17  1146   WBC  --  6.5   HGB 9.8* 11.9   MCV  --  101*   PLT  --  340   INR  --  1.24*   NA  --  140   POTASSIUM  --  4.1   CHLORIDE  --  106   CO2  --  24   BUN  --  17   CR  --  0.78   ANIONGAP  --  10   FRANCISCO  --  8.9   GLC  --  196*       Recent Results (from the past 24 hour(s))   XR Pelvis w Hip Port Left 1 View    Narrative    XR PELVIS AND HIP PORTABLE LEFT 1 VIEW  12/5/2017 6:09 PM     HISTORY:  Post Op Hip Arthroplasty;     COMPARISON: Film dated 12/4/2017.    FINDINGS:  2 views. Left hip arthroplasty has been performed.  Components appear to be in proper position. No postoperative  complications.    GIORGI HARMON MD

## 2017-12-07 ENCOUNTER — APPOINTMENT (OUTPATIENT)
Dept: PHYSICAL THERAPY | Facility: CLINIC | Age: 82
DRG: 470 | End: 2017-12-07
Payer: MEDICARE

## 2017-12-07 ENCOUNTER — APPOINTMENT (OUTPATIENT)
Dept: GENERAL RADIOLOGY | Facility: CLINIC | Age: 82
DRG: 470 | End: 2017-12-07
Attending: PHYSICIAN ASSISTANT
Payer: MEDICARE

## 2017-12-07 LAB
CREAT SERPL-MCNC: 0.71 MG/DL (ref 0.52–1.04)
FERRITIN SERPL-MCNC: 302 NG/ML (ref 8–252)
GFR SERPL CREATININE-BSD FRML MDRD: 77 ML/MIN/1.7M2
GLUCOSE BLDC GLUCOMTR-MCNC: 129 MG/DL (ref 70–99)
GLUCOSE BLDC GLUCOMTR-MCNC: 157 MG/DL (ref 70–99)
GLUCOSE BLDC GLUCOMTR-MCNC: 161 MG/DL (ref 70–99)
GLUCOSE BLDC GLUCOMTR-MCNC: 171 MG/DL (ref 70–99)
GLUCOSE BLDC GLUCOMTR-MCNC: 191 MG/DL (ref 70–99)
HGB BLD-MCNC: 8.9 G/DL (ref 11.7–15.7)
IRON SATN MFR SERPL: 9 % (ref 15–46)
IRON SERPL-MCNC: 20 UG/DL (ref 35–180)
TIBC SERPL-MCNC: 219 UG/DL (ref 240–430)
TRANSFERRIN SERPL-MCNC: 174 MG/DL (ref 210–360)

## 2017-12-07 PROCEDURE — 73560 X-RAY EXAM OF KNEE 1 OR 2: CPT | Mod: LT

## 2017-12-07 PROCEDURE — 97110 THERAPEUTIC EXERCISES: CPT | Mod: GP | Performed by: PHYSICAL THERAPIST

## 2017-12-07 PROCEDURE — 12000007 ZZH R&B INTERMEDIATE

## 2017-12-07 PROCEDURE — 25000132 ZZH RX MED GY IP 250 OP 250 PS 637: Mod: GY | Performed by: PHYSICIAN ASSISTANT

## 2017-12-07 PROCEDURE — 83550 IRON BINDING TEST: CPT | Performed by: PHYSICIAN ASSISTANT

## 2017-12-07 PROCEDURE — 97530 THERAPEUTIC ACTIVITIES: CPT | Mod: GP | Performed by: PHYSICAL THERAPIST

## 2017-12-07 PROCEDURE — 00000146 ZZHCL STATISTIC GLUCOSE BY METER IP

## 2017-12-07 PROCEDURE — 84466 ASSAY OF TRANSFERRIN: CPT | Performed by: PHYSICIAN ASSISTANT

## 2017-12-07 PROCEDURE — A9270 NON-COVERED ITEM OR SERVICE: HCPCS | Mod: GY | Performed by: PHYSICIAN ASSISTANT

## 2017-12-07 PROCEDURE — 25000128 H RX IP 250 OP 636: Performed by: INTERNAL MEDICINE

## 2017-12-07 PROCEDURE — 85018 HEMOGLOBIN: CPT | Performed by: PHYSICIAN ASSISTANT

## 2017-12-07 PROCEDURE — 82728 ASSAY OF FERRITIN: CPT | Performed by: PHYSICIAN ASSISTANT

## 2017-12-07 PROCEDURE — 40000193 ZZH STATISTIC PT WARD VISIT: Performed by: PHYSICAL THERAPIST

## 2017-12-07 PROCEDURE — 36415 COLL VENOUS BLD VENIPUNCTURE: CPT | Performed by: PHYSICIAN ASSISTANT

## 2017-12-07 PROCEDURE — 83540 ASSAY OF IRON: CPT | Performed by: PHYSICIAN ASSISTANT

## 2017-12-07 PROCEDURE — 99232 SBSQ HOSP IP/OBS MODERATE 35: CPT | Performed by: INTERNAL MEDICINE

## 2017-12-07 PROCEDURE — 82565 ASSAY OF CREATININE: CPT | Performed by: PHYSICIAN ASSISTANT

## 2017-12-07 RX ORDER — OXYCODONE HYDROCHLORIDE 5 MG/1
5 TABLET ORAL
Qty: 20 TABLET | Refills: 0 | Status: SHIPPED | OUTPATIENT
Start: 2017-12-07 | End: 2017-12-22 | Stop reason: ALTCHOICE

## 2017-12-07 RX ORDER — FUROSEMIDE 20 MG
20 TABLET ORAL DAILY
Status: DISCONTINUED | OUTPATIENT
Start: 2017-12-08 | End: 2017-12-08 | Stop reason: HOSPADM

## 2017-12-07 RX ORDER — FUROSEMIDE 10 MG/ML
20 INJECTION INTRAMUSCULAR; INTRAVENOUS ONCE
Status: COMPLETED | OUTPATIENT
Start: 2017-12-07 | End: 2017-12-07

## 2017-12-07 RX ORDER — ACETAMINOPHEN 325 MG/1
650 TABLET ORAL EVERY 4 HOURS PRN
Qty: 40 TABLET | Refills: 0 | Status: SHIPPED | OUTPATIENT
Start: 2017-12-08 | End: 2018-12-22

## 2017-12-07 RX ADMIN — OXYCODONE HYDROCHLORIDE 5 MG: 5 TABLET ORAL at 19:42

## 2017-12-07 RX ADMIN — OXYCODONE HYDROCHLORIDE 5 MG: 5 TABLET ORAL at 13:16

## 2017-12-07 RX ADMIN — SENNOSIDES AND DOCUSATE SODIUM 2 TABLET: 8.6; 5 TABLET ORAL at 09:44

## 2017-12-07 RX ADMIN — RIVAROXABAN 10 MG: 10 TABLET, FILM COATED ORAL at 15:37

## 2017-12-07 RX ADMIN — OXYCODONE HYDROCHLORIDE 5 MG: 5 TABLET ORAL at 09:43

## 2017-12-07 RX ADMIN — Medication 75 MG: at 21:41

## 2017-12-07 RX ADMIN — SENNOSIDES AND DOCUSATE SODIUM 2 TABLET: 8.6; 5 TABLET ORAL at 21:40

## 2017-12-07 RX ADMIN — METOPROLOL TARTRATE 75 MG: 50 TABLET ORAL at 21:41

## 2017-12-07 RX ADMIN — INSULIN ASPART 1 UNITS: 100 INJECTION, SOLUTION INTRAVENOUS; SUBCUTANEOUS at 13:16

## 2017-12-07 RX ADMIN — OXYCODONE HYDROCHLORIDE 5 MG: 5 TABLET ORAL at 04:54

## 2017-12-07 RX ADMIN — ACETAMINOPHEN 975 MG: 325 TABLET, FILM COATED ORAL at 15:37

## 2017-12-07 RX ADMIN — LEVOTHYROXINE SODIUM 75 MCG: 75 TABLET ORAL at 09:43

## 2017-12-07 RX ADMIN — ACETAMINOPHEN 975 MG: 325 TABLET, FILM COATED ORAL at 21:40

## 2017-12-07 RX ADMIN — FLUTICASONE FUROATE AND VILANTEROL TRIFENATATE 1 PUFF: 200; 25 POWDER RESPIRATORY (INHALATION) at 11:05

## 2017-12-07 RX ADMIN — OXYCODONE HYDROCHLORIDE 5 MG: 5 TABLET ORAL at 01:05

## 2017-12-07 RX ADMIN — METOPROLOL TARTRATE 75 MG: 50 TABLET ORAL at 09:44

## 2017-12-07 RX ADMIN — FUROSEMIDE 20 MG: 10 INJECTION, SOLUTION INTRAVENOUS at 11:06

## 2017-12-07 RX ADMIN — ACETAMINOPHEN 975 MG: 325 TABLET, FILM COATED ORAL at 05:56

## 2017-12-07 RX ADMIN — Medication 75 MG: at 09:43

## 2017-12-07 NOTE — PLAN OF CARE
Problem: Patient Care Overview  Goal: Plan of Care/Patient Progress Review  Outcome: Improving  A/OX4,cms intact.Very [place].Oxycodone for pain.Up with 2 assist or lift.Dreg CDI.Voiding per bedpan.VSS.1LO2.Turned and repositioned frequently.IV SL.Will continue to monitor.

## 2017-12-07 NOTE — PROGRESS NOTES
Lindseyelizabeth Hale  s/p left hip hermes-arthroplasty  DOS 12/05/17    S: Patient doing well post-operatively. Patient reports pain is well controlled on current pain regimen. Patient reports physical therapy is going well. Patient performing therapy at this time. States she feels good sitting up. Complains of severe left knee pain today. Patient denies fevers, chills or night sweats.    O: @Temp: 98.7  F (37.1  C) Temp src: Oral BP: 138/75 Pulse: 76 Heart Rate: 82 Resp: 16 SpO2: 94 % O2 Device: Nasal cannula Oxygen Delivery: 1 LPM    Left hip dressings c/d/i  Moves all toes  Good cap refill in all toes  No calf tenderness bilaterally  Soft compartments    Labs:   Hemoglobin   Date Value Ref Range Status   12/07/2017 8.9 (L) 11.7 - 15.7 g/dL Final   ]    A:  POD #2 left hip hermes-arthroplasty    P:  - PT/OT, WBAT, strict posterior hip precautions x6 weeks    - falls precautions  - XR of the left knee (2 views) ordered due to patient pain level  - Hgb   - dressing change today per nursing, island dressing  - Xarelto and SCDs for DVT prophylaxis x3 weeks post-op  - disp: Likely TCU in 1-2 days pending medical clearance  - Follow up in office 4 weeks post-op       Ale Tavera PA-C  12/7/2017

## 2017-12-07 NOTE — PLAN OF CARE
Problem: Patient Care Overview  Goal: Plan of Care/Patient Progress Review  PT: Orders received, chart reviewed, Eval completed, and treatment started, s/p L hip hemiarthroplasty on 12/5/17.    Discharge Planner PT   Patient plan for discharge: Disch to TCU prior to returning to her home where she lives alone.  Current status: PT: Needs modA and vc for exercise, bed mobility, transfers, and gait with   FWW, WBAT L , 2' from EOB to w/c.  Barriers to return to prior living situation: Postop pain, edema, weakness, and lives alone.  Recommendations for discharge: TBD POD#2.  Rationale for recommendations:TBD POD#2.       Entered by: Ranjit Lockhart 12/06/2017 9:43 PM

## 2017-12-07 NOTE — PROGRESS NOTES
[facility]    Hospitalist Progress Note    Date of Service (when I saw the patient): 12/07/2017    Assessment & Plan   Lindsey Hale is a very pleasant 89-year-old female with a history of hypothyroidism, pre-diabetes, TIA, polymyalgia rheumatica, HTN, mild-to-moderate mitral regurgitation, COPD, sick sinus node s/p PPM, and paroxysmal atrial fibrillation who presented with progressive left hip pain after a mechanical fall 4 days prior to admission found to have a moderately-angulated left femoral neck fracture.      Left femoral neck fracture secondary to mechanical fall s/p left hip hemiarthroplasty 12/5.  Tripped on rug while leaning to left to  hearing aid battery. Fxound on left femur/pelvis XRs. Ambulatory with 4-pronged cane at baseline. Remote hx left upper extremity DVT following bilateral mastectomy. EBL per OR report 200ml.  - Appreciate Orthopedic Surgery recommendations  - Pain control and DVT prophylaxis deferred to ortho post-operatively.  - Judicious use pain control to reduce risk for delirium.  - Hgb 9.8 to 8.9 12/7, no clinical bleeds, recheck in AM and iron studies ordered      Paroxysmal atrial fibrillation.  Sick sinus syndrome s/p PPM.  HTN.  Follows with Dr. Vazquez.  Last pacemaker check was approximately 2 months ago and appeared to be functioning well.  - c/w PTA flecainide and metoprolol.  - Lasix 20mg IV x 2 today, then resume PTA lasix 20mg po daily starting 12/8  - Xarelto reduced dose of 10mg daily per ortho.      COPD.  Last PFTs 11/2017 consistent with mild obstructive impairment.  Follows with [facility].  She had recent bronchitis, but that resolved approximately 2 weeks ago following two prednisone tapers.   - c/w PTA Symbicort.  - Albuterol nebulizer available PRN.  - Encourage IS and OOB post-op.      Type II diabetes.  Hgb A1c 7.1 (11/17/2017). Not on medication therapy given goals of care and age.  - SSI while admitted       Polymyalgia  rheumatica.  Manifested as chronic low back pain.  The patient does not believe she has ever been on steroid agents for this.  Per recent office visit notes this appears to have been rather inactive for quite some time.   - monitor  - PT/OT given above      Hypothyroidism.  Last TSH wnl 0.87.  - c/w PTA levothyroxine.     DVT Prophylaxis: Xarelto.  Code Status: DNR/DNI    Disposition: Expected discharge 12/8.    Osito Crowder MD    Interval History   No new complaints. Passing gas, no BM. Tolerating diet and therapy.    -Data reviewed today: I reviewed all new labs and imaging results over the last 24 hours. I personally reviewed no images or EKG's today.    Physical Exam   Temp: 98.7  F (37.1  C) Temp src: Oral BP: 138/75 Pulse: 76 Heart Rate: 82 Resp: 16 SpO2: 94 % O2 Device: Nasal cannula Oxygen Delivery: 1 LPM  Vitals:    12/04/17 1141 12/05/17 0614   Weight: 78.9 kg (174 lb) 79.3 kg (174 lb 13.2 oz)     Vital Signs with Ranges  Temp:  [98  F (36.7  C)-99  F (37.2  C)] 98.7  F (37.1  C)  Pulse:  [76] 76  Heart Rate:  [66-82] 82  Resp:  [16-18] 16  BP: (119-148)/(41-75) 138/75  SpO2:  [93 %-98 %] 94 %  I/O last 3 completed shifts:  In: 150 [P.O.:150]  Out: 570 [Urine:570]    Constitutional: Awake, alert, cooperative, no apparent distress  Respiratory: Clear to auscultation bilaterally, no crackles or wheezing  Cardiovascular: Regular rate and rhythm, normal S1 and S2, and no murmur noted  GI: Normal bowel sounds, soft, non-distended, non-tender  Skin/Integumen: No rashes, no cyanosis, trace pedal edema b/l    Medications        furosemide  20 mg Intravenous Once     [START ON 12/8/2017] furosemide  20 mg Oral Daily     rivaroxaban ANTICOAGULANT  10 mg Oral Q24H     acetaminophen  975 mg Oral Q8H     senna-docusate  1-2 tablet Oral BID     sodium chloride (PF)  3 mL Intracatheter Q8H     insulin aspart  1-7 Units Subcutaneous TID AC     insulin aspart  1-5 Units Subcutaneous At Bedtime      fluticasone-vilanterol  1 puff Inhalation Daily     levothyroxine  75 mcg Oral Daily     metoprolol  75 mg Oral BID     flecainide  75 mg Oral BID       Data     Recent Labs  Lab 12/07/17  0710 12/06/17  0639 12/04/17  1146   WBC  --   --  6.5   HGB 8.9* 9.8* 11.9   MCV  --   --  101*   PLT  --   --  340   INR  --   --  1.24*   NA  --   --  140   POTASSIUM  --   --  4.1   CHLORIDE  --   --  106   CO2  --   --  24   BUN  --   --  17   CR 0.71  --  0.78   ANIONGAP  --   --  10   FRANCISCO  --   --  8.9   GLC  --   --  196*       No results found for this or any previous visit (from the past 24 hour(s)).

## 2017-12-07 NOTE — PLAN OF CARE
Problem: Patient Care Overview  Goal: Plan of Care/Patient Progress Review  Outcome: No Change  Pt A/O, VSS on 1L. CMS intact. Dressing c.d.i. Remains DTV- bladder scanned at 1730 for 363- PO intake encouraged. Pain managed with oxycodone. Will continue to monitor.

## 2017-12-07 NOTE — PLAN OF CARE
Problem: Patient Care Overview  Goal: Plan of Care/Patient Progress Review  OT: orders received and chart reviewed. Pt s/p left hip hermes-arthroplasty. Per chart: likely discharge to TCU in 1-2 days. Defer OT eval and treatment to TCU. OT orders completed.

## 2017-12-07 NOTE — PROGRESS NOTES
12/06/17 1016   Quick Adds   Type of Visit Initial PT Evaluation   Living Environment   Lives With alone   Living Arrangements house  (1 story with LL)   Home Accessibility ramps present at home  (no hand rail at present.)   Number of Stairs to Enter Home (Would be 4 steps without ramp.)   Number of Stairs Within Home (Full flight to LL)   Stair Railings at Home inside, present on left side   Transportation Available car;family or friend will provide   Self-Care   Dominant Hand right   Usual Activity Tolerance good   Current Activity Tolerance poor   Regular Exercise yes   Activity/Exercise Type swimming;walking   Exercise Amount/Frequency 30 mins;daily   Equipment Currently Used at Home cane, straight   Functional Level Prior   Ambulation 0-->independent   Transferring 0-->independent   Toileting 0-->independent   Bathing 0-->independent   Dressing 0-->independent   Eating 0-->independent   Communication 0-->understands/communicates without difficulty   Swallowing 0-->swallows foods/liquids without difficulty   Cognition 0 - no cognition issues reported   Fall history within last six months yes   Number of times patient has fallen within last six months 1   Which of the above functional risks had a recent onset or change? ambulation;transferring;toileting;bathing;dressing   General Information   Onset of Illness/Injury or Date of Surgery - Date 12/05/17   Referring Physician Darell Nathan   Patient/Family Goals Statement Disch to TCU prior to returning home alone.   Pertinent History of Current Problem (include personal factors and/or comorbidities that impact the POC) Patient fell and suffered L femoral neck fracture, for whiich she underwent L hip hemiarthroplasty on 12/5/17.   Precautions/Limitations fall precautions;left hip precautions  (P-L KSENIA precautions.)   Weight-Bearing Status - LLE weight-bearing as tolerated   Cognitive Status Examination   Orientation orientation to person, place and time   Level of  Consciousness alert   Follows Commands and Answers Questions 100% of the time;able to follow multistep instructions   Personal Safety and Judgment intact   Memory intact   Pain Assessment   Patient Currently in Pain (5/10 L hip pain.)   Integumentary/Edema   Integumentary/Edema Comments Surgical site covered by postop dressing.   Posture    Posture Forward head position   Posture Comments Tends to slouch with sitting and standing.   Range of Motion (ROM)   ROM Comment L hip ROM limited by postop pain and edema.   Strength   Strength Comments L hip strength inhibited by postop pain and edema.   Bed Mobility   Bed Mobility Bed mobility analysis   Bed Mobility Comments Needs modA and vc for bed mobility, supine to sit at EOB.   Bed Mobility Analysis   Bed Mobility Limitations decreased ability to use legs for bridging/pushing   Impairments Contributing to Impaired Bed Mobility decreased flexibility;pain;decreased ROM;decreased strength   Transfer Skills   Transfer Comments Needs modA and vc for transfers, sit to stand and back to sit, WBAT L, with FWW.   Gait   Gait Gait Analysis   Gait Comments Needs modA of 2 and vc for gait with FWW, WBAT L, 2' from EOB to w/c at patient's R.   Gait Analysis   Gait Pattern Used 3-point gait   Gait Deviations Noted decreased courtney;increased time in double stance;decreased velocity of limb motion;decreased step length   Impairments Contributing to Gait Deviations decreased flexibility;pain;decreased ROM;decreased strength   Balance   Balance other (describe)   Balance Comments Tends to be retropulsive with sitting and standing.   Sensory Examination   Sensory Perception no deficits were identified   Coordination   Coordination no deficits were identified   Muscle Tone   Muscle Tone no deficits were identified   Modality Interventions   Planned Modality Interventions Cryotherapy   General Therapy Interventions   Planned Therapy Interventions bed mobility training;gait  "training;ROM;strengthening;transfer training;risk factor education;home program guidelines;progressive activity/exercise   Clinical Impression   Criteria for Skilled Therapeutic Intervention yes, treatment indicated   PT Diagnosis Impaired mobility and gait.   Influenced by the following impairments Pain, edema, weakness.   Functional limitations due to impairments Limited mobility and gait.   Clinical Presentation Stable/Uncomplicated   Clinical Presentation Rationale Functional assessment and clinical judgement.   Clinical Decision Making (Complexity) Low complexity   Therapy Frequency` daily   Predicted Duration of Therapy Intervention (days/wks) 3 days.   Anticipated Equipment Needs at Discharge walker;front wheeled walker   Anticipated Discharge Disposition Transitional Care Facility   Risk & Benefits of therapy have been explained Yes   Patient, Family & other staff in agreement with plan of care Yes   [facility] [employer] \"6 Clicks\"   2016, Trustees of [facility], under license to [employer].  All rights reserved.   6 Clicks Short Forms Basic Mobility Inpatient Short Form   [facility] [employer]  \"6 Clicks\" V.2 Basic Mobility Inpatient Short Form   1. Turning from your back to your side while in a flat bed without using bedrails? 2 - A Lot   2. Moving from lying on your back to sitting on the side of a flat bed without using bedrails? 2 - A Lot   3. Moving to and from a bed to a chair (including a wheelchair)? 2 - A Lot   4. Standing up from a chair using your arms (e.g., wheelchair, or bedside chair)? 2 - A Lot   5. To walk in hospital room? 2 - A Lot   6. Climbing 3-5 steps with a railing? 1 - Total   Basic Mobility Raw Score (Score out of 24.Lower scores equate to lower levels of function) 11   Total Evaluation Time   Total Evaluation Time (Minutes) 15     "

## 2017-12-07 NOTE — PROGRESS NOTES
[place]    D: Patient was accepted at [facility], which is the family's first choice.     P: Continue to follow.

## 2017-12-08 ENCOUNTER — APPOINTMENT (OUTPATIENT)
Dept: PHYSICAL THERAPY | Facility: CLINIC | Age: 82
DRG: 470 | End: 2017-12-08
Payer: MEDICARE

## 2017-12-08 ENCOUNTER — APPOINTMENT (OUTPATIENT)
Dept: GENERAL RADIOLOGY | Facility: CLINIC | Age: 82
DRG: 470 | End: 2017-12-08
Attending: INTERNAL MEDICINE
Payer: MEDICARE

## 2017-12-08 VITALS
SYSTOLIC BLOOD PRESSURE: 120 MMHG | RESPIRATION RATE: 16 BRPM | WEIGHT: 191.1 LBS | TEMPERATURE: 98.6 F | HEART RATE: 83 BPM | BODY MASS INDEX: 35.52 KG/M2 | OXYGEN SATURATION: 91 % | DIASTOLIC BLOOD PRESSURE: 54 MMHG

## 2017-12-08 LAB
ANION GAP SERPL CALCULATED.3IONS-SCNC: 5 MMOL/L (ref 3–14)
BUN SERPL-MCNC: 11 MG/DL (ref 7–30)
CALCIUM SERPL-MCNC: 8.5 MG/DL (ref 8.5–10.1)
CHLORIDE SERPL-SCNC: 102 MMOL/L (ref 94–109)
CO2 SERPL-SCNC: 28 MMOL/L (ref 20–32)
CREAT SERPL-MCNC: 0.59 MG/DL (ref 0.52–1.04)
GFR SERPL CREATININE-BSD FRML MDRD: >90 ML/MIN/1.7M2
GLUCOSE BLDC GLUCOMTR-MCNC: 131 MG/DL (ref 70–99)
GLUCOSE BLDC GLUCOMTR-MCNC: 139 MG/DL (ref 70–99)
GLUCOSE BLDC GLUCOMTR-MCNC: 160 MG/DL (ref 70–99)
GLUCOSE SERPL-MCNC: 134 MG/DL (ref 70–99)
HGB BLD-MCNC: 8.7 G/DL (ref 11.7–15.7)
POTASSIUM SERPL-SCNC: 3.6 MMOL/L (ref 3.4–5.3)
SODIUM SERPL-SCNC: 135 MMOL/L (ref 133–144)

## 2017-12-08 PROCEDURE — 97530 THERAPEUTIC ACTIVITIES: CPT | Mod: GP | Performed by: PHYSICAL THERAPIST

## 2017-12-08 PROCEDURE — 25000132 ZZH RX MED GY IP 250 OP 250 PS 637: Mod: GY | Performed by: INTERNAL MEDICINE

## 2017-12-08 PROCEDURE — A9270 NON-COVERED ITEM OR SERVICE: HCPCS | Mod: GY | Performed by: PHYSICIAN ASSISTANT

## 2017-12-08 PROCEDURE — 00000146 ZZHCL STATISTIC GLUCOSE BY METER IP

## 2017-12-08 PROCEDURE — 85018 HEMOGLOBIN: CPT | Performed by: INTERNAL MEDICINE

## 2017-12-08 PROCEDURE — 25000132 ZZH RX MED GY IP 250 OP 250 PS 637: Mod: GY | Performed by: PHYSICIAN ASSISTANT

## 2017-12-08 PROCEDURE — A9270 NON-COVERED ITEM OR SERVICE: HCPCS | Mod: GY | Performed by: INTERNAL MEDICINE

## 2017-12-08 PROCEDURE — 36415 COLL VENOUS BLD VENIPUNCTURE: CPT | Performed by: INTERNAL MEDICINE

## 2017-12-08 PROCEDURE — 97110 THERAPEUTIC EXERCISES: CPT | Mod: GP | Performed by: PHYSICAL THERAPIST

## 2017-12-08 PROCEDURE — 71010 XR CHEST PORT 1 VW: CPT

## 2017-12-08 PROCEDURE — 80048 BASIC METABOLIC PNL TOTAL CA: CPT | Performed by: INTERNAL MEDICINE

## 2017-12-08 PROCEDURE — 97116 GAIT TRAINING THERAPY: CPT | Mod: GP | Performed by: PHYSICAL THERAPIST

## 2017-12-08 PROCEDURE — 99239 HOSP IP/OBS DSCHRG MGMT >30: CPT | Performed by: INTERNAL MEDICINE

## 2017-12-08 PROCEDURE — 40000193 ZZH STATISTIC PT WARD VISIT: Performed by: PHYSICAL THERAPIST

## 2017-12-08 RX ADMIN — OXYCODONE HYDROCHLORIDE 5 MG: 5 TABLET ORAL at 00:14

## 2017-12-08 RX ADMIN — SENNOSIDES AND DOCUSATE SODIUM 1 TABLET: 8.6; 5 TABLET ORAL at 08:37

## 2017-12-08 RX ADMIN — LEVOTHYROXINE SODIUM 75 MCG: 75 TABLET ORAL at 08:38

## 2017-12-08 RX ADMIN — METOPROLOL TARTRATE 75 MG: 50 TABLET ORAL at 08:37

## 2017-12-08 RX ADMIN — ACETAMINOPHEN 975 MG: 325 TABLET, FILM COATED ORAL at 13:21

## 2017-12-08 RX ADMIN — INSULIN ASPART 1 UNITS: 100 INJECTION, SOLUTION INTRAVENOUS; SUBCUTANEOUS at 12:05

## 2017-12-08 RX ADMIN — FLUTICASONE FUROATE AND VILANTEROL TRIFENATATE 1 PUFF: 200; 25 POWDER RESPIRATORY (INHALATION) at 08:41

## 2017-12-08 RX ADMIN — ACETAMINOPHEN 975 MG: 325 TABLET, FILM COATED ORAL at 06:07

## 2017-12-08 RX ADMIN — RIVAROXABAN 20 MG: 10 TABLET, FILM COATED ORAL at 13:21

## 2017-12-08 RX ADMIN — OXYCODONE HYDROCHLORIDE 5 MG: 5 TABLET ORAL at 06:07

## 2017-12-08 RX ADMIN — FUROSEMIDE 20 MG: 20 TABLET ORAL at 08:38

## 2017-12-08 RX ADMIN — Medication 75 MG: at 08:37

## 2017-12-08 NOTE — DISCHARGE SUMMARY
[facility]    Discharge Summary  Hospitalist    Date of Admission:  12/4/2017  Date of Discharge:  12/8/2017  Discharging Provider: Osito Crowder MD  Date of Service (when I saw the patient): 12/08/17    Discharge Diagnoses   Left femoral neck fracture secondary to mechanical fall s/p left hip hemiarthroplasty 12/5.  Hx Paroxysmal atrial fibrillation, sick sinus syndrome s/p PPM.  Hx HTN.    History of Present Illness   Please see admission H&P for full details.    Hospital Course   Lindsey Hale is a very pleasant 89-year-old female with a history of hypothyroidism, pre-diabetes, TIA, polymyalgia rheumatica, HTN, mild-to-moderate mitral regurgitation, COPD, sick sinus node s/p PPM, and paroxysmal atrial fibrillation who presented with progressive left hip pain after a mechanical fall 4 days prior to admission found to have a moderately-angulated left femoral neck fracture.      Left femoral neck fracture secondary to mechanical fall s/p left hip hemiarthroplasty 12/5.  Tripped on rug while leaning to left to  hearing aid battery. Fxound on left femur/pelvis XRs. Ambulatory with 4-pronged cane at baseline. Remote hx left upper extremity DVT following bilateral mastectomy. EBL per OR report 200ml. Appreciate Orthopedic Surgery recommendations. Pain control and DVT prophylaxis deferred to ortho post-operatively. Judicious use pain control to reduce risk for delirium. Hgb 9.8 to 8.7 12/8, no clinical bleeds, iron studies likely AOCD. Post-op had one temp 100.4 with mild cough clear sputum, no other fevers noted and patient on room air. CXR without new infiltrates compared to previous. Recommend TCU MD to follow, recheck Hgb at f/u as patient back on PTA Xarelto. No bowel movement but passing gas, denies abd discomfort. F/u ortho in clinic. Clinically stable for discharge.      Paroxysmal atrial fibrillation.  Sick sinus syndrome s/p PPM.  HTN.  Follows with Dr. Vazquez.  Last pacemaker  check was approximately 2 months ago and appeared to be functioning well.  - c/w PTA flecainide and metoprolol.  - Lasix 20mg IV x 1 12/7, resume PTA lasix 20mg po daily starting 12/8  - PTA Xarelto 20mg daily      COPD.  Last PFTs 11/2017 consistent with mild obstructive impairment.  Follows with [facility].  She had recent bronchitis, but that resolved approximately 2 weeks ago following two prednisone tapers.   - c/w PTA Symbicort.  - Albuterol nebulizer available PRN.  - Encourage IS and OOB post-op.      Type II diabetes.  Hgb A1c 7.1 (11/17/2017). Not on medication therapy given goals of care and age.  - SSI while admitted       Polymyalgia rheumatica.  Manifested as chronic low back pain.  The patient does not believe she has ever been on steroid agents for this.  Per recent office visit notes this appears to have been rather inactive for quite some time.   - monitor  - PT/OT given above      Hypothyroidism.  Last TSH wnl 0.87.  - c/w PTA levothyroxine.     Active Problems:    * No active hospital problems. *      Osito Crowder MD    Significant Results and Procedures   See below    Pending Results   These results will be followed up by TCU MD  Unresulted Labs Ordered in the Past 30 Days of this Admission     No orders found from 10/5/2017 to 12/5/2017.          Code Status   DNR / DNI       Primary Care Physician   Jeffery Sherwood    Physical Exam   Temp: 98.6  F (37  C) Temp src: Oral BP: 120/54 Pulse: 83 Heart Rate: 64 Resp: 16 SpO2: 91 % O2 Device: None (Room air)    Vitals:    12/04/17 1141 12/05/17 0614 12/08/17 0358   Weight: 78.9 kg (174 lb) 79.3 kg (174 lb 13.2 oz) 86.7 kg (191 lb 1.6 oz)     Vital Signs with Ranges  Temp:  [98  F (36.7  C)-100.4  F (38  C)] 98.6  F (37  C)  Pulse:  [65-83] 83  Heart Rate:  [61-79] 64  Resp:  [14-16] 16  BP: (107-140)/(34-68) 120/54  SpO2:  [91 %-97 %] 91 %  I/O last 3 completed shifts:  In: 250 [P.O.:250]  Out: 875 [Urine:875]    Constitutional: Awake, alert,  cooperative, no apparent distress  Respiratory: Clear to auscultation bilaterally, no crackles or wheezing  Cardiovascular: Regular rate and rhythm, normal S1 and S2, and no murmur noted  GI: Normal bowel sounds, soft, non-distended, non-tender  Skin/Integumen: No rashes, no cyanosis, trace pretibial edema.    Discharge Disposition   Discharged to nursing home  Condition at discharge: Satisfactory    Consultations This Hospital Stay   SOCIAL WORK IP CONSULT  ORTHOPEDICS IP CONSULT  OCCUPATIONAL THERAPY ADULT IP CONSULT  PHYSICAL THERAPY ADULT IP CONSULT  CARE COORDINATOR IP CONSULT  PHYSICAL THERAPY ADULT IP CONSULT  OCCUPATIONAL THERAPY ADULT IP CONSULT    Time Spent on this Encounter   I, Osito Crowder, personally saw the patient today and spent greater than 30 minutes discharging this patient.    Discharge Orders     AntiEmbolism Stockings   Bilateral below knee length.On in the morning, off at night     Wound care (specify)   Site: left posterolateral hip  Instructions: Dressing change to new island dressing as needed. Leave sutures and surgical glue in place over incision(s). Okay to shower, and replace with new island dressing after. Do not submerge incision in water until at least 4 weeks post-op.     Follow Up and recommended labs and tests   Patient to follow up with Dr. Darell Nathan at 4 weeks post-op. [facility] Address is [address] [place] [address].    Please call Dr. Nathan's care coordinator, Colette, at [phone] to schedule an appointment.     Activity - Up with assistive device   WBAT LLE. Walker for ambulation assistance. Strict posterior hip precautions x4-6 weeks. No hip IR, no hip flexion greater than 80 degrees, no crossing legs. Abduction pillow in place at all times while laying down, resting or sleeping.     Weight bearing status   WBAT LLE. Walker for ambulation assistance. Strict posterior hip precautions x4-6 weeks. No hip IR, no  hip flexion greater than 80 degrees, no crossing legs. Abduction pillow in place at all times while laying down, resting or sleeping.     General info for SNF   Length of Stay Estimate: Short Term Care: Estimated # of Days <30  Condition at Discharge: Improving  Level of care:skilled   Rehabilitation Potential: Fair  Admission H&P remains valid and up-to-date: Yes  Recent Chemotherapy: N/A  Use Nursing Home Standing Orders: Yes     Mantoux instructions   Give two-step Mantoux (PPD) Per Facility Policy Yes     Reason for your hospital stay   Further management of left femoral neck fracture.     Intake and output   Every shift     Daily weights   Call Provider for weight gain of more than 2 pounds per day or 5 pounds per week.     Follow Up and recommended labs and tests   Follow up with [facility] physician.  The following labs/tests are recommended: Hgb.  F/u Ortho as previously arranged.     DNR/DNI     Physical Therapy Adult Consult   Evaluate and treat as clinically indicated.    Reason:  deconditioned     Occupational Therapy Adult Consult   Evaluate and treat as clinically indicated.    Reason:  deconditioned     Fall precautions     Advance Diet as Tolerated   Follow this diet upon discharge: Orders Placed This Encounter     Room Service     Moderate Consistent CHO Diet       Discharge Medications   Current Discharge Medication List      START taking these medications    Details   oxyCODONE IR (ROXICODONE) 5 MG tablet Take 1 tablet (5 mg) by mouth every 3 hours as needed for moderate to severe pain  Qty: 20 tablet, Refills: 0    Associated Diagnoses: Closed fracture of neck of left femur, initial encounter (H)      acetaminophen (TYLENOL) 325 MG tablet Take 2 tablets (650 mg) by mouth every 4 hours as needed for other (surgical pain)  Qty: 40 tablet, Refills: 0    Associated Diagnoses: Closed fracture of neck of left femur, initial encounter (H)         CONTINUE these medications which have NOT CHANGED     Details   Carboxymethylcellulose Sod PF (REFRESH PLUS) 0.5 % SOLN ophthalmic solution 1 drop 3 times daily as needed for dry eyes      multivitamin (OCUVITE) TABS tablet Take 1 tablet by mouth daily as needed      docusate sodium (STOOL SOFTENER) 100 MG capsule Take 100 mg by mouth 2 times daily as needed for constipation      VITAMIN D, CHOLECALCIFEROL, PO Take 1,000 Units by mouth daily      VITAMIN B COMPLEX-C CAPS Take 1 capsule by mouth daily      metoprolol (LOPRESSOR) 50 MG tablet TAKE 1 AND 1/2 TABLETS BY MOUTH TWICE DAILY  Qty: 270 tablet, Refills: 3    Associated Diagnoses: Atrial fibrillation, unspecified type (H); Benign essential hypertension      levothyroxine (SYNTHROID/LEVOTHROID) 75 MCG tablet Take 1 tablet (75 mcg) by mouth daily  Qty: 90 tablet, Refills: 3    Associated Diagnoses: Hypothyroidism, unspecified type      rivaroxaban ANTICOAGULANT (XARELTO) 20 MG TABS tablet Take 1 tablet (20 mg) by mouth daily (with dinner)  Qty: 90 tablet, Refills: 2    Associated Diagnoses: Atrial fibrillation, unspecified type (H)      flecainide acetate 150 MG TABS 1/2 tab bid  Qty: 90 tablet, Refills: 3    Associated Diagnoses: Atrial fibrillation (H)      furosemide (LASIX) 20 MG tablet Take 1 tablet (20 mg) by mouth daily Pt taking one tab of 20mg furosemide every day and two tabs every other day if needed  Qty: 181 tablet, Refills: 3    Associated Diagnoses: Atrial fibrillation, unspecified      budesonide-formoterol (SYMBICORT) 160-4.5 MCG/ACT inhaler Inhale 2 puffs into the lungs 2 times daily         STOP taking these medications       IBUPROFEN PO Comments:   Reason for Stopping:         CEPHALEXIN PO Comments:   Reason for Stopping:         traMADol (ULTRAM) 50 MG tablet Comments:   Reason for Stopping:             Allergies   Allergies   Allergen Reactions     Clindamycin Hcl Other (See Comments)     Difficulty swallowing     Ampicillin Potassium      Rash nausea and vomiting       Celebrex [Celecoxib]       rash     Ciprofloxacin      rash     Erythromycin      rash     Indocin      [facility] [place] with severe head ache     Penicillins      Rash,nausea and vomiting     Vibramycin [Doxycycline Hyclate]      Rash      Xylocaine-Epinephrine [Epinephrine-Lidocaine-Na Metabisulfite]      Xylocaine with epi caused a rapid heart beat     Data   Most Recent 3 CBC's:  Recent Labs   Lab Test  12/08/17   0716  12/07/17   0710  12/06/17   0639  12/04/17   1146  11/17/17   1120  03/09/17   1326   WBC   --    --    --   6.5  13.5*  10.0   HGB  8.7*  8.9*  9.8*  11.9  13.2  13.1   MCV   --    --    --   101*  102*  102*   PLT   --    --    --   340  296  298      Most Recent 3 BMP's:  Recent Labs   Lab Test  12/08/17   0716  12/07/17   0710  12/04/17   1146  11/17/17   1120   NA  135   --   140  137   POTASSIUM  3.6   --   4.1  4.0   CHLORIDE  102   --   106  103   CO2  28   --   24  19*   BUN  11   --   17  16   CR  0.59  0.71  0.78  0.70   ANIONGAP  5   --   10  15*   FRANCISCO  8.5   --   8.9  8.9   GLC  134*   --   196*  157*     Most Recent 2 LFT's:  Recent Labs   Lab Test  02/16/14   1050 11/22/11 06/13/11   AST  27   --   24   ALT  45  14  22   ALKPHOS  61   --    --    BILITOTAL  1.4*   --    --      Most Recent INR's and Anticoagulation Dosing History:  Anticoagulation Dose History     Recent Dosing and Labs Latest Ref Rng & Units 3/11/2014 4/8/2014 4/11/2014 4/18/2014 4/28/2014 5/8/2014 12/4/2017    INR 0.86 - 1.14 - - - - - - 1.24(H)    INR 0.86 - 1.14 2.1(A) 4.8(A) 4.1(A) 1.9(A) 3.0(A) 3.0(A) -        Most Recent 3 Troponin's:  Recent Labs   Lab Test  02/16/14   1050   TROPI  <0.012     Most Recent Cholesterol Panel:  Recent Labs   Lab Test  05/23/13   0924   CHOL  222*   LDL  142*   HDL  48*   TRIG  157*     Most Recent 6 Bacteria Isolates From Any Culture (See EPIC Reports for Culture Details):  Recent Labs   Lab Test  07/18/17   1608  05/16/16   0941  01/21/12   0956   CULT  >100,000 colonies/mL  Escherichia coli*  >100,000 colonies/mL Escherichia coli*  No growth     Most Recent TSH, T4 and A1c Labs:  Recent Labs   Lab Test  11/17/17   1120   TSH  0.87   A1C  7.1*     Results for orders placed or performed during the hospital encounter of 12/04/17   Femur XR,  2 views, left    Narrative    XR FEMUR LT 2 VW 12/4/2017 12:41 PM    HISTORY: Pain.    COMPARISON: None.      Impression    IMPRESSION: Moderately angulated fracture of the left femoral neck.    ARETHA ROACH MD   XR Pelvis 1/2 Views    Narrative    XR PELVIS 1/2 VW 12/4/2017 12:41 PM    HISTORY: Pain.    COMPARISON: None.      Impression    IMPRESSION: Moderately angulated fracture of the left femoral neck.    ARETHA ROACH MD   XR Chest 1 View    Narrative    XR CHEST 1 VW 12/4/2017 12:41 PM    HISTORY: Pain.    COMPARISON: October 19, 2017.      Impression    IMPRESSION: Left-sided cardiac device in place with leads unchanged in  position. Mild diffuse bilateral interstitial prominence, possibly  mild edema. No pleural effusions or pneumothorax. Mild cardiomegaly.    ARETHA ROACH MD   XR Pelvis w Hip Port Left 1 View    Narrative    XR PELVIS AND HIP PORTABLE LEFT 1 VIEW  12/5/2017 6:09 PM     HISTORY:  Post Op Hip Arthroplasty;     COMPARISON: Film dated 12/4/2017.    FINDINGS:  2 views. Left hip arthroplasty has been performed.  Components appear to be in proper position. No postoperative  complications.    GIORGI HARMON MD   XR Knee Left 1/2 Views    Narrative    XR KNEE LT 1/2 VW 12/7/2017 12:28 PM    COMPARISON: Femur radiographs dated 12/4/2017.    HISTORY: Left knee pain after fall.      Impression    IMPRESSION: Left total knee arthroplasty changes. Hardware appears  intact. No fractures are seen.    FROILAN DAY MD

## 2017-12-08 NOTE — PROGRESS NOTES
Reviewed discharge with pt. Verbalized understanding.  Pt to transfer to [facility] via w/c.

## 2017-12-08 NOTE — PLAN OF CARE
Problem: Patient Care Overview  Goal: Plan of Care/Patient Progress Review  Outcome: Improving  Pain managed with Oxycodone 5 mg and scheduled Tylenol, VSS on RA, CMS intact, up with assist  2 and walker to BSC, occasional prod cough this shift,very pleasant and cooperative, A+O x 4, [place] with HA right ear, IV Lasix x 1, plans to D/C to TCU 12/8/17.

## 2017-12-08 NOTE — PLAN OF CARE
Problem: Pain, Acute (Adult)  Goal: Identify Related Risk Factors and Signs and Symptoms  Related risk factors and signs and symptoms are identified upon initiation of Human Response Clinical Practice Guideline (CPG).   Outcome: Improving  A&O, [place], vs stable, cms intact, drsg CDI.  Pain controlled with oxycodone and Tylenol, voiding using the bedpan.  Has occasional productive cough, mild edema to bilateral lower extremities, possible d/c to TCU today.

## 2017-12-08 NOTE — PROGRESS NOTES
LISSET    D: Discharge orders received and faxed to [facility]. Facility has bed availability for today. Patient will be transported via skyway at 1330.    PAS-RR    D: Per DHS regulation, SW completed and submitted PAS-RR to [place] [employer] Direct Connect via the Senior LinkAge Line.  PAS-RR confirmation # is : [id number]    I: SW spoke with patient and they are aware a PAS-RR has been submitted.  LISSET reviewed with patient that they may be contacted for a follow up appointment within 10 days of hospital discharge if their SNF stay is < 30 days.  Contact information for [facility] LinkAge Line was also provided.    A: patient verbalized understanding.    P: Further questions may be directed to [facility] LinkAge Line at #[phone], option #4 for PAS-RR staff.

## 2017-12-08 NOTE — PLAN OF CARE
Problem: Patient Care Overview  Goal: Plan of Care/Patient Progress Review  Discharge Planner PT   Patient plan for discharge: Disch to TCU.  Current status: PT: Needs modA and vc for exercise, bed mobility, transfers, and gait training, WBAT L with FWW, 6'.  Barriers to return to prior living situation: Postop pain, edema, weakness, and lives alone.  Recommendations for discharge: Disch to TCU prior to returning home alone.  Rationale for recommendations: Will continue to need skilled PT for recovery of functional independence, mobility, and safety for negotiation of chosen residence.       Entered by: Ranjit Lockhart 12/07/2017 10:21 PM

## 2017-12-09 NOTE — PLAN OF CARE
Problem: Patient Care Overview  Goal: Plan of Care/Patient Progress Review  Discharge Planner PT   Patient plan for discharge: Disch to TCU.  Current status: PT: Needs Tali and vc for exercise, bed mobility, transfers, and gait training, 5' with FWW, WBAT L   Barriers to return to prior living situation: Postop pain edema, weakness, and lives alone.  Recommendations for discharge: Disch to TCU prior to returning home where she has been living alone.  Rationale for recommendations: Will need skilled PT for recovery of greater functional independence, mobility, and safety for negotiation of chosen residence.    Physical Therapy Discharge Summary    Reason for therapy discharge:    Discharged to transitional care facility.    Progress towards therapy goal(s). See goals on Care Plan in [facility] health record for goal details.  Goals partially met.  Barriers to achieving goals:   limited tolerance for therapy.    Therapy recommendation(s):    Continued therapy is recommended.  Rationale/Recommendations:  Will continue to need skilled PT for recovery of functional independence, mobility, and safety for negotiation of chosen residence..  Continue home exercise program.           Entered by: Ranjit Lockhart 12/08/2017 10:33 PM

## 2017-12-11 ENCOUNTER — NURSING HOME VISIT (OUTPATIENT)
Dept: GERIATRICS | Facility: CLINIC | Age: 82
End: 2017-12-11
Payer: MEDICARE

## 2017-12-11 VITALS
SYSTOLIC BLOOD PRESSURE: 127 MMHG | HEART RATE: 72 BPM | RESPIRATION RATE: 16 BRPM | HEIGHT: 62 IN | BODY MASS INDEX: 32.72 KG/M2 | WEIGHT: 177.8 LBS | DIASTOLIC BLOOD PRESSURE: 62 MMHG | TEMPERATURE: 98.8 F | OXYGEN SATURATION: 93 %

## 2017-12-11 DIAGNOSIS — S72.002D CLOSED FRACTURE OF NECK OF LEFT FEMUR WITH ROUTINE HEALING: Primary | ICD-10-CM

## 2017-12-11 DIAGNOSIS — Z95.0 PACEMAKER: ICD-10-CM

## 2017-12-11 DIAGNOSIS — I49.5 SICK SINUS SYNDROME (H): ICD-10-CM

## 2017-12-11 DIAGNOSIS — Z71.89 ADVANCED DIRECTIVES, COUNSELING/DISCUSSION: ICD-10-CM

## 2017-12-11 DIAGNOSIS — I48.0 PAROXYSMAL ATRIAL FIBRILLATION (H): ICD-10-CM

## 2017-12-11 DIAGNOSIS — Z96.649 S/P HIP HEMIARTHROPLASTY: ICD-10-CM

## 2017-12-11 DIAGNOSIS — D62 ANEMIA DUE TO BLOOD LOSS, ACUTE: ICD-10-CM

## 2017-12-11 DIAGNOSIS — M35.3 PMR (POLYMYALGIA RHEUMATICA) (H): ICD-10-CM

## 2017-12-11 DIAGNOSIS — I10 BENIGN ESSENTIAL HYPERTENSION: ICD-10-CM

## 2017-12-11 DIAGNOSIS — J44.9 CHRONIC OBSTRUCTIVE PULMONARY DISEASE, UNSPECIFIED COPD TYPE (H): ICD-10-CM

## 2017-12-11 DIAGNOSIS — E11.8 TYPE 2 DIABETES MELLITUS WITH COMPLICATION, WITHOUT LONG-TERM CURRENT USE OF INSULIN (H): ICD-10-CM

## 2017-12-11 PROCEDURE — 99309 SBSQ NF CARE MODERATE MDM 30: CPT | Performed by: NURSE PRACTITIONER

## 2017-12-11 NOTE — PROGRESS NOTES
[facility]  PRIMARY CARE PROVIDER AND CLINIC:  Jeffery Sherwood [address] / [place] [address]  Chief Complaint   Patient presents with     Hospital F/U       HPI:    Lindsey Hale is a 89 year old  (9/16/1925),admitted to the [facility] from [facility].  Hospital stay 12/04/2017 through 12/08/2017.  Admitted to this facility for  rehab, medical management and nursing care.  HPI information obtained from: facility chart records, facility staff, patient report and [facility] chart review.    She was hospitalized with worsening left hip pain and difficulty ambulating 4 days after a mechanical fall (tripped on a rug while trying to  an item on the floor). XR showed a displaced left femoral neck fracture. She underwent left hip hemiarthroplasty 12/5/2017 by Dr Nathan. Tolerated the procedure well. She had post op temp of 100.4. CXR negative and no s/s of infection.  Fever resolved. Hgb 8.7 at discharge.     Current issues are:         Closed fracture of neck of left femur with routine healing  S/P hip hemiarthroplasty-reports not feeling as well today with fatigue and fair appetite. Had nausea yesterday, none today. BM last night. No abdominal pain. Reports pain is controlled. Ambulating short distances with walker and assist during therapy. Requires assist of 1 with cares. Uses a cane at home.   Paroxysmal atrial fibrillation (H)-HR: 70-79  Sick sinus syndrome (H)-followed by Dr Vazquez.   Pacemaker  Chronic obstructive pulmonary disease, unspecified COPD type (H)-reports mild shortness of breath with activity. No cough or chest pain. Afebrile.  Followed at [facility]. Treated for bronchitis approx 2 weeks prior to hospitalization.   PMR (polymyalgia rheumatica) (H)  Diabetes type 2-HgbA1c 7.1. Diet controlled.   Benign essential hypertension-BPs: 127/62, 121/70, 112/68  Anemia due to blood loss, acute      CODE STATUS/ADVANCE DIRECTIVES  DISCUSSION:   DNR / DNI  Patient's living condition: lives alone    ALLERGIES:Clindamycin hcl; Ampicillin potassium; Celebrex [celecoxib]; Ciprofloxacin; Erythromycin; Indocin; Penicillins; Vibramycin [doxycycline hyclate]; and Xylocaine-epinephrine [epinephrine-lidocaine-na metabisulfite]  PAST MEDICAL HISTORY:  has a past medical history of Abnormal echocardiogram (04/2017); Arthritis (99); Atrial fibrillation (H) (2011 and 2009); Chest pain (2000); Chronic LBP; COPD (chronic obstructive pulmonary disease) (H); Cysts, breast (1980s); Dizzy (2007); Dysphagia (2005); Elevated blood sugar; Environmental allergies; Hematuria (2004); Hemoptyses (2008); HTN (hypertension); Hyperlipidemia; Hypertension; Hypothyroid (1995); Mitral regurgitation (6/15); nausea (2006); Osteopenia; Pacemaker (2011); Palpitations (2009); PMR (polymyalgia rheumatica) (H) (2004); SOB (shortness of breath) (5/15); Supraventricular premature beats; TIA (transient ischaemic attack) (2009); Treadmill stress test negative for angina pectoris (2011); Urine incontinence; Urosepsis (2009); and Vision changes (2011). She also has no past medical history of Difficult intubation or Malignant hyperthermia.  PAST SURGICAL HISTORY:  has a past surgical history that includes Arthroscopy knee (1997); Mastectomy (1980); breast implants; nj not bso (70's); Arthroplasty patello-femoral (knee) (1998 and 2002); Foot surgery (2003); cataract (2011); Biopsy breast (Right, 9/23/2014); and Arthroplasty hip (Left, 12/5/2017).  FAMILY HISTORY: family history includes Breast Cancer in her sister; C.A.D. in her father; CANCER in her mother; Lymphoma in her brother and father; Myocardial Infarction in her sister; OSTEOPOROSIS in her sister; Unknown/Adopted in her maternal grandfather, maternal grandmother, paternal grandfather, and paternal grandmother.  SOCIAL HISTORY:  reports that she quit smoking about 62 years ago. Her smoking use included Cigarettes. She has never  used smokeless tobacco. She reports that she drinks alcohol. She reports that she does not use illicit drugs.    Post Discharge Medication Reconciliation Status: discharge medications reconciled and changed, per note/orders (see AVS).  Current Outpatient Prescriptions   Medication Sig Dispense Refill     oxyCODONE IR (ROXICODONE) 5 MG tablet Take 1 tablet (5 mg) by mouth every 3 hours as needed for moderate to severe pain 20 tablet 0     acetaminophen (TYLENOL) 325 MG tablet Take 2 tablets (650 mg) by mouth every 4 hours as needed for other (surgical pain) 40 tablet 0     Carboxymethylcellulose Sod PF (REFRESH PLUS) 0.5 % SOLN ophthalmic solution 1 drop 3 times daily as needed for dry eyes       multivitamin (OCUVITE) TABS tablet Take 1 tablet by mouth daily as needed       docusate sodium (STOOL SOFTENER) 100 MG capsule Take 100 mg by mouth 2 times daily as needed for constipation       VITAMIN D, CHOLECALCIFEROL, PO Take 1,000 Units by mouth daily       VITAMIN B COMPLEX-C CAPS Take 1 capsule by mouth daily       metoprolol (LOPRESSOR) 50 MG tablet TAKE 1 AND 1/2 TABLETS BY MOUTH TWICE DAILY 270 tablet 3     levothyroxine (SYNTHROID/LEVOTHROID) 75 MCG tablet Take 1 tablet (75 mcg) by mouth daily 90 tablet 3     rivaroxaban ANTICOAGULANT (XARELTO) 20 MG TABS tablet Take 1 tablet (20 mg) by mouth daily (with dinner) 90 tablet 2     flecainide acetate 150 MG TABS 1/2 tab bid 90 tablet 3     furosemide (LASIX) 20 MG tablet Take 1 tablet (20 mg) by mouth daily Pt taking one tab of 20mg furosemide every day and two tabs every other day if needed 181 tablet 3     budesonide-formoterol (SYMBICORT) 160-4.5 MCG/ACT inhaler Inhale 2 puffs into the lungs 2 times daily         ROS:  10 point ROS of systems including Constitutional, Eyes, Respiratory, Cardiovascular, Gastroenterology, Genitourinary, Integumentary, Muscularskeletal, Psychiatric were all negative except for pertinent positives noted in my HPI.    Exam:  /62  " Pulse 72  Temp 98.8  F (37.1  C)  Resp 16  Ht 5' 1.5\" (1.562 m)  Wt 177 lb 12.8 oz (80.6 kg)  SpO2 93%  BMI 33.05 kg/m2  GENERAL APPEARANCE:  Alert, in no distress  ENT:  Mouth and posterior oropharynx normal, moist mucous membranes, [place]  EYES:  EOM normal, conjunctiva and lids normal, PERRL  NECK:  No adenopathy,masses or thyromegaly  RESP:  respiratory effort and palpation of chest normal, lungs clear to auscultation , no respiratory distress  CV:  Palpation and auscultation of heart done , regular rate and rhythm, 1/6 murmur, no edema, +2 pedal pulses  ABDOMEN:  normal bowel sounds, soft, nontender, no hepatosplenomegaly or other masses  M/S:   gait steady with walker and assist. Mild weakness LLE. Good strength other extremities. No joint inflammation  SKIN:  left hip incision with staples clean, dry, intact, no erythema. No rashes or open areas  PSYCH:  oriented X 3, memory impaired , affect and mood normal    Lab/Diagnostic data:  Last Basic Metabolic Panel:  Lab Results   Component Value Date     12/08/2017      Lab Results   Component Value Date    POTASSIUM 3.6 12/08/2017     Lab Results   Component Value Date    CHLORIDE 102 12/08/2017     Lab Results   Component Value Date    FRANCISCO 8.5 12/08/2017     Lab Results   Component Value Date    CO2 28 12/08/2017     Lab Results   Component Value Date    BUN 11 12/08/2017     Lab Results   Component Value Date    CR 0.59 12/08/2017     Lab Results   Component Value Date     12/08/2017     Lab Results   Component Value Date    WBC 6.5 12/04/2017     Lab Results   Component Value Date    RBC 3.57 12/04/2017     Lab Results   Component Value Date    HGB 8.7 12/08/2017     Lab Results   Component Value Date    HCT 35.9 12/04/2017     Lab Results   Component Value Date     12/04/2017     Lab Results   Component Value Date    MCH 33.3 12/04/2017     Lab Results   Component Value Date    MCHC 33.1 12/04/2017     Lab Results   Component Value " Date    RDW 14.6 12/04/2017     Lab Results   Component Value Date     12/04/2017       (S72.002D) Closed fracture of neck of left femur with routine healing  (primary encounter diagnosis)  (Z96.649) S/P hip hemiarthroplasty  Comment: incision healing without signs of infection. Pain controlled.   Plan: PHYSICAL THERAPY/OT. Schedule tylenol bid plus  prn. Continue oxycodone. Daily dry dressing change. Ortho follow up 4 weeks. Chronic anticoagulation with Xarelto. Bowel regimen.     (I48.0) Paroxysmal atrial fibrillation (H)  (I49.5) Sick sinus syndrome (H)  (Z95.0) Pacemaker  Comment: rate controlled.   Plan: continue Xarelto, metoprolol, flecainide. Pacemaker check 12/28/2017 as scheduled.     (J44.9) Chronic obstructive pulmonary disease, unspecified COPD type (H)  Comment: no acute issues  Plan: continue Symbicort. Encourage IS.     (M35.3) PMR (polymyalgia rheumatica) (H)  Comment: chronic, pain appears controlled  Plan: continue tylenol, oxycodone.    (E11.8) Type 2 diabetes mellitus with complication, without long-term current use of insulin (H)  Comment: diet controlled  Plan: monitor blood sugars prn    (I10) Benign essential hypertension  Comment: controlled  Plan: continue lasix, metoprolol. BMP. Monitor VS    (D62) Anemia due to blood loss, acute  Comment: no s/s of active bleeding  Plan: Hgb    (Z71.89) Advanced directives, counseling/discussion  Comment: she has an advance directive, daughter is health care agent. Confirms DNR/DNI  Plan: POLST completed        Electronically signed by:  MERCEDEZ Obrien CNP

## 2017-12-12 ENCOUNTER — HOSPITAL LABORATORY (OUTPATIENT)
Dept: OTHER | Facility: CLINIC | Age: 82
End: 2017-12-12

## 2017-12-12 PROBLEM — S72.002D CLOSED FRACTURE OF NECK OF LEFT FEMUR WITH ROUTINE HEALING: Status: ACTIVE | Noted: 2017-12-12

## 2017-12-12 PROBLEM — E11.9 DIABETES MELLITUS, TYPE 2 (H): Status: ACTIVE | Noted: 2017-12-12

## 2017-12-12 PROBLEM — Z96.649 S/P HIP HEMIARTHROPLASTY: Status: ACTIVE | Noted: 2017-12-12

## 2017-12-12 PROBLEM — S72.22XD CLOSED DISPLACED SUBTROCHANTERIC FRACTURE OF LEFT FEMUR WITH ROUTINE HEALING: Status: ACTIVE | Noted: 2017-12-12

## 2017-12-12 PROBLEM — R53.81 PHYSICAL DECONDITIONING: Status: ACTIVE | Noted: 2017-12-12

## 2017-12-12 PROBLEM — D62 ANEMIA DUE TO BLOOD LOSS, ACUTE: Status: ACTIVE | Noted: 2017-12-12

## 2017-12-12 LAB
ANION GAP SERPL CALCULATED.3IONS-SCNC: 11 MMOL/L (ref 3–14)
BUN SERPL-MCNC: 16 MG/DL (ref 7–30)
CALCIUM SERPL-MCNC: 8.8 MG/DL (ref 8.5–10.1)
CHLORIDE SERPL-SCNC: 99 MMOL/L (ref 94–109)
CO2 SERPL-SCNC: 26 MMOL/L (ref 20–32)
CREAT SERPL-MCNC: 0.81 MG/DL (ref 0.52–1.04)
GFR SERPL CREATININE-BSD FRML MDRD: 66 ML/MIN/1.7M2
GLUCOSE SERPL-MCNC: 161 MG/DL (ref 70–99)
HGB BLD-MCNC: 9.7 G/DL (ref 11.7–15.7)
POTASSIUM SERPL-SCNC: 3.7 MMOL/L (ref 3.4–5.3)
SODIUM SERPL-SCNC: 136 MMOL/L (ref 133–144)

## 2017-12-12 RX ORDER — AMOXICILLIN 250 MG
1 CAPSULE ORAL 2 TIMES DAILY
COMMUNITY
End: 2018-04-06 | Stop reason: ALTCHOICE

## 2017-12-13 ENCOUNTER — NURSING HOME VISIT (OUTPATIENT)
Dept: GERIATRICS | Facility: CLINIC | Age: 82
End: 2017-12-13
Payer: MEDICARE

## 2017-12-13 VITALS
OXYGEN SATURATION: 93 % | SYSTOLIC BLOOD PRESSURE: 106 MMHG | HEIGHT: 62 IN | WEIGHT: 175.2 LBS | HEART RATE: 65 BPM | RESPIRATION RATE: 16 BRPM | TEMPERATURE: 98.5 F | DIASTOLIC BLOOD PRESSURE: 54 MMHG | BODY MASS INDEX: 32.24 KG/M2

## 2017-12-13 DIAGNOSIS — I48.0 PAROXYSMAL ATRIAL FIBRILLATION (H): ICD-10-CM

## 2017-12-13 DIAGNOSIS — J44.9 CHRONIC OBSTRUCTIVE PULMONARY DISEASE, UNSPECIFIED COPD TYPE (H): ICD-10-CM

## 2017-12-13 DIAGNOSIS — I10 BENIGN ESSENTIAL HYPERTENSION: ICD-10-CM

## 2017-12-13 DIAGNOSIS — Z96.649 S/P HIP HEMIARTHROPLASTY: ICD-10-CM

## 2017-12-13 DIAGNOSIS — S72.002D CLOSED FRACTURE OF NECK OF LEFT FEMUR WITH ROUTINE HEALING: Primary | ICD-10-CM

## 2017-12-13 DIAGNOSIS — E03.9 HYPOTHYROIDISM, UNSPECIFIED TYPE: ICD-10-CM

## 2017-12-13 DIAGNOSIS — D62 ANEMIA DUE TO BLOOD LOSS, ACUTE: ICD-10-CM

## 2017-12-13 PROCEDURE — 99305 1ST NF CARE MODERATE MDM 35: CPT | Performed by: INTERNAL MEDICINE

## 2017-12-14 NOTE — PROGRESS NOTES
Lindsey Hale is a 89 year old female seen December 13, 2017 at [facility] where she was admitted this week after [facility] hospitalization for left hip fracture.    She had a fall at home 4 days before presentation to the hospital, onto her left side.   Developed more pain to the point she was unable to bear weight.    X-rays showed displaced left femoral neck fracture, and she underwent left hip hemiarthroplasty on 12/5/17   Post op course complicated by anemia, but she did not require transfusion    Now transferred here for Rehab.   Patient is seen today in her room, resting abed.   Somewhat tired after therapies.   She has had nausea and lack of appetite since admission.   Given some prn Zofran, and when seen this afternoon reports that this is better.    Also had constipation, improved with bowel meds and also   Patient has a problem list that includes atrial fib, hypothyroidism, COPD, PMR, HTN and hyperglycemia tx'd with SSI.    These have been fairly stable over past couple of years.      Past Medical History:   Diagnosis Date     Abnormal echocardiogram 04/2017    mild mr, ar, mitral stenosis, nl ef, lvh.  Done for episode of vision change     Arthritis 99         Atrial fibrillation (H) 2011 and 2009    neg nuc est 2009, Dr. Vazquez, on coumadin     Chest pain 2000    neg est echo, neg ct chest abd, pelvis [place]-day     Chronic LBP      COPD (chronic obstructive pulmonary disease) (H)     seen by pulmonary md Dr. Whitt, and given inhaler     Cysts, breast 1980s    bilat mastectomies     Dizzy 2007    ct neg, carotid us neg     Dysphagia 2005    egd nl     Elevated blood sugar      Environmental allergies      Hematuria 2004    neg cysto and us     Hemoptyses 2008    ct neg, bronch neg 2009     HTN (hypertension)      Hyperlipidemia      Hypertension      Hypothyroid 1995    Dr. Ortiz     Mitral regurgitation 6/15    on echo     nausea 2006    ct abd and ;pelvis neg     Osteopenia     fu dexa  better 2011     Pacemaker 2011    afib with pauses and syncope     Palpitations 2009    neg est     PMR (polymyalgia rheumatica) (H) 2004    not active in years     SOB (shortness of breath) 5/15    echo nl ef, 2+mr, cxr clear, seen by pulm and given inhaler     Supraventricular premature beats      TIA (transient ischaemic attack) 2009    carotids neg, mri neg     Treadmill stress test negative for angina pectoris 2011    nuclear est     Urine incontinence     Dr. Westfall     Urosepsis 2009    hospitalized     Vision changes 2011    eval by neuro and ophtho and no cause found       Past Surgical History:   Procedure Laterality Date     ARTHROPLASTY HIP Left 12/5/2017    Procedure: ARTHROPLASTY HIP;  LEFT HIP ARBEN ARTHROPLASTY(SORAYA);  Surgeon: Darell Nathan MD;  Location: [facility] OR     ARTHROPLASTY PATELLO-FEMORAL (KNEE)  1998 and 2002     ARTHROSCOPY KNEE  1997     BIOPSY BREAST Right 9/23/2014    Procedure: BIOPSY BREAST;  Surgeon: David Carter MD;  Location:  [facility]     breast implants      4x     cataract  2011     FOOT SURGERY  2003     MASTECTOMY  1980    bilat, cysts     nj not bso  70's    not for ca       Family History   Problem Relation Age of Onset     C.A.D. Father      Lymphoma Father      CANCER Mother      Lymphoma Brother      Breast Cancer Sister      OSTEOPOROSIS Sister      Myocardial Infarction Sister      Unknown/Adopted Maternal Grandmother      Unknown/Adopted Maternal Grandfather      Unknown/Adopted Paternal Grandmother      Unknown/Adopted Paternal Grandfather        Social History   Substance Use Topics     Smoking status: Former Smoker     Types: Cigarettes     Quit date: 2/1/1955     Smokeless tobacco: Never Used      Comment: quit in 1955   had smoked about 2 cigarettes a day or more     Alcohol use 0.0 oz/week     0 Standard drinks or equivalent per week      Comment: occ wine      SH:  , lives alone, house in [place].   She has a daughter and son.     Review Of  "Systems  Skin: negative   Eyes: impaired vision  Ears/Nose/Throat: hearing loss  Respiratory: Mild THOMPSON.   Tx'd for bronchitis 3 weeks ago.     Cardiovascular: irregular heart beat and exercise intolerance  Gastrointestinal: poor appetite, nausea and constipation  Genitourinary: negative  Musculoskeletal: ambulatory with cane PTA.   Now assist for ADLs and transfers, ambulating short distances with walker and assist.     Neurologic: negative  Psychiatric: negative  Hematologic/Lymphatic/Immunologic: anemia  Endocrine: thyroid disorder      GENERAL APPEARANCE: alert and no distress  /54  Pulse 65  Temp 98.5  F (36.9  C)  Resp 16  Ht 5' 1.5\" (1.562 m)  Wt 175 lb 3.2 oz (79.5 kg)  SpO2 93%  BMI 32.57 kg/m2   HEENT: normocephalic, no lesion or abnormalities  NECK: no adenopathy, no asymmetry, masses, or scars and thyroid normal to palpation  RESP: lungs clear to auscultation - no rales, rhonchi or wheezes  CV: regular rate and rhythm, normal S1 S2  ABDOMEN:  soft, nontender, no HSM or masses and bowel sounds normal  MS: extremities normal- no gross deformities noted, no evidence of inflammation in joints, 1+ LE edema  SKIN: no suspicious lesions or rashes; incision healing well, sutures intact.     NEURO: Normal strength and tone, sensory exam grossly normal, and speech normal  PSYCH: affect okay  LYMPHATICS: No cervical,  or supraclavicular nodes     Last Basic Metabolic Panel:  Lab Results   Component Value Date     12/12/2017      Lab Results   Component Value Date    POTASSIUM 3.7 12/12/2017     Lab Results   Component Value Date    CHLORIDE 99 12/12/2017     Lab Results   Component Value Date    FRANCISCO 8.8 12/12/2017     Lab Results   Component Value Date    CO2 26 12/12/2017     Lab Results   Component Value Date    BUN 16 12/12/2017     Lab Results   Component Value Date    CR 0.81 12/12/2017   GFR 66  Lab Results   Component Value Date     12/12/2017     Component Value Date    HGB 9.7 " 12/12/2017     TSH   Date Value Ref Range Status   11/17/2017 0.87 0.40 - 4.00 mU/L Final     Lab Results   Component Value Date    A1C 7.1 11/17/2017    A1C 6.1 03/09/2017        IMP/PLAN:   (S72.002D) Closed fracture of neck of left femur with routine healing  (primary encounter diagnosis)  (Z96.649) S/P hip hemiarthroplasty  Comment: doing fairly well post op  Plan: PHYSICAL THERAPY / OCCUPATIONAL THERAPY for hip rehab, transfers, balance, gait, ADLs.   Discharge goal is return to her home with family support.     Pain management to include scheduled acetaminophen and prn oxycodone.   Increase bowel regimen.     DVT prophylaxis with PTA rivaroxaban.       (D62) Anemia due to blood loss, acute  Comment: improved hgb   Plan: follow prn.      (I48.0) Paroxysmal atrial fibrillation (H)  Comment: controlled VR on flecainide and metoprolol    Plan: rivaroxaban for stroke prophylaxis.      (J44.9) Chronic obstructive pulmonary disease, unspecified COPD type (H)  Comment: mild sx  Plan: continue Symbicort    (E03.9) Hypothyroidism, unspecified type  Comment: on replacement  Plan: continue levothyroxine       (I10) Benign essential hypertension  Comment: lower bps.     Plan: continue metoprolol     Irma Maddox MD

## 2017-12-20 ENCOUNTER — HOSPITAL LABORATORY (OUTPATIENT)
Dept: OTHER | Facility: CLINIC | Age: 82
End: 2017-12-20

## 2017-12-20 ENCOUNTER — TRANSFERRED RECORDS (OUTPATIENT)
Dept: HEALTH INFORMATION MANAGEMENT | Facility: CLINIC | Age: 82
End: 2017-12-20

## 2017-12-20 ENCOUNTER — NURSING HOME VISIT (OUTPATIENT)
Dept: GERIATRICS | Facility: CLINIC | Age: 82
End: 2017-12-20
Payer: MEDICARE

## 2017-12-20 VITALS
SYSTOLIC BLOOD PRESSURE: 120 MMHG | WEIGHT: 176 LBS | TEMPERATURE: 97.6 F | HEIGHT: 62 IN | RESPIRATION RATE: 20 BRPM | OXYGEN SATURATION: 94 % | DIASTOLIC BLOOD PRESSURE: 61 MMHG | HEART RATE: 73 BPM | BODY MASS INDEX: 32.39 KG/M2

## 2017-12-20 DIAGNOSIS — I48.0 PAROXYSMAL ATRIAL FIBRILLATION (H): ICD-10-CM

## 2017-12-20 DIAGNOSIS — J44.9 CHRONIC OBSTRUCTIVE PULMONARY DISEASE, UNSPECIFIED COPD TYPE (H): ICD-10-CM

## 2017-12-20 DIAGNOSIS — S72.002D CLOSED FRACTURE OF NECK OF LEFT FEMUR WITH ROUTINE HEALING: Primary | ICD-10-CM

## 2017-12-20 DIAGNOSIS — Z96.649 S/P HIP HEMIARTHROPLASTY: ICD-10-CM

## 2017-12-20 DIAGNOSIS — Z95.0 PACEMAKER: ICD-10-CM

## 2017-12-20 DIAGNOSIS — I49.5 SICK SINUS SYNDROME (H): ICD-10-CM

## 2017-12-20 DIAGNOSIS — D62 ANEMIA DUE TO BLOOD LOSS, ACUTE: ICD-10-CM

## 2017-12-20 DIAGNOSIS — M35.3 PMR (POLYMYALGIA RHEUMATICA) (H): ICD-10-CM

## 2017-12-20 DIAGNOSIS — R53.81 PHYSICAL DECONDITIONING: ICD-10-CM

## 2017-12-20 DIAGNOSIS — M54.50 CHRONIC LOW BACK PAIN WITHOUT SCIATICA, UNSPECIFIED BACK PAIN LATERALITY: ICD-10-CM

## 2017-12-20 DIAGNOSIS — R60.0 BILATERAL LEG EDEMA: ICD-10-CM

## 2017-12-20 DIAGNOSIS — G89.29 CHRONIC LOW BACK PAIN WITHOUT SCIATICA, UNSPECIFIED BACK PAIN LATERALITY: ICD-10-CM

## 2017-12-20 DIAGNOSIS — I10 BENIGN ESSENTIAL HYPERTENSION: ICD-10-CM

## 2017-12-20 LAB
ANION GAP SERPL CALCULATED.3IONS-SCNC: 6 MMOL/L (ref 3–14)
ANION GAP SERPL CALCULATED.3IONS-SCNC: 6 MMOL/L (ref 3–14)
BUN SERPL-MCNC: 15 MG/DL (ref 7–30)
BUN SERPL-MCNC: 15 MG/DL (ref 7–30)
CALCIUM SERPL-MCNC: 8.8 MG/DL (ref 8.5–10.1)
CALCIUM SERPL-MCNC: 8.8 MG/DL (ref 8.5–10.1)
CHLORIDE SERPL-SCNC: 100 MMOL/L (ref 94–109)
CHLORIDE SERPLBLD-SCNC: 100 MMOL/L (ref 94–109)
CO2 SERPL-SCNC: 27 MMOL/L (ref 20–32)
CO2 SERPL-SCNC: 27 MMOL/L (ref 20–32)
CREAT SERPL-MCNC: 0.84 MG/DL (ref 0.52–1.04)
CREAT SERPL-MCNC: 0.84 MG/DL (ref 0.52–1.04)
GFR SERPL CREATININE-BSD FRML MDRD: 63 ML/MIN/1.73M2
GFR SERPL CREATININE-BSD FRML MDRD: 63 ML/MIN/1.7M2
GLUCOSE SERPL-MCNC: 112 MG/DL (ref 70–99)
GLUCOSE SERPL-MCNC: 112 MG/DL (ref 70–99)
HEMOGLOBIN: 8.7 G/DL (ref 11.7–15.7)
HGB BLD-MCNC: 8.7 G/DL (ref 11.7–15.7)
POTASSIUM SERPL-SCNC: 4.1 MMOL/L (ref 3.4–5.3)
POTASSIUM SERPL-SCNC: 4.1 MMOL/L (ref 3.4–5.3)
SODIUM SERPL-SCNC: 133 MMOL/L (ref 133–144)
SODIUM SERPL-SCNC: 133 MMOL/L (ref 133–144)

## 2017-12-20 PROCEDURE — 99310 SBSQ NF CARE HIGH MDM 45: CPT | Performed by: NURSE PRACTITIONER

## 2017-12-20 RX ORDER — POTASSIUM CHLORIDE 1.5 G/1.58G
20 POWDER, FOR SOLUTION ORAL DAILY
COMMUNITY
End: 2018-01-11

## 2017-12-20 NOTE — PROGRESS NOTES
[facility]    Chief Complaint   Patient presents with     RECHECK       HPI:    Lindsey Hale is a 89 year old  (9/16/1925), who is being seen today for an episodic care visit at [facility].  HPI information obtained from: facility chart records, facility staff, patient report and [facility] chart review and phone call with daughter Oxana Briggs.   She came to this facility 12/8/2017 following hospitalization for left hip pain after a mechanical fall. . XR showed a displaced left femoral neck fracture. She underwent left hip hemiarthroplasty 12/5/2017 by Dr Nathan. Tolerated the procedure well. She had post op temp of 100.4. CXR negative and no s/s of infection.  Fever resolved. Hgb 8.7 at discharge.     Current issues are:          Closed fracture of neck of left femur with routine healing-reports minimal pain. Had mild nausea this am that resolved with zofran. Good appetite. No abdominal pain, no constipation.  Increased LE edema noted today, which she reports is not unusual for her. No calf tenderness. Weight is down 4 lbs from admission.   S/P hip hemiarthroplasty  Chronic obstructive pulmonary disease, unspecified COPD type (H)-patient and her daughter requested Symbicort be increased to 4 puffs bid, which is her usual home dose per [facility].   Paroxysmal atrial fibrillation (H)-HR: 66-73  Sick sinus syndrome (H)  Pacemaker  PMR (polymyalgia rheumatica) (H)-denies joint pain  Anemia due to blood loss, acute  Chronic low back pain without sciatica, unspecified back pain laterality  Benign essential hypertension-BPs: 120/61, 140/62, 128/61  Physical deconditioning-ambulating in the halls with walker and assist of therapist. Requires assist of 1 with all cares.     ALLERGIES: Clindamycin hcl; Ampicillin potassium; Celebrex [celecoxib]; Ciprofloxacin; Erythromycin; Indocin; Penicillins; Vibramycin [doxycycline hyclate]; and Xylocaine-epinephrine [epinephrine-lidocaine-na  metabisulfite]  Past Medical, Surgical, Family and Social History reviewed and updated in [facility].    Current Outpatient Prescriptions   Medication Sig Dispense Refill     TRAZODONE HCL PO Take 50 mg by mouth nightly as needed for sleep       ONDANSETRON PO Take 4 mg by mouth 3 times daily as needed for nausea       ACETAMINOPHEN PO Take 1,000 mg by mouth 2 times daily       senna-docusate (SENOKOT-S;PERICOLACE) 8.6-50 MG per tablet Take 1 tablet by mouth 2 times daily       oxyCODONE IR (ROXICODONE) 5 MG tablet Take 1 tablet (5 mg) by mouth every 3 hours as needed for moderate to severe pain 20 tablet 0     acetaminophen (TYLENOL) 325 MG tablet Take 2 tablets (650 mg) by mouth every 4 hours as needed for other (surgical pain) 40 tablet 0     Carboxymethylcellulose Sod PF (REFRESH PLUS) 0.5 % SOLN ophthalmic solution 1 drop 3 times daily as needed for dry eyes       multivitamin (OCUVITE) TABS tablet Take 1 tablet by mouth daily as needed       VITAMIN D, CHOLECALCIFEROL, PO Take 1,000 Units by mouth daily       VITAMIN B COMPLEX-C CAPS Take 1 capsule by mouth daily       metoprolol (LOPRESSOR) 50 MG tablet TAKE 1 AND 1/2 TABLETS BY MOUTH TWICE DAILY 270 tablet 3     levothyroxine (SYNTHROID/LEVOTHROID) 75 MCG tablet Take 1 tablet (75 mcg) by mouth daily 90 tablet 3     rivaroxaban ANTICOAGULANT (XARELTO) 20 MG TABS tablet Take 1 tablet (20 mg) by mouth daily (with dinner) 90 tablet 2     flecainide acetate 150 MG TABS 1/2 tab bid 90 tablet 3     furosemide (LASIX) 20 MG tablet Take 1 tablet (20 mg) by mouth daily Pt taking one tab of 20mg furosemide every day and two tabs every other day if needed 181 tablet 3     budesonide-formoterol (SYMBICORT) 160-4.5 MCG/ACT inhaler Inhale 4 puffs into the lungs 2 times daily        Medications reviewed:  Medications reconciled to facility chart and changes were made to reflect current medications as identified as above med list. Below are the changes that were made:  "  Medications stopped since last EPIC medication reconciliation:   There are no discontinued medications.    Medications started since last [facility] medication reconciliation:  Orders Placed This Encounter   Medications     TRAZODONE HCL PO     Sig: Take 50 mg by mouth nightly as needed for sleep     ONDANSETRON PO     Sig: Take 4 mg by mouth 3 times daily as needed for nausea       REVIEW OF SYSTEMS:  10 point ROS of systems including Constitutional, Eyes, Respiratory, Cardiovascular, Gastroenterology, Genitourinary, Integumentary, Muscularskeletal, Psychiatric were all negative except for pertinent positives noted in my HPI.    Physical Exam:  /61  Pulse 73  Temp 97.6  F (36.4  C)  Resp 20  Ht 5' 1.5\" (1.562 m)  Wt 176 lb (79.8 kg)  SpO2 94%  BMI 32.72 kg/m2  GENERAL APPEARANCE:  Alert, in no distress  ENT:  Mouth and posterior oropharynx normal, moist mucous membranes, [place]  EYES:  EOM normal, conjunctiva and lids normal  NECK:  No adenopathy,masses or thyromegaly  RESP:  respiratory effort and palpation of chest normal, lungs clear to auscultation , no respiratory distress  CV:  Palpation and auscultation of heart done , regular rate and rhythm, 1/6 murmur, 1-2+ bilateral LE edema, +2 pedal pulses. No calf tenderness, warmth or erythema.   ABDOMEN:  soft, nontender, no hepatosplenomegaly or other masses  M/S:   gait steady with walker and assist. Mild weakness LLE. Good strength other extremities. No joint inflammation.   SKIN:  left hip incision clean, dry, intact, no erythema. No rashes or open areas  PSYCH:  oriented X 3, memory impaired , affect and mood normal    Recent Labs:    Last Basic Metabolic Panel:  Lab Results   Component Value Date     12/20/2017      Lab Results   Component Value Date    POTASSIUM 4.1 12/20/2017     Lab Results   Component Value Date    CHLORIDE 100 12/20/2017     Lab Results   Component Value Date    FRANCISCO 8.8 12/20/2017     Lab Results   Component Value Date    CO2 " 27 12/20/2017     Lab Results   Component Value Date    BUN 15 12/20/2017     Lab Results   Component Value Date    CR 0.84 12/20/2017     Lab Results   Component Value Date     12/20/2017     Lab Results   Component Value Date    WBC 6.5 12/04/2017     Lab Results   Component Value Date    RBC 3.57 12/04/2017     Lab Results   Component Value Date    HGB 8.7 12/20/2017     Lab Results   Component Value Date    HCT 35.9 12/04/2017     Lab Results   Component Value Date     12/04/2017     Lab Results   Component Value Date    MCH 33.3 12/04/2017     Lab Results   Component Value Date    MCHC 33.1 12/04/2017     Lab Results   Component Value Date    RDW 14.6 12/04/2017     Lab Results   Component Value Date     12/04/2017       ASSESSMENT / PLAN:  (S72.002D) Closed fracture of neck of left femur with routine healing  (primary encounter diagnosis)  (Z96.649) S/P hip hemiarthroplasty  Comment: pain and mobility improving. Incision healing without signs of infection.   Plan: continue tylenol and oxycodone. Chronic anticoagulation with Xarelto. Ortho follow up 1/2/2018.     (J44.9) Chronic obstructive pulmonary disease, unspecified COPD type (H)  Comment: managed.   Plan: continue Symbicort at her home dose of 4 puffs bid. Continue IS. Pulmonary follow up per usual routine.     (I48.0) Paroxysmal atrial fibrillation (H)  (I49.5) Sick sinus syndrome (H)  (Z95.0) Pacemaker  Comment: rate controlled.   Plan: continue metoprolol, flecainide, Xarelto. Pacemaker check as scheduled.      (R60.0) Bilateral LE edema  Comment: slight worsening of chronic edema. No s/s of DVT-she is anticoagulated  Plan: increase lasix to 20 mg bid for 3 days, then back to 20 mg daily.     (M35.3) PMR (polymyalgia rheumatica) (H)  Comment: chronic  Plan: continue tylenol, oxycodone.     (D62) Anemia due to blood loss, acute  Comment: Hgb down slightly. No signs of bleeding. Possibly dilutional.   Plan: follow Hgb    (M54.5,   G89.29) Chronic low back pain without sciatica, unspecified back pain laterality  Comment: controlled  Plan: continue pain regimen as above    (I10) Benign essential hypertension  Comment: controlled  Plan: continue metoprolol. Increase in lasix as above. Monitor VS.     (R53.81) Physical deconditioning  Comment: progressing in therapies  Plan: continue PHYSICAL THERAPY/OT.     Total time spent with patient visit at the skilled nursing facility ws 38 minutes including patient visit, review of past records and phone conversation with daughter. Greater than 50% of total time spent with counseling and coordination due to complexity of care.     Electronically signed by  MERCEDEZ Obrien CNP

## 2017-12-22 ENCOUNTER — NURSING HOME VISIT (OUTPATIENT)
Dept: GERIATRICS | Facility: CLINIC | Age: 82
End: 2017-12-22
Payer: MEDICARE

## 2017-12-22 VITALS
WEIGHT: 174.4 LBS | TEMPERATURE: 97.6 F | OXYGEN SATURATION: 94 % | SYSTOLIC BLOOD PRESSURE: 120 MMHG | RESPIRATION RATE: 20 BRPM | BODY MASS INDEX: 32.09 KG/M2 | HEIGHT: 62 IN | DIASTOLIC BLOOD PRESSURE: 61 MMHG | HEART RATE: 73 BPM

## 2017-12-22 DIAGNOSIS — R11.0 NAUSEA: Primary | ICD-10-CM

## 2017-12-22 DIAGNOSIS — R60.0 BILATERAL LEG EDEMA: ICD-10-CM

## 2017-12-22 DIAGNOSIS — Z96.649 S/P HIP HEMIARTHROPLASTY: ICD-10-CM

## 2017-12-22 DIAGNOSIS — I10 BENIGN ESSENTIAL HYPERTENSION: ICD-10-CM

## 2017-12-22 DIAGNOSIS — J44.9 CHRONIC OBSTRUCTIVE PULMONARY DISEASE, UNSPECIFIED COPD TYPE (H): ICD-10-CM

## 2017-12-22 DIAGNOSIS — S72.002D CLOSED FRACTURE OF NECK OF LEFT FEMUR WITH ROUTINE HEALING: ICD-10-CM

## 2017-12-22 DIAGNOSIS — R53.81 PHYSICAL DECONDITIONING: ICD-10-CM

## 2017-12-22 DIAGNOSIS — I48.0 PAROXYSMAL ATRIAL FIBRILLATION (H): ICD-10-CM

## 2017-12-22 DIAGNOSIS — K59.01 SLOW TRANSIT CONSTIPATION: ICD-10-CM

## 2017-12-22 PROCEDURE — 99309 SBSQ NF CARE MODERATE MDM 30: CPT | Performed by: NURSE PRACTITIONER

## 2017-12-22 RX ORDER — HYDROCODONE BITARTRATE AND ACETAMINOPHEN 5; 325 MG/1; MG/1
1 TABLET ORAL 2 TIMES DAILY
COMMUNITY
End: 2018-01-11

## 2017-12-22 RX ORDER — POLYETHYLENE GLYCOL 3350 17 G/17G
17 POWDER, FOR SOLUTION ORAL DAILY
COMMUNITY
End: 2018-12-22

## 2017-12-22 NOTE — PROGRESS NOTES
[place] [facility]    Chief Complaint   Patient presents with     RECHECK       HPI:    Lindsey Hale is a 89 year old  (9/16/1925), who is being seen today for an episodic care visit at [facility].  HPI information obtained from: facility chart records, facility staff, patient report and [facility] chart review and phone call with daughter Oxana Briggs.   She came to this facility 12/8/2017 following hospitalization for left hip pain after a mechanical fall. . XR showed a displaced left femoral neck fracture. She underwent left hip hemiarthroplasty 12/5/2017 by Dr Nathan. Tolerated the procedure well. She had post op temp of 100.4. CXR negative and no s/s of infection.  Fever resolved. Hgb 8.7 at discharge.     Current issues are:        Nausea-reports intermittent nausea, worse today. Had zofran this am with relief. No vomiting or abdominal pain. Appetite has been fairly good.   Slow transit constipation-reports small BMs, last yesterday. Feels constipated today. Daughter is here and concerned about the nausea.   Closed fracture of neck of left femur with routine healing-reports pain controlled.   S/P hip hemiarthroplasty  Chronic obstructive pulmonary disease, unspecified COPD type (H)-denies cough, shortness of breath or chest pain  Paroxysmal atrial fibrillation (H)-HR: 66-73. Pacemaker. Anticoagulated with Xarelto.   Bilateral leg edema-tolerating TG compression stockings without pain. Lasix increased 12/20. Weight is down 6 lbs from admission.   Benign essential hypertension-BPs: 120/61, 140/62, 96/61  Physical deconditioning-ambulating 130 ft with walker and contact guard assist. Able to do 4 stairs with assist. Requires assist of 1 with cares.   SLUMS 24/30, CPT 4.5/5.6      ALLERGIES: Clindamycin hcl; Ampicillin potassium; Celebrex [celecoxib]; Ciprofloxacin; Erythromycin; Indocin; Penicillins; Vibramycin [doxycycline hyclate]; and Xylocaine-epinephrine [epinephrine-lidocaine-na  metabisulfite]  Past Medical, Surgical, Family and Social History reviewed and updated in [facility].    Current Outpatient Prescriptions   Medication Sig Dispense Refill     HYDROcodone-acetaminophen (NORCO) 5-325 MG per tablet Take 1 tablet by mouth 2 times daily Plus 1-2 every 4 hrs prn       polyethylene glycol (MIRALAX/GLYCOLAX) powder Take 17 g by mouth daily       TRAZODONE HCL PO Take 50 mg by mouth nightly as needed for sleep       ONDANSETRON PO Take 4 mg by mouth 3 times daily as needed for nausea       Furosemide (LASIX PO) Take 20 mg by mouth daily Increase to 20 mg bid 12/21-12/24/2017, then back to daily       potassium chloride (KLOR-CON) 20 MEQ Packet Take 20 mEq by mouth daily       senna-docusate (SENOKOT-S;PERICOLACE) 8.6-50 MG per tablet Take 1 tablet by mouth 2 times daily       acetaminophen (TYLENOL) 325 MG tablet Take 2 tablets (650 mg) by mouth every 4 hours as needed for other (surgical pain) 40 tablet 0     Carboxymethylcellulose Sod PF (REFRESH PLUS) 0.5 % SOLN ophthalmic solution 1 drop 3 times daily as needed for dry eyes       multivitamin (OCUVITE) TABS tablet Take 1 tablet by mouth daily        VITAMIN D, CHOLECALCIFEROL, PO Take 1,000 Units by mouth daily       VITAMIN B COMPLEX-C CAPS Take 1 capsule by mouth daily       metoprolol (LOPRESSOR) 50 MG tablet TAKE 1 AND 1/2 TABLETS BY MOUTH TWICE DAILY 270 tablet 3     levothyroxine (SYNTHROID/LEVOTHROID) 75 MCG tablet Take 1 tablet (75 mcg) by mouth daily 90 tablet 3     rivaroxaban ANTICOAGULANT (XARELTO) 20 MG TABS tablet Take 1 tablet (20 mg) by mouth daily (with dinner) 90 tablet 2     flecainide acetate 150 MG TABS 1/2 tab bid 90 tablet 3     budesonide-formoterol (SYMBICORT) 160-4.5 MCG/ACT inhaler Inhale 4 puffs into the lungs 2 times daily        Medications reviewed:  Medications reconciled to facility chart and changes were made to reflect current medications as identified as above med list. Below are the changes that were made:  "  Medications stopped since last EPIC medication reconciliation:   There are no discontinued medications.    Medications started since last [facility] medication reconciliation:  No orders of the defined types were placed in this encounter.    REVIEW OF SYSTEMS:  10 point ROS of systems including Constitutional, Eyes, Respiratory, Cardiovascular, Gastroenterology, Genitourinary, Integumentary, Muscularskeletal, Psychiatric were all negative except for pertinent positives noted in my HPI.    Physical Exam:  /61  Pulse 73  Temp 97.6  F (36.4  C)  Resp 20  Ht 5' 1.5\" (1.562 m)  Wt 174 lb 6.4 oz (79.1 kg)  SpO2 94%  BMI 32.42 kg/m2  GENERAL APPEARANCE:  Alert, in no distress  ENT:  Mouth and posterior oropharynx normal, moist mucous membranes, [place]  EYES:  EOM normal, conjunctiva and lids normal  NECK:  No adenopathy,masses or thyromegaly  RESP:  respiratory effort and palpation of chest normal, lungs clear to auscultation , no respiratory distress  CV:  Palpation and auscultation of heart done , regular rate and rhythm, 1/6 murmur, 1+ bilateral LE edema, +2 pedal pulses. No calf tenderness, warmth or erythema.   ABDOMEN:  soft, nontender, no hepatosplenomegaly or other masses  M/S:   gait steady with walker and assist. Mild weakness LLE. Good strength other extremities. No joint inflammation.   SKIN:  left hip incision clean, dry, intact, no erythema. No rashes or open areas  PSYCH:  oriented X 3, memory impaired , affect and mood normal    Recent Labs:    Last Basic Metabolic Panel:  Lab Results   Component Value Date     12/20/2017      Lab Results   Component Value Date    POTASSIUM 4.1 12/20/2017     Lab Results   Component Value Date    CHLORIDE 100 12/20/2017     Lab Results   Component Value Date    FRANCISCO 8.8 12/20/2017     Lab Results   Component Value Date    CO2 27 12/20/2017     Lab Results   Component Value Date    BUN 15 12/20/2017     Lab Results   Component Value Date    CR 0.84 12/20/2017 "     Lab Results   Component Value Date     12/20/2017     Lab Results   Component Value Date    WBC 6.5 12/04/2017     Lab Results   Component Value Date    RBC 3.57 12/04/2017     Lab Results   Component Value Date    HGB 8.7 12/20/2017     Lab Results   Component Value Date    HCT 35.9 12/04/2017     Lab Results   Component Value Date     12/04/2017     Lab Results   Component Value Date    MCH 33.3 12/04/2017     Lab Results   Component Value Date    MCHC 33.1 12/04/2017     Lab Results   Component Value Date    RDW 14.6 12/04/2017     Lab Results   Component Value Date     12/04/2017       ASSESSMENT / PLAN:  (R11.0) Nausea  (primary encounter diagnosis)  Comment: probably due to oxycodone and constipation  Plan: discontinue oxycodone and start Norco. Continue zofran prn.     (K59.01) Slow transit constipation  Comment: mild constipation  Plan: add miralax daily. Continue senna.     (S72.002D) Closed fracture of neck of left femur with routine healing  (Z96.649) S/P hip hemiarthroplasty  Comment: incision healing without signs of infection. Pain controlled  Plan: change to Norco as above.Chronic anticoagulation with Xarelto.     (J44.9) Chronic obstructive pulmonary disease, unspecified COPD type (H)  Comment: no acute issues  Plan: continue Symbicort. Monitor respiratory status.     (I48.0) Paroxysmal atrial fibrillation (H)  Comment: rate controlled. Pacemaker  Plan: continue Xarelto, flecainide, metoprolol. Monitor VS    (R60.0) Bilateral leg edema  Comment: improved  Plan: continue lasix bid through 12/23/2017,then back to 20 mg daily. BMP. Continue TG compression stockings.     (I10) Benign essential hypertension  Comment: controlled  Plan: continue metoprolol, lasix. Monitor VS    (R53.81) Physical deconditioning  Comment: progressing in therapies  Plan: continue PHYSICAL THERAPY/OT. Goal is to return home with services.         Electronically signed by  MERCEDEZ Obrien  CNP

## 2017-12-23 ENCOUNTER — TELEPHONE (OUTPATIENT)
Dept: GERIATRICS | Facility: CLINIC | Age: 82
End: 2017-12-23

## 2017-12-23 NOTE — TELEPHONE ENCOUNTER
Patient with edema, on diuretics Lasix 20 mg PO BID throught 12/24, then back to 20 mg daily. Wt gain 2 lbs today. Edema continues.   Lab Results   Component Value Date    POTASSIUM 4.1 12/20/2017    POTASSIUM 4.1 12/20/2017     Lab Results   Component Value Date    CR 0.84 12/20/2017    CR 0.84 12/20/2017     PLAN  Extra Lasix 20 mg PO now one time dose.   Wt tomorrow and follow monitoring protocol    Electronically signed by MERCEDEZ Best, GNP

## 2017-12-26 ENCOUNTER — HOSPITAL LABORATORY (OUTPATIENT)
Dept: OTHER | Facility: CLINIC | Age: 82
End: 2017-12-26

## 2017-12-26 ENCOUNTER — NURSING HOME VISIT (OUTPATIENT)
Dept: GERIATRICS | Facility: CLINIC | Age: 82
End: 2017-12-26
Payer: MEDICARE

## 2017-12-26 VITALS
RESPIRATION RATE: 16 BRPM | HEIGHT: 62 IN | BODY MASS INDEX: 31.91 KG/M2 | DIASTOLIC BLOOD PRESSURE: 65 MMHG | HEART RATE: 60 BPM | WEIGHT: 173.4 LBS | TEMPERATURE: 98.2 F | OXYGEN SATURATION: 93 % | SYSTOLIC BLOOD PRESSURE: 120 MMHG

## 2017-12-26 DIAGNOSIS — I48.0 PAROXYSMAL ATRIAL FIBRILLATION (H): ICD-10-CM

## 2017-12-26 DIAGNOSIS — R60.0 BILATERAL LEG EDEMA: ICD-10-CM

## 2017-12-26 DIAGNOSIS — S72.002D CLOSED FRACTURE OF NECK OF LEFT FEMUR WITH ROUTINE HEALING: Primary | ICD-10-CM

## 2017-12-26 DIAGNOSIS — K59.01 SLOW TRANSIT CONSTIPATION: ICD-10-CM

## 2017-12-26 DIAGNOSIS — R53.81 PHYSICAL DECONDITIONING: ICD-10-CM

## 2017-12-26 DIAGNOSIS — I10 BENIGN ESSENTIAL HYPERTENSION: ICD-10-CM

## 2017-12-26 DIAGNOSIS — D62 ANEMIA DUE TO BLOOD LOSS, ACUTE: ICD-10-CM

## 2017-12-26 DIAGNOSIS — E11.8 TYPE 2 DIABETES MELLITUS WITH COMPLICATION, WITHOUT LONG-TERM CURRENT USE OF INSULIN (H): ICD-10-CM

## 2017-12-26 DIAGNOSIS — F51.02 ADJUSTMENT INSOMNIA: ICD-10-CM

## 2017-12-26 DIAGNOSIS — J44.9 CHRONIC OBSTRUCTIVE PULMONARY DISEASE, UNSPECIFIED COPD TYPE (H): ICD-10-CM

## 2017-12-26 DIAGNOSIS — Z96.649 S/P HIP HEMIARTHROPLASTY: ICD-10-CM

## 2017-12-26 DIAGNOSIS — R11.0 NAUSEA: ICD-10-CM

## 2017-12-26 LAB
ANION GAP SERPL CALCULATED.3IONS-SCNC: 8 MMOL/L (ref 3–14)
BUN SERPL-MCNC: 11 MG/DL (ref 7–30)
CALCIUM SERPL-MCNC: 9.3 MG/DL (ref 8.5–10.1)
CHLORIDE SERPL-SCNC: 98 MMOL/L (ref 94–109)
CO2 SERPL-SCNC: 28 MMOL/L (ref 20–32)
CREAT SERPL-MCNC: 0.7 MG/DL (ref 0.52–1.04)
GFR SERPL CREATININE-BSD FRML MDRD: 78 ML/MIN/1.7M2
GLUCOSE SERPL-MCNC: 111 MG/DL (ref 70–99)
HGB BLD-MCNC: 9.5 G/DL (ref 11.7–15.7)
POTASSIUM SERPL-SCNC: 4.4 MMOL/L (ref 3.4–5.3)
SODIUM SERPL-SCNC: 134 MMOL/L (ref 133–144)

## 2017-12-26 PROCEDURE — 99309 SBSQ NF CARE MODERATE MDM 30: CPT | Performed by: NURSE PRACTITIONER

## 2017-12-26 NOTE — PROGRESS NOTES
[facility]    Chief Complaint   Patient presents with     RECHECK       HPI:    Lindsey Hale is a 89 year old  (9/16/1925), who is being seen today for an episodic care visit at [facility].  HPI information obtained from: facility chart records, facility staff, patient report and [facility] chart review.   She came to this facility 12/8/2017 following hospitalization for left hip pain after a mechanical fall. XR showed a displaced left femoral neck fracture. She underwent left hip hemiarthroplasty 12/5/2017 by Dr Nathan. Tolerated the procedure well. She had post op temp of 100.4. CXR negative and no s/s of infection.  Fever resolved. Hgb 8.7 at discharge.      Current issues are:         Closed fracture of neck of left femur with routine healing-reports pain is controlled. Not feeling as well today due to ongoing nausea and poor sleep.   S/P hip hemiarthroplasty  Nausea-no emesis. Had zofran this morning with relief. Oxycodone discontinued and Norco started 12/22. She does not feel this made any difference in the nausea. Constipation has resolved, also with no improvement in nausea. Denies urinary symptoms. No abdominal pain.   Slow transit constipation  Paroxysmal atrial fibrillation (H)-HR: 65-73. Mild bloody nose this morning that quickly resolved.   Type 2 diabetes mellitus with complication, without long-term current use of insulin (H)-diet controlled. Recent glucose 111.   Chronic obstructive pulmonary disease, unspecified COPD type (H)-reports shortness of breath with exertion is at her baseline. No cough or chest pain  Anemia due to blood loss, acute  Bilateral leg edema-lasix increased to bid for 3 days through 12/24/2017. Weight down 7 lbs from admission, in part due to poor appetite. The on call provider was notified of a 2 lb weight.  gain on 12/23 and gave an additional lasix 20 mg for a total of 60 mg that day.   Benign essential hypertension-BPs: 106/46, 114/67,  126/64     Adjustment insomnia-reports poor sleep, which is affecting her daytime activity. Used prn trazodone last night without much relief, but it has worked well other nights.   Physical deconditioning-ambulating up to 130 ft with walker and contact guard assist. Able to do 4 stairs with assist. Requires assist of 1 with cares.   SLUMS 24/30, CPT 4.5/5.6      ALLERGIES: Clindamycin hcl; Ampicillin potassium; Celebrex [celecoxib]; Ciprofloxacin; Erythromycin; Indocin; Penicillins; Vibramycin [doxycycline hyclate]; and Xylocaine-epinephrine [epinephrine-lidocaine-na metabisulfite]  Past Medical, Surgical, Family and Social History reviewed and updated in [facility].    Current Outpatient Prescriptions   Medication Sig Dispense Refill     HYDROcodone-acetaminophen (NORCO) 5-325 MG per tablet Take 1 tablet by mouth 2 times daily Plus 1-2 every 4 hrs prn       polyethylene glycol (MIRALAX/GLYCOLAX) powder Take 17 g by mouth daily       TRAZODONE HCL PO Take 50 mg by mouth At Bedtime may repeat x 1 PRN for a total dose of 100 mg       ONDANSETRON PO Take 4 mg by mouth 3 times daily        Furosemide (LASIX PO) Take 20 mg by mouth daily        potassium chloride (KLOR-CON) 20 MEQ Packet Take 20 mEq by mouth daily       senna-docusate (SENOKOT-S;PERICOLACE) 8.6-50 MG per tablet Take 1 tablet by mouth 2 times daily       acetaminophen (TYLENOL) 325 MG tablet Take 2 tablets (650 mg) by mouth every 4 hours as needed for other (surgical pain) 40 tablet 0     Carboxymethylcellulose Sod PF (REFRESH PLUS) 0.5 % SOLN ophthalmic solution 1 drop 3 times daily as needed for dry eyes       multivitamin (OCUVITE) TABS tablet Take 1 tablet by mouth daily        VITAMIN D, CHOLECALCIFEROL, PO Take 1,000 Units by mouth daily       VITAMIN B COMPLEX-C CAPS Take 1 capsule by mouth daily       metoprolol (LOPRESSOR) 50 MG tablet TAKE 1 AND 1/2 TABLETS BY MOUTH TWICE DAILY 270 tablet 3     levothyroxine (SYNTHROID/LEVOTHROID) 75 MCG tablet Take  "1 tablet (75 mcg) by mouth daily 90 tablet 3     rivaroxaban ANTICOAGULANT (XARELTO) 20 MG TABS tablet Take 1 tablet (20 mg) by mouth daily (with dinner) 90 tablet 2     flecainide acetate 150 MG TABS 1/2 tab bid 90 tablet 3     budesonide-formoterol (SYMBICORT) 160-4.5 MCG/ACT inhaler Inhale 4 puffs into the lungs 2 times daily        [DISCONTINUED] TRAZODONE HCL PO Take 50 mg by mouth At Bedtime       Medications reviewed:  Medications reconciled to facility chart and changes were made to reflect current medications as identified as above med list. Below are the changes that were made:   Medications stopped since last EPIC medication reconciliation:   There are no discontinued medications.    Medications started since last [facility] medication reconciliation:  Orders Placed This Encounter   Medications     TRAZODONE HCL PO     Sig: Take 50 mg by mouth At Bedtime     REVIEW OF SYSTEMS:  10 point ROS of systems including Constitutional, Eyes, Respiratory, Cardiovascular, Gastroenterology, Genitourinary, Integumentary, Muscularskeletal, Psychiatric were all negative except for pertinent positives noted in my HPI.    Physical Exam:  /65  Pulse 60  Temp 98.2  F (36.8  C)  Resp 16  Ht 5' 1.5\" (1.562 m)  Wt 173 lb 6.4 oz (78.7 kg)  SpO2 93%  BMI 32.23 kg/m2  GENERAL APPEARANCE:  Alert, in no distress  ENT:  Mouth and posterior oropharynx normal, moist mucous membranes, [place]  EYES:  EOM normal, conjunctiva and lids normal  NECK:  No adenopathy,masses or thyromegaly  RESP:  respiratory effort and palpation of chest normal, lungs clear to auscultation , no respiratory distress  CV:  Palpation and auscultation of heart done , regular rate and rhythm, 1/6 murmur, 1+ bilateral LE edema, +2 pedal pulses. No calf tenderness, warmth or erythema.   ABDOMEN:  soft, nontender, no hepatosplenomegaly or other masses  M/S:   gait steady with walker and assist. Mild weakness LLE. Good strength other extremities. No joint " inflammation.   SKIN:  left hip incision clean, dry, intact, no erythema. No rashes or open areas  PSYCH:  oriented X 3, memory impaired , affect and mood normal       Recent Labs:    CBC RESULTS:   Recent Labs   Lab Test  12/26/17   0800  12/20/17   0755   12/04/17   1146  11/17/17   1120   WBC   --    --    --   6.5  13.5*   RBC   --    --    --   3.57*  3.89   HGB  9.5*  8.7*   < >  11.9  13.2   HCT   --    --    --   35.9  39.5   MCV   --    --    --   101*  102*   MCH   --    --    --   33.3*  33.9*   MCHC   --    --    --   33.1  33.4   RDW   --    --    --   14.6  14.4   PLT   --    --    --   340  296    < > = values in this interval not displayed.       Last Basic Metabolic Panel:  Recent Labs   Lab Test  12/26/17   0800  12/20/17   0755   NA  134  133   POTASSIUM  4.4  4.1   CHLORIDE  98  100   FRANCISCO  9.3  8.8   CO2  28  27   BUN  11  15   CR  0.70  0.84   GLC  111*  112*       TSH   Date Value Ref Range Status   11/17/2017 0.87 0.40 - 4.00 mU/L Final   03/09/2017 0.59 0.40 - 4.00 mU/L Final     Lab Results   Component Value Date    A1C 7.1 11/17/2017    A1C 6.1 03/09/2017       ASSESSMENT / PLAN:  (S72.002D) Closed fracture of neck of left femur with routine healing  (primary encounter diagnosis)  (Z96.649) S/P hip hemiarthroplasty  Comment: pain controlled. Incision healing without signs of infection  Plan: continue tylenol, Norco. Chronic anticoagulation with Xarelto. Follow up with Ortho as scheduled.     (R11.0) Nausea  Comment: etiology unclear. No improvement with change from oxycodone to [place].   Plan: schedule zofran prior to meals. Monitor    (K59.01) Slow transit constipation  Comment: improved  Plan: continue bowel regimen    (I48.0) Paroxysmal atrial fibrillation (H)  Comment: rate controlled.  Plan: continue Xarelto, metoprolol, flecainide. Monitor VS    (E11.8) Type 2 diabetes mellitus with complication, without long-term current use of insulin (H)  Comment: diet controlled  Plan: glucose  monitoring prn    (J44.9) Chronic obstructive pulmonary disease, unspecified COPD type (H)  Comment: no acute issues  Plan: continue Symbicort.     (D62) Anemia due to blood loss, acute  Comment: Hgb improved  Plan: follow Hgb    (R60.0) Bilateral leg edema  Comment: chronic, improved  Plan: continue compression stockings. Decrease lasix to daily.     (I10) Benign essential hypertension  Comment: controlled. Mild hypotension after receiving  extra lasix  Plan: continue metoprolol, daily lasix. Monitor VS. Avoid hypotension due to fall risk.     (F51.02) Adjustment insomnia  Comment: not optimally managed  Plan: schedule trazodone at HS, MR X 1 prn.     (R53.81) Physical deconditioning  Comment: making good progress in therapies  Plan: continue PHYSICAL THERAPY/OT. Goal is to return home with services and family assistance.         Electronically signed by  MERCEDEZ Obrien CNP

## 2017-12-28 ENCOUNTER — DISCHARGE SUMMARY NURSING HOME (OUTPATIENT)
Dept: GERIATRICS | Facility: CLINIC | Age: 82
End: 2017-12-28
Payer: MEDICARE

## 2017-12-28 VITALS
OXYGEN SATURATION: 93 % | HEART RATE: 65 BPM | WEIGHT: 173 LBS | DIASTOLIC BLOOD PRESSURE: 58 MMHG | RESPIRATION RATE: 16 BRPM | BODY MASS INDEX: 31.83 KG/M2 | TEMPERATURE: 98.8 F | SYSTOLIC BLOOD PRESSURE: 119 MMHG | HEIGHT: 62 IN

## 2017-12-28 DIAGNOSIS — E11.8 TYPE 2 DIABETES MELLITUS WITH COMPLICATION, WITHOUT LONG-TERM CURRENT USE OF INSULIN (H): ICD-10-CM

## 2017-12-28 DIAGNOSIS — I48.0 PAROXYSMAL ATRIAL FIBRILLATION (H): ICD-10-CM

## 2017-12-28 DIAGNOSIS — I10 BENIGN ESSENTIAL HYPERTENSION: ICD-10-CM

## 2017-12-28 DIAGNOSIS — J44.9 CHRONIC OBSTRUCTIVE PULMONARY DISEASE, UNSPECIFIED COPD TYPE (H): ICD-10-CM

## 2017-12-28 DIAGNOSIS — Z95.0 PACEMAKER: ICD-10-CM

## 2017-12-28 DIAGNOSIS — S72.002D CLOSED FRACTURE OF NECK OF LEFT FEMUR WITH ROUTINE HEALING: Primary | ICD-10-CM

## 2017-12-28 DIAGNOSIS — K59.01 SLOW TRANSIT CONSTIPATION: ICD-10-CM

## 2017-12-28 DIAGNOSIS — R11.0 NAUSEA: ICD-10-CM

## 2017-12-28 DIAGNOSIS — D62 ANEMIA DUE TO BLOOD LOSS, ACUTE: ICD-10-CM

## 2017-12-28 DIAGNOSIS — R53.81 PHYSICAL DECONDITIONING: ICD-10-CM

## 2017-12-28 DIAGNOSIS — R60.0 BILATERAL LEG EDEMA: ICD-10-CM

## 2017-12-28 DIAGNOSIS — Z96.649 S/P HIP HEMIARTHROPLASTY: ICD-10-CM

## 2017-12-28 DIAGNOSIS — F51.02 ADJUSTMENT INSOMNIA: ICD-10-CM

## 2017-12-28 DIAGNOSIS — M35.3 PMR (POLYMYALGIA RHEUMATICA) (H): ICD-10-CM

## 2017-12-28 PROCEDURE — 99316 NF DSCHRG MGMT 30 MIN+: CPT | Performed by: NURSE PRACTITIONER

## 2017-12-28 NOTE — PROGRESS NOTES
Documentation of Face-to-Face and Certification for Home Health Services     Patient: Lindsey Hale   YOB: 1925  MR Number: [id number]  Today's Date: 12/28/2017    I certify that patient: Lindsey Hale is under my care and that I, or a nurse practitioner or physician's assistant working with me, had a face-to-face encounter that meets the physician face-to-face encounter requirements with this patient on: 12/28/2017.    This encounter with the patient was in whole, or in part, for the following medical condition, which is the primary reason for home health care: hip fracture, s/p hemiarthroplasty.    I certify that, based on my findings, the following services are medically necessary home health services: Nursing, Occupational Therapy and Physical Therapy.    My clinical findings support the need for the above services because: Nurse is needed: To assess VS, pain management, incision after changes in medications or other medical regimen., To provide assessment and oversight required in the home to assure adherence to the medical plan due to: complex medicaiotn regimen. and To provide caregiver training to assist with: med management.., Occupational Therapy Services are needed to assess and treat cognitive ability and address ADL safety due to impairment in functional status. and Physical Therapy Services are needed to assess and treat the following functional impairments: gait instability, limited endurance.    Further, I certify that my clinical findings support that this patient is homebound (i.e. absences from home require considerable and taxing effort and are for medical reasons or [facility] services or infrequently or of short duration when for other reasons) because: Requires assistance of another person or specialized equipment to access medical services because patient: Has prohibitive pain during ambulation., Is unable to exit home safely on own due to: gait instability,  limited endurance., Is unable to walk greater than 200 feet without rest. and Range of motion limitations prevents ability to exit home safely...    Based on the above findings. I certify that this patient is confined to the home and needs intermittent skilled nursing care, physical therapy and/or speech therapy.  The patient is under my care, and I have initiated the establishment of the plan of care.  This patient will be followed by a physician who will periodically review the plan of care.  Physician/Provider to provide follow up care: Jeffery Sherwood    Responsible Medicare certified PECOS Physician: Electronically signed by Dr. Irma Maddox MD, and only signing for initial order. Please send all follow up questions and concerns or needed follow up signatures to the PCP Jeffery Sherwood.    Physician Signature: See electronic signature associated with these discharge orders.  Date: 12/28/2017

## 2017-12-28 NOTE — PROGRESS NOTES
[facility] DISCHARGE SUMMARY    PATIENT'S NAME: Lindsey Hale  YOB: 1925  MEDICAL RECORD NUMBER:  [id number]    PRIMARY CARE PROVIDER AND CLINIC RESPONSIBLE AFTER TRANSFER: Jeffery Sherwood [address] / [place] [address]     CODE STATUS/ADVANCE DIRECTIVES DISCUSSION:   DNR / DNI       Allergies   Allergen Reactions     Clindamycin Hcl Other (See Comments)     Difficulty swallowing     Ampicillin Potassium      Rash nausea and vomiting       Celebrex [Celecoxib]      rash     Ciprofloxacin      rash     Erythromycin      rash     Indocin      Ended up [facility] to( [facility] hospital with severe head ache     Penicillins      Rash,nausea and vomiting     Vibramycin [Doxycycline Hyclate]      Rash      Xylocaine-Epinephrine [Epinephrine-Lidocaine-Na Metabisulfite]      Xylocaine with epi caused a rapid heart beat       TRANSFERRING PROVIDERS: MERCEDEZ Obrien CNP, Irma Maddox MD  DATE OF SNF ADMISSION:  December / 08 / 2017  DATE OF SNF (anticipated) DISCHARGE: December / 30 / 2017  [place]GE DISPOSITION: [facility]   Nursing Facility: [facility] stay 12/04/2017 to 12/08/2017.     Condition on Discharge:  Improving.  Cognitive Scores: SLUMS 24/30 and CPT 4.5/5.6    Equipment: walker    DISCHARGE DIAGNOSIS:   1. Closed fracture of neck of left femur with routine healing    2. S/P hip hemiarthroplasty    3. Paroxysmal atrial fibrillation (H)    4. Pacemaker    5. Chronic obstructive pulmonary disease, unspecified COPD type (H)    6. PMR (polymyalgia rheumatica) (H)    7. Type 2 diabetes mellitus with complication, without long-term current use of insulin (H)    8. Nausea    9. Anemia due to blood loss, acute    10. Bilateral leg edema    11. Benign essential hypertension    12. Slow transit constipation    13. Adjustment insomnia    14. Physical deconditioning        HPI Nursing Facility Course:  HPI  information obtained from: facility chart records, facility staff, patient report and [facility] chart review.She came to this facility for short term rehab and medical management  following hospitalization for left hip pain after a mechanical fall. XR showed a displaced left femoral neck fracture. She underwent left hip hemiarthroplasty 12/5/2017 by Dr Nathan. Hgb 8.7 at hospital discharge.   She has worked with PHYSICAL THERAPY and OT with good results. Ambulating 200 ft with walker and supervision. Requires stand by assist with bathing, independent with toileting and dressing.   ASSESSMENT / PLAN:  (S72.002D) Closed fracture of neck of left femur with routine healing  (primary encounter diagnosis)  (Z96.649) S/P hip hemiarthroplasty  Comment: pain and mobility much improved. Incision healing without signs of infection.   Plan: discharge home with family support. Continue Norco or tylenol prn. Chronic anticoagulation with Xarelto. Home services and follow up as below.     (I48.0) Paroxysmal atrial fibrillation (H)  (Z95.0) Pacemaker  Comment: rate controlled: 60-69.   Plan: continue Xarelto, metoprolol, flecainide. Pacemaker check per usual routine.     (J44.9) Chronic obstructive pulmonary disease, unspecified COPD type (H)  Comment: no acute issues  Plan: continue Symbicort    (M35.3) PMR (polymyalgia rheumatica) (H)  Comment: chronic  Plan: continue tylenol or Norco prn.     (E11.8) Type 2 diabetes mellitus with complication, without long-term current use of insulin (H)  Comment: diet controlled  Plan: blood sugar monitoring per usual home routine.     (R11.0) Nausea  Comment: she's had intermittent nausea, no vomiting or other GI symptoms.  Narcotics possibly contributing-no improvement with change from oxycodone to Norco. No constipation. Nausea has improved the past 2 days with scheduled zofran.   Plan: wean zofran at home.     (D62) Anemia due to blood loss, acute  Comment: Hgb improved  Plan: follow up  labs per PCP    (R60.0) Bilateral leg edema  Comment: improved with a few extra doses of lasix, now back to baseline.   Plan: continue lasix. Continue compression stockings and elevate legs when able.     (I10) Benign essential hypertension  Comment: controlled with BPs:109/69, 120/65, 114/67, 124/64    Plan: continue metoprolol and lasix. Avoid hypotension due to fall risk.     (K59.01) Slow transit constipation  Comment: improved  Plan: continue bowel regimen    (F51.02) Adjustment insomnia  Comment: some improvement with trazodone  Plan: continue prn trazodone at home.     (R53.81) Physical deconditioning  Comment: progress in therapies as above  Plan: home therapies for ongoing gait training, strengthening and ADL safety.        PAST MEDICAL HISTORY:  has a past medical history of Abnormal echocardiogram (04/2017); Arthritis (99); Atrial fibrillation (H) (2011 and 2009); Chest pain (2000); Chronic LBP; COPD (chronic obstructive pulmonary disease) (H); Cysts, breast (1980s); Dizzy (2007); Dysphagia (2005); Elevated blood sugar; Environmental allergies; Hematuria (2004); Hemoptyses (2008); HTN (hypertension); Hyperlipidemia; Hypertension; Hypothyroid (1995); Mitral regurgitation (6/15); nausea (2006); Osteopenia; Pacemaker (2011); Palpitations (2009); PMR (polymyalgia rheumatica) (H) (2004); SOB (shortness of breath) (5/15); Supraventricular premature beats; TIA (transient ischaemic attack) (2009); Treadmill stress test negative for angina pectoris (2011); Urine incontinence; Urosepsis (2009); and Vision changes (2011). She also has no past medical history of Difficult intubation or Malignant hyperthermia.    DISCHARGE MEDICATIONS:  Current Outpatient Prescriptions   Medication Sig Dispense Refill     HYDROcodone-acetaminophen (NORCO) 5-325 MG per tablet Take 1 tablet by mouth 2 times daily Plus 1-2 every 4 hrs prn       polyethylene glycol (MIRALAX/GLYCOLAX) powder Take 17 g by mouth daily       TRAZODONE HCL PO  Take 50 mg by mouth At Bedtime may repeat x 1 PRN for a total dose of 100 mg       ONDANSETRON PO Take 4 mg by mouth 3 times daily        Furosemide (LASIX PO) Take 20 mg by mouth daily        potassium chloride (KLOR-CON) 20 MEQ Packet Take 20 mEq by mouth daily       senna-docusate (SENOKOT-S;PERICOLACE) 8.6-50 MG per tablet Take 1 tablet by mouth 2 times daily       acetaminophen (TYLENOL) 325 MG tablet Take 2 tablets (650 mg) by mouth every 4 hours as needed for other (surgical pain) 40 tablet 0     Carboxymethylcellulose Sod PF (REFRESH PLUS) 0.5 % SOLN ophthalmic solution 1 drop 3 times daily as needed for dry eyes       multivitamin (OCUVITE) TABS tablet Take 1 tablet by mouth daily        VITAMIN D, CHOLECALCIFEROL, PO Take 1,000 Units by mouth daily       VITAMIN B COMPLEX-C CAPS Take 1 capsule by mouth daily       metoprolol (LOPRESSOR) 50 MG tablet TAKE 1 AND 1/2 TABLETS BY MOUTH TWICE DAILY 270 tablet 3     levothyroxine (SYNTHROID/LEVOTHROID) 75 MCG tablet Take 1 tablet (75 mcg) by mouth daily 90 tablet 3     rivaroxaban ANTICOAGULANT (XARELTO) 20 MG TABS tablet Take 1 tablet (20 mg) by mouth daily (with dinner) 90 tablet 2     flecainide acetate 150 MG TABS 1/2 tab bid 90 tablet 3     budesonide-formoterol (SYMBICORT) 160-4.5 MCG/ACT inhaler Inhale 4 puffs into the lungs 2 times daily          MEDICATION CHANGES/RATIONALE:   Oxycodone discontinued and Norco started for pain.   Zofran prn nausea  Bowel meds scheduled.   Trazodone for sleep.   Additional lasix 20 mg daily given 12/21-12/24/2017 for LE edema.   Controlled medications sent with patient: Medication: Norco , 50 tabs given to patient at the time of discharge to take home     ROS:    10 point ROS of systems including Constitutional, Eyes, Respiratory, Cardiovascular, Gastroenterology, Genitourinary, Integumentary, Muscularskeletal, Psychiatric were all negative except for pertinent positives noted in my HPI.    Physical Exam:   Vitals: BP  "119/58  Pulse 65  Temp 98.8  F (37.1  C)  Resp 16  Ht 5' 1.5\" (1.562 m)  Wt 173 lb (78.5 kg)  SpO2 93%  BMI 32.16 kg/m2  BMI= Body mass index is 32.16 kg/(m^2).    GENERAL APPEARANCE:  Alert, in no distress  ENT:  Mouth and posterior oropharynx normal, moist mucous membranes, [place]  EYES:  EOM normal, conjunctiva and lids normal  NECK:  No adenopathy,masses or thyromegaly  RESP:  respiratory effort and palpation of chest normal, lungs clear to auscultation , no respiratory distress  CV:  Palpation and auscultation of heart done , regular rate and rhythm, 1/6 murmur, 1+ bilateral LE edema, +2 pedal pulses. No calf tenderness, warmth or erythema.   ABDOMEN:  soft, nontender, no hepatosplenomegaly or other masses  M/S:   gait steady with walker and assist. Mild weakness LLE. Good strength other extremities. No joint inflammation.   SKIN:  left hip incision clean, dry, intact, no erythema. No rashes or open areas  PSYCH:  oriented X 3, memory impaired , affect and mood normal    DISCHARGE PLAN:  Occupational Therapy, Physical Therapy, Registered Nurse, Home Health Aide and From:  [facility]  Patient instructed to follow-up with:  PCP in 7 days    Ortho follow up 1/2/2018  [facility] scheduled appointments:  Future Appointments  Date Time Provider Department Center   1/5/2018 1:30 PM Jeffery Sherwood MD [facility]   [facility]12[facility]2018 2[facility]       MTM referral needed and placed by this provider: No    Pending labs: None  SNF labs   Last Basic Metabolic Panel:  Lab Results   Component Value Date     12/26/2017      Lab Results   Component Value Date    POTASSIUM 4.4 12/26/2017     Lab Results   Component Value Date    CHLORIDE 98 12/26/2017     Lab Results   Component Value Date    FRANCISCO 9.3 12/26/2017     Lab Results   Component Value Date    CO2 28 12/26/2017     Lab Results   Component Value Date    BUN 11 12/26/2017     Lab Results   Component Value Date    CR 0.70 " 12/26/2017     Lab Results   Component Value Date     12/26/2017     Lab Results   Component Value Date    WBC 6.5 12/04/2017     Lab Results   Component Value Date    RBC 3.57 12/04/2017     Lab Results   Component Value Date    HGB 9.5 12/26/2017     Lab Results   Component Value Date    HCT 35.9 12/04/2017     Lab Results   Component Value Date     12/04/2017     Lab Results   Component Value Date    MCH 33.3 12/04/2017     Lab Results   Component Value Date    MCHC 33.1 12/04/2017     Lab Results   Component Value Date    RDW 14.6 12/04/2017     Lab Results   Component Value Date     12/04/2017     Discharge Treatments: Keep incision clean and dry    TOTAL DISCHARGE TIME:   Greater than 30 minutes  Electronically signed by:  MERCEDEZ Obrien CNP

## 2018-01-02 ENCOUNTER — CARE COORDINATION (OUTPATIENT)
Dept: CARE COORDINATION | Facility: CLINIC | Age: 83
End: 2018-01-02

## 2018-01-02 ENCOUNTER — TELEPHONE (OUTPATIENT)
Dept: FAMILY MEDICINE | Facility: CLINIC | Age: 83
End: 2018-01-02

## 2018-01-02 NOTE — TELEPHONE ENCOUNTER
Chief Complaint: Closed Fracture Of Neck Of Left Femur With Routine Healing,12/30/17, ED/IP 0/1    Please see care coordination encounter for follow up.   JOHN SamayoaN, RN, PHN  Clinic Care Coordinator  [facility]  [phone]

## 2018-01-02 NOTE — LETTER
Health Care Home - Access Care Plan    About Me  Patient Name:  Lindsey Hale    YOB: 1925  Age:                             89 year old   [place] MRN:            [id number] Telephone Information:     Home Phone [phone]   Mobile none       Address:    [address] [place] [address] Email address:  No e-mail address on record      Emergency Contact(s)  Name Relationship Lgl Grd Work Phone Home Phone Mobile Phone   1. PATRICE GUNN Daughter  [phone]   2. NICOL HALE Son   [phone]              Health Maintenance:      My Access Plan  Medical Emergency 911   Questions or concerns during clinic hours Primary Clinic Line, I will call the clinic directly:  [phone]   24 Hour Appointment Line [phone] or  [phone]) (toll free)   24 Hour Nurse Line [phone] (toll free)   Questions or concerns outside clinic hours 24 Hour Appointment Line, I will call the after-hours on-call line:   [facility] [phone] or [phone]) (toll-free)   Preferred Urgent Care     Preferred Hospital     Preferred Pharmacy [facility] [address]  [place], [place] - [address] [facility] [address]     Behavioral Health Crisis Line The National Suicide Prevention Lifeline at 1-576.133.1582 or 911     My Care Team Members  Patient Care Team       Relationship Specialty Notifications Start End    Jeffery Sherwood MD PCP - General Internal Medicine  9/20/11     Phone: [phone] Fax: [phone] [address] [place] [address]    Donna Navarrete, RN Clinic Care Coordinator  Admissions 1/2/18     Comment:  PH: [phone]    Jenny Carrasquillo Home Care Nurse   1/3/18     Phone: [phone]             My Medical and Care Information  Problem List   Patient Active Problem List   Diagnosis     Urine incontinence     Transient cerebral ischemia     Arthritis     Osteopenia      Pacemaker     Hyperlipidemia LDL goal <160     Elevated blood sugar     Chronic low back pain     Benign essential hypertension     PMR (polymyalgia rheumatica) (H)     Advanced directives, counseling/discussion     SOB (shortness of breath)     Mitral regurgitation     Hypothyroidism, unspecified type     Chronic obstructive pulmonary disease, unspecified COPD type (H)     Paroxysmal atrial fibrillation (H)     Abnormal echocardiogram     Physical deconditioning     Anemia due to blood loss, acute     S/P hip hemiarthroplasty     Closed fracture of neck of left femur with routine healing     Sick sinus syndrome (H)     Diabetes mellitus, type 2 (H)     Bilateral leg edema     Nausea     Slow transit constipation     Insomnia      Current Medications and Allergies:  See printed Medication Report

## 2018-01-03 ENCOUNTER — MEDICAL CORRESPONDENCE (OUTPATIENT)
Dept: HEALTH INFORMATION MANAGEMENT | Facility: CLINIC | Age: 83
End: 2018-01-03

## 2018-01-03 NOTE — PROGRESS NOTES
Clinic Care Coordination Contact  Care Coordination Communication    Clinical Data: Patient was hospitalized at [facility]  from 12/04/17 to 12/08/17 with diagnosis of Left femoral neck fracture secondary to mechanical fall s/p left hip hemiarthroplasty 12/5.. Discharged from [facility] on 12/30/17 to home with home care.     Home Care Contact:              Home Care Agency: [facility]              Contact name () and phone number: Jenny Carrasquillo [phone]              Care Coordination contacted home care: Yes              Anticipated start of care date: 01/02/2018    Patient Contact:               Introduced self and role of care coordination.               Discharge instructions were reviewed with patient/caregiver.               Do you have any questions about your medications? Yes. Was taking Zofran for nausea while in TCU.  Zofran was taking 4 times a day.    Was not d/c'd with Zofran. Pharmacy working on [place].  Daughter was able to but 6 tabs from pharmacy. 2 remaining.  Now taking 1-2 per day. Noted to be  improving.               Follow up appointment is scheduled for 01/05/18.              Provided 24 Hour Nurse Line and/or 24 Hour Appointment Scheduling: Yes              Home care has contacted patient: Yes              Patient questions/concerns: Dr. Kumar (with [facility]), had f/u everything was good.     Plan: RN/SW Care Coordinator will await notification from home care staff informing RN/SW Care Coordinator of patients discharge plans/needs. RN/SW Care Coordinator will review chart and outreach to home care every 4 weeks and as needed.      Donna Navarrete, JOHNN, RN, PHN  Clinic Care Coordinator  [facility]  [phone]

## 2018-01-11 ENCOUNTER — OFFICE VISIT (OUTPATIENT)
Dept: FAMILY MEDICINE | Facility: CLINIC | Age: 83
End: 2018-01-11
Payer: MEDICARE

## 2018-01-11 VITALS
TEMPERATURE: 97.2 F | SYSTOLIC BLOOD PRESSURE: 123 MMHG | DIASTOLIC BLOOD PRESSURE: 59 MMHG | HEIGHT: 62 IN | WEIGHT: 170.6 LBS | HEART RATE: 66 BPM | OXYGEN SATURATION: 92 % | BODY MASS INDEX: 31.39 KG/M2

## 2018-01-11 DIAGNOSIS — R39.89 URINARY PROBLEM: Primary | ICD-10-CM

## 2018-01-11 DIAGNOSIS — N30.01 ACUTE CYSTITIS WITH HEMATURIA: ICD-10-CM

## 2018-01-11 LAB
ALBUMIN UR-MCNC: 30 MG/DL
APPEARANCE UR: ABNORMAL
BACTERIA #/AREA URNS HPF: ABNORMAL /HPF
BILIRUB UR QL STRIP: NEGATIVE
COLOR UR AUTO: YELLOW
GLUCOSE UR STRIP-MCNC: NEGATIVE MG/DL
HGB UR QL STRIP: ABNORMAL
KETONES UR STRIP-MCNC: NEGATIVE MG/DL
LEUKOCYTE ESTERASE UR QL STRIP: ABNORMAL
NITRATE UR QL: POSITIVE
PH UR STRIP: 6.5 PH (ref 5–7)
RBC #/AREA URNS AUTO: ABNORMAL /HPF
SOURCE: ABNORMAL
SP GR UR STRIP: 1.02 (ref 1–1.03)
UROBILINOGEN UR STRIP-ACNC: 1 EU/DL (ref 0.2–1)
WBC #/AREA URNS AUTO: >100 /HPF

## 2018-01-11 PROCEDURE — 81001 URINALYSIS AUTO W/SCOPE: CPT | Performed by: NURSE PRACTITIONER

## 2018-01-11 PROCEDURE — 99213 OFFICE O/P EST LOW 20 MIN: CPT | Performed by: NURSE PRACTITIONER

## 2018-01-11 PROCEDURE — 87088 URINE BACTERIA CULTURE: CPT | Performed by: NURSE PRACTITIONER

## 2018-01-11 PROCEDURE — 87086 URINE CULTURE/COLONY COUNT: CPT | Performed by: NURSE PRACTITIONER

## 2018-01-11 PROCEDURE — 87186 SC STD MICRODIL/AGAR DIL: CPT | Performed by: NURSE PRACTITIONER

## 2018-01-11 RX ORDER — CEPHALEXIN 500 MG/1
500 CAPSULE ORAL 3 TIMES DAILY
Qty: 21 CAPSULE | Refills: 0 | Status: SHIPPED | OUTPATIENT
Start: 2018-01-11 | End: 2018-08-15

## 2018-01-11 NOTE — PROGRESS NOTES
SUBJECTIVE:   Lindsey Hale is a 89 year old female who presents to clinic today for the following health issues:      Urinary frequency and urgency and pelvic pressure overnight last night.  Urinating hourly.  Noticing foul odor to urine and cloudiness with some pink tinge also.  No fever or chilling.   Believes that she is drinking plenty of fluids, is taking lasix 20mg daily for peripheral edema    Had hip arthroplasty 12/5, returned from rehab last week.  She is very happy with her new hip .    Problem list and histories reviewed & adjusted, as indicated.    Additional history: as documented    Patient Active Problem List   Diagnosis     Urine incontinence     Transient cerebral ischemia     Arthritis     Osteopenia     Pacemaker     Hyperlipidemia LDL goal <160     Elevated blood sugar     Chronic low back pain     Benign essential hypertension     PMR (polymyalgia rheumatica) (H)     Advanced directives, counseling/discussion     SOB (shortness of breath)     Mitral regurgitation     Hypothyroidism, unspecified type     Chronic obstructive pulmonary disease, unspecified COPD type (H)     Paroxysmal atrial fibrillation (H)     Abnormal echocardiogram     Physical deconditioning     Anemia due to blood loss, acute     S/P hip hemiarthroplasty     Closed fracture of neck of left femur with routine healing     Sick sinus syndrome (H)     Diabetes mellitus, type 2 (H)     Bilateral leg edema     Nausea     Slow transit constipation     Insomnia     Past Surgical History:   Procedure Laterality Date     ARTHROPLASTY HIP Left 12/5/2017    Procedure: ARTHROPLASTY HIP;  LEFT HIP ARBEN ARTHROPLASTY(SORAYA);  Surgeon: Darell Nathan MD;  Location:  OR     ARTHROPLASTY PATELLO-FEMORAL (KNEE)  1998 and 2002     ARTHROSCOPY KNEE  1997     BIOPSY BREAST Right 9/23/2014    Procedure: BIOPSY BREAST;  Surgeon: David Carter MD;  Location:  SD     breast implants      4x     cataract  2011     FOOT SURGERY   2003     MASTECTOMY  1980    bilat, cysts     [place] not bso  70's    not for ca       Social History   Substance Use Topics     Smoking status: Former Smoker     Types: Cigarettes     Quit date: 2/1/1955     Smokeless tobacco: Never Used      Comment: quit in 1955   had smoked about 2 cigarettes a day or more     Alcohol use 0.0 oz/week     0 Standard drinks or equivalent per week      Comment: occ wine     Family History   Problem Relation Age of Onset     C.A.D. Father      Lymphoma Father      CANCER Mother      Lymphoma Brother      Breast Cancer Sister      OSTEOPOROSIS Sister      Myocardial Infarction Sister      Unknown/Adopted Maternal Grandmother      Unknown/Adopted Maternal Grandfather      Unknown/Adopted Paternal Grandmother      Unknown/Adopted Paternal Grandfather          Current Outpatient Prescriptions   Medication Sig Dispense Refill     cephALEXin (KEFLEX) 500 MG capsule Take 1 capsule (500 mg) by mouth 3 times daily 21 capsule 0     phenazopyridine (AZO) 97.5 MG tablet Take 2 tablets (195 mg) by mouth 3 times daily 12 tablet 0     polyethylene glycol (MIRALAX/GLYCOLAX) powder Take 17 g by mouth daily       Furosemide (LASIX PO) Take 20 mg by mouth daily        senna-docusate (SENOKOT-S;PERICOLACE) 8.6-50 MG per tablet Take 1 tablet by mouth 2 times daily       acetaminophen (TYLENOL) 325 MG tablet Take 2 tablets (650 mg) by mouth every 4 hours as needed for other (surgical pain) 40 tablet 0     Carboxymethylcellulose Sod PF (REFRESH PLUS) 0.5 % SOLN ophthalmic solution 1 drop 3 times daily as needed for dry eyes       VITAMIN D, CHOLECALCIFEROL, PO Take 1,000 Units by mouth daily       VITAMIN B COMPLEX-C CAPS Take 1 capsule by mouth daily       metoprolol (LOPRESSOR) 50 MG tablet TAKE 1 AND 1/2 TABLETS BY MOUTH TWICE DAILY 270 tablet 3     levothyroxine (SYNTHROID/LEVOTHROID) 75 MCG tablet Take 1 tablet (75 mcg) by mouth daily 90 tablet 3     rivaroxaban ANTICOAGULANT (XARELTO) 20 MG TABS tablet  "Take 1 tablet (20 mg) by mouth daily (with dinner) 90 tablet 2     flecainide acetate 150 MG TABS 1/2 tab bid 90 tablet 3     budesonide-formoterol (SYMBICORT) 160-4.5 MCG/ACT inhaler Inhale 4 puffs into the lungs 2 times daily        Allergies   Allergen Reactions     Clindamycin Hcl Other (See Comments)     Difficulty swallowing     Ampicillin Potassium      Rash nausea and vomiting       Celebrex [Celecoxib]      rash     Ciprofloxacin      rash     Erythromycin      rash     Indocin      Ended up going to( [facility]) hospital with severe head ache     Penicillins      Rash,nausea and vomiting     Vibramycin [Doxycycline Hyclate]      Rash      Xylocaine-Epinephrine [Epinephrine-Lidocaine-Na Metabisulfite]      Xylocaine with epi caused a rapid heart beat         Reviewed and updated as needed this visit by clinical staff  Tobacco  Allergies  Meds       Reviewed and updated as needed this visit by Provider         ROS:  Constitutional, HEENT, cardiovascular, pulmonary, gi and gu systems are negative, except as otherwise noted.      OBJECTIVE:   /59 (BP Location: Left arm, Patient Position: Chair, Cuff Size: Adult Large)  Pulse 66  Temp 97.2  F (36.2  C) (Oral)  Ht 5' 1.5\" (1.562 m)  Wt 170 lb 9.6 oz (77.4 kg)  SpO2 92%  BMI 31.71 kg/m2  Body mass index is 31.71 kg/(m^2).  GENERAL: healthy, alert and no distress  RESP: lungs clear to auscultation - no rales, rhonchi or wheezes  CV: regular rate and rhythm, normal S1 S2, no S3 or S4,  ABDOMEN: soft, nontender, no hepatosplenomegaly, no masses and bowel sounds normal  BACK : no CVAT    Diagnostic Test Results:  Results for orders placed or performed in visit on 01/11/18   *UA reflex to Microscopic and Culture ([place] and [facility] (except [place] and [place])   Result Value Ref Range    Color Urine Yellow     Appearance Urine Cloudy     Glucose Urine Negative NEG^Negative mg/dL    Bilirubin Urine Negative NEG^Negative    Ketones Urine Negative " NEG^Negative mg/dL    Specific Gravity Urine 1.020 1.003 - 1.035    Blood Urine Small (A) NEG^Negative    pH Urine 6.5 5.0 - 7.0 pH    Protein Albumin Urine 30 (A) NEG^Negative mg/dL    Urobilinogen Urine 1.0 0.2 - 1.0 EU/dL    Nitrite Urine Positive (A) NEG^Negative    Leukocyte Esterase Urine Large (A) NEG^Negative    Source Midstream Urine    Urine Microscopic   Result Value Ref Range    WBC Urine >100 (A) OTO2^O - 2 /HPF    RBC Urine 2-5 (A) OTO2^O - 2 /HPF    Bacteria Urine Many (A) NEG^Negative /HPF       ASSESSMENT/PLAN:       ICD-10-CM    1. Urinary problem R39.89 *UA reflex to Microscopic and Culture ([place] and [facility] (except [place] and [place])     Urine Culture Aerobic Bacterial     Urine Microscopic   2. Acute cystitis with hematuria N30.01 cephALEXin (KEFLEX) 500 MG capsule     phenazopyridine (AZO) 97.5 MG tablet     *UA reflex to Microscopic and Culture ([place] and [facility] (except [place] and [place])   be sure to drink a lot of fluids including unsweetened cranberry juice or pills  Bring in urine specimen when abx completed to recheck      MERCEDEZ Matute [facility]

## 2018-01-11 NOTE — NURSING NOTE
"Chief Complaint   Patient presents with     Urinary Problem        Initial /59 (BP Location: Left arm, Patient Position: Chair, Cuff Size: Adult Large)  Pulse 66  Temp 97.2  F (36.2  C) (Oral)  Ht 5' 1.5\" (1.562 m)  Wt 170 lb 9.6 oz (77.4 kg)  SpO2 92%  BMI 31.71 kg/m2 Estimated body mass index is 31.71 kg/(m^2) as calculated from the following:    Height as of this encounter: 5' 1.5\" (1.562 m).    Weight as of this encounter: 170 lb 9.6 oz (77.4 kg)..    BP completed using cuff size: large  MEDICATIONS REVIEWED  SOCIAL AND FAMILY HX REVIEWED  Charlee Rich CMA  "

## 2018-01-11 NOTE — LETTER
[address]  [address]  [place]  [address]  Tel: [phone]    January 15, 2018    Lindsey Hale  [address] [place] [address]        Dear Ms. Hale,    The medication you are taking will be effective against the organism causing your urinary tract infection, Lindsey.   Resulted Orders   *UA reflex to Microscopic and Culture ([place] and [facility] (except [place] and [place])   Result Value Ref Range    Color Urine Yellow     Appearance Urine Cloudy     Glucose Urine Negative NEG^Negative mg/dL    Bilirubin Urine Negative NEG^Negative    Ketones Urine Negative NEG^Negative mg/dL    Specific Gravity Urine 1.020 1.003 - 1.035    Blood Urine Small (A) NEG^Negative    pH Urine 6.5 5.0 - 7.0 pH    Protein Albumin Urine 30 (A) NEG^Negative mg/dL    Urobilinogen Urine 1.0 0.2 - 1.0 EU/dL    Nitrite Urine Positive (A) NEG^Negative    Leukocyte Esterase Urine Large (A) NEG^Negative    Source Midstream Urine    Urine Culture Aerobic Bacterial   Result Value Ref Range    Specimen Description Midstream Urine     Culture Micro >100,000 colonies/mL  Escherichia coli   (A)    Urine Microscopic   Result Value Ref Range    WBC Urine >100 (A) OTO2^O - 2 /HPF    RBC Urine 2-5 (A) OTO2^O - 2 /HPF    Bacteria Urine Many (A) NEG^Negative /HPF       If you have any further questions or problems, please contact our office.      Sincerely,    Kiana Petersen NP / radha

## 2018-01-11 NOTE — MR AVS SNAPSHOT
After Visit Summary   1/11/2018    Lindsey Hale    MRN: [id number]           Patient Information     Date Of Birth          9/16/1925        Visit Information        Provider Department      1/11/2018 10:30 AM Kiana Petersen APRN CNP [facility]        Today's Diagnoses     Urinary problem    -  1    Acute cystitis with hematuria           Follow-ups after your visit        Follow-up notes from your care team     Return if symptoms worsen or fail to improve.      Your next 10 appointments already scheduled     Jan 12, 2018  2:00 PM CST   Remote PPM Check with DARREN TECH1   [facility]    [address]  [facility] [id number] [phone]           This appointment is for a remote check of your pacemaker.  This is not an appointment at the office.            Apr 03, 2018 10:00 AM CDT   Office Visit with Jeffery Sherwood MD   [facility] ([facility]    [address] [place] [address] [phone]           Bring a current list of meds and any records pertaining to this visit. For Physicals, please bring immunization records and any forms needing to be filled out. Please arrive 10 minutes early to complete paperwork.              Future tests that were ordered for you today     Open Future Orders        Priority Expected Expires Ordered    *UA reflex to Microscopic and Culture ([place] and [facility]] (except [place] and [place]) Routine 1/18/2018 1/11/2019 1/11/2018            Who to contact     If you have questions or need follow up information about today's clinic visit or your schedule please contact [facility] directly at [phone].  Normal or non-critical lab and imaging results will be communicated to you by MyChart, letter or phone within 4 business days after the clinic has received the results. If you do not hear from us within 7 days,  "please contact the clinic through [email or web address] or phone. If you have a critical or abnormal lab result, we will notify you by phone as soon as possible.  Submit refill requests through [email or web address] or call your pharmacy and they will forward the refill request to us. Please allow 3 business days for your refill to be completed.          Additional Information About Your Visit        Great Mobile MeetingsharClass Messenger Information     [email or web address] lets you send messages to your doctor, view your test results, renew your prescriptions, schedule appointments and more. To sign up, go to [email or web address] . Click on \"Log in\" on the left side of the screen, which will take you to the Welcome page. Then click on \"Sign up Now\" on the right side of the page.     You will be asked to enter the access code listed below, as well as some personal information. Please follow the directions to create your username and password.     Your access code is: [email or web address]  Expires: 2018 11:17 AM     Your access code will  in 90 days. If you need help or a new code, please call your [place] clinic or [phone].        Care EveryWhere ID     This is your Care EveryWhere ID. This could be used by other organizations to access your [place] medical records  [id number]        Your Vitals Were     Pulse Temperature Height Pulse Oximetry BMI (Body Mass Index)       66 97.2  F (36.2  C) (Oral) 5' 1.5\" (1.562 m) 92% 31.71 kg/m2        Blood Pressure from Last 3 Encounters:   18 123/59   17 119/58   17 120/65    Weight from Last 3 Encounters:   18 170 lb 9.6 oz (77.4 kg)   17 173 lb (78.5 kg)   17 173 lb 6.4 oz (78.7 kg)              We Performed the Following     *UA reflex to Microscopic and Culture ([facility] (except [place] and [place])     Urine Culture Aerobic Bacterial     Urine Microscopic          Today's Medication Changes          These changes are accurate as of: 18 11:17 AM.  If you have any " questions, ask your nurse or doctor.               Start taking these medicines.        Dose/Directions    cephALEXin 500 MG capsule   Commonly known as:  KEFLEX   Used for:  Acute cystitis with hematuria   Started by:  Kiana Petersen APRN CNP        Dose:  500 mg   Take 1 capsule (500 mg) by mouth 3 times daily   Quantity:  21 capsule   Refills:  0       phenazopyridine 97.5 MG tablet   Commonly known as:  AZO   Used for:  Acute cystitis with hematuria   Started by:  Kiana Petersen APRN CNP        Dose:  195 mg   Take 2 tablets (195 mg) by mouth 3 times daily   Quantity:  12 tablet   Refills:  0            Where to get your medicines      These medications were sent to [facility], [place] - [address]  [address], [facility] [address]     Phone:  [phone]     cephALEXin 500 MG capsule    phenazopyridine 97.5 MG tablet                Primary Care Provider Office Phone # Fax #    Jeffery Sherwood [phone] [address]  [facility] [address]        Equal Access to Services     [facility]: Hadii darrian ku hadasho Soomaali, waaxda luqadaha, qaybta kaalmada aderadhayaceline, maritza quesada. So [facility] [phone].    ATENCIÓN: Si habla español, tiene a rolon disposición servicios gratuitos de asistencia lingüística. Nikita al [phone].    We comply with applicable federal civil rights laws and [place] laws. We do not discriminate on the basis of race, color, national origin, age, disability, sex, sexual orientation, or gender identity.            Thank you!     Thank you for choosing [facility]  for your care. Our goal is always to provide you with excellent care. Hearing back from our patients is one way we can continue to improve our services. Please take a few minutes to complete the written survey that you may receive in the mail after your visit with us. Thank you!              Your Updated Medication List - Protect others around you: Learn how to safely use, store and throw away your medicines at www.disposemymeds.org.          This list is accurate as of: 1/11/18 11:17 AM.  Always use your most recent med list.                   Brand Name Dispense Instructions for use Diagnosis    acetaminophen 325 MG tablet    TYLENOL    40 tablet    Take 2 tablets (650 mg) by mouth every 4 hours as needed for other (surgical pain)    Closed fracture of neck of left femur, initial encounter (H)       budesonide-formoterol 160-4.5 MCG/ACT Inhaler    SYMBICORT     Inhale 4 puffs into the lungs 2 times daily        Carboxymethylcellulose Sod PF 0.5 % Soln ophthalmic solution    REFRESH PLUS     1 drop 3 times daily as needed for dry eyes        cephALEXin 500 MG capsule    KEFLEX    21 capsule    Take 1 capsule (500 mg) by mouth 3 times daily    Acute cystitis with hematuria       flecainide acetate 150 MG Tabs     90 tablet    1/2 tab bid    Atrial fibrillation (H)       LASIX PO      Take 20 mg by mouth daily        levothyroxine 75 MCG tablet    SYNTHROID/LEVOTHROID    90 tablet    Take 1 tablet (75 mcg) by mouth daily    Hypothyroidism, unspecified type       metoprolol 50 MG tablet    LOPRESSOR    270 tablet    TAKE 1 AND 1/2 TABLETS BY MOUTH TWICE DAILY    Atrial fibrillation, unspecified type (H), Benign essential hypertension       phenazopyridine 97.5 MG tablet    AZO    12 tablet    Take 2 tablets (195 mg) by mouth 3 times daily    Acute cystitis with hematuria       polyethylene glycol powder    MIRALAX/GLYCOLAX     Take 17 g by mouth daily        rivaroxaban ANTICOAGULANT 20 MG Tabs tablet    XARELTO    90 tablet    Take 1 tablet (20 mg) by mouth daily (with dinner)    Atrial fibrillation, unspecified type (H)       senna-docusate 8.6-50 MG per tablet    SENOKOT-S;PERICOLACE     Take 1 tablet by mouth 2 times daily        VITAMIN B COMPLEX-C Caps      Take 1 capsule by mouth daily         VITAMIN D (CHOLECALCIFEROL) PO      Take 1,000 Units by mouth daily

## 2018-01-12 LAB
BACTERIA SPEC CULT: ABNORMAL
SPECIMEN SOURCE: ABNORMAL

## 2018-01-14 ENCOUNTER — ALLIED HEALTH/NURSE VISIT (OUTPATIENT)
Dept: CARDIOLOGY | Facility: CLINIC | Age: 83
End: 2018-01-14
Payer: MEDICARE

## 2018-01-14 DIAGNOSIS — Z95.0 CARDIAC PACEMAKER IN SITU: Primary | ICD-10-CM

## 2018-01-14 PROCEDURE — 93296 REM INTERROG EVL PM/IDS: CPT | Performed by: INTERNAL MEDICINE

## 2018-01-14 PROCEDURE — 93294 REM INTERROG EVL PM/LDLS PM: CPT | Performed by: INTERNAL MEDICINE

## 2018-01-14 NOTE — MR AVS SNAPSHOT
"              After Visit Summary   1/14/2018    Lindsey Hale    MRN: [id number]           Patient Information     Date Of Birth          9/16/1925        Visit Information        Provider Department      1/14/2018 4:00 PM [facility]1 [facility]        Today's Diagnoses     Cardiac pacemaker in situ    -  1       Follow-ups after your visit        Your next 10 appointments already scheduled     Apr 03, 2018 10:00 AM CDT   [facility] Visit with Jeffery [facility]wood MD   [facility]    [address] [facility] [address] [phone]           Bring a current list of meds and any records pertaining to this visit. For Physicals, please bring immunization records and any forms needing to be filled out. Please arrive 10 minutes early to complete paperwork.              Who to contact     If you have questions or need follow up information about today's clinic visit or your schedule please contact [facility] directly at [phone].  Normal or non-critical lab and imaging results will be communicated to you by [employer], letter or phone within 4 business days after the clinic has received the results. If you do not hear from us within 7 days, please contact the clinic through Sunn[employer] or phone. If you have a critical or abnormal lab result, we will notify you by phone as soon as possible.  Submit refill requests through [employer] or call your pharmacy and they will forward the refill request to us. Please allow 3 business days for your refill to be completed.          Additional Information About Your Visit        [employer] Information     [employer] lets you send messages to your doctor, view your test results, renew your prescriptions, schedule appointments and more. To sign up, go to [email or web address] . Click on \"Log in\" on the left side of the screen, which will take you to the Welcome page. Then " "click on \"Sign up Now\" on the right side of the page.     You will be asked to enter the access code listed below, as well as some personal information. Please follow the directions to create your username and password.     Your access code is: [email or web address]  Expires: 2018 11:17 AM     Your access code will  in 90 days. If you need help or a new code, please call your [place] clinic or [phone].        Care EveryWhere ID     This is your Care EveryWhere ID. This could be used by other organizations to access your [facility] medical records  [id number]         Blood Pressure from Last 3 Encounters:   18 123/59   17 119/58   17 120/65    Weight from Last 3 Encounters:   18 77.4 kg (170 lb 9.6 oz)   17 78.5 kg (173 lb)   17 78.7 kg (173 lb 6.4 oz)              We Performed the Following     INTERROGATION DEVICE EVAL REMOTE, PACER/ICD (34742)     PM DEVICE INTERROGATE REMOTE (89569)        Primary Care Provider Office Phone # Fax #    Jeffery Marvin Sherwood [phone] [address] [address]        Equal Access to Services     [facility] AH: Hadii aad ku hadasho Soomaali, waaxda luqadaha, qaybta kaalmada adeegyada, waxay nadira haypalak kim . So [facility] [phone].    ATENCIÓN: Si habla español, tiene a rolon disposición servicios gratuitos de asistencia lingüística. Llame al [phone].    We comply with applicable federal civil rights laws and [place] laws. We do not discriminate on the basis of race, color, national origin, age, disability, sex, sexual orientation, or gender identity.            Thank you!     Thank you for choosing [facility]  for your care. Our goal is always to provide you with excellent care. Hearing back from our patients is one way we can continue to improve our services. Please take a few minutes to complete the written survey that you may receive in " the mail after your visit with us. Thank you!             Your Updated Medication List - Protect others around you: Learn how to safely use, store and throw away your medicines at www.disposemymeds.org.          This list is accurate as of: 1/14/18 11:59 PM.  Always use your most recent med list.                   Brand Name Dispense Instructions for use Diagnosis    acetaminophen 325 MG tablet    TYLENOL    40 tablet    Take 2 tablets (650 mg) by mouth every 4 hours as needed for other (surgical pain)    Closed fracture of neck of left femur, initial encounter (H)       budesonide-formoterol 160-4.5 MCG/ACT Inhaler    SYMBICORT     Inhale 4 puffs into the lungs 2 times daily        Carboxymethylcellulose Sod PF 0.5 % Soln ophthalmic solution    REFRESH PLUS     1 drop 3 times daily as needed for dry eyes        cephALEXin 500 MG capsule    KEFLEX    21 capsule    Take 1 capsule (500 mg) by mouth 3 times daily    Acute cystitis with hematuria       flecainide acetate 150 MG Tabs     90 tablet    1/2 tab bid    Atrial fibrillation (H)       LASIX PO      Take 20 mg by mouth daily        levothyroxine 75 MCG tablet    SYNTHROID/LEVOTHROID    90 tablet    Take 1 tablet (75 mcg) by mouth daily    Hypothyroidism, unspecified type       metoprolol tartrate 50 MG tablet    LOPRESSOR    270 tablet    TAKE 1 AND 1/2 TABLETS BY MOUTH TWICE DAILY    Atrial fibrillation, unspecified type (H), Benign essential hypertension       phenazopyridine 97.5 MG tablet    AZO    12 tablet    Take 2 tablets (195 mg) by mouth 3 times daily    Acute cystitis with hematuria       polyethylene glycol powder    MIRALAX/GLYCOLAX     Take 17 g by mouth daily        rivaroxaban ANTICOAGULANT 20 MG Tabs tablet    XARELTO    90 tablet    Take 1 tablet (20 mg) by mouth daily (with dinner)    Atrial fibrillation, unspecified type (H)       senna-docusate 8.6-50 MG per tablet    SENOKOT-S;PERICOLACE     Take 1 tablet by mouth 2 times daily         VITAMIN B COMPLEX-C Caps      Take 1 capsule by mouth daily        VITAMIN D (CHOLECALCIFEROL) PO      Take 1,000 Units by mouth daily

## 2018-01-15 NOTE — PROGRESS NOTES
[facility] [address] (D) Remote PPM Device Check  AP: 35% : 87%  Mode: DDDR        Presenting Rhythm: AS/ & AP/  Heart Rate: adequate heart rates per histogram  Sensing: RA: stable RV: not performed    Pacing Threshold: RA; stable RV: not performed    Impedance: stable  Battery Status: 6.4 years remaining  Atrial Arrhythmia: none  Ventricular Arrhythmia: none     Care Plan: F/U Merlin q 3 months.  Gave results and next transmission date over the phone to veronica VILLELA

## 2018-01-15 NOTE — PROGRESS NOTES
The medication you are taking will be effective against the organism causing your urinary tract infection, Lindsey

## 2018-01-16 ENCOUNTER — TELEPHONE (OUTPATIENT)
Dept: FAMILY MEDICINE | Facility: CLINIC | Age: 83
End: 2018-01-16

## 2018-01-16 NOTE — TELEPHONE ENCOUNTER
Left non detailed message as number below is not identified.    Need to have this nurse talk to a triage nurse to consolidate med lists.   Iris Child RN

## 2018-01-16 NOTE — TELEPHONE ENCOUNTER
Carol, nurse  Home care, requesting call back from nurse to clarify medications pt is supposed to be taking.  Pt informed nurse she is not taking all of the meds on the list.Is  is aware of this?  Carol may be reached at [phone]. Ok to leave message.

## 2018-01-16 NOTE — PROGRESS NOTES
OCCUPATIONAL THERAPY VISUAL REHABILITATION DISCHARGE SUMMARY     Patient: Lindsey Priest  : 1925  Insurance:   Payor/Plan Subscriber Name Rel Member # Group #   MEDICARE - MEDICARE LINDSEY PRIEST*  [id number]       ATTN CLAIMS, [address]   BCBS - [place]BS OF [place] LINDSEY PRIEST*  [id number]      [address] [address]       Beginning/End Dates of Reporting Period:  17-17    Therapy Diagnosis:               GOALS     Goal 1 - Near vision    Goal Description: Near Vision: Patient will verbalize awareness of visual field Loss and demonstrate improved use of visual skills/adaptive equipment for increased independence in reading-based activities of daily living, written communication and detail ADL tasks.       Target Date: 10/26/17        Goal 3 - Environmental modification    Goal Description: Environment modification: Patient will demonstrate understanding of the impact of lighting, contrast and glare on ADL activities, in conjunction with environmentally-based ADL modifications       Target Date: 10/26/17        Goal 4 - Resource education    Goal Description: Resource education: Patient will verbalize awareness of community resources for the following:, Audio access to print materials, Access to large print materials, Access to low vision devices, Transportation alternatives       Target Date: 10/26/17        Goal 7    Goal Description: Pt will demonstrate modified I with writing note to self, reading written note and writing out check with environmental adaptations and AE.   Target Date: 10/26/17   Date Met: 17    Progress Toward Goals  Goal(s) 7 met    Reason for Discharge  Patient chooses to discontinue therapy.  Patient has failed to schedule further appointments.    Adaptive Equipment/Optical Devices Recommended:  Magnifier for spot reading 3.25x  Reading lenses 3.0+  Gel pen/marker  Large format checks  Large check register, LED light, Font size when printing off material,  electronic magnification (chan)    Discharge Plan  Patient to continue home program/techniques

## 2018-01-16 NOTE — TELEPHONE ENCOUNTER
"Routing to Dr Sherwood for advise.  Patient isn't taking Potassium.    OK with PCP?  Patient does still take Rx Lasix 20mg daily.      HC med list includes RX Potassium.   Patient told HC nurse she doesn't take Potassium, and \"hasn't for a long time\".      Potassium was removed from patient's current med list at patient's last OV 1-11-18 per rooming med reconciliation.  No mention in documentation regarding this med being removed.      Dinora MINAYA RN,BSN        "

## 2018-01-17 NOTE — TELEPHONE ENCOUNTER
Potassium has been stable on labs. Looks like she wasn't discharged from the hospital with the potassium, so I feel it is ok to not take it.  Thanks  Dakota Ledbetter NP

## 2018-01-19 DIAGNOSIS — N30.01 ACUTE CYSTITIS WITH HEMATURIA: ICD-10-CM

## 2018-01-19 LAB
ALBUMIN UR-MCNC: NEGATIVE MG/DL
APPEARANCE UR: CLEAR
BACTERIA #/AREA URNS HPF: ABNORMAL /HPF
BILIRUB UR QL STRIP: NEGATIVE
CAOX CRY #/AREA URNS HPF: ABNORMAL /HPF
COLOR UR AUTO: YELLOW
GLUCOSE UR STRIP-MCNC: NEGATIVE MG/DL
HGB UR QL STRIP: NEGATIVE
KETONES UR STRIP-MCNC: NEGATIVE MG/DL
LEUKOCYTE ESTERASE UR QL STRIP: ABNORMAL
NITRATE UR QL: NEGATIVE
NON-SQ EPI CELLS #/AREA URNS LPF: ABNORMAL /LPF
PH UR STRIP: 5.5 PH (ref 5–7)
RBC #/AREA URNS AUTO: ABNORMAL /HPF
SOURCE: ABNORMAL
SP GR UR STRIP: 1.02 (ref 1–1.03)
UROBILINOGEN UR STRIP-ACNC: 0.2 EU/DL (ref 0.2–1)
WBC #/AREA URNS AUTO: ABNORMAL /HPF

## 2018-01-19 PROCEDURE — 81001 URINALYSIS AUTO W/SCOPE: CPT | Performed by: NURSE PRACTITIONER

## 2018-01-19 NOTE — LETTER
[place] [address]  [address]  [place]  [address]  Tel: [phone]    January 25, 2018    Lindsey Hale  [address] [place] [address]        Dear Ms. Hale,    This is the follow up urinalysis,Lindsey.  therre dose not appear to be a problem with urinary tract infection now.    If you have any further questions or problems, please contact our office.      Sincerely,    Kiana Petersen NP/ Gisel Bob, CMA  Results for orders placed or performed in visit on 01/19/18   *UA reflex to Microscopic and Culture ([place] and [facility] (except [place] and [place])   Result Value Ref Range    Color Urine Yellow     Appearance Urine Clear     Glucose Urine Negative NEG^Negative mg/dL    Bilirubin Urine Negative NEG^Negative    Ketones Urine Negative NEG^Negative mg/dL    Specific Gravity Urine 1.025 1.003 - 1.035    Blood Urine Negative NEG^Negative    pH Urine 5.5 5.0 - 7.0 pH    Protein Albumin Urine Negative NEG^Negative mg/dL    Urobilinogen Urine 0.2 0.2 - 1.0 EU/dL    Nitrite Urine Negative NEG^Negative    Leukocyte Esterase Urine Trace (A) NEG^Negative    Source Midstream Urine    Urine Microscopic   Result Value Ref Range    WBC Urine 2-5 (A) OTO2^O - 2 /HPF    RBC Urine O - 2 OTO2^O - 2 /HPF    Squamous Epithelial /LPF Urine Moderate (A) FEW^Few /LPF    Bacteria Urine Few (A) NEG^Negative /HPF    Calcium Oxalate Few (A) NEG^Negative /HPF               Enclosure: Lab Results

## 2018-01-22 DIAGNOSIS — Z53.9 DIAGNOSIS NOT YET DEFINED: Primary | ICD-10-CM

## 2018-01-22 PROCEDURE — G0180 MD CERTIFICATION HHA PATIENT: HCPCS | Performed by: INTERNAL MEDICINE

## 2018-01-25 NOTE — PROGRESS NOTES
This is the follow up urinalysis,Lindsey.  therre dose not appear to be a problem with urinary tract infection now.

## 2018-01-29 ENCOUNTER — TELEPHONE (OUTPATIENT)
Dept: FAMILY MEDICINE | Facility: CLINIC | Age: 83
End: 2018-01-29

## 2018-01-29 NOTE — TELEPHONE ENCOUNTER
Verbal orders given to the below orders.  She will fax orders for PCP signature.  Aurora Linn RN

## 2018-01-29 NOTE — TELEPHONE ENCOUNTER
Reason for Call:  Home Health Care    Marcie with [facility] called regarding (reason for call):     Orders are needed for this patient. PT    PT: Continue PT 2 x a week for 2 weeks, and then 1 x a week for two weeks      Phone Number Homecare Nurse can be reached at: [phone]    Can we leave a detailed message on this number? YES    Phone number patient can be reached at:     Best Time: any    Call taken on 1/29/2018 at 12:01 PM by Flower Anders

## 2018-02-01 ENCOUNTER — MEDICAL CORRESPONDENCE (OUTPATIENT)
Dept: HEALTH INFORMATION MANAGEMENT | Facility: CLINIC | Age: 83
End: 2018-02-01

## 2018-02-09 ENCOUNTER — TELEPHONE (OUTPATIENT)
Dept: FAMILY MEDICINE | Facility: CLINIC | Age: 83
End: 2018-02-09

## 2018-02-09 NOTE — TELEPHONE ENCOUNTER
Reason for Call: Request for an order :    Order or referral being requested:    PT:   Requested. Twice a week for two weeks, then once a week for one week.    Date needed: ASAP    Has the patient been seen by the PCP for this problem? Not Applicable    Additional comments: Deidre called from [facility]  [phone]        Best Time:  any    Can we leave a detailed message on this number?  YES    Call taken on 2/9/2018 at 3:52 PM by Honey Ortiz

## 2018-02-22 ENCOUNTER — TELEPHONE (OUTPATIENT)
Dept: FAMILY MEDICINE | Facility: CLINIC | Age: 83
End: 2018-02-22

## 2018-02-22 NOTE — TELEPHONE ENCOUNTER
Reason for Call:  Other Discharge home health care orders     Detailed comments: Deidre would like to discharge Lindsey from home health due to daughter helping patient and they are both comfortable with that.     Phone Number Patient can be reached at: Other phone number:  [phone]    Best Time: anytime     Can we leave a detailed message on this number? YES    Call taken on 2/22/2018 at 4:35 PM by Inga Torres

## 2018-02-23 NOTE — PLAN OF CARE
Past Medical History:   Diagnosis Date    Encounter for blood transfusion     Hypothyroidism     Wears partial dentures     upper       Past Surgical History:   Procedure Laterality Date    BREAST SURGERY      CHOLECYSTECTOMY      HYSTERECTOMY      SHOULDER SURGERY Right     10-16       Current Outpatient Prescriptions   Medication Sig    conjugated estrogens (PREMARIN) vaginal cream Place 0.5 g vaginally twice a week.    ergocalciferol (ERGOCALCIFEROL) 50,000 unit Cap Take 1 capsule (50,000 Units total) by mouth every 7 days.    ibuprofen (ADVIL,MOTRIN) 800 MG tablet Take 1 tablet (800 mg total) by mouth 3 (three) times daily.    levothyroxine (SYNTHROID) 75 MCG tablet Take 1 tablet (75 mcg total) by mouth once daily.    ondansetron (ZOFRAN-ODT) 8 MG TbDL Take 1 tablet (8 mg total) by mouth every 6 (six) hours as needed.     No current facility-administered medications for this visit.        Review of patient's allergies indicates:  No Known Allergies    History reviewed. No pertinent family history.    Social History     Social History    Marital status:      Spouse name: N/A    Number of children: N/A    Years of education: N/A     Occupational History    Not on file.     Social History Main Topics    Smoking status: Former Smoker     Packs/day: 1.00     Years: 25.00     Quit date: 8/18/1992    Smokeless tobacco: Never Used    Alcohol use Yes      Comment: occasional    Drug use: No    Sexual activity: Not on file     Other Topics Concern    Not on file     Social History Narrative    No narrative on file       Chief Complaint:   Chief Complaint   Patient presents with    Right Shoulder - Pain       Consulting Physician: Demond James MD    History of present illness: This is a 61 y.o. year old female following up for right shoulder repeat cuff repair and DARIUSZ 8-30-17. Pain 2/10. In PT.      Review of Systems:    Constitution: Denies chills, fever, and sweats.    HENT: Denies  Problem: Patient Care Overview  Goal: Plan of Care/Patient Progress Review  Outcome: No Change  Admit from ED at 1500. Patient settled and oriented to room/call light. VSS on 3 LPM NC. Denies pain except with movement of hip. Has christensen that was placed in ED. NPO at midnight for possible surgery tomorrow. Ortho to see patient tonight. Report given to Real Thomas RN.        headaches or blurry vision.    Cardiovascular: Denies chest pain or irregular heart beat.    Respiratory: Denies cough or shortness of breath.    Gastrointestinal: Denies abdominal pain, nausea, or vomiting.    Musculoskeletal:  Denies muscle cramps.    Neurological: Denies dizziness or focal weakness.    Psychiatric/Behavioral: Normal mental status.    Hematologic/Lymphatic: Denies bleeding problem or easy bruising/bleeding.    Skin: Denies rash or suspicious lesions.      Examination:    Vital Signs:    Vitals:    02/19/18 1259   BP: 130/86   Pulse: 60       Body mass index is 30.24 kg/m².    This a well-developed, well nourished patient in no acute distress.    Alert and oriented and cooperative to examination.       Physical Exam: right shoulder    Inspection/Observation   Swelling:   mild  Erythema:   none  Bruising:   none  Wounds:   healed  Drainage:  None    Range of Motion   90/140, 20, Low back    Muscle Strength   4-4+    Other   Sensation:  normal  Pulse:    palpable    Imaging:     Assessment: Rotator cuff injury, right, subsequent encounter        Plan: She is still showing significant improvement. We will continue PT for ROM and cuff. Using only ibuprofen. Back in 6 weeks. Limited use of arm at this time for work.      DISCLAIMER: This note may have been dictated using voice recognition software and may contain grammatical errors. Report sent to referring provider as required.

## 2018-02-26 ENCOUNTER — CARE COORDINATION (OUTPATIENT)
Dept: CARE COORDINATION | Facility: CLINIC | Age: 83
End: 2018-02-26

## 2018-02-27 ENCOUNTER — TRANSFERRED RECORDS (OUTPATIENT)
Dept: HEALTH INFORMATION MANAGEMENT | Facility: CLINIC | Age: 83
End: 2018-02-27

## 2018-02-27 ENCOUNTER — TELEPHONE (OUTPATIENT)
Dept: FAMILY MEDICINE | Facility: CLINIC | Age: 83
End: 2018-02-27

## 2018-02-27 NOTE — TELEPHONE ENCOUNTER
Reason for Call:  Home Health Care    Deidre with [facility] called regarding (reason for call): Verbal orders    Orders are needed for this patient. PT     PT: continue Re certify  visit and 1 addition PT visit  2 x for 3 weeks, 1 x week for 3 weeks      Pt Provider:     Phone Number Homecare Nurse can be reached at: [phone]    Can we leave a detailed message on this number? YES        Call taken on 2/27/2018 at 3:44 PM by Kofi Torres

## 2018-02-27 NOTE — TELEPHONE ENCOUNTER
Saw orthopedics today, and they recommended extended home care PT for 6 weeks.    I also advised OV with PCP in near future for follow up on labs also.  Nurse will contact daughter.  Leny Pitts RN

## 2018-02-27 NOTE — TELEPHONE ENCOUNTER
Reason for Call:  Other call back    Detailed comments: need verbal order for discharge please call    Phone Number Patient can be reached at: Other phone number:  [phone]    Best Time:    Can we leave a detailed message on this number? YES    Call taken on 2/27/2018 at 8:46 AM by Chelly Childers

## 2018-02-28 NOTE — PROGRESS NOTES
Clinic Care Coordination Contact  Care Team Conversations    Patient continues to be current with  Home Care Services.   12/04/17 admission for femur fx, was d/c'd to TCU, then to home with home care.   No current complex needs. Ongoing PT. Has d/c'd from skilled nursing.     Plan: RN/SW Care Coordinator will close to active clinic care coordination.        Donna Navarrete, RN, BSN, PHN  Clinic Care Coordinator  [facility]  [phone]

## 2018-03-26 DIAGNOSIS — I48.91 ATRIAL FIBRILLATION (H): ICD-10-CM

## 2018-03-26 RX ORDER — FLECAINIDE ACETATE 150 MG/1
TABLET ORAL
Qty: 90 TABLET | Refills: 3 | Status: SHIPPED | OUTPATIENT
Start: 2018-03-26 | End: 2019-04-03

## 2018-03-26 NOTE — PROGRESS NOTES
Medication Refilled: flecainide   Last office visit: 1/24/17 w/ Dr. Vazquez  Last Labs/EKG: EKG 12/4/17, last device check 1/14/18  Next office visit: 4/6/18 w/ Zaina [facility]  Pharmacy sent to: Delmy Dunlap RN

## 2018-03-28 ENCOUNTER — MEDICAL CORRESPONDENCE (OUTPATIENT)
Dept: HEALTH INFORMATION MANAGEMENT | Facility: CLINIC | Age: 83
End: 2018-03-28

## 2018-04-03 ENCOUNTER — OFFICE VISIT (OUTPATIENT)
Dept: FAMILY MEDICINE | Facility: CLINIC | Age: 83
End: 2018-04-03
Payer: MEDICARE

## 2018-04-03 VITALS
OXYGEN SATURATION: 96 % | TEMPERATURE: 97.4 F | WEIGHT: 161 LBS | SYSTOLIC BLOOD PRESSURE: 134 MMHG | DIASTOLIC BLOOD PRESSURE: 78 MMHG | BODY MASS INDEX: 29.63 KG/M2 | HEART RATE: 69 BPM | HEIGHT: 62 IN

## 2018-04-03 DIAGNOSIS — M35.3 PMR (POLYMYALGIA RHEUMATICA) (H): Primary | ICD-10-CM

## 2018-04-03 DIAGNOSIS — E03.9 HYPOTHYROIDISM, UNSPECIFIED TYPE: ICD-10-CM

## 2018-04-03 DIAGNOSIS — S72.002D CLOSED FRACTURE OF NECK OF LEFT FEMUR WITH ROUTINE HEALING: ICD-10-CM

## 2018-04-03 DIAGNOSIS — M81.0 SENILE OSTEOPOROSIS: ICD-10-CM

## 2018-04-03 DIAGNOSIS — I10 BENIGN ESSENTIAL HYPERTENSION: ICD-10-CM

## 2018-04-03 DIAGNOSIS — E11.8 TYPE 2 DIABETES MELLITUS WITH COMPLICATION, WITHOUT LONG-TERM CURRENT USE OF INSULIN (H): ICD-10-CM

## 2018-04-03 DIAGNOSIS — J44.9 CHRONIC OBSTRUCTIVE PULMONARY DISEASE, UNSPECIFIED COPD TYPE (H): ICD-10-CM

## 2018-04-03 DIAGNOSIS — G89.29 CHRONIC LOW BACK PAIN WITHOUT SCIATICA, UNSPECIFIED BACK PAIN LATERALITY: ICD-10-CM

## 2018-04-03 DIAGNOSIS — M54.50 CHRONIC LOW BACK PAIN WITHOUT SCIATICA, UNSPECIFIED BACK PAIN LATERALITY: ICD-10-CM

## 2018-04-03 DIAGNOSIS — D62 ANEMIA DUE TO BLOOD LOSS, ACUTE: ICD-10-CM

## 2018-04-03 DIAGNOSIS — I49.5 SICK SINUS SYNDROME (H): ICD-10-CM

## 2018-04-03 DIAGNOSIS — I48.0 PAROXYSMAL ATRIAL FIBRILLATION (H): ICD-10-CM

## 2018-04-03 LAB
ANION GAP SERPL CALCULATED.3IONS-SCNC: 7 MMOL/L (ref 3–14)
BUN SERPL-MCNC: 21 MG/DL (ref 7–30)
CALCIUM SERPL-MCNC: 9 MG/DL (ref 8.5–10.1)
CHLORIDE SERPL-SCNC: 107 MMOL/L (ref 94–109)
CO2 SERPL-SCNC: 26 MMOL/L (ref 20–32)
CREAT SERPL-MCNC: 0.73 MG/DL (ref 0.52–1.04)
ERYTHROCYTE [DISTWIDTH] IN BLOOD BY AUTOMATED COUNT: 14.7 % (ref 10–15)
GFR SERPL CREATININE-BSD FRML MDRD: 75 ML/MIN/1.7M2
GLUCOSE SERPL-MCNC: 167 MG/DL (ref 70–99)
HBA1C MFR BLD: 6.3 % (ref 0–6.4)
HCT VFR BLD AUTO: 41.1 % (ref 35–47)
HGB BLD-MCNC: 12.9 G/DL (ref 11.7–15.7)
MCH RBC QN AUTO: 32.6 PG (ref 26.5–33)
MCHC RBC AUTO-ENTMCNC: 31.4 G/DL (ref 31.5–36.5)
MCV RBC AUTO: 104 FL (ref 78–100)
PLATELET # BLD AUTO: 314 10E9/L (ref 150–450)
POTASSIUM SERPL-SCNC: 4 MMOL/L (ref 3.4–5.3)
RBC # BLD AUTO: 3.96 10E12/L (ref 3.8–5.2)
SODIUM SERPL-SCNC: 140 MMOL/L (ref 133–144)
TSH SERPL DL<=0.005 MIU/L-ACNC: 0.53 MU/L (ref 0.4–4)
WBC # BLD AUTO: 8.8 10E9/L (ref 4–11)

## 2018-04-03 PROCEDURE — 36415 COLL VENOUS BLD VENIPUNCTURE: CPT | Performed by: INTERNAL MEDICINE

## 2018-04-03 PROCEDURE — 85027 COMPLETE CBC AUTOMATED: CPT | Performed by: INTERNAL MEDICINE

## 2018-04-03 PROCEDURE — 99214 OFFICE O/P EST MOD 30 MIN: CPT | Performed by: INTERNAL MEDICINE

## 2018-04-03 PROCEDURE — 84443 ASSAY THYROID STIM HORMONE: CPT | Performed by: INTERNAL MEDICINE

## 2018-04-03 PROCEDURE — 83036 HEMOGLOBIN GLYCOSYLATED A1C: CPT | Performed by: INTERNAL MEDICINE

## 2018-04-03 PROCEDURE — 80048 BASIC METABOLIC PNL TOTAL CA: CPT | Performed by: INTERNAL MEDICINE

## 2018-04-03 RX ORDER — ALENDRONATE SODIUM 70 MG/1
TABLET ORAL
Qty: 12 TABLET | Refills: 3 | Status: SHIPPED | OUTPATIENT
Start: 2018-04-03 | End: 2018-08-28

## 2018-04-03 RX ORDER — TRAMADOL HYDROCHLORIDE 50 MG/1
50 TABLET ORAL 2 TIMES DAILY PRN
Qty: 30 TABLET | Refills: 0 | Status: ON HOLD | OUTPATIENT
Start: 2018-04-03 | End: 2018-12-24

## 2018-04-03 NOTE — NURSING NOTE
"Chief Complaint   Patient presents with     Recheck Medication       Initial /78  Pulse 69  Temp 97.4  F (36.3  C) (Oral)  Ht 5' 1.5\" (1.562 m)  Wt 161 lb (73 kg)  SpO2 96%  Breastfeeding? No  BMI 29.93 kg/m2 Estimated body mass index is 29.93 kg/(m^2) as calculated from the following:    Height as of this encounter: 5' 1.5\" (1.562 m).    Weight as of this encounter: 161 lb (73 kg).  Medication Reconciliation: complete   Gisel Bob CMA      "

## 2018-04-03 NOTE — MR AVS SNAPSHOT
After Visit Summary   4/3/2018    Lindsey Hale    MRN: [id number]           Patient Information     Date Of Birth          9/16/1925        Visit Information        Provider Department      4/3/2018 10:00 AM Jeffery Sherwood MD [facility]        Today's Diagnoses     PMR (polymyalgia rheumatica) (H)    -  1    Chronic obstructive pulmonary disease, unspecified COPD type (H)        Paroxysmal atrial fibrillation (H)        Type 2 diabetes mellitus with complication, without long-term current use of insulin (H)        Sick sinus syndrome (H)        Closed fracture of neck of left femur with routine healing        Anemia due to blood loss, acute        Hypothyroidism, unspecified type        Benign essential hypertension        Chronic low back pain without sciatica, unspecified back pain laterality        Senile osteoporosis          Care Instructions    Start taking the fosamax once weekly    Call if problems    See me in 4 months    Jeffery Sherwood M.D.            Follow-ups after your visit        Your next 10 appointments already scheduled     Apr 03, 2018 10:00 AM CDT   Office Visit with Jeffery Sherwood MD   [facility] ([facility])    [address] [facility] [place] [address] [phone]           Bring a current list of meds and any records pertaining to this visit. For Physicals, please bring immunization records and any forms needing to be filled out. Please arrive 10 minutes early to complete paperwork.            Apr 06, 2018 10:30 AM CDT   P EP RETURN with Zaina Herman APRN CNP   [facility] ([facility])    [id number]  [facility] [address] [phone] OPT 2            Apr 23, 2018  2:45 PM CDT   Remote PPM Check with BANKS TECH1   [facility] ([facility])    [address] [address]  "  [phone] OPT 2           This appointment is for a remote check of your pacemaker.  This is not an appointment at the office.              Who to contact     If you have questions or need follow up information about today's clinic visit or your schedule please contact [facility] directly at [phone].  Normal or non-critical lab and imaging results will be communicated to you by MyChart, letter or phone within 4 business days after the clinic has received the results. If you do not hear from us within 7 days, please contact the clinic through MyChart or phone. If you have a critical or abnormal lab result, we will notify you by phone as soon as possible.  Submit refill requests through [employer] or call your pharmacy and they will forward the refill request to us. Please allow 3 business days for your refill to be completed.          Additional Information About Your Visit        MyChart Information     [employer] lets you send messages to your doctor, view your test results, renew your prescriptions, schedule appointments and more. To sign up, go to [email or web address] . Click on \"Log in\" on the left side of the screen, which will take you to the Welcome page. Then click on \"Sign up Now\" on the right side of the page.     You will be asked to enter the access code listed below, as well as some personal information. Please follow the directions to create your username and password.     Your access code is: [email or web address]  Expires: 2018 12:17 PM     Your access code will  in 90 days. If you need help or a new code, please call your [place] clinic or [phone].        Care EveryWhere ID     This is your Care EveryWhere ID. This could be used by other organizations to access your [place] medical records  [id number]        Your Vitals Were     Pulse Temperature Height Pulse Oximetry Breastfeeding? BMI (Body Mass Index)    69 97.4  F (36.3  C) (Oral) 5' 1.5\" (1.562 m) 96% No 29.93 " kg/m2       Blood Pressure from Last 3 Encounters:   04/03/18 134/78   01/11/18 123/59   12/28/17 119/58    Weight from Last 3 Encounters:   04/03/18 161 lb (73 kg)   01/11/18 170 lb 9.6 oz (77.4 kg)   12/28/17 173 lb (78.5 kg)              We Performed the Following     Basic metabolic panel     CBC with platelets     Hemoglobin A1c          Today's Medication Changes          These changes are accurate as of 4/3/18  8:54 AM.  If you have any questions, ask your nurse or doctor.               Start taking these medicines.        Dose/Directions    alendronate 70 MG tablet   Commonly known as:  FOSAMAX   Used for:  Senile osteoporosis   Started by:  Jeffery Sherwood MD        Take 1 tablet (70 mg) by mouth with 8oz water every 7 days 30 minutes before breakfast and remain upright during this time.   Quantity:  12 tablet   Refills:  3       traMADol 50 MG tablet   Commonly known as:  ULTRAM   Used for:  Chronic low back pain without sciatica, unspecified back pain laterality   Started by:  Jeffery Sherwood MD        Dose:  50 mg   Take 1 tablet (50 mg) by mouth 2 times daily as needed for severe pain   Quantity:  30 tablet   Refills:  0         These medicines have changed or have updated prescriptions.        Dose/Directions    cephALEXin 500 MG capsule   Commonly known as:  KEFLEX   This may have changed:    - how much to take  - when to take this  - reasons to take this   Used for:  Acute cystitis with hematuria        Dose:  500 mg   Take 1 capsule (500 mg) by mouth 3 times daily   Quantity:  21 capsule   Refills:  0         Stop taking these medicines if you haven't already. Please contact your care team if you have questions.     phenazopyridine 97.5 MG tablet   Commonly known as:  AZO   Stopped by:  Jeffery Sherwood MD                Where to get your medicines      These medications were sent to [facility] [id number] [place] [address] [address] [facility] [address]  [facility]  [address] [place] [address]     Phone:  [phone]     alendronate 70 MG tablet         Some of these will need a paper prescription and others can be bought over the counter.  Ask your nurse if you have questions.     Bring a paper prescription for each of these medications     traMADol 50 MG tablet                Primary Care Provider Office Phone # Fax #    Jeffery Sherwood [phone] [address]  [facility] [address]        Equal Access to Services     [facility] : Hadii aad ku hadasho Soomaali, waaxda luqadaha, qaybta kaalmada adeegyada, waxay idiin hayaan adeeg kharanicolette kim . So [facility] [phone].    ATENCIÓN: Si habla español, tiene a rolon disposición servicios gratuitos de asistencia lingüística. [facility] [phone].    We comply with applicable federal civil rights laws and [place] laws. We do not discriminate on the basis of race, color, national origin, age, disability, sex, sexual orientation, or gender identity.            Thank you!     Thank you for choosing [facility]  for your care. Our goal is always to provide you with excellent care. Hearing back from our patients is one way we can continue to improve our services. Please take a few minutes to complete the written survey that you may receive in the mail after your visit with us. Thank you!             Your Updated Medication List - Protect others around you: Learn how to safely use, store and throw away your medicines at www.disposemymeds.org.          This list is accurate as of 4/3/18  8:54 AM.  Always use your most recent med list.                   Brand Name Dispense Instructions for use Diagnosis    acetaminophen 325 MG tablet    TYLENOL    40 tablet    Take 2 tablets (650 mg) by mouth every 4 hours as needed for other (surgical pain)    Closed fracture of neck of left femur, initial encounter (H)       alendronate 70 MG tablet    FOSAMAX    12 tablet    Take 1 tablet (70 mg) by  mouth with 8oz water every 7 days 30 minutes before breakfast and remain upright during this time.    Senile osteoporosis       budesonide-formoterol 160-4.5 MCG/ACT Inhaler    SYMBICORT     Inhale 2 puffs into the lungs 2 times daily        Carboxymethylcellulose Sod PF 0.5 % Soln ophthalmic solution    REFRESH PLUS     1 drop 3 times daily as needed for dry eyes        cephALEXin 500 MG capsule    KEFLEX    21 capsule    Take 1 capsule (500 mg) by mouth 3 times daily    Acute cystitis with hematuria       flecainide acetate 150 MG Tabs     90 tablet    Take 1/2 tablet by mouth twice daily    Atrial fibrillation (H)       LASIX PO      Take 20 mg by mouth daily        levothyroxine 75 MCG tablet    SYNTHROID/LEVOTHROID    90 tablet    Take 1 tablet (75 mcg) by mouth daily    Hypothyroidism, unspecified type       metoprolol tartrate 50 MG tablet    LOPRESSOR    270 tablet    TAKE 1 AND 1/2 TABLETS BY MOUTH TWICE DAILY    Atrial fibrillation, unspecified type (H), Benign essential hypertension       polyethylene glycol powder    MIRALAX/GLYCOLAX     Take 17 g by mouth daily        rivaroxaban ANTICOAGULANT 20 MG Tabs tablet    XARELTO    90 tablet    Take 1 tablet (20 mg) by mouth daily (with dinner)    Atrial fibrillation, unspecified type (H)       senna-docusate 8.6-50 MG per tablet    SENOKOT-S;PERICOLACE     Take 1 tablet by mouth 2 times daily        traMADol 50 MG tablet    ULTRAM    30 tablet    Take 1 tablet (50 mg) by mouth 2 times daily as needed for severe pain    Chronic low back pain without sciatica, unspecified back pain laterality       VITAMIN B COMPLEX-C Caps      Take 1 capsule by mouth daily        VITAMIN D (CHOLECALCIFEROL) PO      Take 1,000 Units by mouth daily

## 2018-04-03 NOTE — PROGRESS NOTES
The patient is here with her son-in-law to follow-up multiple medical problems.    As noted the patient fractured her hip in November and was at the [facility] until the end of the year.  She is back home now and doing super.  She has multiple problems as noted.    Follow-up diabetes, not on medication, no current complications.  She is up-to-date on her eye exam.    Follow-up COPD, she is using the inhaler and her breathing has been quite stable without recent exacerbations.    Follow-up atrial fibrillation, on Xarelto with no difficulty.  No chest pain or shortness of breath or dizziness.  She also has sick sinus syndrome.    Follow-up polymyalgia rheumatica, off steroids for a long time and no difficulty.  No headache, fevers or jaw claudication.    Follow-up blood loss anemia, to get labs today.    Follow-up hypertension, blood pressure well controlled.    The patient has chronic back pain.  She liked a prescription for tramadol which she uses very infrequently and understands the risks.    The patient otherwise feels well.  No HEENT, cardiovascular, respiratory, GI or  symptoms.    Past Medical History:   Diagnosis Date     Abnormal echocardiogram 04/2017    mild mr, ar, mitral stenosis, nl ef, lvh.  Done for episode of vision change     Arthritis 99         Atrial fibrillation (H) 2011 and 2009    neg nuc est 2009, Dr. Vazquez, on coumadin     Chest pain 2000    neg est echo, neg ct chest abd, pelvis Faith     Chronic LBP      COPD (chronic obstructive pulmonary disease) (H)     seen by pulmonary md Dr. Whitt, and given inhaler     Cysts, breast 1980s    bilat mastectomies     Dizzy 2007    ct neg, carotid us neg     Dysphagia 2005    egd nl     Elevated blood sugar      Environmental allergies      Hematuria 2004    neg cysto and us     Hemoptyses 2008    ct neg, bronch neg 2009     Hyperlipidemia      Hypertension      Hypothyroid 1995    Dr. Ortiz     Mitral regurgitation 6/15    on echo     nausea  2006    ct abd and ;pelvis neg     Osteopenia     fu dexa better 2011     Pacemaker 2011    afib with pauses and syncope     Palpitations 2009    neg est     PMR (polymyalgia rheumatica) (H) 2004    not active in years     SOB (shortness of breath) 5/15    echo nl ef, 2+mr, cxr clear, seen by pulm and given inhaler     Supraventricular premature beats      TIA (transient ischaemic attack) 2009    carotids neg, mri neg     Treadmill stress test negative for angina pectoris 2011    nuclear est     Urine incontinence     Dr. Westfall     Urosepsis 2009    hospitalized     Vision changes 2011    eval by neuro and ophtho and no cause found     Past Surgical History:   Procedure Laterality Date     ARTHROPLASTY HIP Left 12/5/2017    Procedure: ARTHROPLASTY HIP;  LEFT HIP ARBEN ARTHROPLASTY([place]);  Surgeon: Darell Nathan MD;  Location: [facility] OR     ARTHROPLASTY PATELLO-FEMORAL (KNEE)  1998 and 2002     ARTHROSCOPY KNEE  1997     BIOPSY BREAST Right 9/23/2014    Procedure: BIOPSY BREAST;  Surgeon: David Carter MD;  Location: [facility]     breast implants      4x     cataract  2011     FOOT SURGERY  2003     MASTECTOMY  1980    bilat, cysts     nj not bso  70's    not for ca     Social History     Social History     Marital status:      Spouse name: N/A     Number of children: 4     Years of education: N/A     Occupational History     Not on file.     Social History Main Topics     Smoking status: Former Smoker     Types: Cigarettes     Quit date: 2/1/1955     Smokeless tobacco: Never Used      Comment: quit in 1955   had smoked about 2 cigarettes a day or more     Alcohol use 0.0 oz/week     0 Standard drinks or equivalent per week      Comment: occ wine     Drug use: No     Sexual activity: Not Currently     Other Topics Concern     Caffeine Concern No     coffee: 1 cup a week.  Occ green tea     Sleep Concern No     Stress Concern No     Weight Concern No     Special Diet No     Exercise Yes     walking daily      Seat Belt Yes     Social History Narrative     Current Outpatient Prescriptions   Medication Sig Dispense Refill     traMADol (ULTRAM) 50 MG tablet Take 1 tablet (50 mg) by mouth 2 times daily as needed for severe pain 30 tablet 0     alendronate (FOSAMAX) 70 MG tablet Take 1 tablet (70 mg) by mouth with 8oz water every 7 days 30 minutes before breakfast and remain upright during this time. 12 tablet 3     flecainide acetate 150 MG TABS Take 1/2 tablet by mouth twice daily 90 tablet 3     cephALEXin (KEFLEX) 500 MG capsule Take 1 capsule (500 mg) by mouth 3 times daily (Patient taking differently: Take 2,000 mg by mouth daily as needed ) 21 capsule 0     polyethylene glycol (MIRALAX/GLYCOLAX) powder Take 17 g by mouth daily       Furosemide (LASIX PO) Take 20 mg by mouth daily        senna-docusate (SENOKOT-S;PERICOLACE) 8.6-50 MG per tablet Take 1 tablet by mouth 2 times daily       acetaminophen (TYLENOL) 325 MG tablet Take 2 tablets (650 mg) by mouth every 4 hours as needed for other (surgical pain) 40 tablet 0     Carboxymethylcellulose Sod PF (REFRESH PLUS) 0.5 % SOLN ophthalmic solution 1 drop 3 times daily as needed for dry eyes       VITAMIN D, CHOLECALCIFEROL, PO Take 1,000 Units by mouth daily       VITAMIN B COMPLEX-C CAPS Take 1 capsule by mouth daily       metoprolol (LOPRESSOR) 50 MG tablet TAKE 1 AND 1/2 TABLETS BY MOUTH TWICE DAILY 270 tablet 3     levothyroxine (SYNTHROID/LEVOTHROID) 75 MCG tablet Take 1 tablet (75 mcg) by mouth daily 90 tablet 3     rivaroxaban ANTICOAGULANT (XARELTO) 20 MG TABS tablet Take 1 tablet (20 mg) by mouth daily (with dinner) 90 tablet 2     budesonide-formoterol (SYMBICORT) 160-4.5 MCG/ACT inhaler Inhale 2 puffs into the lungs 2 times daily        Allergies   Allergen Reactions     Clindamycin Hcl Other (See Comments)     Difficulty swallowing     Ampicillin Potassium      Rash nausea and vomiting       Celebrex [Celecoxib]      rash     Ciprofloxacin      rash      "Erythromycin      rash     Indocin      Ended up going to( .) [facility] with severe head ache     Penicillins      Rash,nausea and vomiting     Vibramycin [Doxycycline Hyclate]      Rash      Xylocaine-Epinephrine [Epinephrine-Lidocaine-Na Metabisulfite]      Xylocaine with epi caused a rapid heart beat     FAMILY HISTORY NOTED AND REVIEWED    REVIEW OF SYSTEMS: above    PHYSICAL EXAM    /78  Pulse 69  Temp 97.4  F (36.3  C) (Oral)  Ht 5' 1.5\" (1.562 m)  Wt 161 lb (73 kg)  SpO2 96%  Breastfeeding? No  BMI 29.93 kg/m2    Patient appears non toxic  Mouth - tongue midline and within normal limits, mucous membranes and posterior pharynx within normal limits, no lesions seen.  Neck - no masses, lesions or tenderness  Nodes - no supraclavicular, cervical or axially adenopathy .  Lungs - clear, normal flow  Cardiovascular - regular rate and rhythm, n2/6 sm across chest, no rub or gallop, no jvp or edema, carotids within normal limits, no bruits.  Abdomen - normal active bowel sounds, soft, non tender, no masses, guarding or rebound, no hepatosplenomegaly      Labs sent    ASSESSMENT:  1. Pmr, amarilys  2. Copd, stable  3. Osteoporosis by definition given fx hip, discussed with patient and son in law meds for this and will add fosamax, cont calcium and vit d  4. Afib, regular now, on xarelto  5. Hypertension, controlled  6. Blood loss anemia, follow up labs  7. Hypothyroidism, follow up labs  8. Sss, pacer, stable  9. Chronic low back pain, no issues    PLAN:  Add fosamax  Labs today  Call if problems  Follow up 4 months      Jeffery Sherwood M.D.                  "

## 2018-04-03 NOTE — LETTER
[place] [address] [facility]  [address]  [place]  [address]  Tel: [phone]    April 4, 2018    Lindsey Hale  [address] [place] [address]        Dear Ms. Hale,    It was a pleasure seeing you.  I wanted to get back to you with your test results.  I have enclosed a copy for your records.    Your labs look very good including your diabetes test or a1c, thyroid test or tsh, complete blood count and chemistries.    If you have any questions please call me.        Sincerely,    Jeffery Sherwood MD / radha          Enclosure: Lab Results    Resulted Orders   CBC with platelets   Result Value Ref Range    WBC 8.8 4.0 - 11.0 10e9/L    RBC Count 3.96 3.8 - 5.2 10e12/L    Hemoglobin 12.9 11.7 - 15.7 g/dL    Hematocrit 41.1 35.0 - 47.0 %     (H) 78 - 100 fl    MCH 32.6 26.5 - 33.0 pg    MCHC 31.4 (L) 31.5 - 36.5 g/dL    RDW 14.7 10.0 - 15.0 %    Platelet Count 314 150 - 450 10e9/L   Basic metabolic panel   Result Value Ref Range    Sodium 140 133 - 144 mmol/L    Potassium 4.0 3.4 - 5.3 mmol/L    Chloride 107 94 - 109 mmol/L    Carbon Dioxide 26 20 - 32 mmol/L    Anion Gap 7 3 - 14 mmol/L    Glucose 167 (H) 70 - 99 mg/dL    Urea Nitrogen 21 7 - 30 mg/dL    Creatinine 0.73 0.52 - 1.04 mg/dL    GFR Estimate 75 >60 mL/min/1.7m2      Comment:      Non  GFR Calc    GFR Estimate If Black >90 >60 mL/min/1.7m2      Comment:       GFR Calc    Calcium 9.0 8.5 - 10.1 mg/dL   Hemoglobin A1c   Result Value Ref Range    Hemoglobin A1C 6.3 0 - 6.4 %      Comment:      Normal <5.7% Prediabetes 5.7-6.4%  Diabetes 6.5% or higher - adopted from ADA   consensus guidelines.     TSH with free T4 reflex   Result Value Ref Range    TSH 0.53 0.40 - 4.00 mU/L

## 2018-04-03 NOTE — PATIENT INSTRUCTIONS
Start taking the fosamax once weekly    Call if problems    See me in 4 months    Jeffery Sherwood M.D.

## 2018-04-04 NOTE — PROGRESS NOTES
It was a pleasure seeing you.  I wanted to get back to you with your test results.  I have enclosed a copy for your records.    Your labs look very good including your diabetes test or a1c, thyroid test or tsh, complete blood count and chemistries.    If you have any questions please call me.

## 2018-04-06 ENCOUNTER — OFFICE VISIT (OUTPATIENT)
Dept: CARDIOLOGY | Facility: CLINIC | Age: 83
End: 2018-04-06
Attending: INTERNAL MEDICINE
Payer: MEDICARE

## 2018-04-06 VITALS
HEART RATE: 60 BPM | DIASTOLIC BLOOD PRESSURE: 58 MMHG | BODY MASS INDEX: 30.14 KG/M2 | SYSTOLIC BLOOD PRESSURE: 108 MMHG | WEIGHT: 163.8 LBS | OXYGEN SATURATION: 94 % | HEIGHT: 62 IN

## 2018-04-06 DIAGNOSIS — I49.5 SSS (SICK SINUS SYNDROME) (H): ICD-10-CM

## 2018-04-06 DIAGNOSIS — I48.0 PAROXYSMAL ATRIAL FIBRILLATION (H): Primary | ICD-10-CM

## 2018-04-06 PROCEDURE — 99214 OFFICE O/P EST MOD 30 MIN: CPT | Performed by: NURSE PRACTITIONER

## 2018-04-06 PROCEDURE — 93000 ELECTROCARDIOGRAM COMPLETE: CPT | Performed by: NURSE PRACTITIONER

## 2018-04-06 NOTE — MR AVS SNAPSHOT
After Visit Summary   4/6/2018    Lindsey Hale    MRN: [id number]           Patient Information     Date Of Birth          9/16/1925        Visit Information        Provider Department      4/6/2018 10:30 AM Zaina Herman APRN CNP [facility]        Today's Diagnoses     Paroxysmal atrial fibrillation (H)          Care Instructions    No changes  Please call if you have questions or concerns          Follow-ups after your visit        Additional Services     Follow-Up with Electrophysiologist                 Your next 10 appointments already scheduled     Apr 23, 2018  2:45 PM CDT   Remote PPM Check with BANKS TECH1   [facility] ([facility])    [address]  [facility] [id number] [phone] OPT 2           This appointment is for a remote check of your pacemaker.  This is not an appointment at the office.            Aug 16, 2018  9:00 AM CDT   [facility]ery [facility]wood MD   [facility]    [address] [place] [id number] [phone]           Bring a current list of meds and any records pertaining to this visit. For Physicals, please bring immunization records and any forms needing to be filled out. Please arrive 10 minutes early to complete paperwork.              Future tests that were ordered for you today     Open Future Orders        Priority Expected Expires Ordered    EKG 12-lead complete w/read - Clinics (to be scheduled) Routine 4/6/2019 4/7/2019 4/6/2018    Follow-Up with Electrophysiologist Routine 4/6/2019 4/7/2019 4/6/2018            Who to contact     If you have questions or need follow up information about today's clinic visit or your schedule please contact [facility] directly at [phone].  Normal or non-critical lab and imaging results will be communicated to you by  "MyChart, letter or phone within 4 business days after the clinic has received the results. If you do not hear from us within 7 days, please contact the clinic through [facility] or phone. If you have a critical or abnormal lab result, we will notify you by phone as soon as possible.  Submit refill requests through [employer]Link or call your pharmacy and they will forward the refill request to us. Please allow 3 business days for your refill to be completed.          Additional Information About Your Visit        [facility]ast[facility] Information     HipLink lets you send messages to your doctor, view your test results, renew your prescriptions, schedule appointments and more. To sign up, go to [email or web address] . Click on \"Log in\" on the left side of the screen, which will take you to the Welcome page. Then click on \"Sign up Now\" on the right side of the page.     You will be asked to enter the access code listed below, as well as some personal information. Please follow the directions to create your username and password.     Your access code is: [email or web address]  Expires: 2018 12:17 PM     Your access code will  in 90 days. If you need help or a new code, please call your [place] clinic or [phone].        Care EveryWhere ID     This is your Care EveryWhere ID. This could be used by other organizations to access your [place] medical records  [id number]        Your Vitals Were     Pulse Height Pulse Oximetry BMI (Body Mass Index)          60 1.562 m (5' 1.5\") 94% 30.45 kg/m2         Blood Pressure from Last 3 Encounters:   18 108/58   18 134/78   18 123/59    Weight from Last 3 Encounters:   18 74.3 kg (163 lb 12.8 oz)   18 73 kg (161 lb)   18 77.4 kg (170 lb 9.6 oz)              We Performed the Following     EKG 12-lead complete w/read - Clinics (performed today)     Follow-Up with Cardiac Advanced Practice Provider          Today's Medication Changes          These changes are " accurate as of 4/6/18 11:00 AM.  If you have any questions, ask your nurse or doctor.               Stop taking these medicines if you haven't already. Please contact your care team if you have questions.     senna-docusate 8.6-50 MG per tablet   Commonly known as:  SENOKOT-S;PERICOLACE   Stopped by:  Zaina Herman, MERCEDEZ MORALES                    Primary Care Provider Office Phone # Fax #    Jeffery Sherwood [phone] [address] [facility] [address]  [facility] [address]        Equal Access to Services     [facility]: Hadii aad ku hadasho Soomaali, waaxda luqadaha, qaybta kaalmada aderadhayada, maritza kim . So [facility] [phone].    ATENCIÓN: Si habla español, tiene a rolon disposición servicios gratuitos de asistencia lingüística. [facility] [phone].    We comply with applicable federal civil rights laws and [place] laws. We do not discriminate on the basis of race, color, national origin, age, disability, sex, sexual orientation, or gender identity.            Thank you!     Thank you for choosing [facility]  for your care. Our goal is always to provide you with excellent care. Hearing back from our patients is one way we can continue to improve our services. Please take a few minutes to complete the written survey that you may receive in the mail after your visit with us. Thank you!             Your Updated Medication List - Protect others around you: Learn how to safely use, store and throw away your medicines at www.disposemymeds.org.          This list is accurate as of 4/6/18 11:00 AM.  Always use your most recent med list.                   Brand Name Dispense Instructions for use Diagnosis    acetaminophen 325 MG tablet    TYLENOL    40 tablet    Take 2 tablets (650 mg) by mouth every 4 hours as needed for other (surgical pain)    Closed fracture of neck of left femur, initial encounter (H)       alendronate 70 MG tablet     FOSAMAX    12 tablet    Take 1 tablet (70 mg) by mouth with 8oz water every 7 days 30 minutes before breakfast and remain upright during this time.    Senile osteoporosis       budesonide-formoterol 160-4.5 MCG/ACT Inhaler    SYMBICORT     Inhale 2 puffs into the lungs 2 times daily        Carboxymethylcellulose Sod PF 0.5 % Soln ophthalmic solution    REFRESH PLUS     1 drop 3 times daily as needed for dry eyes        cephALEXin 500 MG capsule    KEFLEX    21 capsule    Take 1 capsule (500 mg) by mouth 3 times daily    Acute cystitis with hematuria       flecainide acetate 150 MG Tabs     90 tablet    Take 1/2 tablet by mouth twice daily    Atrial fibrillation (H)       LASIX PO      Take 20 mg by mouth daily        levothyroxine 75 MCG tablet    SYNTHROID/LEVOTHROID    90 tablet    Take 1 tablet (75 mcg) by mouth daily    Hypothyroidism, unspecified type       metoprolol tartrate 50 MG tablet    LOPRESSOR    270 tablet    TAKE 1 AND 1/2 TABLETS BY MOUTH TWICE DAILY    Atrial fibrillation, unspecified type (H), Benign essential hypertension       polyethylene glycol powder    MIRALAX/GLYCOLAX     Take 17 g by mouth daily        rivaroxaban ANTICOAGULANT 20 MG Tabs tablet    XARELTO    90 tablet    Take 1 tablet (20 mg) by mouth daily (with dinner)    Atrial fibrillation, unspecified type (H)       traMADol 50 MG tablet    ULTRAM    30 tablet    Take 1 tablet (50 mg) by mouth 2 times daily as needed for severe pain    Chronic low back pain without sciatica, unspecified back pain laterality       VITAMIN B COMPLEX-C Caps      Take 1 capsule by mouth daily        VITAMIN D (CHOLECALCIFEROL) PO      Take 1,000 Units by mouth daily

## 2018-04-06 NOTE — PROGRESS NOTES
HPI and Plan: #[id number]  See dictation      Echo Interpretation Summary 4/3/2017     Left ventricular systolic function is normal.  There is borderline concentric left ventricular hypertrophy.  There is mild right ventricular hypertrophy.  The right ventricular systolic function is normal.    Mildly thickened mitral leaflets with restricted motion of the posterior leaflet.  There is mild mitral stenosis.  The mean mitral valve gradient is 4mmHg at 68 bpm.  There is mild to moderate (1-2+) mitral regurgitation.  There is mild (1+) tricuspid regurgitation.  The right ventricular systolic pressure is approximated at 39mmHg plus the right atrial pressure.  There is mild to moderate (1-2+) aortic regurgitation.  Compared to the prior study dated 6/11/2015, there are changes as noted. Mild mitral stenosis is now seen. The degree of mitral and aortic insufficiency is unchanged.    Echo reviewed and repeat echo offered, but pt declined.    Orders Placed This Encounter   Procedures     Follow-Up with Electrophysiologist     EKG 12-lead complete w/read - Clinics (performed today)     EKG 12-lead complete w/read - Clinics (to be scheduled)       No orders of the defined types were placed in this encounter.      Medications Discontinued During This Encounter   Medication Reason     senna-docusate (SENOKOT-S;PERICOLACE) 8.6-50 MG per tablet Alternate therapy         Encounter Diagnosis   Name Primary?     Paroxysmal atrial fibrillation (H)        CURRENT MEDICATIONS:  Current Outpatient Prescriptions   Medication Sig Dispense Refill     traMADol (ULTRAM) 50 MG tablet Take 1 tablet (50 mg) by mouth 2 times daily as needed for severe pain 30 tablet 0     alendronate (FOSAMAX) 70 MG tablet Take 1 tablet (70 mg) by mouth with 8oz water every 7 days 30 minutes before breakfast and remain upright during this time. 12 tablet 3     flecainide acetate 150 MG TABS Take 1/2 tablet by mouth twice daily 90 tablet 3     polyethylene glycol  (MIRALAX/GLYCOLAX) powder Take 17 g by mouth daily       Furosemide (LASIX PO) Take 20 mg by mouth daily        acetaminophen (TYLENOL) 325 MG tablet Take 2 tablets (650 mg) by mouth every 4 hours as needed for other (surgical pain) 40 tablet 0     Carboxymethylcellulose Sod PF (REFRESH PLUS) 0.5 % SOLN ophthalmic solution 1 drop 3 times daily as needed for dry eyes       VITAMIN D, CHOLECALCIFEROL, PO Take 1,000 Units by mouth daily       VITAMIN B COMPLEX-C CAPS Take 1 capsule by mouth daily       metoprolol (LOPRESSOR) 50 MG tablet TAKE 1 AND 1/2 TABLETS BY MOUTH TWICE DAILY 270 tablet 3     levothyroxine (SYNTHROID/LEVOTHROID) 75 MCG tablet Take 1 tablet (75 mcg) by mouth daily 90 tablet 3     rivaroxaban ANTICOAGULANT (XARELTO) 20 MG TABS tablet Take 1 tablet (20 mg) by mouth daily (with dinner) 90 tablet 2     budesonide-formoterol (SYMBICORT) 160-4.5 MCG/ACT inhaler Inhale 2 puffs into the lungs 2 times daily        cephALEXin (KEFLEX) 500 MG capsule Take 1 capsule (500 mg) by mouth 3 times daily (Patient not taking: Reported on 4/6/2018) 21 capsule 0       ALLERGIES     Allergies   Allergen Reactions     Clindamycin Hcl Other (See Comments)     Difficulty swallowing     Ampicillin Potassium      Rash nausea and vomiting       Celebrex [Celecoxib]      rash     Ciprofloxacin      rash     Erythromycin      rash     Indocin      Ended up [facility]( [facility] with severe head ache     Penicillins      Rash,nausea and vomiting     Vibramycin [Doxycycline Hyclate]      Rash      Xylocaine-Epinephrine [Epinephrine-Lidocaine-Na Metabisulfite]      Xylocaine with epi caused a rapid heart beat       PAST MEDICAL HISTORY:  Past Medical History:   Diagnosis Date     Abnormal echocardiogram 04/2017    mild mr, ar, mitral stenosis, nl ef, lvh.  Done for episode of vision change     Arthritis 99         Atrial fibrillation (H) 2011 and 2009    neg nuc est 2009, Dr. Vazquez, on coumadin     Chest pain 2000     neg est echo, neg ct chest abd, pelvis Druze     Chronic LBP      COPD (chronic obstructive pulmonary disease) (H)     seen by pulmonary md Dr. Whitt, and given inhaler     Cysts, breast 1980s    bilat mastectomies     Dizzy 2007    ct neg, carotid us neg     Dysphagia 2005    egd nl     Elevated blood sugar      Environmental allergies      Hematuria 2004    neg cysto and us     Hemoptyses 2008    ct neg, bronch neg 2009     Hyperlipidemia      Hypertension      Hypothyroid 1995    Dr. Ortiz     Mitral regurgitation 6/15    on echo     nausea 2006    ct abd and ;pelvis neg     Osteopenia     fu dexa better 2011     Pacemaker 2011    afib with pauses and syncope     Palpitations 2009    neg est     PMR (polymyalgia rheumatica) (H) 2004    not active in years     SOB (shortness of breath) 5/15    echo nl ef, 2+mr, cxr clear, seen by pulm and given inhaler     Supraventricular premature beats      TIA (transient ischaemic attack) 2009    carotids neg, mri neg     Treadmill stress test negative for angina pectoris 2011    nuclear est     Urine incontinence     Dr. Westfall     Urosepsis 2009    hospitalized     Vision changes 2011    eval by neuro and ophtho and no cause found       PAST SURGICAL HISTORY:  Past Surgical History:   Procedure Laterality Date     ARTHROPLASTY HIP Left 12/5/2017    Procedure: ARTHROPLASTY HIP;  LEFT HIP ARBEN ARTHROPLASTY(SORAYA);  Surgeon: Darell Nathan MD;  Location: [facility]     ARTHROPLASTY PATELLO-FEMORAL (KNEE)  1998 and 2002     ARTHROSCOPY KNEE  1997     BIOPSY BREAST Right 9/23/2014    Procedure: BIOPSY BREAST;  Surgeon: David Carter MD;  Location:  [facility]     breast implants      4x     cataract  2011     FOOT SURGERY  2003     MASTECTOMY  1980    bilat, cysts     [place] not bso  70's    not for ca       FAMILY HISTORY:  Family History   Problem Relation Age of Onset     C.A.D. Father      Lymphoma Father      CANCER Mother      Lymphoma Brother      Breast Cancer Sister  "     OSTEOPOROSIS Sister      Myocardial Infarction Sister      Unknown/Adopted Maternal Grandmother      Unknown/Adopted Maternal Grandfather      Unknown/Adopted Paternal Grandmother      Unknown/Adopted Paternal Grandfather        SOCIAL HISTORY:  Social History     Social History     Marital status:      Spouse name: N/A     Number of children: 4     Years of education: N/A     Social History Main Topics     Smoking status: Former Smoker     Types: Cigarettes     Quit date: 2/1/1955     Smokeless tobacco: Never Used      Comment: quit in 1955   had smoked about 2 cigarettes a day or more     Alcohol use 0.0 oz/week     0 Standard drinks or equivalent per week      Comment: occ wine     Drug use: No     Sexual activity: Not Currently     Other Topics Concern     Caffeine Concern No     coffee: 1 cup a week.  Occ green tea     Sleep Concern No     Stress Concern No     Weight Concern No     Special Diet No     Exercise Yes     walking daily     Seat Belt Yes     Social History Narrative       Review of Systems:  Skin:  Negative       Eyes:  Positive for glasses    ENT:  Positive for nasal congestion allergies  Respiratory:  Negative       Cardiovascular:    edema;Positive for    Gastroenterology: Negative      Genitourinary:  Negative      Musculoskeletal:  Positive for arthritis    Neurologic:  Negative      Psychiatric:  Negative      Heme/Lymph/Imm:  Positive for allergies    Endocrine:  Positive for thyroid disorder      Physical Exam:  Vitals: /58  Pulse 60  Ht 1.562 m (5' 1.5\")  Wt 74.3 kg (163 lb 12.8 oz)  SpO2 94%  BMI 30.45 kg/m2    Constitutional:  cooperative, alert and oriented, well developed, well nourished, in no acute distress        Skin:  warm and dry to the touch   pacemaker incision in the left infraclavicular area was well-healed ecchymosis of right side of face    Head:  normocephalic, no masses or lesions        Eyes:           Lymph:      ENT:  no pallor or cyanosis    "     Neck:    JVP 8-10      Respiratory:  clear to auscultation         Cardiac: regular rhythm, normal S1/S2, no S3 or S4, apical impulse not displaced, no murmurs, gallops or rubs                not assessed this visit                                        GI:  abdomen soft        Extremities and Muscular Skeletal:  no deformities, clubbing, cyanosis, erythema observed   trace;bilateral LE edema          Neurological:  affect appropriate   walking with a cane    Psych:  Alert and Oriented x 3        CC  Efra Vazquez MD  [address]  [place] [id number]

## 2018-04-06 NOTE — LETTER
4/6/2018    Jeffery Sherwood MD  [address]  [place] [address]    RE: Lindsey Savannah Hale       Dear Colleague,    I had the pleasure of seeing Lindsey Savannah Hale in the [facility].    HPI and Plan: #[id number]  See dictation      Echo Interpretation Summary 4/3/2017     Left ventricular systolic function is normal.  There is borderline concentric left ventricular hypertrophy.  There is mild right ventricular hypertrophy.  The right ventricular systolic function is normal.    Mildly thickened mitral leaflets with restricted motion of the posterior leaflet.  There is mild mitral stenosis.  The mean mitral valve gradient is 4mmHg at 68 bpm.  There is mild to moderate (1-2+) mitral regurgitation.  There is mild (1+) tricuspid regurgitation.  The right ventricular systolic pressure is approximated at 39mmHg plus the right atrial pressure.  There is mild to moderate (1-2+) aortic regurgitation.  Compared to the prior study dated 6/11/2015, there are changes as noted. Mild mitral stenosis is now seen. The degree of mitral and aortic insufficiency is unchanged.    Echo reviewed and repeat echo offered, but pt declined.    Orders Placed This Encounter   Procedures     Follow-Up with Electrophysiologist     EKG 12-lead complete w/read - Clinics (performed today)     EKG 12-lead complete w/read - Clinics (to be scheduled)       No orders of the defined types were placed in this encounter.      Medications Discontinued During This Encounter   Medication Reason     senna-docusate (SENOKOT-S;PERICOLACE) 8.6-50 MG per tablet Alternate therapy         Encounter Diagnosis   Name Primary?     Paroxysmal atrial fibrillation (H)        CURRENT MEDICATIONS:  Current Outpatient Prescriptions   Medication Sig Dispense Refill     traMADol (ULTRAM) 50 MG tablet Take 1 tablet (50 mg) by mouth 2 times daily as needed for severe pain 30 tablet 0     alendronate (FOSAMAX) 70 MG tablet Take 1 tablet (70 mg)  by mouth with 8oz water every 7 days 30 minutes before breakfast and remain upright during this time. 12 tablet 3     flecainide acetate 150 MG TABS Take 1/2 tablet by mouth twice daily 90 tablet 3     polyethylene glycol (MIRALAX/GLYCOLAX) powder Take 17 g by mouth daily       Furosemide (LASIX PO) Take 20 mg by mouth daily        acetaminophen (TYLENOL) 325 MG tablet Take 2 tablets (650 mg) by mouth every 4 hours as needed for other (surgical pain) 40 tablet 0     Carboxymethylcellulose Sod PF (REFRESH PLUS) 0.5 % SOLN ophthalmic solution 1 drop 3 times daily as needed for dry eyes       VITAMIN D, CHOLECALCIFEROL, PO Take 1,000 Units by mouth daily       VITAMIN B COMPLEX-C CAPS Take 1 capsule by mouth daily       metoprolol (LOPRESSOR) 50 MG tablet TAKE 1 AND 1/2 TABLETS BY MOUTH TWICE DAILY 270 tablet 3     levothyroxine (SYNTHROID/LEVOTHROID) 75 MCG tablet Take 1 tablet (75 mcg) by mouth daily 90 tablet 3     rivaroxaban ANTICOAGULANT (XARELTO) 20 MG TABS tablet Take 1 tablet (20 mg) by mouth daily (with dinner) 90 tablet 2     budesonide-formoterol (SYMBICORT) 160-4.5 MCG/ACT inhaler Inhale 2 puffs into the lungs 2 times daily        cephALEXin (KEFLEX) 500 MG capsule Take 1 capsule (500 mg) by mouth 3 times daily (Patient not taking: Reported on 4/6/2018) 21 capsule 0       ALLERGIES     Allergies   Allergen Reactions     Clindamycin Hcl Other (See Comments)     Difficulty swallowing     Ampicillin Potassium      Rash nausea and vomiting       Celebrex [Celecoxib]      rash     Ciprofloxacin      rash     Erythromycin      rash     Indocin      Ended up going to( [facility] hospital with severe head ache     Penicillins      Rash,nausea and vomiting     Vibramycin [Doxycycline Hyclate]      Rash      Xylocaine-Epinephrine [Epinephrine-Lidocaine-Na Metabisulfite]      Xylocaine with epi caused a rapid heart beat       PAST MEDICAL HISTORY:  Past Medical History:   Diagnosis Date     Abnormal echocardiogram 04/2017     mild mr, ar, mitral stenosis, nl ef, lvh.  Done for episode of vision change     Arthritis 99         Atrial fibrillation (H) 2011 and 2009    neg nuc est 2009, Dr. Vazquez, on coumadin     Chest pain 2000    neg est echo, neg ct chest abd, pelvis Orthodox     Chronic LBP      COPD (chronic obstructive pulmonary disease) (H)     seen by pulmonary md Dr. Whitt, and given inhaler     Cysts, breast 1980s    bilat mastectomies     Dizzy 2007    ct neg, carotid us neg     Dysphagia 2005    egd nl     Elevated blood sugar      Environmental allergies      Hematuria 2004    neg cysto and us     Hemoptyses 2008    ct neg, bronch neg 2009     Hyperlipidemia      Hypertension      Hypothyroid 1995    Dr. Ortiz     Mitral regurgitation 6/15    on echo     nausea 2006    ct abd and ;pelvis neg     Osteopenia     fu dexa better 2011     Pacemaker 2011    afib with pauses and syncope     Palpitations 2009    neg est     PMR (polymyalgia rheumatica) (H) 2004    not active in years     SOB (shortness of breath) 5/15    echo nl ef, 2+mr, cxr clear, seen by pulm and given inhaler     Supraventricular premature beats      TIA (transient ischaemic attack) 2009    carotids neg, mri neg     Treadmill stress test negative for angina pectoris 2011    nuclear est     Urine incontinence     Dr. Westfall     Urosepsis 2009    hospitalized     Vision changes 2011    eval by neuro and ophtho and no cause found       PAST SURGICAL HISTORY:  Past Surgical History:   Procedure Laterality Date     ARTHROPLASTY HIP Left 12/5/2017    Procedure: ARTHROPLASTY HIP;  LEFT HIP ARBEN ARTHROPLASTY(SORAYA);  Surgeon: Darell Nathan MD;  Location:  OR     ARTHROPLASTY PATELLO-FEMORAL (KNEE)  1998 and 2002     ARTHROSCOPY KNEE  1997     BIOPSY BREAST Right 9/23/2014    Procedure: BIOPSY BREAST;  Surgeon: David Carter MD;  Location:  SD     breast implants      4x     cataract  2011     FOOT SURGERY  2003     MASTECTOMY  1980     "bilat, cysts     [place] not bso  70's    not for ca       FAMILY HISTORY:  Family History   Problem Relation Age of Onset     C.A.D. Father      Lymphoma Father      CANCER Mother      Lymphoma Brother      Breast Cancer Sister      OSTEOPOROSIS Sister      Myocardial Infarction Sister      Unknown/Adopted Maternal Grandmother      Unknown/Adopted Maternal Grandfather      Unknown/Adopted Paternal Grandmother      Unknown/Adopted Paternal Grandfather        SOCIAL HISTORY:  Social History     Social History     Marital status:      Spouse name: N/A     Number of children: 4     Years of education: N/A     Social History Main Topics     Smoking status: Former Smoker     Types: Cigarettes     Quit date: 2/1/1955     Smokeless tobacco: Never Used      Comment: quit in 1955   had smoked about 2 cigarettes a day or more     Alcohol use 0.0 oz/week     0 Standard drinks or equivalent per week      Comment: occ wine     Drug use: No     Sexual activity: Not Currently     Other Topics Concern     Caffeine Concern No     coffee: 1 cup a week.  Occ green tea     Sleep Concern No     Stress Concern No     Weight Concern No     Special Diet No     Exercise Yes     walking daily     Seat Belt Yes     Social History Narrative       Review of Systems:  Skin:  Negative       Eyes:  Positive for glasses    ENT:  Positive for nasal congestion allergies  Respiratory:  Negative       Cardiovascular:    edema;Positive for    Gastroenterology: Negative      Genitourinary:  Negative      Musculoskeletal:  Positive for arthritis    Neurologic:  Negative      Psychiatric:  Negative      Heme/Lymph/Imm:  Positive for allergies    Endocrine:  Positive for thyroid disorder      Physical Exam:  Vitals: /58  Pulse 60  Ht 1.562 m (5' 1.5\")  Wt 74.3 kg (163 lb 12.8 oz)  SpO2 94%  BMI 30.45 kg/m2    Constitutional:  cooperative, alert and oriented, well developed, well nourished, in no acute distress        Skin:  warm and dry to the " touch   pacemaker incision in the left infraclavicular area was well-healed ecchymosis of right side of face    Head:  normocephalic, no masses or lesions        Eyes:           Lymph:      ENT:  no pallor or cyanosis        Neck:    JVP 8-10      Respiratory:  clear to auscultation         Cardiac: regular rhythm, normal S1/S2, no S3 or S4, apical impulse not displaced, no murmurs, gallops or rubs                not assessed this visit                                        GI:  abdomen soft        Extremities and Muscular Skeletal:  no deformities, clubbing, cyanosis, erythema observed   trace;bilateral LE edema          Neurological:  affect appropriate   walking with a cane    Psych:  Alert and Oriented x 3        CC  Efra Vazquez MD  [address]  [place] [id number]                Thank you for allowing me to participate in the care of your patient.      Sincerely,     Zaina Herman, NP, APRN CNP     [facility]    cc:   Efra Vazquez MD  [address]  [place] [id number]

## 2018-04-06 NOTE — PROGRESS NOTES
Service Date: 04/06/2018      HISTORY OF PRESENT ILLNESS:  Ms. Hale is a delightful 89-year-old woman that I am having the pleasure of seeing today in followup.  She is an established patient of Dr. Vazquez.  Briefly, she has a history of symptomatic paroxysmal atrial fibrillation, sinus node dysfunction with pacemaker implantation.  She was briefly on amiodarone which was discontinued because of abnormal pulmonary function test.  She was placed on flecainide 75 mg b.i.d., metoprolol 75 mg b.i.d. and Xarelto 20 mg daily.      Unfortunately, she suffered a mechanical fall in December.  The patient walked for 3 days without pain and then unfortunately was unable to move.  She was brought to the hospital via EMS and was noted to have a fracture.  This was surgically repaired after the Xarelto was held for 2 days.  Dr. Sherwood also recently started her on Fosamax, and she is continuing with calcium and vitamin D.      After her hip fracture she was noted to be in atrial fibrillation.  EKG completed today shows A pace, V pace.  The QRS and QT are stable and similar to her EKG in 01/2017.  Her 12-lead EKG in December, upon presentation to the ED after her hip fracture was AFib.  Her last device interrogation was on 01/14, showed 35% A paced, 87% V paced.  This was a Merlin transmission.      Ms. Hale is feeling well.  She denies chest pain, shortness of breath, lightheadedness or dizziness.  She continues to be fairly active for the age of 89.  She is now ambulating with a cane due to the hip fracture.  She is here today with her daughter, Oxana.  All other review of systems, past medical history and physical exam are noted below.      ASSESSMENT AND PLAN:  Ms. Hale is a delightful 89-year-old woman here today for followup:      1.  Paroxysmal atrial fibrillation.  She is doing well on flecainide 75 mg b.i.d., metoprolol and Xarelto.  We continue to monitor her on her device interrogation.  Her EKG is stable from last year.  I  will send this to Dr. Vazquez for review as well.  We will continue her current medical regimen.  She will see Dr. Vazquez in 1 year with a followup EKG, or sooner if there are questions or concerns.      2.  Sick sinus syndrome.  Her pacemaker is functioning well.  Battery longevity is between 6-7 years.  She has a remote transmission scheduled for later this month.  Again, we will continue to monitor for any atrial or ventricular dysrhythmias.      3.  Hypertension.  She is normotensive at today's visit.      Thank you as always for including us in the care of this delightful patient.  If there are any questions or concerns, feel free to contact us.  She will follow up with Dr. Sherwood in the fall.         RADHA UMANA NP             D: 2018   T: 2018   MT: RONALD      Name:     CASIMIRO PRIEST   MRN:      [id number]        Account:      [id number]   :      1925           Service Date: 2018      Document: [id number]

## 2018-04-06 NOTE — LETTER
4/6/2018      Jeffery Sherwood MD  [address]  [facility] [address]      RE: Lindsey Hale       Dear Colleague,    I had the pleasure of seeing Lindsey Hale in the [facility].    Service Date: 04/06/2018      HISTORY OF PRESENT ILLNESS:  Ms. Hale is a delightful 89-year-old woman that I am having the pleasure of seeing today in followup.  She is an established patient of Dr. Vazquez.  Briefly, she has a history of symptomatic paroxysmal atrial fibrillation, sinus node dysfunction with pacemaker implantation.  She was briefly on amiodarone which was discontinued because of abnormal pulmonary function test.  She was placed on flecainide 75 mg b.i.d., metoprolol 75 mg b.i.d. and Xarelto 20 mg daily.      Unfortunately, she suffered a mechanical fall in December.  The patient walked for 3 days without pain and then unfortunately was unable to move.  She was brought to the hospital via EMS and was noted to have a fracture.  This was surgically repaired after the Xarelto was held for 2 days.  Dr. Sherwood also recently started her on Fosamax, and she is continuing with calcium and vitamin D.      After her hip fracture she was noted to be in atrial fibrillation.  EKG completed today shows A pace, V pace.  The QRS and QT are stable and similar to her EKG in 01/2017.  Her 12-lead EKG in December, upon presentation to the ED after her hip fracture was AFib.  Her last device interrogation was on 01/14, showed 35% A paced, 87% V paced.  This was a Merlin transmission.      Ms. Hale is feeling well.  She denies chest pain, shortness of breath, lightheadedness or dizziness.  She continues to be fairly active for the age of 89.  She is now ambulating with a cane due to the hip fracture.  She is here today with her daughter, Oxana.  All other review of systems, past medical history and physical exam are noted below.      ASSESSMENT AND PLAN:  Ms. Hale is a delightful 89-year-old woman  here today for followup:      1.  Paroxysmal atrial fibrillation.  She is doing well on flecainide 75 mg b.i.d., metoprolol and Xarelto.  We continue to monitor her on her device interrogation.  Her EKG is stable from last year.  I will send this to Dr. Vazquez for review as well.  We will continue her current medical regimen.  She will see Dr. Vazquez in 1 year with a followup EKG, or sooner if there are questions or concerns.      2.  Sick sinus syndrome.  Her pacemaker is functioning well.  Battery longevity is between 6-7 years.  She has a remote transmission scheduled for later this month.  Again, we will continue to monitor for any atrial or ventricular dysrhythmias.      3.  Hypertension.  She is normotensive at today's visit.      Thank you as always for including us in the care of this delightful patient.  If there are any questions or concerns, feel free to contact us.  She will follow up with Dr. Sherwood in the fall.         RADHA UMANA NP             D: 2018   T: 2018   MT: RONALD      Name:     CASIMIRO PRIEST   MRN:      [id number]        Account:      [id number]   :      1925           Service Date: 2018      Document: [id number]         Outpatient Encounter Prescriptions as of 2018   Medication Sig Dispense Refill     traMADol (ULTRAM) 50 MG tablet Take 1 tablet (50 mg) by mouth 2 times daily as needed for severe pain 30 tablet 0     alendronate (FOSAMAX) 70 MG tablet Take 1 tablet (70 mg) by mouth with 8oz water every 7 days 30 minutes before breakfast and remain upright during this time. 12 tablet 3     flecainide acetate 150 MG TABS Take 1/2 tablet by mouth twice daily 90 tablet 3     polyethylene glycol (MIRALAX/GLYCOLAX) powder Take 17 g by mouth daily       Furosemide (LASIX PO) Take 20 mg by mouth daily        acetaminophen (TYLENOL) 325 MG tablet Take 2 tablets (650 mg) by mouth every 4 hours as needed for other (surgical pain) 40 tablet 0     Carboxymethylcellulose  Sod PF (REFRESH PLUS) 0.5 % SOLN ophthalmic solution 1 drop 3 times daily as needed for dry eyes       VITAMIN D, CHOLECALCIFEROL, PO Take 1,000 Units by mouth daily       VITAMIN B COMPLEX-C CAPS Take 1 capsule by mouth daily       metoprolol (LOPRESSOR) 50 MG tablet TAKE 1 AND 1/2 TABLETS BY MOUTH TWICE DAILY 270 tablet 3     levothyroxine (SYNTHROID/LEVOTHROID) 75 MCG tablet Take 1 tablet (75 mcg) by mouth daily 90 tablet 3     rivaroxaban ANTICOAGULANT (XARELTO) 20 MG TABS tablet Take 1 tablet (20 mg) by mouth daily (with dinner) 90 tablet 2     budesonide-formoterol (SYMBICORT) 160-4.5 MCG/ACT inhaler Inhale 2 puffs into the lungs 2 times daily        cephALEXin (KEFLEX) 500 MG capsule Take 1 capsule (500 mg) by mouth 3 times daily (Patient not taking: Reported on 4/6/2018) 21 capsule 0     [DISCONTINUED] senna-docusate (SENOKOT-S;PERICOLACE) 8.6-50 MG per tablet Take 1 tablet by mouth 2 times daily       No facility-administered encounter medications on file as of 4/6/2018.        Again, thank you for allowing me to participate in the care of your patient.      Sincerely,    Zaina Herman NP, APRN [facility]

## 2018-04-11 ENCOUNTER — TELEPHONE (OUTPATIENT)
Dept: FAMILY MEDICINE | Facility: CLINIC | Age: 83
End: 2018-04-11

## 2018-04-11 NOTE — TELEPHONE ENCOUNTER
Marcie physical therapist with [facility] called #[phone]   Pt has met all PT goals so she is requesting orders to discontinue home care    Gave verbal per protocol.   She will be faxing over the orders to have PCP sign     Rasheeda VELIZ RN

## 2018-04-23 ENCOUNTER — ALLIED HEALTH/NURSE VISIT (OUTPATIENT)
Dept: CARDIOLOGY | Facility: CLINIC | Age: 83
End: 2018-04-23
Payer: MEDICARE

## 2018-04-23 DIAGNOSIS — Z95.0 CARDIAC PACEMAKER IN SITU: Primary | ICD-10-CM

## 2018-04-23 PROCEDURE — 93296 REM INTERROG EVL PM/IDS: CPT | Performed by: INTERNAL MEDICINE

## 2018-04-23 PROCEDURE — 93294 REM INTERROG EVL PM/LDLS PM: CPT | Performed by: INTERNAL MEDICINE

## 2018-04-23 NOTE — PROGRESS NOTES
[facility] [employer] (D) Remote PPM Device Check  AP: 48 % : 99 %  Mode: DDDR        Presenting Rhythm: AP/  Heart Rate: Adequate rates per histogram  Sensing: Stable    Pacing Threshold: Stable    Impedance: Stable  Battery Status: 6.3 years  Atrial Arrhythmia: None  Ventricular Arrhythmia: None     Care Plan: F/u PPM Merlin q 3 months. LM with results. Russel,CVT

## 2018-04-23 NOTE — MR AVS SNAPSHOT
After Visit Summary   4/23/2018    Lindsey Hale    MRN: [id number]           Patient Information     Date Of Birth          9/16/1925        Visit Information        Provider Department      4/23/2018 2:45 PM DARREN JOHNSTON [facility]        Today's Diagnoses     Cardiac pacemaker in situ    -  1       Follow-ups after your visit        Your next 10 appointments already scheduled     Apr 23, 2018  2:45 PM CDT   Remote PPM Check with DARREN JOHNSTON   [facility] ([facility])    [address]  [facility] [address] [phone] OPT 2           This appointment is for a remote check of your pacemaker.  This is not an appointment at the office.            Aug 16, 2018  9:00 AM CDT   [facility] Visit with Jeffery Sherwood MD   [facility]    [address] [address] [phone]           Bring a current list of meds and any records pertaining to this visit. For Physicals, please bring immunization records and any forms needing to be filled out. Please arrive 10 minutes early to complete paperwork.              Who to contact     If you have questions or need follow up information about today's clinic visit or your schedule please contact [facility] directly at [phone].  Normal or non-critical lab and imaging results will be communicated to you by MyChart, letter or phone within 4 business days after the clinic has received the results. If you do not hear from us within 7 days, please contact the clinic through MyChart or phone. If you have a critical or abnormal lab result, we will notify you by phone as soon as possible.  Submit refill requests through [employer] or call your pharmacy and they will forward the refill request to us. Please allow 3 business days for your refill to be completed.          Additional  "Information About Your Visit        MyChart Information     [employer] lets you send messages to your doctor, view your test results, renew your prescriptions, schedule appointments and more. To sign up, go to [email or web address] . Click on \"Log in\" on the left side of the screen, which will take you to the Welcome page. Then click on \"Sign up Now\" on the right side of the page.     You will be asked to enter the access code listed below, as well as some personal information. Please follow the directions to create your username and password.     Your access code is: [email or web address]  Expires: 2018 10:58 AM     Your access code will  in 90 days. If you need help or a new code, please call your [facility] clinic or [phone].        Care EveryWhere ID     This is your Care EveryWhere ID. This could be used by other organizations to access your [facility] medical records  [id number]         Blood Pressure from Last 3 Encounters:   18 108/58   18 134/78   18 123/59    Weight from Last 3 Encounters:   18 74.3 kg (163 lb 12.8 oz)   18 73 kg (161 lb)   18 77.4 kg (170 lb 9.6 oz)              We Performed the Following     INTERROGATION DEVICE EVAL REMOTE, PACER/ICD (68664)     PM DEVICE INTERROGATE REMOTE (18293)        Primary Care Provider Office Phone # Fax #    Jeffery Marvin Sherwood [phone] [address] [address]        Equal Access to Services     [facility] : Hadii darrian Sinclair, waaxda luqadaha, qaybta kaaltwan chang, maritza quesada. So [facility] [phone].    ATENCIÓN: Si habla español, tiene a rolon disposición servicios gratuitos de asistencia lingüística. Llame al [phone].    We comply with applicable federal civil rights laws and [place] laws. We do not discriminate on the basis of race, color, national origin, age, disability, sex, sexual orientation, or gender identity.       "      Thank you!     Thank you for choosing [facility]  for your care. Our goal is always to provide you with excellent care. Hearing back from our patients is one way we can continue to improve our services. Please take a few minutes to complete the written survey that you may receive in the mail after your visit with us. Thank you!             Your Updated Medication List - Protect others around you: Learn how to safely use, store and throw away your medicines at www.disposemymeds.org.          This list is accurate as of 4/23/18 10:58 AM.  Always use your most recent med list.                   Brand Name Dispense Instructions for use Diagnosis    acetaminophen 325 MG tablet    TYLENOL    40 tablet    Take 2 tablets (650 mg) by mouth every 4 hours as needed for other (surgical pain)    Closed fracture of neck of left femur, initial encounter (H)       alendronate 70 MG tablet    FOSAMAX    12 tablet    Take 1 tablet (70 mg) by mouth with 8oz water every 7 days 30 minutes before breakfast and remain upright during this time.    Senile osteoporosis       budesonide-formoterol 160-4.5 MCG/ACT Inhaler    SYMBICORT     Inhale 2 puffs into the lungs 2 times daily        Carboxymethylcellulose Sod PF 0.5 % Soln ophthalmic solution    REFRESH PLUS     1 drop 3 times daily as needed for dry eyes        cephALEXin 500 MG capsule    KEFLEX    21 capsule    Take 1 capsule (500 mg) by mouth 3 times daily    Acute cystitis with hematuria       flecainide acetate 150 MG Tabs     90 tablet    Take 1/2 tablet by mouth twice daily    Atrial fibrillation (H)       LASIX PO      Take 20 mg by mouth daily        levothyroxine 75 MCG tablet    SYNTHROID/LEVOTHROID    90 tablet    Take 1 tablet (75 mcg) by mouth daily    Hypothyroidism, unspecified type       metoprolol tartrate 50 MG tablet    LOPRESSOR    270 tablet    TAKE 1 AND 1/2 TABLETS BY MOUTH TWICE DAILY    Atrial fibrillation,  unspecified type (H), Benign essential hypertension       polyethylene glycol powder    MIRALAX/GLYCOLAX     Take 17 g by mouth daily        rivaroxaban ANTICOAGULANT 20 MG Tabs tablet    XARELTO    90 tablet    Take 1 tablet (20 mg) by mouth daily (with dinner)    Atrial fibrillation, unspecified type (H)       traMADol 50 MG tablet    ULTRAM    30 tablet    Take 1 tablet (50 mg) by mouth 2 times daily as needed for severe pain    Chronic low back pain without sciatica, unspecified back pain laterality       VITAMIN B COMPLEX-C Caps      Take 1 capsule by mouth daily        VITAMIN D (CHOLECALCIFEROL) PO      Take 1,000 Units by mouth daily

## 2018-04-25 DIAGNOSIS — Z53.9 DIAGNOSIS NOT YET DEFINED: Primary | ICD-10-CM

## 2018-04-25 PROCEDURE — G0179 MD RECERTIFICATION HHA PT: HCPCS | Performed by: INTERNAL MEDICINE

## 2018-06-26 ENCOUNTER — TELEPHONE (OUTPATIENT)
Dept: FAMILY MEDICINE | Facility: CLINIC | Age: 83
End: 2018-06-26

## 2018-07-30 ENCOUNTER — ALLIED HEALTH/NURSE VISIT (OUTPATIENT)
Dept: CARDIOLOGY | Facility: CLINIC | Age: 83
End: 2018-07-30
Payer: MEDICARE

## 2018-07-30 DIAGNOSIS — Z95.0 CARDIAC PACEMAKER IN SITU: Primary | ICD-10-CM

## 2018-07-30 PROCEDURE — 93294 REM INTERROG EVL PM/LDLS PM: CPT | Performed by: INTERNAL MEDICINE

## 2018-07-30 PROCEDURE — 93296 REM INTERROG EVL PM/IDS: CPT | Performed by: INTERNAL MEDICINE

## 2018-07-30 NOTE — MR AVS SNAPSHOT
"              After Visit Summary   7/30/2018    Lindsey Hale    MRN: [id number]           Patient Information     Date Of Birth          9/16/1925        Visit Information        Provider Department      7/30/2018 2:45 PM [facility]        Today's Diagnoses     Cardiac pacemaker in situ    -  1       Follow-ups after your visit        Your next 10 appointments already scheduled     Aug 15, 2018  9:00 AM CDT   Office Visit with Jeffery Sherwood MD   [facility]    [address] [facility] [address] [phone]           Bring a current list of meds and any records pertaining to this visit. For Physicals, please bring immunization records and any forms needing to be filled out. Please arrive 10 minutes early to complete paperwork.              Who to contact     If you have questions or need follow up information about today's clinic visit or your schedule please contact [facility] directly at [phone].  Normal or non-critical lab and imaging results will be communicated to you by [employer], letter or phone within 4 business days after the clinic has received the results. If you do not hear from us within 7 days, please contact the clinic through [employer] or phone. If you have a critical or abnormal lab result, we will notify you by phone as soon as possible.  Submit refill requests through [employer] or call your pharmacy and they will forward the refill request to us. Please allow 3 business days for your refill to be completed.          Additional Information About Your Visit        [employer] Information     [employer] lets you send messages to your doctor, view your test results, renew your prescriptions, schedule appointments and more. To sign up, go to [email or web address] . Click on \"Log in\" on the left side of the screen, which will take you to the Welcome page. Then " "click on \"Sign up Now\" on the right side of the page.     You will be asked to enter the access code listed below, as well as some personal information. Please follow the directions to create your username and password.     Your access code is: [email or web address]  Expires: 10/29/2018 10:10 AM     Your access code will  in 90 days. If you need help or a new code, please call your [place] clinic or [phone].        Care EveryWhere ID     This is your Care EveryWhere ID. This could be used by other organizations to access your [place] medical records  [id number]         Blood Pressure from Last 3 Encounters:   18 108/58   18 134/78   18 123/59    Weight from Last 3 Encounters:   18 74.3 kg (163 lb 12.8 oz)   18 73 kg (161 lb)   18 77.4 kg (170 lb 9.6 oz)              We Performed the Following     INTERROGATION DEVICE EVAL REMOTE, PACER/ICD (02951)     PM DEVICE INTERROGATE REMOTE (03452)        Primary Care Provider Office Phone # Fax #    Jeffery Marvin Sherwood [phone] [address] [address]        Equal Access to Services     [facility] AH: Hadii aad ku hadasho Soomaali, waaxda luqadaha, qaybta kaalmada adeegyada, waxay idiin haypalak kim . So [facility] [phone].    ATENCIÓN: Si habla español, tiene a rolon disposición servicios gratuitos de asistencia lingüística. Llame al [phone].    We comply with applicable federal civil rights laws and [place] laws. We do not discriminate on the basis of race, color, national origin, age, disability, sex, sexual orientation, or gender identity.            Thank you!     Thank you for choosing [facility]  for your care. Our goal is always to provide you with excellent care. Hearing back from our patients is one way we can continue to improve our services. Please take a few minutes to complete the written survey that you may receive in " the mail after your visit with us. Thank you!             Your Updated Medication List - Protect others around you: Learn how to safely use, store and throw away your medicines at www.disposemymeds.org.          This list is accurate as of 7/30/18 11:59 PM.  Always use your most recent med list.                   Brand Name Dispense Instructions for use Diagnosis    acetaminophen 325 MG tablet    TYLENOL    40 tablet    Take 2 tablets (650 mg) by mouth every 4 hours as needed for other (surgical pain)    Closed fracture of neck of left femur, initial encounter (H)       alendronate 70 MG tablet    FOSAMAX    12 tablet    Take 1 tablet (70 mg) by mouth with 8oz water every 7 days 30 minutes before breakfast and remain upright during this time.    Senile osteoporosis       budesonide-formoterol 160-4.5 MCG/ACT Inhaler    SYMBICORT     Inhale 2 puffs into the lungs 2 times daily        Carboxymethylcellulose Sod PF 0.5 % Soln ophthalmic solution    REFRESH PLUS     1 drop 3 times daily as needed for dry eyes        cephALEXin 500 MG capsule    KEFLEX    21 capsule    Take 1 capsule (500 mg) by mouth 3 times daily    Acute cystitis with hematuria       flecainide acetate 150 MG Tabs     90 tablet    Take 1/2 tablet by mouth twice daily    Atrial fibrillation (H)       LASIX PO      Take 20 mg by mouth daily        levothyroxine 75 MCG tablet    SYNTHROID/LEVOTHROID    90 tablet    Take 1 tablet (75 mcg) by mouth daily    Hypothyroidism, unspecified type       metoprolol tartrate 50 MG tablet    LOPRESSOR    270 tablet    TAKE 1 AND 1/2 TABLETS BY MOUTH TWICE DAILY    Atrial fibrillation, unspecified type (H), Benign essential hypertension       polyethylene glycol powder    MIRALAX/GLYCOLAX     Take 17 g by mouth daily        rivaroxaban ANTICOAGULANT 20 MG Tabs tablet    XARELTO    90 tablet    Take 1 tablet (20 mg) by mouth daily (with dinner)    Atrial fibrillation, unspecified type (H)       traMADol 50 MG tablet     ULTRAM    30 tablet    Take 1 tablet (50 mg) by mouth 2 times daily as needed for severe pain    Chronic low back pain without sciatica, unspecified back pain laterality       VITAMIN B COMPLEX-C Caps      Take 1 capsule by mouth daily        VITAMIN D (CHOLECALCIFEROL) PO      Take 1,000 Units by mouth daily

## 2018-07-31 NOTE — PROGRESS NOTES
[address] (D) Remote PPM Device Check  AP: 55% : >99%  Mode: DDDR        Presenting Rhythm: AS/ & AP/  Heart Rate: adequate heart rates per histogram  Sensing: RA: stable RV: not performed    Pacing Threshold: RA: stable RV: not performed    Impedance: stable  Battery Status: 6.2 years remaining  Atrial Arrhythmia: none  Ventricular Arrhythmia: none     Care Plan: Order placed for annual threshold to be scheduled in November. Order in for annual OV to be scheduled in April 2018. No answer, left message with results. Mara VILLELA

## 2018-08-15 ENCOUNTER — APPOINTMENT (OUTPATIENT)
Dept: GENERAL RADIOLOGY | Facility: CLINIC | Age: 83
End: 2018-08-15
Attending: EMERGENCY MEDICINE
Payer: MEDICARE

## 2018-08-15 ENCOUNTER — HOSPITAL ENCOUNTER (EMERGENCY)
Facility: CLINIC | Age: 83
Discharge: HOME | End: 2018-08-15
Attending: EMERGENCY MEDICINE | Admitting: EMERGENCY MEDICINE
Payer: MEDICARE

## 2018-08-15 VITALS
OXYGEN SATURATION: 95 % | HEART RATE: 60 BPM | DIASTOLIC BLOOD PRESSURE: 49 MMHG | SYSTOLIC BLOOD PRESSURE: 119 MMHG | TEMPERATURE: 100.2 F | RESPIRATION RATE: 16 BRPM

## 2018-08-15 DIAGNOSIS — M54.50 ACUTE LEFT-SIDED LOW BACK PAIN WITHOUT SCIATICA: ICD-10-CM

## 2018-08-15 DIAGNOSIS — N30.00 ACUTE CYSTITIS WITHOUT HEMATURIA: ICD-10-CM

## 2018-08-15 LAB
ALBUMIN UR-MCNC: ABNORMAL MG/DL
ANION GAP SERPL CALCULATED.3IONS-SCNC: 3 MMOL/L (ref 3–14)
APPEARANCE UR: ABNORMAL
BASOPHILS # BLD AUTO: 0 10E9/L (ref 0–0.2)
BASOPHILS NFR BLD AUTO: 0.1 %
BILIRUB UR QL STRIP: NEGATIVE
BUN SERPL-MCNC: 20 MG/DL (ref 7–30)
CALCIUM SERPL-MCNC: 8.7 MG/DL (ref 8.5–10.1)
CHLORIDE SERPL-SCNC: 104 MMOL/L (ref 94–109)
CO2 SERPL-SCNC: 30 MMOL/L (ref 20–32)
COLOR UR AUTO: ABNORMAL
CREAT SERPL-MCNC: 0.72 MG/DL (ref 0.52–1.04)
DIFFERENTIAL METHOD BLD: ABNORMAL
EOSINOPHIL # BLD AUTO: 0 10E9/L (ref 0–0.7)
EOSINOPHIL NFR BLD AUTO: 0 %
ERYTHROCYTE [DISTWIDTH] IN BLOOD BY AUTOMATED COUNT: 13.6 % (ref 10–15)
GFR SERPL CREATININE-BSD FRML MDRD: 75 ML/MIN/1.7M2
GLUCOSE SERPL-MCNC: 145 MG/DL (ref 70–99)
GLUCOSE UR STRIP-MCNC: NEGATIVE MG/DL
HCT VFR BLD AUTO: 36.2 % (ref 35–47)
HGB BLD-MCNC: 11.9 G/DL (ref 11.7–15.7)
HGB UR QL STRIP: ABNORMAL
IMM GRANULOCYTES # BLD: 0 10E9/L (ref 0–0.4)
IMM GRANULOCYTES NFR BLD: 0.2 %
KETONES UR STRIP-MCNC: NEGATIVE MG/DL
LACTATE BLD-SCNC: 1.3 MMOL/L (ref 0.7–2)
LEUKOCYTE ESTERASE UR QL STRIP: ABNORMAL
LYMPHOCYTES # BLD AUTO: 0.9 10E9/L (ref 0.8–5.3)
LYMPHOCYTES NFR BLD AUTO: 6.6 %
MCH RBC QN AUTO: 32.9 PG (ref 26.5–33)
MCHC RBC AUTO-ENTMCNC: 32.9 G/DL (ref 31.5–36.5)
MCV RBC AUTO: 100 FL (ref 78–100)
MONOCYTES # BLD AUTO: 1.4 10E9/L (ref 0–1.3)
MONOCYTES NFR BLD AUTO: 10.9 %
NEUTROPHILS # BLD AUTO: 10.8 10E9/L (ref 1.6–8.3)
NEUTROPHILS NFR BLD AUTO: 82.2 %
NITRATE UR QL: POSITIVE
NRBC # BLD AUTO: 0 10*3/UL
NRBC BLD AUTO-RTO: 0 /100
PH UR STRIP: 7.5 PH (ref 5–7)
PLATELET # BLD AUTO: 231 10E9/L (ref 150–450)
POTASSIUM SERPL-SCNC: 4.6 MMOL/L (ref 3.4–5.3)
RBC # BLD AUTO: 3.62 10E12/L (ref 3.8–5.2)
RBC #/AREA URNS AUTO: 1 /HPF (ref 0–2)
SODIUM SERPL-SCNC: 137 MMOL/L (ref 133–144)
SOURCE: ABNORMAL
SP GR UR STRIP: 1.01 (ref 1–1.03)
SQUAMOUS #/AREA URNS AUTO: 10 /HPF (ref 0–1)
UROBILINOGEN UR STRIP-MCNC: 0.2 MG/DL (ref 0–2)
WBC # BLD AUTO: 13.2 10E9/L (ref 4–11)
WBC #/AREA URNS AUTO: 100 /HPF (ref 0–5)

## 2018-08-15 PROCEDURE — 87086 URINE CULTURE/COLONY COUNT: CPT | Performed by: EMERGENCY MEDICINE

## 2018-08-15 PROCEDURE — 73502 X-RAY EXAM HIP UNI 2-3 VIEWS: CPT

## 2018-08-15 PROCEDURE — 85025 COMPLETE CBC W/AUTO DIFF WBC: CPT | Performed by: EMERGENCY MEDICINE

## 2018-08-15 PROCEDURE — A9270 NON-COVERED ITEM OR SERVICE: HCPCS | Mod: GY | Performed by: EMERGENCY MEDICINE

## 2018-08-15 PROCEDURE — 99284 EMERGENCY DEPT VISIT MOD MDM: CPT

## 2018-08-15 PROCEDURE — 83605 ASSAY OF LACTIC ACID: CPT | Performed by: EMERGENCY MEDICINE

## 2018-08-15 PROCEDURE — 25000132 ZZH RX MED GY IP 250 OP 250 PS 637: Mod: GY | Performed by: EMERGENCY MEDICINE

## 2018-08-15 PROCEDURE — 81001 URINALYSIS AUTO W/SCOPE: CPT | Performed by: EMERGENCY MEDICINE

## 2018-08-15 PROCEDURE — 80048 BASIC METABOLIC PNL TOTAL CA: CPT | Performed by: EMERGENCY MEDICINE

## 2018-08-15 RX ORDER — CEPHALEXIN 500 MG/1
500 CAPSULE ORAL 2 TIMES DAILY
Qty: 10 CAPSULE | Refills: 0 | Status: SHIPPED | OUTPATIENT
Start: 2018-08-15 | End: 2019-01-31

## 2018-08-15 RX ORDER — PHENAZOPYRIDINE HYDROCHLORIDE 95 MG/1
95 TABLET ORAL 3 TIMES DAILY
Qty: 6 TABLET | Refills: 0 | Status: SHIPPED | OUTPATIENT
Start: 2018-08-15 | End: 2019-02-14

## 2018-08-15 RX ORDER — CEPHALEXIN 500 MG/1
500 CAPSULE ORAL ONCE
Status: COMPLETED | OUTPATIENT
Start: 2018-08-15 | End: 2018-08-15

## 2018-08-15 RX ORDER — ACETAMINOPHEN 500 MG
1000 TABLET ORAL ONCE
Status: COMPLETED | OUTPATIENT
Start: 2018-08-15 | End: 2018-08-15

## 2018-08-15 RX ORDER — LIDOCAINE 4 G/G
1 PATCH TOPICAL ONCE
Status: DISCONTINUED | OUTPATIENT
Start: 2018-08-15 | End: 2018-08-15 | Stop reason: HOSPADM

## 2018-08-15 RX ORDER — LIDOCAINE 50 MG/G
1 PATCH TOPICAL EVERY 24 HOURS
Qty: 10 PATCH | Refills: 0 | Status: SHIPPED | OUTPATIENT
Start: 2018-08-15 | End: 2019-01-31

## 2018-08-15 RX ADMIN — LIDOCAINE 1 PATCH: 560 PATCH PERCUTANEOUS; TOPICAL; TRANSDERMAL at 08:26

## 2018-08-15 RX ADMIN — CEPHALEXIN 500 MG: 500 CAPSULE ORAL at 10:02

## 2018-08-15 RX ADMIN — ACETAMINOPHEN 1000 MG: 500 TABLET, FILM COATED ORAL at 08:26

## 2018-08-15 ASSESSMENT — ENCOUNTER SYMPTOMS
MYALGIAS: 0
ABDOMINAL PAIN: 0
BACK PAIN: 0
ARTHRALGIAS: 1
DIARRHEA: 0
DYSURIA: 0
FEVER: 0
CONSTIPATION: 0

## 2018-08-15 NOTE — ED AVS SNAPSHOT
Emergency Department    [address] [facility] [address]    Phone:  [phone]    Fax:  [phone]                                       Lindsey Hale   MRN: [id number]    Department:   Emergency Department   Date of Visit:  8/15/2018           Patient Information     Date Of Birth          9/16/1925        Your diagnoses for this visit were:     Acute left-sided low back pain without sciatica     Acute cystitis without hematuria        You were seen by Demetrius Brown MD.      Follow-up Information     Call Jeffery Sherwood MD.    Specialty:  Internal Medicine    Contact information:    [address] [facility]  [facility] [address]  [phone]          Follow up with  Emergency Department.    Specialty:  EMERGENCY MEDICINE    Why:  As needed, If symptoms worsen    Contact information:    [address] [facility] [id number] [phone]        Discharge Instructions         Understanding Sacroiliac Strain    A joint is a place where 2 bones meet. The 2 sacroiliac joints are where the hip (iliac) bones meet the bottom part of the spine (sacrum). These joints are surrounded by muscle, connective tissue, and nerves. Normally, a sacroiliac joint (SIJ) does not move very much. But it can be pushed out of alignment. The tissues around an SIJ also can be stretched or torn. This can lead to pain in the low back.     How to say it  BZM-iz-LE-francine-ak strain   Causes of sacroiliac strain  Causes of SIJ strain can include:    Stress on the SIJ from lifting weight incorrectly    Poor body mechanics and posture during sports or work activities    Damage from degenerative diseases such as arthritis    Increased pressure on the SIJ from pregnancy  Symptoms of sacroiliac strain  Symptoms of SIJ strain may include:    Aching in the low back, buttocks, or upper leg    Pain that gets worse with movement or standing for a long time, and gets better with rest    Inability to move  "as freely as usual    Muscle spasms in the low back  Treating sacroiliac strain  Treatment focuses on reducing pain and avoiding further injury. Treatments may include:    Prescription or over-the-counter pain medicines. These help reduce pain and swelling.    Cold packs or heat packs. These help reduce pain and swelling.    Stretching and other exercises. These improve flexibility and strength.    Physical therapy. This may include exercises or other treatments.    An SIJ belt. This medical device is worn around the hips, to make the SIJ more stable and reduce pain.    Injections of medicine. This may relieve symptoms.  Possible complications of sacroiliac strain  If the cause of the pain is not addressed, symptoms may return or get worse. Follow your healthcare provider s instructions on lifestyle changes and treating your SIJ strain.     When to call your healthcare provider  Call your healthcare provider right away if you have any of these:    Fever of 100.4 F (38 C) or higher, or as directed    Redness or swelling    Pain that gets worse    Symptoms that don t get better with prescribed medicines, or get worse    New symptoms   Date Last Reviewed: 3/10/2016    3044-5710 [employer]. [address] [place] [address]. All rights reserved. This information is not intended as a substitute for professional medical care. Always follow your healthcare professional's instructions.           * BLADDER INFECTION,Female (Adult)    A bladder infection (\"cystitis\" or \"UTI\") usually causes a constant urge to urinate and a burning when passing urine. Urine may be cloudy, smelly or dark. There may be pain in the lower abdomen. A bladder infection occurs when bacteria from the vaginal area enter the bladder opening (urethra). This can occur from sexual intercourse, wearing tight clothing, dehydration and other factors.  HOME CARE:  1. Drink lots of fluids (at least 6-8 glasses a day, unless you must " restrict fluids for other medical reasons). This will force the medicine into your urinary system and flush the bacteria out of your body. Cranberry juice has been shown to help clear out the bacteria.  2. Avoid sexual intercourse until your symptoms are gone.  3. A bladder infection is treated with antibiotics. You may also be given Pyridium (generic = phenazopyridine) to reduce the burning sensation. This medicine will cause your urine to become a bright orange color. The orange urine may stain clothing. You may wear a pad or panty-liner to protect clothing.  PREVENTING FUTURE INFECTIONS:  1. Always wipe from front to back after a bowel movement.  2. Keep the genital area clean and dry.  3. Drink plenty of fluids each day to avoid dehydration.  4. Urinate right after intercourse to flush out the bladder.  5. Wear cotton underwear and cotton-lined panty hose; avoid tight-fitting pants.  6. If you are on birth control pills and are having frequent bladder infections, discuss with your doctor.  FOLLOW UP: Return to this facility or see your doctor if ALL symptoms are not gone after three days of treatment.  GET PROMPT MEDICAL ATTENTION if any of the following occur:    Fever over 101 F (38.3 C)    No improvement by the third day of treatment    Increasing back or abdominal pain    Repeated vomiting; unable to keep medicine down    Weakness, dizziness or fainting    Vaginal discharge    Pain, redness or swelling in the labia (outer vaginal area)    7257-9544 [employer]. [address] [place], [place] [id number]. All rights reserved. This information is not intended as a substitute for professional medical care. Always follow your healthcare professional's instructions.  This information has been modified by your health care provider with permission from the publisher.        24 Hour Appointment Hotline       To make an appointment at any [facility], call [phone] ([phone]). If you  don't have a family doctor or clinic, we will help you find one. [facility] are conveniently located to serve the needs of you and your family.             Review of your medicines      START taking        Dose / Directions Last dose taken    lidocaine 5 % Patch   Commonly known as:  LIDODERM   Dose:  1 patch   Quantity:  10 patch        Place 1 patch onto the skin every 24 hours for 10 days   Refills:  0        phenazopyridine HCl tablet   Commonly known as:  AZO URINARY PAIN RELIEF   Dose:  95 mg   Quantity:  6 tablet        Take 1 tablet (95 mg) by mouth 3 times daily for 2 days   Refills:  0          CONTINUE these medicines which may have CHANGED, or have new prescriptions. If we are uncertain of the size of tablets/capsules you have at home, strength may be listed as something that might have changed.        Dose / Directions Last dose taken    cephALEXin 500 MG capsule   Commonly known as:  KEFLEX   Dose:  500 mg   What changed:  when to take this   Quantity:  10 capsule        Take 1 capsule (500 mg) by mouth 2 times daily for 5 days   Refills:  0          Our records show that you are taking the medicines listed below. If these are incorrect, please call your family doctor or clinic.        Dose / Directions Last dose taken    acetaminophen 325 MG tablet   Commonly known as:  TYLENOL   Dose:  650 mg   Quantity:  40 tablet        Take 2 tablets (650 mg) by mouth every 4 hours as needed for other (surgical pain)   Refills:  0        alendronate 70 MG tablet   Commonly known as:  FOSAMAX   Quantity:  12 tablet        Take 1 tablet (70 mg) by mouth with 8oz water every 7 days 30 minutes before breakfast and remain upright during this time.   Refills:  3        budesonide-formoterol 160-4.5 MCG/ACT Inhaler   Commonly known as:  SYMBICORT   Dose:  2 puff        Inhale 2 puffs into the lungs 2 times daily   Refills:  0        Carboxymethylcellulose Sod PF 0.5 % Soln ophthalmic solution   Commonly known as:   REFRESH PLUS   Dose:  1 drop        1 drop 3 times daily as needed for dry eyes   Refills:  0        flecainide acetate 150 MG Tabs   Quantity:  90 tablet        Take 1/2 tablet by mouth twice daily   Refills:  3        LASIX PO   Dose:  20 mg        Take 20 mg by mouth daily   Refills:  0        levothyroxine 75 MCG tablet   Commonly known as:  SYNTHROID/LEVOTHROID   Dose:  75 mcg   Quantity:  90 tablet        Take 1 tablet (75 mcg) by mouth daily   Refills:  3        metoprolol tartrate 50 MG tablet   Commonly known as:  LOPRESSOR   Quantity:  270 tablet        TAKE 1 AND 1/2 TABLETS BY MOUTH TWICE DAILY   Refills:  3        polyethylene glycol powder   Commonly known as:  MIRALAX/GLYCOLAX   Dose:  17 g        Take 17 g by mouth daily   Refills:  0        rivaroxaban ANTICOAGULANT 20 MG Tabs tablet   Commonly known as:  XARELTO   Dose:  20 mg   Quantity:  90 tablet        Take 1 tablet (20 mg) by mouth daily (with dinner)   Refills:  2        traMADol 50 MG tablet   Commonly known as:  ULTRAM   Dose:  50 mg   Quantity:  30 tablet        Take 1 tablet (50 mg) by mouth 2 times daily as needed for severe pain   Refills:  0        VITAMIN B COMPLEX-C Caps   Dose:  1 capsule        Take 1 capsule by mouth daily   Refills:  0        VITAMIN D (CHOLECALCIFEROL) PO   Dose:  1000 Units        Take 1,000 Units by mouth daily   Refills:  0                Prescriptions were sent or printed at these locations (3 Prescriptions)                   Other Prescriptions                Printed at Department/Unit printer (3 of 3)         cephALEXin (KEFLEX) 500 MG capsule               lidocaine (LIDODERM) 5 % Patch               phenazopyridine HCl (AZO URINARY PAIN RELIEF) tablet                Procedures and tests performed during your visit     Basic metabolic panel    CBC with platelets + differential    Lactic acid    UA with Microscopic    XR Pelvis and Hip Left 1 View      Orders Needing Specimen Collection     None       Pending Results     No orders found from 8/13/2018 to 8/16/2018.            Pending Culture Results     No orders found from 8/13/2018 to 8/16/2018.            Pending Results Instructions     If you had any lab results that were not finalized at the time of your Discharge, you can call the ED Lab Result RN at [phone]. You will be contacted by this team for any positive Lab results or changes in treatment. The nurses are available 7 days a week from 10A to 6:30P.  You can leave a message 24 hours per day and they will return your call.        Test Results From Your Hospital Stay        8/15/2018  7:35 AM      Narrative     XR PELVIS AND HIP LEFT 1 VIEW 8/15/2018 7:31 AM    COMPARISON: 12/5/2017.    HISTORY: LEFT hip and sacroiliac joint pain.        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 No fractures are seen in the pelvis or LEFT hip. Mild  degenerative changes in the sacroiliac joints.    FROILAN DAY MD         8/15/2018  9:01 AM      Component Results     Component Value Ref Range & Units Status    Color Urine Light Yellow  Final    Appearance Urine Cloudy  Final    Glucose Urine Negative NEG^Negative mg/dL Final    Bilirubin Urine Negative NEG^Negative Final    Ketones Urine Negative NEG^Negative mg/dL Final    Specific Gravity Urine 1.010 1.003 - 1.035 Final    Blood Urine Trace (A) NEG^Negative Final    pH Urine 7.5 (H) 5.0 - 7.0 pH Final    Protein Albumin Urine Trace (A) NEG^Negative mg/dL Final    Urobilinogen mg/dL 0.2 0.0 - 2.0 mg/dL Final    Nitrite Urine Positive (A) NEG^Negative Final    Leukocyte Esterase Urine Moderate (A) NEG^Negative Final    Source Catheterized Urine  Final    WBC Urine 100 (H) 0 - 5 /HPF Final    RBC Urine 1 0 - 2 /HPF Final    Squamous Epithelial /HPF Urine 10 (H) 0 - 1 /HPF Final         8/15/2018  9:37 AM      Component Results     Component Value Ref Range & Units Status    WBC 13.2 (H) 4.0 - 11.0 10e9/L Final    RBC  Count 3.62 (L) 3.8 - 5.2 10e12/L Final    Hemoglobin 11.9 11.7 - 15.7 g/dL Final    Hematocrit 36.2 35.0 - 47.0 % Final     78 - 100 fl Final    MCH 32.9 26.5 - 33.0 pg Final    MCHC 32.9 31.5 - 36.5 g/dL Final    RDW 13.6 10.0 - 15.0 % Final    Platelet Count 231 150 - 450 10e9/L Final    Diff Method Automated Method  Final    % Neutrophils 82.2 % Final    % Lymphocytes 6.6 % Final    % Monocytes 10.9 % Final    % Eosinophils 0.0 % Final    % Basophils 0.1 % Final    % Immature Granulocytes 0.2 % Final    Nucleated RBCs 0 0 /100 Final    Absolute Neutrophil 10.8 (H) 1.6 - 8.3 10e9/L Final    Absolute Lymphocytes 0.9 0.8 - 5.3 10e9/L Final    Absolute Monocytes 1.4 (H) 0.0 - 1.3 10e9/L Final    Absolute Eosinophils 0.0 0.0 - 0.7 10e9/L Final    Absolute Basophils 0.0 0.0 - 0.2 10e9/L Final    Abs Immature Granulocytes 0.0 0 - 0.4 10e9/L Final    Absolute Nucleated RBC 0.0  Final         8/15/2018  9:58 AM      Component Results     Component Value Ref Range & Units Status    Sodium 137 133 - 144 mmol/L Final    Potassium 4.6 3.4 - 5.3 mmol/L Final    Chloride 104 94 - 109 mmol/L Final    Carbon Dioxide 30 20 - 32 mmol/L Final    Anion Gap 3 3 - 14 mmol/L Final    Glucose 145 (H) 70 - 99 mg/dL Final    Urea Nitrogen 20 7 - 30 mg/dL Final    Creatinine 0.72 0.52 - 1.04 mg/dL Final    GFR Estimate 75 >60 mL/min/1.7m2 Final    Non  GFR Calc    GFR Estimate If Black >90 >60 mL/min/1.7m2 Final    African American GFR Calc    Calcium 8.7 8.5 - 10.1 mg/dL Final         8/15/2018  9:39 AM      Component Results     Component Value Ref Range & Units Status    Lactic Acid 1.3 0.7 - 2.0 mmol/L Final                Clinical Quality Measure: Blood Pressure Screening     Your blood pressure was checked while you were in the emergency department today. The last reading we obtained was  BP: 119/49 . Please read the guidelines below about what these numbers mean and what you should do about them.  If your  "systolic blood pressure (the top number) is less than 120 and your diastolic blood pressure (the bottom number) is less than 80, then your blood pressure is normal. There is nothing more that you need to do about it.  If your systolic blood pressure (the top number) is 120-139 or your diastolic blood pressure (the bottom number) is 80-89, your blood pressure may be higher than it should be. You should have your blood pressure rechecked within a year by a primary care provider.  If your systolic blood pressure (the top number) is 140 or greater or your diastolic blood pressure (the bottom number) is 90 or greater, you may have high blood pressure. High blood pressure is treatable, but if left untreated over time it can put you at risk for heart attack, stroke, or kidney failure. You should have your blood pressure rechecked by a primary care provider within the next 4 weeks.  If your provider in the emergency department today gave you specific instructions to follow-up with your doctor or provider even sooner than that, you should follow that instruction and not wait for up to 4 weeks for your follow-up visit.        Thank you for choosing [facility]       Thank you for choosing [facility] for your care. Our goal is always to provide you with excellent care. Hearing back from our patients is one way we can continue to improve our services. Please take a few minutes to complete the written survey that you may receive in the mail after you visit with us. Thank you!        [employer] Information     [employer] lets you send messages to your doctor, view your test results, renew your prescriptions, schedule appointments and more. To sign up, go to [email or web address] . Click on \"Log in\" on the left side of the screen, which will take you to the Welcome page. Then click on \"Sign up Now\" on the right side of the page.     You will be asked to enter the access code listed below, as well as some personal information. Please " follow the directions to create your username and password.     Your access code is: [email or web address]  Expires: 10/29/2018 10:10 AM     Your access code will  in 90 days. If you need help or a new code, please call your [facility] clinic or [phone].        Care EveryWhere ID     This is your Care EveryWhere ID. This could be used by other organizations to access your [facility] medical records  [id number]        Equal Access to Services     [facility] Center[facility]ÍA : Hadii aad ku hadasho Soomaali, waaxda luqadaha, qaybta kaalmada aderadhayaceline, maritza quesada. So [facility] [phone].    ATENCIÓN: Si habla español, tiene a rolon disposición servicios gratuitos de asistencia lingüística. Llame al [phone].    We comply with applicable federal civil rights laws and [place] laws. We do not discriminate on the basis of race, color, national origin, age, disability, sex, sexual orientation, or gender identity.            After Visit Summary       This is your record. Keep this with you and show to your community pharmacist(s) and doctor(s) at your next visit.

## 2018-08-15 NOTE — ED PROVIDER NOTES
History     Chief Complaint:  Hip Pain    HPI   Lindsey Hale is a 89 year old female with a history of HLD, HTN who presents to the emergency department for evaluation of hip pain. The patient reports she had a surgery on her left hip performed in December of 2017. She states she was at her baseline yesterday during the day, but around 1900 was putting lotion on her feet and bending over, after which she began experiencing left hip pain. She indicates she was ambulatory after the incident but noted left hip pain with movement. The patient denies any history of hip pain since the surgery, as well as any abdominal pain, chest pain, back pain, pain in the left leg, dysuria, constipation, diarrhea, or fever. She took a Tramadol last night for pain management with no improvement of symptoms.    Allergies:  Clindamycin Hcl  Ampicillin Potassium  Celebrex [Celecoxib]  Ciprofloxacin  Erythromycin  Indocin  Penicillins  Vibramycin [Doxycycline Hyclate]  Xylocaine-Epinephrine [Epinephrine-Lidocaine-Na Metabisulfite]     Medications:    Fosamax  Symbicort  Refresh Plus  Keflex  Flecainide acetate  Lasix  Levothyroxine  Lopressor  Miralax  Xarelto  Ultram  Vitamin B, D     Past Medical History:    Arthritis  Atrial fibrillation  Chronic LBP  COPD  Cysts, breast  Dysphagia  HLD  HTN  Hypothyroidism  Mitral regurgitation  Osteopenia  PMR  Supraventricular premature beats  TIA  Urosepsis    Past Surgical History:    Arthroplasty hip  Arthroplasty patello-femoral  Breast implants  Cataract  Foot surgery  Mastectomy    Family History:    CAD  Lymphoma   Breast Cancer  Osteoperosis  MI    Social History:  Presents with daughter, son.  Former smoker, quit 2/1/1955.  Negative for alcohol use.  Marital Status:   [5]     Review of Systems   Constitutional: Negative for fever.   Cardiovascular: Negative for chest pain.   Gastrointestinal: Negative for abdominal pain, constipation and diarrhea.   Genitourinary: Negative for  dysuria.   Musculoskeletal: Positive for arthralgias and gait problem. Negative for back pain and myalgias.   All other systems reviewed and are negative.    Physical Exam     Patient Vitals for the past 24 hrs:   BP Temp Temp src Pulse Resp SpO2   08/15/18 1000 119/49 - - - - 95 %   08/15/18 0929 - - - - - 96 %   08/15/18 0928 111/47 - - - - -   08/15/18 0822 - - - - - 93 %   08/15/18 0821 141/76 - - 60 16 96 %   08/15/18 0655 165/81 100.2  F (37.9  C) Oral 60 18 92 %       Physical Exam  General: Appears well-developed and well-nourished.   Head: No signs of trauma.   CV: Normal rate and regular rhythm.    Resp: Effort normal and breath sounds normal. No respiratory distress.   GI: Soft. There is no tenderness.  No rebound or guarding.  Normal bowel sounds.  No CVA tenderness.  MSK: Normal range of motion. Pain to left SI region with palpation.  no edema. No Calf tenderness.  Neuro: The patient is alert and oriented to person, place, and time.  Strength in upper/lower extremities normal and symmetrical.   Sensation normal. Speech normal.  GCS 15  Skin: Skin is warm and dry. No rash noted.   Psych: normal mood and affect. behavior is normal.       Emergency Department Course     Imaging:  Radiographic findings were communicated with the patient who voiced understanding of the findings.    XR Pelvis and Hip Left 1 View:  LEFT hip hemiarthroplasty intact and in unchanged  position. No fractures are seen in the pelvis or LEFT hip. Mild  degenerative changes in the sacroiliac joints As per radiology.    Laboratory:  UA with micro: Blood Trace, pH 7.5 (H), Albumin Trace, Nitrite Positive, Leukocyte Esterase Moderate,  (H), Squamous Epithelial 10 (H), o/w negative    CBC: WBC: 13.2 (H), HGB: 11.9, PLT: 231  BMP: Glucose 145 (H), o/w WNL (Creatinine: 0.72)    Lactic acid: 1.3    Interventions:  0826 Lidocare 1 patch Transdermal   Tylenol 1000 mg PO  1002 Keflex 500 mg PO    Emergency Department Course:  Nursing  notes and vitals reviewed. 0714 I performed an exam of the patient as documented above.     The patient provided a urine sample here in the emergency department. This was sent for laboratory testing, findings above.     The patient was sent for a XR Pelvis and Hip while in the emergency department, findings above.     1005 I rechecked the patient and discussed the results of her workup thus far.     Findings and plan explained to the Patient. Patient discharged home with instructions regarding supportive care, medications, and reasons to return. The importance of close follow-up was reviewed. The patient was prescribed Keflex, Lidoderm, Phenazopyridine.    I personally reviewed the laboratory results with the Patient and answered all related questions prior to discharge.     Impression & Plan      Medical Decision Making:  Lindsey Hale is a 89-year-old woman who presents due to left low back pain.  She complained of hip pain, but on exam, it was her left sacroiliac region that seem to be tender.  No other tenderness was noted.  Abdomen was soft and nontender. I did obtain a pelvic x-ray which showed some arthritic changes to the left SI region, but no signs of fracture or acute process.  I did obtain a UA which showed signs of urinary tract infection.  Patient did have a low-grade temperature on arrival.  I also obtained blood work which showed a very minimal elevation of her white blood cell count, but normal lactic acid and normal electrolytes and kidney function.  The patient appears well overall; I felt that she is appropriate for discharge home and outpatient management.  I was able to review cultures from prior UAs to ensure that there are pansensitive.  She was started on Keflex along with Azo which she has tolerated well in the past, and I recommended Tylenol primarily for pain control along with Lidoderm patch.  She was also recommended she use her tramadol as needed for low back pain as well.  She  is instructed to return to the ER for any new or worsening symptoms or further concerns and to follow-up with primary care doctor.    Diagnosis:    ICD-10-CM   1. Acute left-sided low back pain without sciatica M54.5   2. Acute cystitis without hematuria N30.00       Disposition:  discharged to home    Discharge Medications:  Discharge Medication List as of 8/15/2018 10:18 AM      START taking these medications    Details   lidocaine (LIDODERM) 5 % Patch Place 1 patch onto the skin every 24 hours for 10 daysDisp-10 patch, R-0Local Print      phenazopyridine HCl (AZO URINARY PAIN RELIEF) tablet Take 1 tablet (95 mg) by mouth 3 times daily for 2 days, Disp-6 tablet, R-0, Local Print           IBarry, subha serving as a scribe on 8/15/2018 at 7:16 AM to personally document services performed by Demetrius Brown MD based on my observations and the provider's statements to me.     Barry Denise  8/15/2018    EMERGENCY DEPARTMENT       Demetrius Brown MD  08/17/18 0728

## 2018-08-15 NOTE — ED NOTES
Bed: ED25  Expected date:   Expected time:   Means of arrival:   Comments:  Mariela 2-89F Hip Pain

## 2018-08-15 NOTE — ED AVS SNAPSHOT
Emergency Department    [address] [facility] [address]    Phone:  [phone]    Fax:  [phone]                                       Lindsey Hale   MRN: [id number]    Department:   Emergency Department   Date of Visit:  8/15/2018           After Visit Summary Signature Page     I have received my discharge instructions, and my questions have been answered. I have discussed any challenges I see with this plan with the nurse or doctor.    ..........................................................................................................................................  Patient/Patient Representative Signature      ..........................................................................................................................................  Patient Representative Print Name and Relationship to Patient    ..................................................               ................................................  Date                                            Time    ..........................................................................................................................................  Reviewed by Signature/Title    ...................................................              ..............................................  Date                                                            Time

## 2018-08-15 NOTE — DISCHARGE INSTRUCTIONS
Understanding Sacroiliac Strain    A joint is a place where 2 bones meet. The 2 sacroiliac joints are where the hip (iliac) bones meet the bottom part of the spine (sacrum). These joints are surrounded by muscle, connective tissue, and nerves. Normally, a sacroiliac joint (SIJ) does not move very much. But it can be pushed out of alignment. The tissues around an SIJ also can be stretched or torn. This can lead to pain in the low back.     How to say it  HEL-uo-UX-francine-ak strain   Causes of sacroiliac strain  Causes of SIJ strain can include:    Stress on the SIJ from lifting weight incorrectly    Poor body mechanics and posture during sports or work activities    Damage from degenerative diseases such as arthritis    Increased pressure on the SIJ from pregnancy  Symptoms of sacroiliac strain  Symptoms of SIJ strain may include:    Aching in the low back, buttocks, or upper leg    Pain that gets worse with movement or standing for a long time, and gets better with rest    Inability to move as freely as usual    Muscle spasms in the low back  Treating sacroiliac strain  Treatment focuses on reducing pain and avoiding further injury. Treatments may include:    Prescription or over-the-counter pain medicines. These help reduce pain and swelling.    Cold packs or heat packs. These help reduce pain and swelling.    Stretching and other exercises. These improve flexibility and strength.    Physical therapy. This may include exercises or other treatments.    An SIJ belt. This medical device is worn around the hips, to make the SIJ more stable and reduce pain.    Injections of medicine. This may relieve symptoms.  Possible complications of sacroiliac strain  If the cause of the pain is not addressed, symptoms may return or get worse. Follow your healthcare provider s instructions on lifestyle changes and treating your SIJ strain.     When to call your healthcare provider  Call your healthcare provider right away if you have  "any of these:    Fever of 100.4 F (38 C) or higher, or as directed    Redness or swelling    Pain that gets worse    Symptoms that don t get better with prescribed medicines, or get worse    New symptoms   Date Last Reviewed: 3/10/2016    6495-0245 [employer]. [address] [place] [address]. All rights reserved. This information is not intended as a substitute for professional medical care. Always follow your healthcare professional's instructions.           * BLADDER INFECTION,Female (Adult)    A bladder infection (\"cystitis\" or \"UTI\") usually causes a constant urge to urinate and a burning when passing urine. Urine may be cloudy, smelly or dark. There may be pain in the lower abdomen. A bladder infection occurs when bacteria from the vaginal area enter the bladder opening (urethra). This can occur from sexual intercourse, wearing tight clothing, dehydration and other factors.  HOME CARE:  1. Drink lots of fluids (at least 6-8 glasses a day, unless you must restrict fluids for other medical reasons). This will force the medicine into your urinary system and flush the bacteria out of your body. Cranberry juice has been shown to help clear out the bacteria.  2. Avoid sexual intercourse until your symptoms are gone.  3. A bladder infection is treated with antibiotics. You may also be given Pyridium (generic = phenazopyridine) to reduce the burning sensation. This medicine will cause your urine to become a bright orange color. The orange urine may stain clothing. You may wear a pad or panty-liner to protect clothing.  PREVENTING FUTURE INFECTIONS:  1. Always wipe from front to back after a bowel movement.  2. Keep the genital area clean and dry.  3. Drink plenty of fluids each day to avoid dehydration.  4. Urinate right after intercourse to flush out the bladder.  5. Wear cotton underwear and cotton-lined panty hose; avoid tight-fitting pants.  6. If you are on birth control pills and are " having frequent bladder infections, discuss with your doctor.  FOLLOW UP: Return to this facility or see your doctor if ALL symptoms are not gone after three days of treatment.  GET PROMPT MEDICAL ATTENTION if any of the following occur:    Fever over 101 F (38.3 C)    No improvement by the third day of treatment    Increasing back or abdominal pain    Repeated vomiting; unable to keep medicine down    Weakness, dizziness or fainting    Vaginal discharge    Pain, redness or swelling in the labia (outer vaginal area)    2000-0001 [employer]. [address] [place], [place] [id number]. All rights reserved. This information is not intended as a substitute for professional medical care. Always follow your healthcare professional's instructions.  This information has been modified by your health care provider with permission from the publisher.

## 2018-08-16 LAB
BACTERIA SPEC CULT: NORMAL
BACTERIA SPEC CULT: NORMAL
SPECIMEN SOURCE: NORMAL

## 2018-08-28 ENCOUNTER — OFFICE VISIT (OUTPATIENT)
Dept: FAMILY MEDICINE | Facility: CLINIC | Age: 83
End: 2018-08-28
Payer: MEDICARE

## 2018-08-28 VITALS
WEIGHT: 164 LBS | OXYGEN SATURATION: 97 % | BODY MASS INDEX: 30.18 KG/M2 | TEMPERATURE: 96.8 F | HEIGHT: 62 IN | SYSTOLIC BLOOD PRESSURE: 134 MMHG | DIASTOLIC BLOOD PRESSURE: 67 MMHG | HEART RATE: 64 BPM

## 2018-08-28 DIAGNOSIS — M54.42 ACUTE BILATERAL LOW BACK PAIN WITH LEFT-SIDED SCIATICA: Primary | ICD-10-CM

## 2018-08-28 PROCEDURE — 99213 OFFICE O/P EST LOW 20 MIN: CPT | Performed by: INTERNAL MEDICINE

## 2018-08-28 NOTE — PATIENT INSTRUCTIONS
If physical therapy does not call in the next 24 hours let me know.    If the pain worsens or you get a fever or weakness call me.    If the physical therapy is not helping over the next 10 to 14 days let me know    Jeffery Sherwood M.D.

## 2018-08-28 NOTE — PROGRESS NOTES
The patient is here with her daughter for evaluation of back pain.  This started about 2 weeks ago after lifting something.  She has not had it before.  It is a fairly constant pain in the left low back.  At times she also feels some discomfort in the left side down the leg.  Occasionally on the right side.  She is not having leg tingling numbness or weakness.  At times she can have some control issues with her bladder but not her bowels.  She is not having a fever.  No GI symptoms.  No urinary burning or blood but some frequency.  No chest pain or shortness of breath.  No headaches or jaw claudication or shoulder pains.  The pain is worse when she is walking.    Past Medical History:   Diagnosis Date     Abnormal echocardiogram 04/2017    mild mr, ar, mitral stenosis, nl ef, lvh.  Done for episode of vision change     Arthritis 99         Atrial fibrillation (H) 2011 and 2009    neg nuc est 2009, Dr. Vazquez, on coumadin     Chest pain 2000    neg est echo, neg ct chest abd, pelvis Yarsanism     Chronic LBP      COPD (chronic obstructive pulmonary disease) (H)     seen by pulmonary md Dr. Whitt, and given inhaler     Cysts, breast 1980s    bilat mastectomies     Dizzy 2007    ct neg, carotid us neg     Dysphagia 2005    egd nl     Elevated blood sugar      Environmental allergies      Hematuria 2004    neg cysto and us     Hemoptyses 2008    ct neg, bronch neg 2009     Hyperlipidemia      Hypertension      Hypothyroid 1995    Dr. Ortiz     Mitral regurgitation 6/15    on echo     nausea 2006    ct abd and ;pelvis neg     Osteopenia     fu dexa better 2011     Pacemaker 2011    afib with pauses and syncope     Palpitations 2009    neg est     PMR (polymyalgia rheumatica) (H) 2004    not active in years     SOB (shortness of breath) 5/15    echo nl ef, 2+mr, cxr clear, seen by pulm and given inhaler     Supraventricular premature beats      TIA (transient ischaemic attack) 2009    carotids neg, mri neg      Treadmill stress test negative for angina pectoris 2011    nuclear est     Urine incontinence     Dr. Westfall     Urosepsis 2009    hospitalized     Vision changes 2011    eval by neuro and ophtho and no cause found     Past Surgical History:   Procedure Laterality Date     ARTHROPLASTY HIP Left 12/5/2017    Procedure: ARTHROPLASTY HIP;  LEFT HIP ARBEN ARTHROPLASTY(SORAYA);  Surgeon: Darell Nathan MD;  Location: [facility] OR     ARTHROPLASTY PATELLO-FEMORAL (KNEE)  1998 and 2002     ARTHROSCOPY KNEE  1997     BIOPSY BREAST Right 9/23/2014    Procedure: BIOPSY BREAST;  Surgeon: David Carter MD;  Location:  [facility]     breast implants      4x     cataract  2011     FOOT SURGERY  2003     MASTECTOMY  1980    bilat, cysts     nj not bso  70's    not for ca     Social History     Social History     Marital status:      Spouse name: N/A     Number of children: 4     Years of education: N/A     Occupational History     Not on file.     Social History Main Topics     Smoking status: Former Smoker     Types: Cigarettes     Quit date: 2/1/1955     Smokeless tobacco: Never Used      Comment: quit in 1955   had smoked about 2 cigarettes a day or more     Alcohol use 0.0 oz/week     0 Standard drinks or equivalent per week      Comment: occ wine     Drug use: No     Sexual activity: Not Currently     Other Topics Concern     Caffeine Concern No     coffee: 1 cup a week.  Occ green tea     Sleep Concern No     Stress Concern No     Weight Concern No     Special Diet No     Exercise Yes     walking daily     Seat Belt Yes     Social History Narrative     Current Outpatient Prescriptions   Medication Sig Dispense Refill     acetaminophen (TYLENOL) 325 MG tablet Take 2 tablets (650 mg) by mouth every 4 hours as needed for other (surgical pain) 40 tablet 0     budesonide-formoterol (SYMBICORT) 160-4.5 MCG/ACT inhaler Inhale 2 puffs into the lungs 2 times daily        Carboxymethylcellulose Sod PF (REFRESH PLUS) 0.5 % SOLN  "ophthalmic solution 1 drop 3 times daily as needed for dry eyes       flecainide acetate 150 MG TABS Take 1/2 tablet by mouth twice daily 90 tablet 3     Furosemide (LASIX PO) Take 20 mg by mouth daily        levothyroxine (SYNTHROID/LEVOTHROID) 75 MCG tablet Take 1 tablet (75 mcg) by mouth daily 90 tablet 3     metoprolol (LOPRESSOR) 50 MG tablet TAKE 1 AND 1/2 TABLETS BY MOUTH TWICE DAILY 270 tablet 3     polyethylene glycol (MIRALAX/GLYCOLAX) powder Take 17 g by mouth daily       rivaroxaban ANTICOAGULANT (XARELTO) 20 MG TABS tablet Take 1 tablet (20 mg) by mouth daily (with dinner) 90 tablet 2     traMADol (ULTRAM) 50 MG tablet Take 1 tablet (50 mg) by mouth 2 times daily as needed for severe pain 30 tablet 0     VITAMIN B COMPLEX-C CAPS Take 1 capsule by mouth daily       VITAMIN D, CHOLECALCIFEROL, PO Take 1,000 Units by mouth daily       Allergies   Allergen Reactions     Clindamycin Hcl Other (See Comments)     Difficulty swallowing     Ampicillin Potassium      Rash nausea and vomiting       Celebrex [Celecoxib]      rash     Ciprofloxacin      rash     Erythromycin      rash     Indocin      [facility]ed [facility] with severe [facility] ache     Penicillins      Rash,nausea and vomiting     Vibramycin [Doxycycline Hyclate]      Rash      Xylocaine-Epinephrine [Epinephrine-Lidocaine-Na Metabisulfite]      Xylocaine with epi caused a rapid heart beat     FAMILY HISTORY NOTED AND REVIEWED    REVIEW OF SYSTEMS: above    PHYSICAL EXAM    /67 (BP Location: Right arm, Patient Position: Sitting, Cuff Size: Adult Large)  Pulse 64  Temp 96.8  F (36  C) (Oral)  Ht 5' 1.5\" (1.562 m)  Wt 164 lb (74.4 kg)  SpO2 97%  Breastfeeding? No  BMI 30.49 kg/m2    Patient appears non toxic  Abdomen normal active bowel sounds, soft, non-tender  No back lesions or tenderness  Cms within normal limits legs  slr neg  Gait within normal limits    ASSESSMENT:  Left low back pain, suspect sciatica vs msk, doubt gi or " genitourinary, doubt cord syndrome, doubt infection or mets    PLAN:  Tylenol  physical therapy  Call if worsens, not improving soon and would do ct    Jeffery Sherwood M.D.      physical therapy to be at her house as too painful for patient to get out    Jeffery Sherwood M.D.

## 2018-08-28 NOTE — MR AVS SNAPSHOT
After Visit Summary   8/28/2018    Lindsey Hale    MRN: [id number]           Patient Information     Date Of Birth          9/16/1925        Visit Information        Provider Department      8/28/2018 2:30 PM Jeffery Sherwood MD [facility]        Today's Diagnoses     Acute bilateral low back pain with left-sided sciatica    -  1      Care Instructions    If physical therapy does not call in the next 24 hours let me know.    If the pain worsens or you get a fever or weakness call me.    If the physical therapy is not helping over the next 10 to 14 days let me know    Jeffery Sherwood M.D.            Follow-ups after your visit        Additional Services     [facility] PT, HAND, AND CHIROPRACTIC REFERRAL       **This order will print in the [facility] Scheduling Office**    Physical Therapy, Hand Therapy and Chiropractic Care are available through:    *[facility]  *[facility]  *[facility]    Call one number to schedule at any of the above locations: [phone].    Your provider has referred you to: Physical Therapy at [facility] or [facility]    Indication/Reason for Referral: Low Back Pain  Onset of Illness: 2 weeks  Therapy Orders: Evaluate and Treat  Special Programs: None  Special Request: None    Carisa Younger      Additional Comments for the Therapist or Chiropractor:     Please be aware that coverage of these services is subject to the terms and limitations of your health insurance plan.  Call member services at your health plan with any benefit or coverage questions.      Please bring the following to your appointment:    *Your personal calendar for scheduling future appointments  *Comfortable clothing                  Who to contact     If you have questions or need follow up information about today's clinic visit or your schedule please contact [facility] directly at [phone].  Normal or non-critical lab and imaging results  "will be communicated to you by MyChart, letter or phone within 4 business days after the clinic has received the results. If you do not hear from us within 7 days, please contact the clinic through [email or web address] or phone. If you have a critical or abnormal lab result, we will notify you by phone as soon as possible.  Submit refill requests through [email or web address] or call your pharmacy and they will forward the refill request to us. Please allow 3 business days for your refill to be completed.          Additional Information About Your Visit        [email or web address] Information     [email or web address] lets you send messages to your doctor, view your test results, renew your prescriptions, schedule appointments and more. To sign up, go to [email or web address] . Click on \"Log in\" on the left side of the screen, which will take you to the Welcome page. Then click on \"Sign up Now\" on the right side of the page.     You will be asked to enter the access code listed below, as well as some personal information. Please follow the directions to create your username and password.     Your access code is: [email or web address]  Expires: 10/29/2018 10:10 AM     Your access code will  in 90 days. If you need help or a new code, please call your [facility] clinic or [phone].        Care EveryWhere ID     This is your Care EveryWhere ID. This could be used by other organizations to access your [facility] [place] medical records  [id number]        Your Vitals Were     Pulse Temperature Height Pulse Oximetry Breastfeeding? BMI (Body Mass Index)    64 96.8  F (36  C) (Oral) 5' 1.5\" (1.562 m) 97% No 30.49 kg/m2       Blood Pressure from Last 3 Encounters:   18 134/67   08/15/18 119/49   18 108/58    Weight from Last 3 Encounters:   18 164 lb (74.4 kg)   18 163 lb 12.8 oz (74.3 kg)   18 161 lb (73 kg)              We Performed the Following     [facility] PT, HAND, AND CHIROPRACTIC REFERRAL          Today's Medication Changes          These changes are " accurate as of 8/28/18  2:49 PM.  If you have any questions, ask your nurse or doctor.               Stop taking these medicines if you haven't already. Please contact your care team if you have questions.     alendronate 70 MG tablet   Commonly known as:  FOSAMAX   Stopped by:  Jeffery Sherwood MD                    Primary Care Provider Office Phone # Fax #    Jeffery Sherwood [phone] [address] [address]        Equal Access to Services     [facility]: Hadii aad ku hadasho Soomaali, waaxda luqadaha, qaybta kaalmada adeegyada, waxay idiin hayaan adeeg channing kim . So [facility] [phone].    ATENCIÓN: Si habla español, tiene a rolon disposición servicios gratuitos de asistencia lingüística. [facility] [phone].    We comply with applicable federal civil rights laws and [place] laws. We do not discriminate on the basis of race, color, national origin, age, disability, sex, sexual orientation, or gender identity.            Thank you!     Thank you for choosing [facility]  for your care. Our goal is always to provide you with excellent care. Hearing back from our patients is one way we can continue to improve our services. Please take a few minutes to complete the written survey that you may receive in the mail after your visit with us. Thank you!             Your Updated Medication List - Protect others around you: Learn how to safely use, store and throw away your medicines at www.disposemymeds.org.          This list is accurate as of 8/28/18  2:49 PM.  Always use your most recent med list.                   Brand Name Dispense Instructions for use Diagnosis    acetaminophen 325 MG tablet    TYLENOL    40 tablet    Take 2 tablets (650 mg) by mouth every 4 hours as needed for other (surgical pain)    Closed fracture of neck of left femur, initial encounter (H)       budesonide-formoterol 160-4.5 MCG/ACT Inhaler    SYMBICORT     Inhale 2  puffs into the lungs 2 times daily        Carboxymethylcellulose Sod PF 0.5 % Soln ophthalmic solution    REFRESH PLUS     1 drop 3 times daily as needed for dry eyes        flecainide acetate 150 MG Tabs     90 tablet    Take 1/2 tablet by mouth twice daily    Atrial fibrillation (H)       LASIX PO      Take 20 mg by mouth daily        levothyroxine 75 MCG tablet    SYNTHROID/LEVOTHROID    90 tablet    Take 1 tablet (75 mcg) by mouth daily    Hypothyroidism, unspecified type       metoprolol tartrate 50 MG tablet    LOPRESSOR    270 tablet    TAKE 1 AND 1/2 TABLETS BY MOUTH TWICE DAILY    Atrial fibrillation, unspecified type (H), Benign essential hypertension       polyethylene glycol powder    MIRALAX/GLYCOLAX     Take 17 g by mouth daily        rivaroxaban ANTICOAGULANT 20 MG Tabs tablet    XARELTO    90 tablet    Take 1 tablet (20 mg) by mouth daily (with dinner)    Atrial fibrillation, unspecified type (H)       traMADol 50 MG tablet    ULTRAM    30 tablet    Take 1 tablet (50 mg) by mouth 2 times daily as needed for severe pain    Chronic low back pain without sciatica, unspecified back pain laterality       VITAMIN B COMPLEX-C Caps      Take 1 capsule by mouth daily        VITAMIN D (CHOLECALCIFEROL) PO      Take 1,000 Units by mouth daily

## 2018-08-29 ENCOUNTER — TELEPHONE (OUTPATIENT)
Dept: FAMILY MEDICINE | Facility: CLINIC | Age: 83
End: 2018-08-29

## 2018-08-29 NOTE — TELEPHONE ENCOUNTER
"Reason for Call: Request for an order or referral:    Order or referral being requested: PHYSICAL THERAPY    Date needed: as soon as possible    Has the patient been seen by the PCP for this problem? YES    Additional comments: THE PT ORDER FROM YESTERDAY'S VISIT WITH DR PARTIDA NEEDS TO BE CHANGED TO SAY \"FACE TO FACE\" SO THE PHYSICAL THERAPIST CAN COME TO THE PATIENT'S HOUSE. DAUGHTER STATES THAT IT IS TOO PAINFUL FOR PATIENT TO LEAVE THE HOUSE TO GET TO PHYSICAL THERAPY. PLEASE CALL DAUGHTER WHEN REFERRAL HAS BEEN CHANGED SO SHE CAN SCHEDULE PHYS THERAPY    Phone number Patient can be reached at:  Other phone number: [phone]    Best Time:  ANYTIME    Can we leave a detailed message on this number?  YES    Call taken on 8/29/2018 at 9:34 AM by Macie Miles    "

## 2018-08-29 NOTE — TELEPHONE ENCOUNTER
I guess it says in the referral, but I don't know where you would put that.  Please see pt's daughters note.   Misa Brown MA

## 2018-08-29 NOTE — TELEPHONE ENCOUNTER
Please let physical therapy  know that I added it to the note itself.    Thanks    Jeffery Sherwood M.D.

## 2018-08-30 ENCOUNTER — TELEPHONE (OUTPATIENT)
Dept: FAMILY MEDICINE | Facility: CLINIC | Age: 83
End: 2018-08-30

## 2018-08-30 DIAGNOSIS — M54.42 ACUTE BILATERAL LOW BACK PAIN WITH LEFT-SIDED SCIATICA: Primary | ICD-10-CM

## 2018-08-30 NOTE — TELEPHONE ENCOUNTER
If patient is looking for Home Care PT then this needs to go to [facility], not [facility]. I discussed with [facility] scheduling and they directed me to put in the order: Home Care PT Referral for Hospital Discharge. I signed a new referral and sent to [facility] for cosign.     Real Malave, [facility]

## 2018-08-30 NOTE — TELEPHONE ENCOUNTER
Reason for Call:  Other call back    Detailed comments: Radha called to see if the HC orders we sent out   Please call her to let her know   P [phone]  F [phone]    Best Time: anytime    Can we leave a detailed message on this number? YES    Call taken on 8/30/2018 at 10:22 AM by Moon Millard

## 2018-08-30 NOTE — TELEPHONE ENCOUNTER
Home Care PT orders signed. I will call the patient to let them know to call  [facility] for their home care PT.    I also called [facility] to let them know that patient wishes to go with [facility] PT, not [facility].    Real Malave, [facility]

## 2018-09-10 ENCOUNTER — TRANSFERRED RECORDS (OUTPATIENT)
Dept: HEALTH INFORMATION MANAGEMENT | Facility: CLINIC | Age: 83
End: 2018-09-10

## 2018-09-12 DIAGNOSIS — Z53.9 DIAGNOSIS NOT YET DEFINED: Primary | ICD-10-CM

## 2018-09-12 PROCEDURE — G0180 MD CERTIFICATION HHA PATIENT: HCPCS | Performed by: INTERNAL MEDICINE

## 2018-09-13 ENCOUNTER — TELEPHONE (OUTPATIENT)
Dept: FAMILY MEDICINE | Facility: CLINIC | Age: 83
End: 2018-09-13

## 2018-09-13 NOTE — TELEPHONE ENCOUNTER
Reason for Call:  Home Health Care    Marcie with [facility] called regarding (reason for call): Orders    Orders are needed for this patient. PT    PT: 2 x week for 3 weeks, 1 x week for 1 week    Pt Provider:     Phone Number Homecare Nurse can be reached at: [phone]    Can we leave a detailed message on this number? YES          Call taken on 9/13/2018 at 10:17 AM by Kofi Torres

## 2018-09-13 NOTE — TELEPHONE ENCOUNTER
Called Marcie with [facility] to provide verbal orders for PT as requested.    Christofer COX RN

## 2018-11-06 ENCOUNTER — TRANSFERRED RECORDS (OUTPATIENT)
Dept: HEALTH INFORMATION MANAGEMENT | Facility: CLINIC | Age: 83
End: 2018-11-06

## 2018-11-11 DIAGNOSIS — E03.9 HYPOTHYROIDISM, UNSPECIFIED TYPE: ICD-10-CM

## 2018-11-11 DIAGNOSIS — I10 BENIGN ESSENTIAL HYPERTENSION: ICD-10-CM

## 2018-11-11 DIAGNOSIS — I48.91 ATRIAL FIBRILLATION, UNSPECIFIED TYPE (H): ICD-10-CM

## 2018-11-12 RX ORDER — METOPROLOL TARTRATE 50 MG
TABLET ORAL
Qty: 270 TABLET | Refills: 0 | Status: SHIPPED | OUTPATIENT
Start: 2018-11-12 | End: 2019-04-01

## 2018-11-12 RX ORDER — LEVOTHYROXINE SODIUM 75 UG/1
TABLET ORAL
Qty: 90 TABLET | Refills: 0 | Status: SHIPPED | OUTPATIENT
Start: 2018-11-12 | End: 2019-03-14

## 2018-11-12 NOTE — TELEPHONE ENCOUNTER
Prescription approved per [facility] Refill Protocol.  Cele WASHINGTON RN

## 2018-11-12 NOTE — TELEPHONE ENCOUNTER
"levothyroxine (SYNTHROID/LEVOTHROID) 75 MCG tablet  Last Written Prescription Date:  11/17/2017  Last Fill Quantity: 90,  # refills: 3   Last office visit: 8/28/2018 with prescribing provider:  Kaela Prince Office Visit:        metoprolol (LOPRESSOR) 50 MG tablet  Last Written Prescription Date:  11/17/2017  Last Fill Quantity: 270,  # refills: 3   Last office visit: 8/28/2018 with prescribing provider:  Kaela Prince Office Visit:        Requested Prescriptions   Pending Prescriptions Disp Refills     levothyroxine (SYNTHROID/LEVOTHROID) 75 MCG tablet [Pharmacy Med Name: LEVOTHYROXINE 0.075MG (75MCG) TABS] 90 tablet 0     Sig: TAKE 1 TABLET(75 MCG) BY MOUTH DAILY    Thyroid Protocol Passed    11/11/2018 11:33 AM       Passed - Patient is 12 years or older       Passed - Recent (12 mo) or future (30 days) visit within the authorizing provider's specialty    Patient had office visit in the last 12 months or has a visit in the next 30 days with authorizing provider or within the authorizing provider's specialty.  See \"Patient Info\" tab in inbasket, or \"Choose Columns\" in Meds & Orders section of the refill encounter.             Passed - Normal TSH on file in past 12 months    Recent Labs   Lab Test  04/03/18   0859   TSH  0.53             Passed - No active pregnancy on record    If patient is pregnant or has had a positive pregnancy test, please check TSH.         Passed - No positive pregnancy test in past 12 months    If patient is pregnant or has had a positive pregnancy test, please check TSH.          metoprolol tartrate (LOPRESSOR) 50 MG tablet [Pharmacy Med Name: METOPROLOL TARTRATE 50MG TABLETS] 270 tablet 0     Sig: TAKE 1 AND 1/2 TABLETS BY MOUTH TWICE DAILY    Beta-Blockers Protocol Passed    11/11/2018 11:33 AM       Passed - Blood pressure under 140/90 in past 12 months    BP Readings from Last 3 Encounters:   08/28/18 134/67   08/15/18 119/49   04/06/18 108/58                Passed - Patient is " "age 6 or older       Passed - Recent (12 mo) or future (30 days) visit within the authorizing provider's specialty    Patient had office visit in the last 12 months or has a visit in the next 30 days with authorizing provider or within the authorizing provider's specialty.  See \"Patient Info\" tab in inbasket, or \"Choose Columns\" in Meds & Orders section of the refill encounter.                "

## 2018-11-19 DIAGNOSIS — I48.91 ATRIAL FIBRILLATION, UNSPECIFIED TYPE (H): ICD-10-CM

## 2018-11-20 NOTE — TELEPHONE ENCOUNTER
Requested Prescriptions   Pending Prescriptions Disp Refills     XARELTO 20 MG TABS tablet [Pharmacy Med Name: XARELTO 20MG TABLETS] 90 tablet 0      Last Written Prescription Date:  11/17/2017  Last Fill Quantity: 90,  # refills: 2   Last Office Visit: 8/28/2018   Future Office Visit:      Sig: TAKE 1 TABLET(20 MG) BY MOUTH DAILY WITH DINNER    Direct Oral Anticoagulant Agents Failed    11/19/2018  6:01 PM       Failed - Creatinine Clearance greater than 50 ml/min on file in past 3 mos    No lab results found.         Failed - Serum creatinine less than or equal to 1.4 on file in past 3 mos    Recent Labs   Lab Test  08/15/18   0925   CR  0.72            Passed - Normal Platelets on file in past 12 months    Recent Labs   Lab Test  08/15/18   0925   PLT  231              Passed - Medication is active on med list       Passed - Patient is 18 years of age or older       Passed - No active pregnancy on record       Passed - No positive pregnancy test within past 12 months       Passed - Recent (6 mo) or future (30 days) visit within the authorizing provider's specialty

## 2018-11-21 ENCOUNTER — ALLIED HEALTH/NURSE VISIT (OUTPATIENT)
Dept: CARDIOLOGY | Facility: CLINIC | Age: 83
End: 2018-11-21
Payer: MEDICARE

## 2018-11-21 DIAGNOSIS — Z95.0 CARDIAC PACEMAKER IN SITU: ICD-10-CM

## 2018-11-21 DIAGNOSIS — I49.5 SICK SINUS SYNDROME (H): Primary | ICD-10-CM

## 2018-11-21 PROCEDURE — 93280 PM DEVICE PROGR EVAL DUAL: CPT | Performed by: INTERNAL MEDICINE

## 2018-11-21 RX ORDER — RIVAROXABAN 20 MG/1
TABLET, FILM COATED ORAL
Qty: 90 TABLET | Refills: 0 | Status: SHIPPED | OUTPATIENT
Start: 2018-11-21 | End: 2018-12-04

## 2018-11-21 NOTE — PROGRESS NOTES
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 Patient is due to follow up with Zaina in April.   Radha Montes RN

## 2018-11-21 NOTE — TELEPHONE ENCOUNTER
AST   Date Value Ref Range Status   02/16/2014 27 0 - 45 U/L Final     Lab Results   Component Value Date    ALT 45 02/16/2014     Last AST and ALT from 2014    Will route to PCP to review

## 2018-11-21 NOTE — MR AVS SNAPSHOT
After Visit Summary   11/21/2018    Lindsey Hale    MRN: [id number]           Patient Information     Date Of Birth          9/16/1925        Visit Information        Provider Department      11/21/2018 10:30 AM DARREN SCHULTZN [facility]        Today's Diagnoses     Sick sinus syndrome (H)    -  1    Cardiac pacemaker in situ           Follow-ups after your visit        Your next 10 appointments already scheduled     Mar 05, 2019  4:00 PM CST   Remote PPM Check with BANKS TECH1   [facility] ([facility])    [facility] [facility] [address]  [facility] [id number] [phone] OPT 2           This appointment is for a remote check of your pacemaker.  This is not an appointment at the office.              Who to contact     If you have questions or need follow up information about today's clinic visit or your schedule please contact [facility] directly at [phone].  Normal or non-critical lab and imaging results will be communicated to you by MyChart, letter or phone within 4 business days after the clinic has received the results. If you do not hear from us within 7 days, please contact the clinic through MyChart or phone. If you have a critical or abnormal lab result, we will notify you by phone as soon as possible.  Submit refill requests through [employer] or call your pharmacy and they will forward the refill request to us. Please allow 3 business days for your refill to be completed.          Additional Information About Your Visit        Care EveryWhere ID     This is your Care EveryWhere ID. This could be used by other organizations to access your [place] medical records  [id number]         Blood Pressure from Last 3 Encounters:   08/28/18 134/67   08/15/18 119/49   04/06/18 108/58    Weight from Last 3 Encounters:   08/28/18 74.4 kg (164 lb)   04/06/18 74.3 kg (163 lb 12.8  oz)   04/03/18 73 kg (161 lb)              We Performed the Following     Follow-Up with Device Clinic     PM DEVICE PROGRAMMING EVAL, DUAL LEAD PACER ([id number])        Primary Care Provider Office Phone # Fax #    Jeffery Sherwood [phone] [address]  [place] [address]        Equal Access to Services     [facility] : Hadii aad ku hadasho Soomaali, waaxda luqadaha, qaybta kaalmada adeegyada, waxay idiin hayaan adeeg kharash la'aan ah. So [facility] [phone].    ATENCIÓN: Si habla español, tiene a rolon disposición servicios gratuitos de asistencia lingüística. Bertoame al [phone].    We comply with applicable federal civil rights laws and [place] laws. We do not discriminate on the basis of race, color, national origin, age, disability, sex, sexual orientation, or gender identity.            Thank you!     Thank you for choosing [facility]  for your care. Our goal is always to provide you with excellent care. Hearing back from our patients is one way we can continue to improve our services. Please take a few minutes to complete the written survey that you may receive in the mail after your visit with us. Thank you!             Your Updated Medication List - Protect others around you: Learn how to safely use, store and throw away your medicines at www.disposemymeds.org.          This list is accurate as of 11/21/18 10:59 AM.  Always use your most recent med list.                   Brand Name Dispense Instructions for use Diagnosis    acetaminophen 325 MG tablet    TYLENOL    40 tablet    Take 2 tablets (650 mg) by mouth every 4 hours as needed for other (surgical pain)    Closed fracture of neck of left femur, initial encounter (H)       budesonide-formoterol 160-4.5 MCG/ACT Inhaler    SYMBICORT     Inhale 2 puffs into the lungs 2 times daily        Carboxymethylcellulose Sod PF 0.5 % Soln ophthalmic solution    REFRESH PLUS     1 drop 3  times daily as needed for dry eyes        flecainide acetate 150 MG tablet    TAMBOCOR    90 tablet    Take 1/2 tablet by mouth twice daily    Atrial fibrillation (H)       LASIX PO      Take 20 mg by mouth daily        levothyroxine 75 MCG tablet    SYNTHROID/LEVOTHROID    90 tablet    TAKE 1 TABLET(75 MCG) BY MOUTH DAILY    Hypothyroidism, unspecified type       metoprolol tartrate 50 MG tablet    LOPRESSOR    270 tablet    TAKE 1 AND 1/2 TABLETS BY MOUTH TWICE DAILY    Atrial fibrillation, unspecified type (H), Benign essential hypertension       polyethylene glycol powder    MIRALAX/GLYCOLAX     Take 17 g by mouth daily        traMADol 50 MG tablet    ULTRAM    30 tablet    Take 1 tablet (50 mg) by mouth 2 times daily as needed for severe pain    Chronic low back pain without sciatica, unspecified back pain laterality       VITAMIN B COMPLEX-C Caps      Take 1 capsule by mouth daily        VITAMIN D (CHOLECALCIFEROL) PO      Take 1,000 Units by mouth daily        XARELTO 20 MG Tabs tablet   Generic drug:  rivaroxaban ANTICOAGULANT     90 tablet    TAKE 1 TABLET(20 MG) BY MOUTH DAILY WITH DINNER    Atrial fibrillation, unspecified type (H)          Ambulatory

## 2018-12-04 DIAGNOSIS — I48.91 ATRIAL FIBRILLATION, UNSPECIFIED TYPE (H): ICD-10-CM

## 2018-12-04 NOTE — TELEPHONE ENCOUNTER
XARELTO 20 MG TABS tablet 90 tablet 0 11/21/2018       Last Written Prescription Date:  11/21/2018  Last Fill Quantity: 90,  # refills: 0   Last office visit: 8/28/2018 with prescribing provider:     Future Office Visit:  Unknown    Requested Prescriptions   Pending Prescriptions Disp Refills     XARELTO 20 MG TABS tablet [Pharmacy Med Name: XARELTO 20MG TABLETS] 90 tablet 0     Sig: TAKE 1 TABLET(20 MG) BY MOUTH DAILY WITH DINNER    Direct Oral Anticoagulant Agents Failed    12/4/2018 10:57 AM       Failed - Creatinine Clearance greater than 50 ml/min on file in past 3 mos    No lab results found.         Failed - Serum creatinine less than or equal to 1.4 on file in past 3 mos    Recent Labs   Lab Test  08/15/18   0925   CR  0.72            Passed - Normal Platelets on file in past 12 months    Recent Labs   Lab Test  08/15/18   0925   PLT  231              Passed - Medication is active on med list       Passed - Patient is 18 years of age or older       Passed - No active pregnancy on record       Passed - No positive pregnancy test within past 12 months       Passed - Recent (6 mo) or future (30 days) visit within the authorizing provider's specialty

## 2018-12-05 RX ORDER — RIVAROXABAN 20 MG/1
TABLET, FILM COATED ORAL
Qty: 90 TABLET | Refills: 0 | Status: ON HOLD | OUTPATIENT
Start: 2018-12-05 | End: 2018-12-24

## 2018-12-18 ENCOUNTER — ANCILLARY PROCEDURE (OUTPATIENT)
Dept: GENERAL RADIOLOGY | Facility: CLINIC | Age: 83
End: 2018-12-18
Attending: NURSE PRACTITIONER
Payer: MEDICARE

## 2018-12-18 ENCOUNTER — OFFICE VISIT (OUTPATIENT)
Dept: FAMILY MEDICINE | Facility: CLINIC | Age: 83
End: 2018-12-18
Payer: MEDICARE

## 2018-12-18 VITALS
TEMPERATURE: 97.9 F | SYSTOLIC BLOOD PRESSURE: 149 MMHG | HEART RATE: 67 BPM | DIASTOLIC BLOOD PRESSURE: 72 MMHG | BODY MASS INDEX: 31.1 KG/M2 | HEIGHT: 62 IN | WEIGHT: 169 LBS | OXYGEN SATURATION: 98 %

## 2018-12-18 DIAGNOSIS — E11.8 TYPE 2 DIABETES MELLITUS WITH COMPLICATION, WITHOUT LONG-TERM CURRENT USE OF INSULIN (H): ICD-10-CM

## 2018-12-18 DIAGNOSIS — J44.9 CHRONIC OBSTRUCTIVE PULMONARY DISEASE, UNSPECIFIED COPD TYPE (H): ICD-10-CM

## 2018-12-18 DIAGNOSIS — N39.41 URGENCY INCONTINENCE: ICD-10-CM

## 2018-12-18 DIAGNOSIS — M54.6 ACUTE RIGHT-SIDED THORACIC BACK PAIN: Primary | ICD-10-CM

## 2018-12-18 DIAGNOSIS — M54.6 ACUTE RIGHT-SIDED THORACIC BACK PAIN: ICD-10-CM

## 2018-12-18 LAB
ALBUMIN UR-MCNC: NEGATIVE MG/DL
APPEARANCE UR: CLEAR
BILIRUB UR QL STRIP: NEGATIVE
COLOR UR AUTO: YELLOW
GLUCOSE UR STRIP-MCNC: NEGATIVE MG/DL
HGB UR QL STRIP: NEGATIVE
KETONES UR STRIP-MCNC: NEGATIVE MG/DL
LEUKOCYTE ESTERASE UR QL STRIP: ABNORMAL
MUCOUS THREADS #/AREA URNS LPF: PRESENT /LPF
NITRATE UR QL: NEGATIVE
NON-SQ EPI CELLS #/AREA URNS LPF: ABNORMAL /LPF
PH UR STRIP: 5.5 PH (ref 5–7)
RBC #/AREA URNS AUTO: ABNORMAL /HPF
SOURCE: ABNORMAL
SP GR UR STRIP: 1.02 (ref 1–1.03)
UROBILINOGEN UR STRIP-ACNC: 0.2 EU/DL (ref 0.2–1)
WBC #/AREA URNS AUTO: ABNORMAL /HPF

## 2018-12-18 PROCEDURE — 81001 URINALYSIS AUTO W/SCOPE: CPT | Performed by: NURSE PRACTITIONER

## 2018-12-18 PROCEDURE — 99214 OFFICE O/P EST MOD 30 MIN: CPT | Performed by: NURSE PRACTITIONER

## 2018-12-18 PROCEDURE — 71101 X-RAY EXAM UNILAT RIBS/CHEST: CPT | Mod: RT

## 2018-12-18 ASSESSMENT — MIFFLIN-ST. JEOR: SCORE: 1116.89
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
## 2018-12-18 NOTE — LETTER
[address]  [address]  [place]  [address]  Tel: [phone]    December 26, 2018    Lindsey Hale  [address] [place] [address]        Dear Ms. Hale,    The results of your recent Urine Culture were normal.      Resulted Orders   *UA reflex to Microscopic and Culture ([place] [facility] (except [place] and [place])   Result Value Ref Range    Color Urine Yellow     Appearance Urine Clear     Glucose Urine Negative NEG^Negative mg/dL    Bilirubin Urine Negative NEG^Negative    Ketones Urine Negative NEG^Negative mg/dL    Specific Gravity Urine 1.020 1.003 - 1.035    Blood Urine Negative NEG^Negative    pH Urine 5.5 5.0 - 7.0 pH    Protein Albumin Urine Negative NEG^Negative mg/dL    Urobilinogen Urine 0.2 0.2 - 1.0 EU/dL    Nitrite Urine Negative NEG^Negative    Leukocyte Esterase Urine Trace (A) NEG^Negative    Source Midstream Urine    Urine Microscopic   Result Value Ref Range    WBC Urine 0 - 5 OTO5^0 - 5 /HPF    RBC Urine O - 2 OTO2^O - 2 /HPF    Squamous Epithelial /LPF Urine Many (A) FEW^Few /LPF    Mucous Urine Present (A) NEG^Negative /LPF          If you have any further questions or problems, please contact our office.      Sincerely,    Kiana Petersen NP / radha

## 2018-12-18 NOTE — PATIENT INSTRUCTIONS
Begin using salon pas on the area of pain  Continue tylenol 500mg every 8 hours; you may take 2 at a time as necessary for more severe pain  Use a lumbar support to maintain good posture  Follow up if pain is not improved in 2-3 weeks

## 2018-12-22 ENCOUNTER — APPOINTMENT (OUTPATIENT)
Dept: CT IMAGING | Facility: CLINIC | Age: 83
End: 2018-12-22
Attending: EMERGENCY MEDICINE
Payer: MEDICARE

## 2018-12-22 ENCOUNTER — HOSPITAL ENCOUNTER (OUTPATIENT)
Facility: CLINIC | Age: 83
Setting detail: OBSERVATION
Discharge: HOME | End: 2018-12-24
Attending: EMERGENCY MEDICINE | Admitting: INTERNAL MEDICINE
Payer: MEDICARE

## 2018-12-22 DIAGNOSIS — S22.080A T12 COMPRESSION FRACTURE (H): Primary | ICD-10-CM

## 2018-12-22 DIAGNOSIS — K59.00 CONSTIPATION, UNSPECIFIED CONSTIPATION TYPE: ICD-10-CM

## 2018-12-22 DIAGNOSIS — S22.088A OTHER CLOSED FRACTURE OF TWELFTH THORACIC VERTEBRA, INITIAL ENCOUNTER (H): ICD-10-CM

## 2018-12-22 DIAGNOSIS — I48.0 PAROXYSMAL ATRIAL FIBRILLATION (H): ICD-10-CM

## 2018-12-22 LAB
ALBUMIN SERPL-MCNC: 3.2 G/DL (ref 3.4–5)
ALBUMIN UR-MCNC: NEGATIVE MG/DL
ALP SERPL-CCNC: 68 U/L (ref 40–150)
ALT SERPL W P-5'-P-CCNC: 15 U/L (ref 0–50)
ANION GAP SERPL CALCULATED.3IONS-SCNC: 7 MMOL/L (ref 3–14)
APPEARANCE UR: CLEAR
AST SERPL W P-5'-P-CCNC: 14 U/L (ref 0–45)
BASOPHILS # BLD AUTO: 0 10E9/L (ref 0–0.2)
BASOPHILS NFR BLD AUTO: 0.2 %
BILIRUB SERPL-MCNC: 0.5 MG/DL (ref 0.2–1.3)
BILIRUB UR QL STRIP: NEGATIVE
BUN SERPL-MCNC: 25 MG/DL (ref 7–30)
CALCIUM SERPL-MCNC: 9 MG/DL (ref 8.5–10.1)
CHLORIDE SERPL-SCNC: 108 MMOL/L (ref 94–109)
CO2 SERPL-SCNC: 25 MMOL/L (ref 20–32)
COLOR UR AUTO: ABNORMAL
CREAT SERPL-MCNC: 0.76 MG/DL (ref 0.52–1.04)
DIFFERENTIAL METHOD BLD: ABNORMAL
EOSINOPHIL # BLD AUTO: 0.2 10E9/L (ref 0–0.7)
EOSINOPHIL NFR BLD AUTO: 1.9 %
ERYTHROCYTE [DISTWIDTH] IN BLOOD BY AUTOMATED COUNT: 13.4 % (ref 10–15)
GFR SERPL CREATININE-BSD FRML MDRD: 67 ML/MIN/{1.73_M2}
GLUCOSE SERPL-MCNC: 120 MG/DL (ref 70–99)
GLUCOSE UR STRIP-MCNC: NEGATIVE MG/DL
HCT VFR BLD AUTO: 35 % (ref 35–47)
HGB BLD-MCNC: 11.8 G/DL (ref 11.7–15.7)
HGB UR QL STRIP: NEGATIVE
IMM GRANULOCYTES # BLD: 0 10E9/L (ref 0–0.4)
IMM GRANULOCYTES NFR BLD: 0.4 %
KETONES UR STRIP-MCNC: NEGATIVE MG/DL
LEUKOCYTE ESTERASE UR QL STRIP: ABNORMAL
LIPASE SERPL-CCNC: 68 U/L (ref 73–393)
LYMPHOCYTES # BLD AUTO: 0.9 10E9/L (ref 0.8–5.3)
LYMPHOCYTES NFR BLD AUTO: 10.5 %
MCH RBC QN AUTO: 33.7 PG (ref 26.5–33)
MCHC RBC AUTO-ENTMCNC: 33.7 G/DL (ref 31.5–36.5)
MCV RBC AUTO: 100 FL (ref 78–100)
MONOCYTES # BLD AUTO: 0.6 10E9/L (ref 0–1.3)
MONOCYTES NFR BLD AUTO: 6.9 %
NEUTROPHILS # BLD AUTO: 6.7 10E9/L (ref 1.6–8.3)
NEUTROPHILS NFR BLD AUTO: 80.1 %
NITRATE UR QL: NEGATIVE
NRBC # BLD AUTO: 0 10*3/UL
NRBC BLD AUTO-RTO: 0 /100
PH UR STRIP: 7 PH (ref 5–7)
PLATELET # BLD AUTO: 287 10E9/L (ref 150–450)
POTASSIUM SERPL-SCNC: 4.3 MMOL/L (ref 3.4–5.3)
PROT SERPL-MCNC: 7.4 G/DL (ref 6.8–8.8)
RBC # BLD AUTO: 3.5 10E12/L (ref 3.8–5.2)
RBC #/AREA URNS AUTO: 2 /HPF (ref 0–2)
SODIUM SERPL-SCNC: 140 MMOL/L (ref 133–144)
SOURCE: ABNORMAL
SP GR UR STRIP: 1.04 (ref 1–1.03)
SQUAMOUS #/AREA URNS AUTO: 1 /HPF (ref 0–1)
UROBILINOGEN UR STRIP-MCNC: NORMAL MG/DL (ref 0–2)
WBC # BLD AUTO: 8.4 10E9/L (ref 4–11)
WBC #/AREA URNS AUTO: 3 /HPF (ref 0–5)

## 2018-12-22 PROCEDURE — 72131 CT LUMBAR SPINE W/O DYE: CPT

## 2018-12-22 PROCEDURE — 99220 ZZC INITIAL OBSERVATION CARE,LEVL III: CPT | Performed by: INTERNAL MEDICINE

## 2018-12-22 PROCEDURE — 25000125 ZZHC RX 250: Performed by: PHYSICIAN ASSISTANT

## 2018-12-22 PROCEDURE — 25000132 ZZH RX MED GY IP 250 OP 250 PS 637: Mod: GY | Performed by: PHYSICIAN ASSISTANT

## 2018-12-22 PROCEDURE — 72128 CT CHEST SPINE W/O DYE: CPT

## 2018-12-22 PROCEDURE — 80053 COMPREHEN METABOLIC PANEL: CPT | Performed by: EMERGENCY MEDICINE

## 2018-12-22 PROCEDURE — 96376 TX/PRO/DX INJ SAME DRUG ADON: CPT

## 2018-12-22 PROCEDURE — 96374 THER/PROPH/DIAG INJ IV PUSH: CPT | Mod: 59

## 2018-12-22 PROCEDURE — 25000128 H RX IP 250 OP 636: Performed by: PHYSICIAN ASSISTANT

## 2018-12-22 PROCEDURE — 96375 TX/PRO/DX INJ NEW DRUG ADDON: CPT

## 2018-12-22 PROCEDURE — 85025 COMPLETE CBC W/AUTO DIFF WBC: CPT | Performed by: EMERGENCY MEDICINE

## 2018-12-22 PROCEDURE — 99285 EMERGENCY DEPT VISIT HI MDM: CPT | Mod: 25

## 2018-12-22 PROCEDURE — 25000125 ZZHC RX 250: Performed by: EMERGENCY MEDICINE

## 2018-12-22 PROCEDURE — 83690 ASSAY OF LIPASE: CPT | Performed by: EMERGENCY MEDICINE

## 2018-12-22 PROCEDURE — 74177 CT ABD & PELVIS W/CONTRAST: CPT

## 2018-12-22 PROCEDURE — 94640 AIRWAY INHALATION TREATMENT: CPT

## 2018-12-22 PROCEDURE — 25000128 H RX IP 250 OP 636: Performed by: EMERGENCY MEDICINE

## 2018-12-22 PROCEDURE — 81001 URINALYSIS AUTO W/SCOPE: CPT | Performed by: EMERGENCY MEDICINE

## 2018-12-22 PROCEDURE — A9270 NON-COVERED ITEM OR SERVICE: HCPCS | Mod: GY | Performed by: PHYSICIAN ASSISTANT

## 2018-12-22 PROCEDURE — G0378 HOSPITAL OBSERVATION PER HR: HCPCS

## 2018-12-22 RX ORDER — MORPHINE SULFATE 2 MG/ML
2 INJECTION, SOLUTION INTRAMUSCULAR; INTRAVENOUS ONCE
Status: COMPLETED | OUTPATIENT
Start: 2018-12-22 | End: 2018-12-22

## 2018-12-22 RX ORDER — ONDANSETRON 4 MG/1
4 TABLET, ORALLY DISINTEGRATING ORAL EVERY 6 HOURS PRN
Status: DISCONTINUED | OUTPATIENT
Start: 2018-12-22 | End: 2018-12-24 | Stop reason: HOSPADM

## 2018-12-22 RX ORDER — HYDROMORPHONE HYDROCHLORIDE 1 MG/ML
.3-.5 INJECTION, SOLUTION INTRAMUSCULAR; INTRAVENOUS; SUBCUTANEOUS
Status: DISCONTINUED | OUTPATIENT
Start: 2018-12-22 | End: 2018-12-24 | Stop reason: HOSPADM

## 2018-12-22 RX ORDER — ONDANSETRON 2 MG/ML
4 INJECTION INTRAMUSCULAR; INTRAVENOUS ONCE
Status: COMPLETED | OUTPATIENT
Start: 2018-12-22 | End: 2018-12-22

## 2018-12-22 RX ORDER — MUPIROCIN 20 MG/G
OINTMENT TOPICAL 2 TIMES DAILY
Status: DISCONTINUED | OUTPATIENT
Start: 2018-12-22 | End: 2018-12-24 | Stop reason: HOSPADM

## 2018-12-22 RX ORDER — AMOXICILLIN 250 MG
2 CAPSULE ORAL 2 TIMES DAILY
Status: DISCONTINUED | OUTPATIENT
Start: 2018-12-22 | End: 2018-12-24 | Stop reason: HOSPADM

## 2018-12-22 RX ORDER — CEPHALEXIN 500 MG/1
500 CAPSULE ORAL 2 TIMES DAILY
Status: DISCONTINUED | OUTPATIENT
Start: 2018-12-22 | End: 2018-12-24 | Stop reason: HOSPADM

## 2018-12-22 RX ORDER — MUPIROCIN 20 MG/G
OINTMENT TOPICAL 2 TIMES DAILY
COMMUNITY
End: 2019-11-14

## 2018-12-22 RX ORDER — AMOXICILLIN 250 MG
1 CAPSULE ORAL 2 TIMES DAILY PRN
Status: DISCONTINUED | OUTPATIENT
Start: 2018-12-22 | End: 2018-12-24 | Stop reason: HOSPADM

## 2018-12-22 RX ORDER — IOPAMIDOL 755 MG/ML
83 INJECTION, SOLUTION INTRAVASCULAR ONCE
Status: COMPLETED | OUTPATIENT
Start: 2018-12-22 | End: 2018-12-22

## 2018-12-22 RX ORDER — MORPHINE SULFATE 2 MG/ML
2 INJECTION, SOLUTION INTRAMUSCULAR; INTRAVENOUS
Status: COMPLETED | OUTPATIENT
Start: 2018-12-22 | End: 2018-12-22

## 2018-12-22 RX ORDER — ONDANSETRON 2 MG/ML
4 INJECTION INTRAMUSCULAR; INTRAVENOUS EVERY 6 HOURS PRN
Status: DISCONTINUED | OUTPATIENT
Start: 2018-12-22 | End: 2018-12-24 | Stop reason: HOSPADM

## 2018-12-22 RX ORDER — AMOXICILLIN 250 MG
2 CAPSULE ORAL 2 TIMES DAILY PRN
Status: DISCONTINUED | OUTPATIENT
Start: 2018-12-22 | End: 2018-12-24 | Stop reason: HOSPADM

## 2018-12-22 RX ORDER — POLYETHYLENE GLYCOL 3350 17 G/17G
17 POWDER, FOR SOLUTION ORAL 2 TIMES DAILY
Status: DISCONTINUED | OUTPATIENT
Start: 2018-12-22 | End: 2018-12-24 | Stop reason: HOSPADM

## 2018-12-22 RX ORDER — LEVOTHYROXINE SODIUM 75 UG/1
75 TABLET ORAL DAILY
Status: DISCONTINUED | OUTPATIENT
Start: 2018-12-23 | End: 2018-12-24 | Stop reason: HOSPADM

## 2018-12-22 RX ORDER — CEPHALEXIN 500 MG/1
500 CAPSULE ORAL 2 TIMES DAILY
COMMUNITY
End: 2019-01-04

## 2018-12-22 RX ORDER — ACETAMINOPHEN 500 MG
1000 TABLET ORAL
Status: DISCONTINUED | OUTPATIENT
Start: 2018-12-22 | End: 2018-12-24 | Stop reason: HOSPADM

## 2018-12-22 RX ORDER — BISACODYL 10 MG
10 SUPPOSITORY, RECTAL RECTAL DAILY PRN
Status: DISCONTINUED | OUTPATIENT
Start: 2018-12-22 | End: 2018-12-24 | Stop reason: HOSPADM

## 2018-12-22 RX ORDER — NALOXONE HYDROCHLORIDE 0.4 MG/ML
.1-.4 INJECTION, SOLUTION INTRAMUSCULAR; INTRAVENOUS; SUBCUTANEOUS
Status: DISCONTINUED | OUTPATIENT
Start: 2018-12-22 | End: 2018-12-24 | Stop reason: HOSPADM

## 2018-12-22 RX ADMIN — MORPHINE SULFATE 2 MG: 2 INJECTION, SOLUTION INTRAMUSCULAR; INTRAVENOUS at 11:18

## 2018-12-22 RX ADMIN — Medication 0.5 MG: at 18:10

## 2018-12-22 RX ADMIN — IOPAMIDOL 83 ML: 755 INJECTION, SOLUTION INTRAVENOUS at 11:29

## 2018-12-22 RX ADMIN — CEPHALEXIN 500 MG: 500 CAPSULE ORAL at 18:05

## 2018-12-22 RX ADMIN — ONDANSETRON 4 MG: 2 INJECTION INTRAMUSCULAR; INTRAVENOUS at 09:51

## 2018-12-22 RX ADMIN — SODIUM CHLORIDE 65 ML: 9 INJECTION, SOLUTION INTRAVENOUS at 11:30

## 2018-12-22 RX ADMIN — METOPROLOL TARTRATE 75 MG: 50 TABLET ORAL at 20:08

## 2018-12-22 RX ADMIN — SODIUM PHOSPHATE, DIBASIC AND SODIUM PHOSPHATE, MONOBASIC 1 ENEMA: 7; 19 ENEMA RECTAL at 18:28

## 2018-12-22 RX ADMIN — RIVAROXABAN 15 MG: 15 TABLET, FILM COATED ORAL at 16:39

## 2018-12-22 RX ADMIN — FLECAINIDE ACETATE 75 MG: 50 TABLET ORAL at 20:11

## 2018-12-22 RX ADMIN — MUPIROCIN: 20 OINTMENT TOPICAL at 20:16

## 2018-12-22 RX ADMIN — POLYETHYLENE GLYCOL 3350 17 G: 17 POWDER, FOR SOLUTION ORAL at 16:39

## 2018-12-22 RX ADMIN — SENNOSIDES AND DOCUSATE SODIUM 2 TABLET: 8.6; 5 TABLET ORAL at 20:53

## 2018-12-22 RX ADMIN — POLYETHYLENE GLYCOL 3350 17 G: 17 POWDER, FOR SOLUTION ORAL at 20:08

## 2018-12-22 RX ADMIN — ACETAMINOPHEN 1000 MG: 500 TABLET, FILM COATED ORAL at 18:05

## 2018-12-22 RX ADMIN — MORPHINE SULFATE 2 MG: 2 INJECTION, SOLUTION INTRAMUSCULAR; INTRAVENOUS at 09:51

## 2018-12-22 RX ADMIN — FLUTICASONE FUROATE AND VILANTEROL TRIFENATATE 1 PUFF: 200; 25 POWDER RESPIRATORY (INHALATION) at 18:04

## 2018-12-22 ASSESSMENT — ENCOUNTER SYMPTOMS
VOMITING: 0
NUMBNESS: 0
ABDOMINAL DISTENTION: 1
NAUSEA: 1
WOUND: 1
DYSURIA: 0
BACK PAIN: 1
ABDOMINAL PAIN: 1
BLOOD IN STOOL: 0
CONSTIPATION: 1
WEAKNESS: 0
HEMATURIA: 0

## 2018-12-22 ASSESSMENT — MIFFLIN-ST. JEOR: SCORE: 1074.94

## 2018-12-22 NOTE — ED NOTES
"[facility]  ED Nurse Handoff Report    ED Chief complaint: Back Pain (back pain for 5 days right sided )      ED Diagnosis:   Final diagnoses:   Other closed fracture of twelfth thoracic vertebra, initial encounter (H)   Constipation, unspecified constipation type       Code Status: Full Code    Allergies:   Allergies   Allergen Reactions     Clindamycin Hcl Other (See Comments)     Difficulty swallowing     Ampicillin Potassium      Rash nausea and vomiting       Celebrex [Celecoxib]      rash     Ciprofloxacin      rash     Erythromycin      rash     Indocin      Ended up going to( [facility] hospital with severe head ache     Penicillins      Rash,nausea and vomiting     Vibramycin [Doxycycline Hyclate]      Rash      Xylocaine-Epinephrine [Epinephrine-Lidocaine-Na Metabisulfite]      Xylocaine with epi caused a rapid heart beat       Activity level - Baseline/Home:  Independent    Activity Level - Current:   Independent     Needed?: No    Isolation: No  Infection: Not Applicable  Bariatric?: No    Vital Signs:   Vitals:    12/22/18 0838 12/22/18 1000 12/22/18 1130   BP: 145/71 135/89 162/75   Pulse: 62 64 63   Resp: 16     Temp: 98.4  F (36.9  C)     TempSrc: Oral     SpO2: 96% 96% 98%   Weight: 74.8 kg (165 lb)     Height: 1.524 m (5')         Cardiac Rhythm: ,        Pain level: 0-10 Pain Scale: 2    Is this patient confused?: No   Does this patient have a guardian?  No         If yes, is there guardianship documents in the Epic \"Code/ACP\" activity?  N/A         Guardian Notified?  N/A  [place] - Suicide Severity Rating Scale Completed?  Yes  If yes, what color did the patient score?  White    Patient Report: Initial Complaint: Back Pain  Focused Assessment: Pt presents with lower right sided back pain worsening over the past 5 days. Worse with movement. CT indicates T12 compression fracture. Pt also reports constipation over the past week.  Tests Performed: CT abd/pelvis, CT thoracic " and lumbar spine, Labs  Abnormal Results:   Labs Ordered and Resulted from Time of ED Arrival Up to the Time of Departure from the ED   CBC WITH PLATELETS DIFFERENTIAL - Abnormal; Notable for the following components:       Result Value    RBC Count 3.50 (*)     MCH 33.7 (*)     All other components within normal limits   COMPREHENSIVE METABOLIC PANEL - Abnormal; Notable for the following components:    Glucose 120 (*)     Albumin 3.2 (*)     All other components within normal limits   LIPASE - Abnormal; Notable for the following components:    Lipase 68 (*)     All other components within normal limits   ROUTINE UA WITH MICROSCOPIC   PERIPHERAL IV CATHETER   FREE WATER       Treatments provided: 2mg morphine IV x2, zofran 4mg IV    Family Comments: Daughter at bedside.    OBS brochure/video discussed/provided to patient/family: Yes              Name of person given brochure if not patient: N/A              Relationship to patient: N/A    ED Medications:   Medications   sodium chloride (PF) 0.9% PF flush 3 mL (not administered)   sodium chloride (PF) 0.9% PF flush 3 mL (not administered)   morphine (PF) injection 2 mg (2 mg Intravenous Given 12/22/18 0951)   ondansetron (ZOFRAN) injection 4 mg (4 mg Intravenous Given 12/22/18 0951)   iopamidol (ISOVUE-370) solution 83 mL (83 mLs Intravenous Given 12/22/18 1129)   Saline flush (65 mLs Intravenous Given 12/22/18 1130)   morphine (PF) injection 2 mg (2 mg Intravenous Given 12/22/18 1118)       Drips infusing?:  No    For the majority of the shift this patient was Green.   Interventions performed were N/A.    Severe Sepsis OR Septic Shock Diagnosis Present: No    To be done/followed up on inpatient unit:  N/A    ED NURSE PHONE NUMBER: [phone]

## 2018-12-22 NOTE — PROGRESS NOTES
RECEIVING UNIT ED HANDOFF REVIEW    ED Nurse Handoff Report was reviewed by: Yanelis Childers on December 22, 2018 at 3:10 PM

## 2018-12-22 NOTE — PLAN OF CARE
PRIMARY DIAGNOSIS: ACUTE PAIN  OUTPATIENT/OBSERVATION GOALS TO BE MET BEFORE DISCHARGE:  1. Pain Status: Improved but still requiring IV narcotics.    2. Return to near baseline physical activity: No    3. Cleared for discharge by consultants (if involved): No    Discharge Planner Nurse   Safe discharge environment identified: No  Barriers to discharge: Yes       Entered by: Yanelis Childers 12/22/2018 4:49 PM     Please review provider order for any additional goals.   Nurse to notify provider when observation goals have been met and patient is ready for discharge.

## 2018-12-22 NOTE — PHARMACY-ADMISSION MEDICATION HISTORY
Admission medication history interview status for the 12/22/2018  admission is complete. See EPIC admission navigator for prior to admission medications     Medication history source reliability:Good    Actions taken by pharmacist (provider contacted, etc): Interviewed patient and family, family had complete medication list along.      Additional medication history information not noted on PTA med list :None    Medication reconciliation/reorder completed by provider prior to medication history? No    Time spent in this activity: 15 minutes    Prior to Admission medications    Medication Sig Last Dose Taking? Auth Provider   budesonide-formoterol (SYMBICORT) 160-4.5 MCG/ACT inhaler Inhale 2 puffs into the lungs 2 times daily  12/22/2018 at am Yes Reported, Patient   cephALEXin (KEFLEX) 500 MG capsule Take 500 mg by mouth 2 times daily Patient is taking 1 tablet by mouth twice daily for 7 days, started 12/20, will end on 12/27. 12/21/2018 at pm Yes Unknown, Entered By History   flecainide acetate 150 MG TABS Take 1/2 tablet by mouth twice daily 12/22/2018 at am Yes Efra Vazquez MD   Furosemide (LASIX PO) Take 20 mg by mouth daily as needed  prn Yes Reported, Patient   levothyroxine (SYNTHROID/LEVOTHROID) 75 MCG tablet TAKE 1 TABLET(75 MCG) BY MOUTH DAILY 12/22/2018 at am Yes Jeffery Sherwood MD   metoprolol tartrate (LOPRESSOR) 50 MG tablet TAKE 1 AND 1/2 TABLETS BY MOUTH TWICE DAILY 12/22/2018 at am Yes Jeffery Sherwood MD   mupirocin (BACTROBAN) 2 % external ointment Apply topically 2 times daily 12/21/2018 at pm Yes Unknown, Entered By History   traMADol (ULTRAM) 50 MG tablet Take 1 tablet (50 mg) by mouth 2 times daily as needed for severe pain prn at Unknown time Yes Jeffery Sherwood MD   XARELTO 20 MG TABS tablet TAKE 1 TABLET(20 MG) BY MOUTH DAILY WITH DINNER 12/21/2018 at pm Yes Jeffery Sherwood MD

## 2018-12-22 NOTE — ED PROVIDER NOTES
History     Chief Complaint:  Back pain    The history is provided by the patient and a relative.      Lindsey Hale is an anticoagulated 89 year old female with a history of atrial fibrillation, mitral regurgitation, HTN, hyperlipidemia, pacemaker, COPD, and TIA who presents to the emergency department with her son and daughter for evaluation of back pain. For the past week, the patient has had ongoing right sided back pain that wraps around to the front of her abdomen. She went to her doctor three days ago where xray's were done to assess fractures. These were unremarkable. Kidney stones were also on the PCP's differential, and the patient denies a history of stones. Then yesterday night, the patient states she was having difficulty breathing due to new onset abdominal bloating secondary to her current constipation and she was slightly nauseated at the time as well. She has a history of constipation and has not had a bowel movement in a week, which can occur for her. She also states she has urinary incontinence in the past few days. These symptoms and the severity of pain she is in prompted her to seek evaluation here in the emergency department.    Here, she is in pain even just at rest lying on the bed and is slightly nauseated. Pain worsens with movement. She denies difficulty breathing now. She denies numbness and weakness in the lower extremities and she is able to ambulate normally. She denies hematuria and dysuria. She is without bloody stools and vomiting. She denies a history of bowel obstruction. She still has her gallbladder.     To note, the patient had a recent Mohs surgery 2.5 weeks ago for a lesion on her lower left leg, with area still in process of healing. She is taking ibuprofen for pain and Keflex and a topical cream for antibiotics. The patient has another lesion that needs to be removed from her lower right leg in the foreseeable future.     Allergies:  Clindamycin  Ampicillin  Potassium  Ciprofloxacin  Erythromycin  Indocin  Penicillins  Vibramycin  Xylocaine-Epinephrine     Medications:    Xarelto  Tramadol  Miralax  Levothyroxine  Lasix  Symbicort     Past Medical History:    Atrial Fibrillation  Arthritis  Chronic LBP  COPD  Insomnia  Breast cysts  Dysphagia  Hyperlipidemia  HTN  Hypothyroidism  Mitral regurgitation  Osteopenia  Cardiac pacemaker  Urosepsis  TIA  Polymyalgia rheumatica  Urine incontinence  Vision Changes    Past Surgical History:    Left hip replacement  Knee replacement  Right breast biopsy  Bilateral mastectomy  Breast implants  Cataract surgery  Foot surgery  Hysterectomy  appendectomy    Family History:    CAD: Father  Lymphoma  Cancer  Breast Cancer  MI: Sister  Osteoporosis    Social History:  Former Smoker: quit date 2/1/1955  Positive for alcohol use  Negative for drug use.  Marital Status:        Review of Systems   Gastrointestinal: Positive for abdominal distention, abdominal pain, constipation and nausea. Negative for blood in stool and vomiting.   Genitourinary: Negative for dysuria and hematuria.        Positive for urinary incontinence.    Musculoskeletal: Positive for back pain.   Skin: Positive for wound (status post Mohns procedure).   Neurological: Negative for weakness and numbness.   All other systems reviewed and are negative.    Physical Exam     Patient Vitals for the past 24 hrs:   BP Temp Temp src Pulse Resp SpO2 Height Weight   12/22/18 1130 162/75 -- -- 63 -- 98 % -- --   12/22/18 1000 135/89 -- -- 64 -- 96 % -- --   12/22/18 0838 145/71 98.4  F (36.9  C) Oral 62 16 96 % 1.524 m (5') 74.8 kg (165 lb)       Physical Exam  SKIN:  Warm, dry.  HEMATOLOGIC/IMMUNOLOGIC/LYMPHATIC:  No pallor.  No edema.  HENT:  Moist oral mucosa.  EYES:  Conjunctivae normal.  CARDIOVASCULAR:  Regular rate and rhythm.  No murmur.  RESPIRATORY:  No respiratory distress, breath sounds equal and normal.  GASTROINTESTINAL:  Soft, nontender abdomen with active  bowel sounds.  No mass or distension.  MUSCULOSKELETAL:  Minimal passive ROM of torso exacerbates the right mid/lower back pain.  NEUROLOGIC:  Alert, conversant.  No gross motor or sensory deficit of the lower limbs.  PSYCHIATRIC:  Normal mood.    Emergency Department Course   Imaging:  Radiographic findings were communicated with the patient and family who voiced understanding of the findings.    CT Abdomen/Pelvis with IV contrast:   1. Colonic diverticulosis without CT evidence of diverticulitis.  2. Prominent fecal debris throughout the colon.  3. No evidence of bowel obstruction or acute inflammatory process in  the abdomen or pelvis. As per radiology.    CT Lumbar Spine w/o contrast:  1. No evidence of acute fracture.  2. Old fractures of L3 superior and inferior endplates.  3. Multilevel degenerative disc disease and degenerative facet  arthropathy.  4. L4-L5 grade 2 spondylolisthesis.  5. Severe spinal canal stenosis at L4-L5, moderate spinal canal  stenosis L2-L3, L3-L4 and L5-S1 and mild spinal canal stenosis L1-L2. As per radiology.     CT Thoracic Spine w/o contrast:  1. Compression fracture of the superior endplate of T12 with  recent-appearing fracture lines. Mild adjacent soft tissue swelling.  2. Multilevel degenerative disc disease.  3. Scoliosis. As per radiology.     Laboratory:  UA with micro: Not collected  CBC: WBC: 8.4, HGB: 11.8, PLT: 287  CMP: Glucose 120 (H), Albumin 3.2 (L), o/w WNL (Creatinine: 0.76)  Lipase: 68 (L)    Interventions:  0951 Zofran, 4 mg, IV injection  0951 Morphine 2 mg IV  1118 Morphine, 2 mg, IV injection    Emergency Department Course:  0900 Nursing notes and vitals reviewed.  I performed an exam of the patient as documented above.     IV inserted. Medicine administered as documented above. Blood drawn. This was sent to the lab for further testing, results above.    The patient provided a urine sample here in the emergency department. This was sent for laboratory  testing, findings above.     The patient was sent for a abdomen pelvis CT while in the emergency department, findings above.     1000 I rechecked the patient.    1218 I rechecked the patient and discussed the results of their workup thus far.     1413 I rechecked the patient.     1441  I consulted with Dr. Qureshi of the hospitalist services. They are in agreement to accept the patient for admission.    Findings and plan explained to the Patient and son and daughter who consents to admission. Discussed the patient with Dr. Qureshi, who will admit the patient to an observation bed for further monitoring, evaluation, and treatment.  Impression & Plan    Medical Decision Making:  Lindsey Hale is a 89 year old female who presents to mid to lower back pain radiating around to the abdomen, constipation without a normal stool in the last week, and abdominal bloating which at times has limited her breathing. Evaluation as above with respect to abdominal or bony pathology notable for an acute T12 fracture which correlates with her pain.  Given her functional limitation with this fracture and back pain she will be admitted.     Diagnosis:    ICD-10-CM    1. Other closed fracture of twelfth thoracic vertebra, initial encounter (H) S22.088A    2. Constipation, unspecified constipation type K59.00        Disposition:  Admitted to Dr. Qureshi to an observation bed    Scribe Disclosure:  I, Maria R Parkinson, am serving as a scribe on 12/22/2018 at 9:14 AM to personally document services performed by Cesario Hamilton MD based on my observations and the provider's statements to me.     Maria R Parkinson  12/22/2018    EMERGENCY DEPARTMENT       Cesario Hamilton MD  12/22/18 1957

## 2018-12-23 ENCOUNTER — APPOINTMENT (OUTPATIENT)
Dept: PHYSICAL THERAPY | Facility: CLINIC | Age: 83
End: 2018-12-23
Attending: PHYSICIAN ASSISTANT
Payer: MEDICARE

## 2018-12-23 PROCEDURE — G0378 HOSPITAL OBSERVATION PER HR: HCPCS

## 2018-12-23 PROCEDURE — 96376 TX/PRO/DX INJ SAME DRUG ADON: CPT

## 2018-12-23 PROCEDURE — 25000128 H RX IP 250 OP 636: Performed by: PHYSICIAN ASSISTANT

## 2018-12-23 PROCEDURE — 40000193 ZZH STATISTIC PT WARD VISIT

## 2018-12-23 PROCEDURE — 25000132 ZZH RX MED GY IP 250 OP 250 PS 637: Mod: GY | Performed by: PHYSICIAN ASSISTANT

## 2018-12-23 PROCEDURE — A9270 NON-COVERED ITEM OR SERVICE: HCPCS | Mod: GY | Performed by: PHYSICIAN ASSISTANT

## 2018-12-23 PROCEDURE — 99225 ZZC SUBSEQUENT OBSERVATION CARE,LEVEL II: CPT | Performed by: PHYSICIAN ASSISTANT

## 2018-12-23 PROCEDURE — 97161 PT EVAL LOW COMPLEX 20 MIN: CPT | Mod: GP

## 2018-12-23 RX ADMIN — MUPIROCIN: 20 OINTMENT TOPICAL at 19:53

## 2018-12-23 RX ADMIN — ACETAMINOPHEN 1000 MG: 500 TABLET, FILM COATED ORAL at 08:31

## 2018-12-23 RX ADMIN — OXYCODONE HYDROCHLORIDE 2.5 MG: 5 TABLET ORAL at 19:53

## 2018-12-23 RX ADMIN — SENNOSIDES AND DOCUSATE SODIUM 2 TABLET: 8.6; 5 TABLET ORAL at 08:31

## 2018-12-23 RX ADMIN — Medication 0.5 MG: at 07:07

## 2018-12-23 RX ADMIN — POLYETHYLENE GLYCOL 3350 17 G: 17 POWDER, FOR SOLUTION ORAL at 19:49

## 2018-12-23 RX ADMIN — METOPROLOL TARTRATE 75 MG: 50 TABLET ORAL at 19:49

## 2018-12-23 RX ADMIN — OXYCODONE HYDROCHLORIDE 2.5 MG: 5 TABLET ORAL at 15:56

## 2018-12-23 RX ADMIN — CEPHALEXIN 500 MG: 500 CAPSULE ORAL at 08:31

## 2018-12-23 RX ADMIN — RIVAROXABAN 15 MG: 15 TABLET, FILM COATED ORAL at 17:25

## 2018-12-23 RX ADMIN — SENNOSIDES AND DOCUSATE SODIUM 2 TABLET: 8.6; 5 TABLET ORAL at 19:48

## 2018-12-23 RX ADMIN — LEVOTHYROXINE SODIUM 75 MCG: 75 TABLET ORAL at 08:31

## 2018-12-23 RX ADMIN — ACETAMINOPHEN 1000 MG: 500 TABLET, FILM COATED ORAL at 17:25

## 2018-12-23 RX ADMIN — ONDANSETRON 4 MG: 2 INJECTION INTRAMUSCULAR; INTRAVENOUS at 15:22

## 2018-12-23 RX ADMIN — SODIUM PHOSPHATE, DIBASIC AND SODIUM PHOSPHATE, MONOBASIC 1 ENEMA: 7; 19 ENEMA RECTAL at 17:25

## 2018-12-23 RX ADMIN — METOPROLOL TARTRATE 75 MG: 50 TABLET ORAL at 08:31

## 2018-12-23 RX ADMIN — FLECAINIDE ACETATE 75 MG: 50 TABLET ORAL at 08:31

## 2018-12-23 RX ADMIN — FLUTICASONE FUROATE AND VILANTEROL TRIFENATATE 1 PUFF: 200; 25 POWDER RESPIRATORY (INHALATION) at 08:42

## 2018-12-23 RX ADMIN — OXYCODONE HYDROCHLORIDE 5 MG: 5 TABLET ORAL at 23:47

## 2018-12-23 RX ADMIN — SENNOSIDES AND DOCUSATE SODIUM 2 TABLET: 8.6; 5 TABLET ORAL at 08:40

## 2018-12-23 RX ADMIN — Medication 0.5 MG: at 02:16

## 2018-12-23 RX ADMIN — POLYETHYLENE GLYCOL 3350 17 G: 17 POWDER, FOR SOLUTION ORAL at 08:32

## 2018-12-23 RX ADMIN — ACETAMINOPHEN 1000 MG: 500 TABLET, FILM COATED ORAL at 13:18

## 2018-12-23 RX ADMIN — FLECAINIDE ACETATE 75 MG: 50 TABLET ORAL at 19:49

## 2018-12-23 RX ADMIN — MUPIROCIN: 20 OINTMENT TOPICAL at 10:21

## 2018-12-23 RX ADMIN — CEPHALEXIN 500 MG: 500 CAPSULE ORAL at 19:49

## 2018-12-23 NOTE — PLAN OF CARE
Discharge Planner PT   Patient plan for discharge: Home  Current status: Pt adm under observation status on 12/22/18 with back pain. Pt found to have T12 compression fracture. At baseline, pt lives alone, ambulates with FWW if needed, ramp to enter, all needs on main floor, has neighbor and family who check in daily. PT evaluation completed, pt SBA for rolling, supine to sit, sit to supine, SBA for sit to stand and stand to sit transfers. Pt amb 60' with FWW and SBA, no overt LOB. Pt c/o 4/10 back pain at rest increasing to 7/10 with ambulation.   Barriers to return to prior living situation: Decreased activity tolerance, increased pain  Recommendations for discharge: Home with increased assistance for household tasks such as laundry and meals, home PT  Rationale for recommendations: Pt is able to perform mobility necessary for discharge to home however does demonstrate increased pain with increased mobility therefore anticipate need for increased assistance at home. Recommend home PT to progress independence and ease of mobility tasks as pt will require assistance of another person, assistive device, and need to put forth great effort to leave her home. Will complete acute care PT orders at this time. Recommend pt continue to ambulate throughout stay with nursing supervision to prevent further deconditioning.        Entered by: Misa Peguero 12/23/2018 11:31 AM

## 2018-12-23 NOTE — PLAN OF CARE
PRIMARY DIAGNOSIS: ACUTE PAIN  OUTPATIENT/OBSERVATION GOALS TO BE MET BEFORE DISCHARGE:  1. Pain Status: 5  2. Return to near baseline physical activity: Improving  3. Cleared for discharge by consultants (if involved): NO  Discharge Planner Nurse   Safe discharge environment identified: no   Barriers to discharge:      Entered by: Shaniqua De La Fuente 12/23/2018 4:27 AM   Pt is alert.Took dilaudid for pain.IVF saline locked.Walk to the bathroom.Will monitor.

## 2018-12-23 NOTE — PROGRESS NOTES
PRIMARY DIAGNOSIS: ACUTE PAIN  OUTPATIENT/OBSERVATION GOALS TO BE MET BEFORE DISCHARGE:  1. Pain Status: Partially met, relieved with PRN Dilaudid and schedule Tylenol.  2. Return to near baseline physical activity: Partially met  3. Cleared for discharge by consultants (if involved): NO

## 2018-12-23 NOTE — CONSULTS
[facility] Orthopedic Consultation    Lindsey Hale MRN# [id number]   Age: 89 year old YOB: 1925     Date of Admission:  12/22/2018    Reason for consult: Back pain       Requesting physician:        Level of consult: One-time consult to assist in determining a diagnosis and to recommend an appropriate treatment plan           Assessment and Plan:   Assessment:   T12 compression fracture through the superior end plate        Plan:   No surgical intervention necessary  Rec PT/OT  WBAT  TLSO brace for comfort  Pain control  F/u with Spine in 1-2 weeks for repeat evaluation           Chief Complaint:   T12 compression fracture         History of Present Illness:   This patient is a 89 year old female who presents with the following condition requiring a hospital admission:    89 yo F with low back pain for the last few weeks. She denies any falls or trauma but her pain has been getting worse to the point she presented to the ED where she was found to have a T12 compression fracture of the superior end plate. She was admitted for obs and ortho consulted. No numbness/tingling. No other complaints.          Past Medical History:     Past Medical History:   Diagnosis Date     Abnormal echocardiogram 04/2017    mild mr, ar, mitral stenosis, nl ef, lvh.  Done for episode of vision change     Arthritis 99         Atrial fibrillation (H) 2011 and 2009    neg nuc est 2009, Dr. Vazquez, on coumadin     Chest pain 2000    neg est echo, neg ct chest abd, pelvis Denominational     Chronic LBP      COPD (chronic obstructive pulmonary disease) (H)     seen by pulmonary md Dr. Whitt, and given inhaler     Cysts, breast 1980s    bilat mastectomies     Dizzy 2007    ct neg, carotid us neg     Dysphagia 2005    egd nl     Elevated blood sugar      Environmental allergies      Hematuria 2004    neg cysto and us     Hemoptyses 2008    ct neg, bronch neg 2009     Hyperlipidemia      Hypertension       Hypothyroid     Dr. Ortiz     Mitral regurgitation 6/15    on echo     nausea 2006    ct abd and ;pelvis neg     Osteopenia     fu dexa better 2011     Pacemaker     afib with pauses and syncope     Palpitations 2009    neg est     PMR (polymyalgia rheumatica) (H)     not active in years     SOB (shortness of breath) 5/15    echo nl ef, 2+mr, cxr clear, seen by pulm and given inhaler     Supraventricular premature beats      TIA (transient ischaemic attack)     carotids neg, mri neg     Treadmill stress test negative for angina pectoris     nuclear est     Urine incontinence     Dr. Westfall     Urosepsis     hospitalized     Vision changes     eval by neuro and ophtho and no cause found             Past Surgical History:     Past Surgical History:   Procedure Laterality Date     ARTHROPLASTY HIP Left 2017    Procedure: ARTHROPLASTY HIP;  LEFT HIP ARBEN ARTHROPLASTY(SORAYA);  Surgeon: Darell Nathan MD;  Location: [facility] OR     ARTHROPLASTY PATELLO-FEMORAL (KNEE)   and      ARTHROSCOPY KNEE  1997     BIOPSY BREAST Right 2014    Procedure: BIOPSY BREAST;  Surgeon: David Carter MD;  Location:  [facility]     breast implants      4x     cataract  2011     FOOT SURGERY       MASTECTOMY  1980    bilat, cysts     [place] not bso  70's    not for ca             Social History:     Social History     Tobacco Use     Smoking status: Former Smoker     Types: Cigarettes     Last attempt to quit: 1955     Years since quittin.9     Smokeless tobacco: Never Used     Tobacco comment: quit in    had smoked about 2 cigarettes a day or more   Substance Use Topics     Alcohol use: Yes     Alcohol/week: 0.0 oz     Comment: occ wine             Family History:     Family History   Problem Relation Age of Onset     C.A.D. Father      Lymphoma Father      Cancer Mother      Lymphoma Brother      Breast Cancer Sister      Osteoporosis Sister      Myocardial Infarction Sister       Unknown/Adopted Maternal Grandmother      Unknown/Adopted Maternal Grandfather      Unknown/Adopted Paternal Grandmother      Unknown/Adopted Paternal Grandfather              Immunizations:     VACCINE/DOSE   Diptheria   DPT   DTAP   HBIG   Hepatitis A   Hepatitis B   HIB   Influenza   Measles   Meningococcal   MMR   Mumps   Pneumococcal   Polio   Rubella   Small Pox   TDAP   Varicella   Zoster             Allergies:     Allergies   Allergen Reactions     Clindamycin Hcl Other (See Comments)     Difficulty swallowing     Ampicillin Potassium      Rash nausea and vomiting       Celebrex [Celecoxib]      rash     Ciprofloxacin      rash     Erythromycin      rash     Indocin      Ended up [facility]( [facility] with severe head ache     Penicillins      Rash,nausea and vomiting     Vibramycin [Doxycycline Hyclate]      Rash      Xylocaine-Epinephrine [Epinephrine-Lidocaine-Na Metabisulfite]      Xylocaine with epi caused a rapid heart beat             Medications:     Current Facility-Administered Medications   Medication     acetaminophen (TYLENOL) tablet 1,000 mg     bisacodyl (DULCOLAX) Suppository 10 mg     cephALEXin (KEFLEX) capsule 500 mg     flecainide (TAMBOCOR) half-tab 75 mg     fluticasone-vilanterol (BREO ELLIPTA) 200-25 MCG/INH inhaler 1 puff     HYDROmorphone (PF) (DILAUDID) injection 0.3-0.5 mg     levothyroxine (SYNTHROID/LEVOTHROID) tablet 75 mcg     melatonin tablet 1 mg     metoprolol tartrate (LOPRESSOR) tablet 75 mg     mupirocin (BACTROBAN) 2 % ointment     naloxone (NARCAN) injection 0.1-0.4 mg     ondansetron (ZOFRAN-ODT) ODT tab 4 mg    Or     ondansetron (ZOFRAN) injection 4 mg     oxyCODONE IR (ROXICODONE) half-tab 2.5-5 mg     polyethylene glycol (MIRALAX/GLYCOLAX) Packet 17 g     rivaroxaban ANTICOAGULANT (XARELTO) tablet 15 mg     senna-docusate (SENOKOT-S/PERICOLACE) 8.6-50 MG per tablet 1 tablet    Or     senna-docusate (SENOKOT-S/PERICOLACE) 8.6-50 MG per tablet 2 tablet      senna-docusate (SENOKOT-S/PERICOLACE) 8.6-50 MG per tablet 2 tablet     sodium chloride (PF) 0.9% PF flush 3 mL     sodium chloride (PF) 0.9% PF flush 3 mL             Review of Systems:   All review of systems was performed and was negative except as per hpi         Physical Exam:   All vitals have been reviewed  Patient Vitals for the past 24 hrs:   BP Temp Temp src Pulse Heart Rate Resp SpO2   12/23/18 0816 141/58 96.4  F (35.8  C) Oral -- 63 14 96 %   12/23/18 0231 -- -- -- -- -- 14 --   12/23/18 0100 130/50 96.4  F (35.8  C) Oral 64 -- 14 96 %   12/22/18 2008 137/53 -- -- -- 64 -- --   12/22/18 1805 -- -- -- -- -- 14 --   12/22/18 1620 159/72 -- -- -- -- -- --   12/22/18 1601 180/71 96.4  F (35.8  C) Oral 64 -- 14 96 %       Intake/Output Summary (Last 24 hours) at 12/23/2018 1302  Last data filed at 12/22/2018 1600  Gross per 24 hour   Intake 240 ml   Output 50 ml   Net 190 ml     NAD  nonlabored breathing  No peripheral edema  Skin intact  White sclera  BLE:  SILT throughout  +DF/PF              Data:   All laboratory data reviewed  Results for orders placed or performed during the hospital encounter of 12/22/18   CT Abdomen Pelvis w Contrast    Narrative    CT ABDOMEN AND PELVIS WITH CONTRAST  12/22/2018 11:29 AM     HISTORY: Abdominal distension.    CONTRAST DOSE: 83 cc nonspecified IV contrast material    Radiation dose for this scan was reduced using automated exposure  control, adjustment of the mA and/or kV according to patient size, or  iterative reconstruction technique.    FINDINGS:  Prominent fecal debris is noted throughout the colon.  Colonic diverticulosis is noted without pericolonic inflammatory  stranding to definitively indicate diverticulitis. There is no  evidence of bowel obstruction. No free peritoneal fluid or air. The  liver, spleen, kidneys, adrenal glands, pancreas, and gallbladder  appear within normal limits. Although pelvic contents are somewhat  obscured by metallic artifact  related to left hip arthroplasty, pelvic  contents appear to be grossly normal.      Impression    IMPRESSION:  1. Colonic diverticulosis without CT evidence of diverticulitis.  2. Prominent fecal debris throughout the colon.  3. No evidence of bowel obstruction or acute inflammatory process in  the abdomen or pelvis.    DEVONTE ADAMS MD   CT Thoracic Spine w/o Contrast    Narrative    CT THORACIC SPINE WITHOUT CONTRAST   12/22/2018 1:22 PM     HISTORY: Mid thoracic back pain.     TECHNIQUE: Axial images of the thoracic spine were obtained without  intravenous contrast. Multiplanar reformations were performed.   Radiation dose for this scan was reduced using automated exposure  control, adjustment of the mA and/or kV according to patient size, or  iterative reconstruction technique.    COMPARISON: None.    FINDINGS:  There is a recent-appearing compression fracture of the  superior endplate of T12 with mild adjacent soft tissue swelling.  There is no retropulsion. Fracture lines are best seen on the axial  images and on coronal images. No other fracture is seen. There is  multilevel degenerative disc disease from T3 through L1. Facet joints  appear normal. Mild scoliosis convex to the left in the upper thoracic  spine and to the right in the lower thoracic spine. Pacemaker is seen  in the heart.      Impression    IMPRESSION:  1. Compression fracture of the superior endplate of T12 with  recent-appearing fracture lines. Mild adjacent soft tissue swelling.  2. Multilevel degenerative disc disease.  3. Scoliosis.    BERYL CERVANTES MD   Lumbar spine CT w/o contrast    Narrative    CT LUMBAR SPINE WITHOUT CONTRAST  12/22/2018 1:22 PM     HISTORY: Back pain. Symptoms less than six weeks.     TECHNIQUE: Axial images of the lumbar spine were obtained without  intravenous contrast. Multiplanar reformations were performed.   Radiation dose for this scan was reduced using automated exposure  control, adjustment of the mA  and/or kV according to patient size, or  iterative reconstruction technique.    COMPARISON: None.    FINDINGS: Old compression fractures are seen in the superior and  inferior endplates of L3. No acute fracture is evident.    There is multilevel degenerative disc disease and degenerative facet  arthropathy. There is grade 2 spondylolisthesis at L4-L5. This causes  severe spinal canal stenosis along with what appears to be a central  posterior superiorly extruded disc herniation at this level as seen on  the sagittal images. There is bilateral severe foraminal stenosis at  L3-L4 and L4-L5. There is moderate spinal canal stenosis at L2-L3,  L3-L4 and L5-S1. There is mild spinal canal stenosis at L1-L2.  Paraspinous soft tissues are unremarkable.      Impression    IMPRESSION:  1. No evidence of acute fracture.  2. Old fractures of L3 superior and inferior endplates.  3. Multilevel degenerative disc disease and degenerative facet  arthropathy.  4. L4-L5 grade 2 spondylolisthesis.  5. Severe spinal canal stenosis at L4-L5, moderate spinal canal  stenosis L2-L3, L3-L4 and L5-S1 and mild spinal canal stenosis L1-L2.    BERYL CERVANTES MD   CBC with platelets differential   Result Value Ref Range    WBC 8.4 4.0 - 11.0 10e9/L    RBC Count 3.50 (L) 3.8 - 5.2 10e12/L    Hemoglobin 11.8 11.7 - 15.7 g/dL    Hematocrit 35.0 35.0 - 47.0 %     78 - 100 fl    MCH 33.7 (H) 26.5 - 33.0 pg    MCHC 33.7 31.5 - 36.5 g/dL    RDW 13.4 10.0 - 15.0 %    Platelet Count 287 150 - 450 10e9/L    Diff Method Automated Method     % Neutrophils 80.1 %    % Lymphocytes 10.5 %    % Monocytes 6.9 %    % Eosinophils 1.9 %    % Basophils 0.2 %    % Immature Granulocytes 0.4 %    Nucleated RBCs 0 0 /100    Absolute Neutrophil 6.7 1.6 - 8.3 10e9/L    Absolute Lymphocytes 0.9 0.8 - 5.3 10e9/L    Absolute Monocytes 0.6 0.0 - 1.3 10e9/L    Absolute Eosinophils 0.2 0.0 - 0.7 10e9/L    Absolute Basophils 0.0 0.0 - 0.2 10e9/L    Abs Immature Granulocytes  0.0 0 - 0.4 10e9/L    Absolute Nucleated RBC 0.0    Comprehensive metabolic panel   Result Value Ref Range    Sodium 140 133 - 144 mmol/L    Potassium 4.3 3.4 - 5.3 mmol/L    Chloride 108 94 - 109 mmol/L    Carbon Dioxide 25 20 - 32 mmol/L    Anion Gap 7 3 - 14 mmol/L    Glucose 120 (H) 70 - 99 mg/dL    Urea Nitrogen 25 7 - 30 mg/dL    Creatinine 0.76 0.52 - 1.04 mg/dL    GFR Estimate 67 >60 mL/min/[1.73_m2]    GFR Estimate If Black 78 >60 mL/min/[1.73_m2]    Calcium 9.0 8.5 - 10.1 mg/dL    Bilirubin Total 0.5 0.2 - 1.3 mg/dL    Albumin 3.2 (L) 3.4 - 5.0 g/dL    Protein Total 7.4 6.8 - 8.8 g/dL    Alkaline Phosphatase 68 40 - 150 U/L    ALT 15 0 - 50 U/L    AST 14 0 - 45 U/L   Lipase   Result Value Ref Range    Lipase 68 (L) 73 - 393 U/L   UA with Microscopic   Result Value Ref Range    Color Urine Light Yellow     Appearance Urine Clear     Glucose Urine Negative NEG^Negative mg/dL    Bilirubin Urine Negative NEG^Negative    Ketones Urine Negative NEG^Negative mg/dL    Specific Gravity Urine 1.041 (H) 1.003 - 1.035    Blood Urine Negative NEG^Negative    pH Urine 7.0 5.0 - 7.0 pH    Protein Albumin Urine Negative NEG^Negative mg/dL    Urobilinogen mg/dL Normal 0.0 - 2.0 mg/dL    Nitrite Urine Negative NEG^Negative    Leukocyte Esterase Urine Small (A) NEG^Negative    Source Midstream Urine     WBC Urine 3 0 - 5 /HPF    RBC Urine 2 0 - 2 /HPF    Squamous Epithelial /HPF Urine 1 0 - 1 /HPF          Attestation:  Amount of time performed on this consult: 15 minutes.    Toan Tabares MD

## 2018-12-23 NOTE — PROGRESS NOTES
[facility]    Hospitalist Progress Note    Date of Service (when I saw the patient): 12/23/2018    Assessment & Plan   Lindsey Hale is a pleasant 89-year-old female with a past medical history of atrial fibrillation on Xarelto, mitral regurgitation, hypertension, hyperlipidemia, pacemaker placement, COPD and TIA, who presents to the Emergency Department with 1 week of worsening back pain.  She was found to have a likely acute T12 compression fracture along with lumbar spondylolisthesis and spinal canal stenosis and thus was admitted to observation care for further pain control and orthopedic evaluation.     Back pain due to T12 compression fracture, and lumbar spondylolisthesis with stenosis.  The patient has had 1 week of worsening back pain, not triggered by any injury or particular activity.  CT of the thoracic spine shows a T12 compression fracture that appears with mild adjacent soft tissue swelling as well as multilevel degenerative disk disease.  Lumbar portion shows no evidence of acute fracture, but old fractures of L3 as well as L4-L5 grade 2 spondylolisthesis with severe spinal canal stenosis at L4-L5 and mild to moderate disease elsewhere.    -Orthopedic Surgery. No surgical intervention necessary.  -TLSO brace for comfort  -Scheduled Tylenol 1000 mg t.i.d.   -Oxycodone 2.5-5 mg q.3 hours p.r.n.    -[place] IV Dilaudid for severe pain not controlled by oral medication.   -Will not order lidocaine given her Xylocaine allergy.    -Will have heat and ice pack as needed.    -PT to evaluate.    -Social work consulted for disposition planning.   -F/u with Spine in 1-2 weeks for repeat evaluation    Constipation.  The patient reports no bowel movement for 7 days prior to presentation.  CT of the abdomen and pelvis shows prominent fecal debris throughout the colon without any evidence of obstruction or acute inflammatory process.  She has tried stool softeners and laxatives at home without any  benefit.    -Senokot-S 2 tablets b.i.d. scheduled   -MiraLax 17 grams b.i.d. scheduled.    -Fleets enema daily and monitor for response.    -Advance bowel regimen as necessary.     Chronic atrial fibrillation and sick sinus syndrome, status post pacemaker placement.  We will continue with patient's prior to admission Xarelto, flecainide and metoprolol.      Recent Mohs procedure.  This was on her left shin.  She is to be taking Keflex 500 mg b.i.d. for 7 days, started on 12/20 and will end on 12/27.  Also, Bactroban 2% ointment topically 2 times daily.     Hypothyroidism.  Continue Synthroid.     COPD.  She did have some shortness of breath the day prior to admission, although this was when she was feeling anxious due to pain and also had significant abdominal distention which could contribute.  Lungs are clear on exam.  -Continue with prior to admission Symbicort and monitor.       Hypertension and hyperlipidemia.  The patient's blood pressure is moderately elevated.  Continue with prior to mentioned metoprolol and will monitor.  She is not on a statin.     Deep venous thrombosis prophylaxis.  She is on Xarelto.      CODE STATUS:  The patient is DNR/DNI, confirmed with her and her family at time of admission.      DISPOSITION:  Observation status, anticipating discharge to home with home PT/OT possibly 12/24 pending pain control and improvement of constipation.       Darell Alcantara PA-C    Interval History   Doing well.  Pain is improving on oral medications.  Denies fever, chest pain, shortness of breath, abdominal pain, nausea, vomiting.  Ambulating with assistance.    -Data reviewed today: I reviewed all new labs and imaging results over the last 24 hours.    Physical Exam   Temp: 96.4  F (35.8  C) Temp src: Oral BP: 130/50 Pulse: 64 Heart Rate: 64 Resp: 14 SpO2: 96 % O2 Device: None (Room air)    Vitals:    12/22/18 0838   Weight: 74.8 kg (165 lb)     Vital Signs with Ranges  Temp:  [96.4  F (35.8   C)-98.4  F (36.9  C)] 96.4  F (35.8  C)  Pulse:  [62-64] 64  Heart Rate:  [64] 64  Resp:  [14-16] 14  BP: (130-180)/(50-89) 130/50  SpO2:  [96 %-98 %] 96 %  I/O last 3 completed shifts:  In: 240 [P.O.:240]  Out: 50 [Urine:50]    Constitutional: Alert, oriented to person, place, situation.  Cooperative, lying in bed in NAD. Elderly and frail appearing.  Respiratory:  Lungs CTAB.  No crackles, wheezes, or rhonchi, no labored breathing.  Cardiovascular:  Heart RRR, no MRG, no edema.  GI:  Abdomen soft, NT/ND and with normoactive BS  Skin/Integumen:  Warm, dry, non-diaphoretic.  MSK: CMS x4 intact.    Medications       acetaminophen  1,000 mg Oral TID     cephALEXin  500 mg Oral BID     flecainide  75 mg Oral Q12H SAUL     fluticasone-vilanterol  1 puff Inhalation Daily     levothyroxine  75 mcg Oral Daily     metoprolol tartrate  75 mg Oral BID     mupirocin   Topical BID     polyethylene glycol  17 g Oral BID     rivaroxaban ANTICOAGULANT  15 mg Oral Daily with supper     senna-docusate  2 tablet Oral BID     sodium chloride (PF)  3 mL Intracatheter Q8H       Data   Recent Labs   Lab 12/22/18  0955   WBC 8.4   HGB 11.8            POTASSIUM 4.3   CHLORIDE 108   CO2 25   BUN 25   CR 0.76   ANIONGAP 7   FRANCISCO 9.0   *   ALBUMIN 3.2*   PROTTOTAL 7.4   BILITOTAL 0.5   ALKPHOS 68   ALT 15   AST 14   LIPASE 68*       Recent Results (from the past 24 hour(s))   CT Abdomen Pelvis w Contrast    Narrative    CT ABDOMEN AND PELVIS WITH CONTRAST  12/22/2018 11:29 AM     HISTORY: Abdominal distension.    CONTRAST DOSE: 83 cc nonspecified IV contrast material    Radiation dose for this scan was reduced using automated exposure  control, adjustment of the mA and/or kV according to patient size, or  iterative reconstruction technique.    FINDINGS:  Prominent fecal debris is noted throughout the colon.  Colonic diverticulosis is noted without pericolonic inflammatory  stranding to definitively indicate  diverticulitis. There is no  evidence of bowel obstruction. No free peritoneal fluid or air. The  liver, spleen, kidneys, adrenal glands, pancreas, and gallbladder  appear within normal limits. Although pelvic contents are somewhat  obscured by metallic artifact related to left hip arthroplasty, pelvic  contents appear to be grossly normal.      Impression    IMPRESSION:  1. Colonic diverticulosis without CT evidence of diverticulitis.  2. Prominent fecal debris throughout the colon.  3. No evidence of bowel obstruction or acute inflammatory process in  the abdomen or pelvis.    DEVONTE ADAMS MD   CT Thoracic Spine w/o Contrast    Narrative    CT THORACIC SPINE WITHOUT CONTRAST   12/22/2018 1:22 PM     HISTORY: Mid thoracic back pain.     TECHNIQUE: Axial images of the thoracic spine were obtained without  intravenous contrast. Multiplanar reformations were performed.   Radiation dose for this scan was reduced using automated exposure  control, adjustment of the mA and/or kV according to patient size, or  iterative reconstruction technique.    COMPARISON: None.    FINDINGS:  There is a recent-appearing compression fracture of the  superior endplate of T12 with mild adjacent soft tissue swelling.  There is no retropulsion. Fracture lines are best seen on the axial  images and on coronal images. No other fracture is seen. There is  multilevel degenerative disc disease from T3 through L1. Facet joints  appear normal. Mild scoliosis convex to the left in the upper thoracic  spine and to the right in the lower thoracic spine. Pacemaker is seen  in the heart.      Impression    IMPRESSION:  1. Compression fracture of the superior endplate of T12 with  recent-appearing fracture lines. Mild adjacent soft tissue swelling.  2. Multilevel degenerative disc disease.  3. Scoliosis.    BERYL CERVANTES MD   Lumbar spine CT w/o contrast    Narrative    CT LUMBAR SPINE WITHOUT CONTRAST  12/22/2018 1:22 PM     HISTORY: Back pain.  Symptoms less than six weeks.     TECHNIQUE: Axial images of the lumbar spine were obtained without  intravenous contrast. Multiplanar reformations were performed.   Radiation dose for this scan was reduced using automated exposure  control, adjustment of the mA and/or kV according to patient size, or  iterative reconstruction technique.    COMPARISON: None.    FINDINGS: Old compression fractures are seen in the superior and  inferior endplates of L3. No acute fracture is evident.    There is multilevel degenerative disc disease and degenerative facet  arthropathy. There is grade 2 spondylolisthesis at L4-L5. This causes  severe spinal canal stenosis along with what appears to be a central  posterior superiorly extruded disc herniation at this level as seen on  the sagittal images. There is bilateral severe foraminal stenosis at  L3-L4 and L4-L5. There is moderate spinal canal stenosis at L2-L3,  L3-L4 and L5-S1. There is mild spinal canal stenosis at L1-L2.  Paraspinous soft tissues are unremarkable.      Impression    IMPRESSION:  1. No evidence of acute fracture.  2. Old fractures of L3 superior and inferior endplates.  3. Multilevel degenerative disc disease and degenerative facet  arthropathy.  4. L4-L5 grade 2 spondylolisthesis.  5. Severe spinal canal stenosis at L4-L5, moderate spinal canal  stenosis L2-L3, L3-L4 and L5-S1 and mild spinal canal stenosis L1-L2.    BERYL CERVANTES MD

## 2018-12-23 NOTE — PLAN OF CARE
PRIMARY DIAGNOSIS: ACUTE PAIN  OUTPATIENT/OBSERVATION GOALS TO BE MET BEFORE DISCHARGE:  1. Pain Status: Partially met, pt received 2.5mg Roxicodone x1  2. Return to near baseline physical activity: Partially met, fitted for TLSO brace  3. Cleared for discharge by consultants (if involved): NO

## 2018-12-23 NOTE — PLAN OF CARE
PRIMARY DIAGNOSIS: ACUTE PAIN  OUTPATIENT/OBSERVATION GOALS TO BE MET BEFORE DISCHARGE:  1. Pain Status: Improved but still requiring IV narcotics.    2. Return to near baseline physical activity: No    3. Cleared for discharge by consultants (if involved): No    Discharge Planner Nurse   Safe discharge environment identified: No  Barriers to discharge: Yes       Entered by: Shaniqua De La Fuente 12/23/2018 6:43 AM     Please review provider order for any additional goals.   Nurse to notify provider when observation goals have been met and patient is ready for discharge.

## 2018-12-23 NOTE — PROGRESS NOTES
Pt A&O. VSS,RA. C/O pain at back. Administered scheduled Tylenol and heat pack, that was able to relief pain. Assist x1 /GB got transfer. Regular diet. Voiding. No BM today. Has order for Enema, awaiting supply from pharmacy, family requesting to give after lunch. Awaiting lunch to arrive. Will update PM nurse. PT/Ortho consulted. No surgical intervention. Recommended back brace for comfort. Continue to monitor.

## 2018-12-23 NOTE — DISCHARGE INSTRUCTIONS
A follow-up appointment was scheduled for you [see above].  It is very important to go to it--bring these papers, an ID, and your insurance card with you.  At the visit, talk about your hospital stay.  Tell your doctor how you feel.  Show your new list of medications.  Your doctor will update records, make sure you are still doing OK, and decide if any tests or medication changes are needed.

## 2018-12-23 NOTE — PROGRESS NOTES
12/23/18 1011   Quick Adds   Type of Visit Initial PT Evaluation   Living Environment   Lives With alone   Living Arrangements house   Home Accessibility no concerns   Living Environment Comment Pt has all needs including laundry met on first floor, ramp to enter home, lives alone but has supportive neighbor who helps out as needed as well as supportive family nearby.   Self-Care   Usual Activity Tolerance good   Current Activity Tolerance fair   Equipment Currently Used at Home cane, straight;walker, rolling   Activity/Exercise/Self-Care Comment Pt has FWW at home from previous hip injury, had not been using it until 2 weeks prior to admission when back pain started getting worse.   Functional Level Prior   Ambulation 1-->assistive equipment   Transferring 1-->assistive equipment   Fall history within last six months no   Prior Functional Level Comment Pt denies any falls or trauma preceeding start of back pain   General Information   Onset of Illness/Injury or Date of Surgery - Date 12/22/18   Referring Physician Darell Alcantara PA   Patient/Family Goals Statement To get better   Pertinent History of Current Problem (include personal factors and/or comorbidities that impact the POC) Pt is 89 year old female adm on 12/22/18 under observation due to increasing back pain. Pt found to have new T12 compression fracture, ortho consulted.   Precautions/Limitations fall precautions   General Info Comments Activity: up with assist   Cognitive Status Examination   Orientation orientation to person, place and time   Level of Consciousness alert   Pain Assessment   Patient Currently in Pain (4/10 back pain at rest 7/10 with ambulation)   Integumentary/Edema   Integumentary/Edema no deficits were identifed   Range of Motion (ROM)   ROM Comment B LE ROM WFL   Strength   Strength Comments B LE strength 3/5   Bed Mobility   Bed Mobility Comments Pt educated on logroll technique, states she uses this technique as well  [facility] - [facility]  Progress Note     Eleanor Lugo  : 7/10/1958    Status: Inpatient  Day #: 2    Attending: Roseline Trejo MD  PCP: Kyara Llamas MD      Assessment and Plan     Acute Sigmoid Diverticulitis  Leukocytosis - improved  - con't abx "as bed rail at home. Pt SBA for rolling and supine to sit at EOB.   Transfer Skills   Transfer Comments Pt sit to stand with SBA, good recall of hand placement using FWW   Gait   Gait Comments Pt amb 60' with FWW and SBA, c/o increased pain with ambulation to 7/10, able to perform turns with SBA.   Balance   Balance Comments Fair   Sensory Examination   Sensory Perception Comments Denies numbness/tingling   Coordination   Coordination Comments Intact   Clinical Impression   Anticipated Discharge Disposition Home with Home Therapy   Risk & Benefits of therapy have been explained Yes   Patient, Family & other staff in agreement with plan of care Yes   Clinical Impression Comments Pt presents with increased pain with mobility, decreased activity tolerance, decreased strength. Pt is able to complete mobility necessary for discharge to home. Neighbor present during session and stating pt will be able to have increased assistance at home either from neighbor or pt's children who are in the area. At this time, no further skilled acute care PT needs, do recommend home PT to further progress pt's ease and independence with mobility, increase strength, and decrease fall risk.   [facility] [employer] [facility] TM \"6 Clicks\"   2016, Trustees of [facility], under license to [employer].  All rights reserved.   6 Clicks Short Forms Basic Mobility Inpatient Short Form   [facility]  \"6 Clicks\" V.2 Basic Mobility Inpatient Short Form   1. Turning from your back to your side while in a flat bed without using bedrails? 4 - None   2. Moving from lying on your back to sitting on the side of a flat bed without using bedrails? 4 - None   3. Moving to and from a bed to a chair (including a wheelchair)? 4 - None   4. Standing up from a chair using your arms (e.g., wheelchair, or bedside chair)? 4 - None   5. To walk in hospital room? 3 - A Little   6. Climbing 3-5 steps with a railing? 3 - A Little   Basic Mobility Raw Score " ALT  46   --    --    ALKPHO  110*   --    --    TP  7.8   --    --    LIP  23   --    --        Ct Abdomen Pelvis Iv Contrast, No Oral (er)    Result Date: 9/13/2017  CONCLUSION: Diverticulitis of the sigmoid colon without abscess or perforation.   If no (Score out of 24.Lower scores equate to lower levels of function) 22   Total Evaluation Time   Total Evaluation Time (Minutes) 29

## 2018-12-23 NOTE — PROGRESS NOTES
PRIMARY DIAGNOSIS: ACUTE PAIN  OUTPATIENT/OBSERVATION GOALS TO BE MET BEFORE DISCHARGE:  1. Pain Status: Partially met, relieved  schedule Tylenol and hot pack.  2. Return to near baseline physical activity: Partially met  3. Cleared for discharge by consultants (if involved): NO

## 2018-12-23 NOTE — H&P
Admitted:     12/22/2018      PRIMARY CARE PROVIDER:  Jeffery Smith.      CHIEF COMPLAINT:  Back pain.      HISTORY OF PRESENT ILLNESS:  Lindsey Hale is a very pleasant 89-year-old female with a past medical history of atrial fibrillation on Xarelto, mitral regurgitation, hypertension, hyperlipidemia, pacemaker placement, COPD and TIA who presented to the Emergency Department with her son and daughter for evaluation of back pain.  For approximately the past week, the patient has had ongoing nearly constant right-sided back pain that wraps around to the front of her abdomen.  She did see her doctor 3 days ago where x-rays were obtained to assess for fractures.  These were reportedly unremarkable.  Kidney stones were also in the differential, but the patient denies any history of kidney stones.  Yesterday evening the patient was having difficulty breathing due to new onset of abdominal bloating secondary to constipation and she was also experiencing some nausea.  She does have a history of COPD and did state she was feeling increasingly anxious because of her pain which may have contributed to the shortness of breath.  She states she has not had any bowel movement in a week, which is atypical for her.  She also has had some urinary incontinence the past few days.  Given that her pain was worsening and that her constipation was not improving she presented to the Emergency Department for further evaluation.  She denies any fevers, chills, headache, lightheadedness, chest pain, cough, diarrhea, dysuria or focal weakness.      In the Emergency Department, patient was evaluated by Dr. Patterson.  There she was found to have a blood pressure 145/71 with heart rate of 62 and temperature 98.4.  She is satting at 96% on room air.  Her lab work including a CMP, lipase and CBC were overall unremarkable.  Urinalysis was not concerning for infection.  She had a CT abdomen and pelvis that showed colonic diverticulosis without evidence  of diverticulitis.  There is prominent fecal debris throughout the colon without any evidence for bowel obstruction or acute inflammatory process.  CT of the thoracic and lumbar spine did reveal a compression fracture of the superior end plate 12 with recent appearing fracture lines and mild adjacent soft tissue swelling.  There is also multilevel degenerative disk disease and severe spinal stenosis at L4-L5 with grade 2 spondylolisthesis.  There were no acute fractures of the lumbar spine.  The patient was given IV morphine with some improvement of her pain.  She has been admitted to observation for further management.      PAST MEDICAL HISTORY:   1.  Atrial fibrillation on Xarelto.   2.  Arthritis.   3.  Chronic low back pain.   4.  COPD.   5.  Insomnia.   6.  Expressive aphasia.   7.  Seizure.   8.  Hypertension.   9.  Hyperlipidemia.   10.  Hypothyroidism.   11.  Mitral regurgitation.   12.  Osteopenia.   13.  Pacemaker.   14.  History of sepsis due to UTI.   15.  TIA.   16.  Polymyalgia rheumatica.   17.  History of urinary incontinence.      PAST SURGICAL HISTORY:   1.  Left hip replacement.   2.  Bilateral knee replacements.   3.  Right breast biopsy.   4.  Bilateral mastectomy.   5.  Breast implants.   6.  Cataract surgery.   7.  Foot surgery.   8.  Hysterectomy.      FAMILY HISTORY:  Father with CAD and lymphoma.  Sister with breast cancer.      SOCIAL HISTORY:  The patient is a former smoker and quit in 1955.  She drinks alcohol seldomly.  No illicit drug use.  She is .  She lives independently in her own home.  She normally ambulates with the use of cane.      PRIOR TO ADMISSION MEDICATIONS:    Medications Prior to Admission   Medication Sig Dispense Refill Last Dose     budesonide-formoterol (SYMBICORT) 160-4.5 MCG/ACT inhaler Inhale 2 puffs into the lungs 2 times daily    12/22/2018 at am     cephALEXin (KEFLEX) 500 MG capsule Take 500 mg by mouth 2 times daily Patient is taking 1 tablet by mouth  twice daily for 7 days, started 12/20, will end on 12/27.   12/21/2018 at pm     flecainide acetate 150 MG TABS Take 1/2 tablet by mouth twice daily 90 tablet 3 12/22/2018 at am     Furosemide (LASIX PO) Take 20 mg by mouth daily as needed    prn     levothyroxine (SYNTHROID/LEVOTHROID) 75 MCG tablet TAKE 1 TABLET(75 MCG) BY MOUTH DAILY 90 tablet 0 12/22/2018 at am     metoprolol tartrate (LOPRESSOR) 50 MG tablet TAKE 1 AND 1/2 TABLETS BY MOUTH TWICE DAILY 270 tablet 0 12/22/2018 at am     mupirocin (BACTROBAN) 2 % external ointment Apply topically 2 times daily   12/21/2018 at pm     traMADol (ULTRAM) 50 MG tablet Take 1 tablet (50 mg) by mouth 2 times daily as needed for severe pain 30 tablet 0 prn at Unknown time     XARELTO 20 MG TABS tablet TAKE 1 TABLET(20 MG) BY MOUTH DAILY WITH DINNER 90 tablet 0 12/21/2018 at pm     ALLERGIES:  CLINDAMYCIN, AMPICILLIN, CELEBREX, CIPROFLOXACIN, ERYTHROMYCIN, INDOCIN, PENICILLIN, VIBRAMYCIN, XYLOCAINE-EPINEPHRINE      REVIEW OF SYSTEMS:  A complete 10-point review of systems was performed and is negative other than the items previously mentioned in the above HPI.      PHYSICAL EXAMINATION:   VITAL SIGNS:  Blood pressure 159/72, heart rate 64 beats per minute, temperature 96.4, respiratory rate 14, oxygen saturation 96% on room air.   GENERAL:  The patient is alert, oriented to person, place and situation, cooperative, lying on the gurney in no apparent distress.  She is elderly.   HEENT:  Pupils equal, round, reactive to light.  Extraocular movements intact.   HENT:  Head normocephalic, atraumatic.  Throat, mucosa and tongue appear moist and normal.  Posterior pharynx clear.   NECK:  Supple.   CARDIOVASCULAR:  Regular rate and rhythm.  No murmurs, rubs or gallops.  Distal pulses are intact.  Chronic appearing bilateral lower extremity edema.   PULMONARY:  Lungs are clear to auscultation bilaterally.  No crackles, wheeze or rhonchi.  Breathing is nonlabored.    GASTROINTESTINAL:  Abdomen soft, moderately distended, diffusely tender and with hypoactive bowel sounds.  No guarding.   MUSCULOSKELETAL:  The patient moves all 4 extremities equally with normal strength.  Limbs are atraumatic.  Tenderness to palpation of lower thoracic and lumbar spine.   SKIN:  Warm, dry, nondiaphoretic.  No rashes noted.  She has a dressing to her Mohs procedure on the left shin.   NEUROLOGIC:  Alert.  Cranial nerves II-XII grossly intact.  Motor function is intact.  No focal deficits.     PSYCHIATRIC:  Normal mood and affect.      LABORATORY DATA:  CBC:  WBC 8.4, hemoglobin 11.8, hematocrit 35.0, platelet count 287.  RBC 3.50.  CMP, potassium 4.3, creatinine 0.76, albumin 3.2, glucose 120, otherwise within normal limits.  Lipase 68.  Urinalysis with specific gravity 1.041, small leukocyte esterase, otherwise within normal limits.      IMAGING:      CT Abdomen/Pelvis with IV contrast:   1. Colonic diverticulosis without CT evidence of diverticulitis.  2. Prominent fecal debris throughout the colon.  3. No evidence of bowel obstruction or acute inflammatory process in  the abdomen or pelvis. As per radiology.     CT Lumbar Spine w/o contrast:  1. No evidence of acute fracture.  2. Old fractures of L3 superior and inferior endplates.  3. Multilevel degenerative disc disease and degenerative facet  arthropathy.  4. L4-L5 grade 2 spondylolisthesis.  5. Severe spinal canal stenosis at L4-L5, moderate spinal canal  stenosis L2-L3, L3-L4 and L5-S1 and mild spinal canal stenosis L1-L2. As per radiology.      CT Thoracic Spine w/o contrast:  1. Compression fracture of the superior endplate of T12 with  recent-appearing fracture lines. Mild adjacent soft tissue swelling.  2. Multilevel degenerative disc disease.  3. Scoliosis. As per radiology.          ASSESSMENT AND PLAN:  Lindsey Hale is a pleasant 89-year-old female with a past medical history of atrial fibrillation on Xarelto, mitral regurgitation,  hypertension, hyperlipidemia, pacemaker placement, COPD and TIA, who presents to the Emergency Department with 1 week of worsening back pain.  She was found to have a likely acute T12 compression fracture along with lumbar spondylolisthesis and spinal canal stenosis and thus was admitted to observation care for further pain control and orthopedic evaluation.     1.  Back pain due to T12 compression fracture, and lumbar spondylolisthesis with stenosis.  The patient has had 1 week of worsening back pain, not triggered by any injury or particular activity.  CT of the thoracic spine shows a T12 compression fracture that appears with mild adjacent soft tissue swelling as well as multilevel degenerative disk disease.  Lumbar portion shows no evidence of acute fracture, but old fractures of L3 as well as L4-L5 grade 2 spondylolisthesis with severe spinal canal stenosis at L4-L5 and mild to moderate disease elsewhere.  We will consult Orthopedic Surgery.  Will start scheduled Tylenol 1000 mg t.i.d.  Oxycodone 2.5-5 mg q.3 hours p.r.n.  Ridge IV Dilaudid for severe pain not controlled by oral medication.  Will not order lidocaine given her Xylocaine allergy.  Will have heat and ice pack as needed.  PT to evaluate.  We will have social work consulted for disposition planning.     2.  Constipation.  The patient reports no bowel movement for the last 7 days.  CT of the abdomen and pelvis shows prominent fecal debris throughout the colon without any evidence of obstruction or acute inflammatory process.  She has tried stool softeners and laxatives at home without any benefit.  We will make Senokot-S 2 tablets b.i.d. scheduled along with MiraLax 17 grams b.i.d. scheduled.  Will have a Fleets enema today and monitor for response.  Advance bowel regimen as necessary.     3.  Chronic atrial fibrillation, status post pacemaker placement.  We will continue with patient's prior to admission Xarelto, flecainide and metoprolol.     4.   Recent Mohs procedure.  This was on her left shin.  She is to be taking Keflex 500 mg b.i.d. for 7 days, started on  and will end on .  Also, Bactroban 2% ointment topically 2 times daily.     5.  Hypothyroidism.  Continue Synthroid.     6.  COPD.  She did have some shortness of breath yesterday, although this was when she was feeling anxious due to pain and also had significant abdominal distention which could contribute.  We will continue with prior to admission Symbicort and monitor.  Lungs are clear on exam.     7.  Hypertension and hyperlipidemia.  The patient's blood pressure is moderately elevated.  Continue with prior to mentioned metoprolol and will monitor.  She is not on a statin.     8.  Deep venous thrombosis prophylaxis.  She is on Xarelto.      CODE STATUS:  The patient is DNR/DNI, confirmed with her and her family at time of admission.      DISPOSITION:  Observation status, anticipating discharge possibly  pending pain control, PT evaluation and orthopedic surgery evaluation.      This patient was discussed with Dr. Jason Watson with the [facility] service.  He is in agreement with assessment and plan of care.         JASON WATSON DO       As dictated by CHELSEA TOSCANO PA-C            D: 2018   T: 2018   MT: ASAF      Name:     CASIMIRO PRIEST   MRN:      [id number]        Account:      [id number]   :      1925        Admitted:     2018                   Document: [id number]

## 2018-12-24 VITALS
TEMPERATURE: 96.5 F | HEIGHT: 60 IN | RESPIRATION RATE: 16 BRPM | DIASTOLIC BLOOD PRESSURE: 58 MMHG | HEART RATE: 65 BPM | BODY MASS INDEX: 32.39 KG/M2 | SYSTOLIC BLOOD PRESSURE: 157 MMHG | OXYGEN SATURATION: 94 % | WEIGHT: 165 LBS

## 2018-12-24 PROCEDURE — G0378 HOSPITAL OBSERVATION PER HR: HCPCS

## 2018-12-24 PROCEDURE — A9270 NON-COVERED ITEM OR SERVICE: HCPCS | Mod: GY | Performed by: PHYSICIAN ASSISTANT

## 2018-12-24 PROCEDURE — G0463 HOSPITAL OUTPT CLINIC VISIT: HCPCS | Performed by: NURSE PRACTITIONER

## 2018-12-24 PROCEDURE — 25000132 ZZH RX MED GY IP 250 OP 250 PS 637: Mod: GY | Performed by: PHYSICIAN ASSISTANT

## 2018-12-24 PROCEDURE — 99217 ZZC OBSERVATION CARE DISCHARGE: CPT | Performed by: PHYSICIAN ASSISTANT

## 2018-12-24 RX ORDER — OXYCODONE HYDROCHLORIDE 5 MG/1
2.5-5 TABLET ORAL EVERY 4 HOURS PRN
Qty: 10 TABLET | Refills: 0 | Status: SHIPPED | OUTPATIENT
Start: 2018-12-24 | End: 2018-12-28

## 2018-12-24 RX ORDER — ACETAMINOPHEN 500 MG
1000 TABLET ORAL EVERY 8 HOURS PRN
COMMUNITY
Start: 2018-12-24 | End: 2019-01-15 | Stop reason: DRUGHIGH

## 2018-12-24 RX ORDER — AMOXICILLIN 250 MG
1-2 CAPSULE ORAL 2 TIMES DAILY PRN
Qty: 30 TABLET | Refills: 0 | Status: SHIPPED | OUTPATIENT
Start: 2018-12-24 | End: 2019-02-14

## 2018-12-24 RX ADMIN — METOPROLOL TARTRATE 75 MG: 50 TABLET ORAL at 07:53

## 2018-12-24 RX ADMIN — ACETAMINOPHEN 1000 MG: 500 TABLET, FILM COATED ORAL at 07:52

## 2018-12-24 RX ADMIN — FLECAINIDE ACETATE 75 MG: 50 TABLET ORAL at 07:53

## 2018-12-24 RX ADMIN — OXYCODONE HYDROCHLORIDE 5 MG: 5 TABLET ORAL at 05:10

## 2018-12-24 RX ADMIN — FLUTICASONE FUROATE AND VILANTEROL TRIFENATATE 1 PUFF: 200; 25 POWDER RESPIRATORY (INHALATION) at 07:59

## 2018-12-24 RX ADMIN — LEVOTHYROXINE SODIUM 75 MCG: 75 TABLET ORAL at 07:53

## 2018-12-24 RX ADMIN — POLYETHYLENE GLYCOL 3350 17 G: 17 POWDER, FOR SOLUTION ORAL at 07:54

## 2018-12-24 RX ADMIN — CEPHALEXIN 500 MG: 500 CAPSULE ORAL at 07:53

## 2018-12-24 RX ADMIN — SENNOSIDES AND DOCUSATE SODIUM 2 TABLET: 8.6; 5 TABLET ORAL at 07:54

## 2018-12-24 RX ADMIN — MUPIROCIN: 20 OINTMENT TOPICAL at 08:02

## 2018-12-24 NOTE — PLAN OF CARE
[id number]: Pt A&Ox4. VSS on RA. Lower back pain managed with PRN 2.5mg Oxy x1 with good relief. TLSO brace at bedside. Tolerating reg diet. No nausea this shift. +BS. Passing flatus. No BM this shift. Dressing changed to left leg wounds, applied Bactroban ointment per order. Mild edema to bilateral ankles, elevated in bed. PIV SL. Possible discharge home tomorrow. Will continue to monitor.

## 2018-12-24 NOTE — PROGRESS NOTES
S: Pt. seen in [facility]. Female. Dr. Tabares. RX: TLSO. DX: Compression FX T12.  O:A: Pt. fell at residence and suffered a T12 compression FX. Today I F/D a Aspen 456 TLSO. TLSO to provide reduction of pain and to speed healing following injury by restricting mobility and providing intercavitary pressure to offload vertebral bodies. Donning doffing wear and care instructions given.  P: Pt. to be seen as needed.    Hemal PIERCE,LO

## 2018-12-24 NOTE — PROGRESS NOTES
[facility] Nurse Inpatient Wound Assessment     Assessment of wound(s) on pt's: Left lower leg        Data:   Patient History:   Per MD notes, patient is a 89 year old female admitted 12-22 for back pain, was found to have a T12 compression fracture. No surgical intervention planned.             Current Diet / Nutrition:       Orders Placed This Encounter        Regular Diet Adult        Diet                Kahlil Assessment and sub scores:   No Data Recorded     Labs:         Recent Labs   Lab Test 12/22/18  0955  04/03/18  0859  12/04/17  1146  03/09/17  1326   ALBUMIN 3.2*  --   --   --   --   --   --    HGB 11.8   < > 12.9   < > 11.9   < > 13.1   RBC 3.50*   < > 3.96  --  3.57*   < > 3.95   WBC 8.4   < > 8.8  --  6.5   < > 10.0      < > 314  --  340   < > 298   INR  --   --   --   --  1.24*  --   --    A1C  --   --  6.3  --   --    < > 6.1*   CRP  --   --   --   --   --   --  <2.9    < > = values in this interval not displayed.          Wound Assessment (location #1 Left lower leg):     Wound History:  Open area from removal of Squamous cell tissue to evaluate for possible skin cancer per Dermatology. Awaiting pathology results.     Specific Dimensions (length x width x depth, in cm):   1.7 x 1.5 x 0.3 cm    Tunneling: none      Undermining: none    Wound Base: moist, 60% red tissue and 40% thin yellow slough    Palpation of the wound bed: firm    Slough appearance:  moist         Eschar appearance:  none    Periwound Skin:  normal      Color: normal    Temperature  : normal    Drainage: none     Odor: none    Pain:  none          Intervention:     Patient's chart evaluated.      Wound was evaluated    Wound Care: was done    Orders : written    Supplies  : not needed    Discussed plan of care with patient      Assessment:       Clean excision left lower leg, no signs of infection. Pt states has been using Bactroban at home per Dermatology orders and this is appropriate. Probable  discharge today, will use Hydrogel if stays past today.        Plan:     Nursing to notify the Provider(s) and re-consult the WOC Nurse if wound deteriorates or if the wound care plan needs reevaluation.    Plan of care for wound located on: Left lower leg: Cleanse with Microklenz, pat dry. Apply thin layer of Hydrogel,cover with Telfa. Change once daily.    WOC Nurse will return: weekly

## 2018-12-24 NOTE — CONSULTS
Care Transition Initial Assessment - RN        Met with: Patient and Family.  Homecare services recommended. Referral made to [facility] per patient request but [facility] unable to provide services in a timely manner.  Referral made to [facility] and they have accepted patient and will be in contact with them.  DATA   Active Problems:    T12 compression fracture (H)       Cognitive Status: awake, alert and oriented.        Contact information and PCP information verified: Yes  Lives With: alone   Living Arrangements: house                 Insurance concerns: No Insurance issues identified  ASSESSMENT  Patient currently receives the following services:  none        PLAN  Financial costs for the patient include TBD .  Patient given options and choices for discharge Yes .  Patient/family is agreeable to the plan?  Yes: home with homecare3  Patient anticipates discharging to home with C .        Patient anticipates needs for home equipment: Does not know  Plan/Disposition: Home   Appointments:   Already scheduled  Care  (CTS) will continue to follow as needed.

## 2018-12-24 NOTE — PLAN OF CARE
A&Ox4. VSS on RA. Reports back pain at a 3/10. PRN Oxycodone and scheduled Tylenol given with relief. Pt fitted for TLSO brace and is at bedside. Can wear for comfort. Up with a SBA. Regular diet. Reported nausea, relieved with Zofran. Voiding. No BM today. Fleet enema given with very small results. PT/Ortho saw pt, see notes. No surgical intervention. Recommended TLSO for comfort. Will continue to monitor.

## 2018-12-24 NOTE — PROGRESS NOTES
Patient discharged to home today 12/24/10. Pt A&O. VSS,RA. Denied pain. Discharge instruction given to patient and daughter. Verbalizes understanding of all the instruction. No question on new medicine , medicine changes and wound care. Understood diet /ambulation resume and further appointment. Escorted to Door #6 via wheel chair.

## 2018-12-24 NOTE — DISCHARGE SUMMARY
[facility]    Discharge Summary  Hospitalist    Date of Admission:  12/22/2018  Date of Discharge:  12/24/2018  Discharging Provider: JoAnna K. Barthell, PA-C    Discharge Diagnoses   Compression fracture, T12.  Lumbar spondylolisthesis with severe spinal canal stenosis.  Constipation.  Chronic atrial fibrillation.  Hypertension.  Recent Mohs procedure.    Secondary discharge diagnoses.  Hypothyroidism.  COPD.  HLD.  History of Present Illness   Lindsey Hale is a pleasant 89-year-old female with a past medical history of atrial fibrillation on Xarelto, mitral regurgitation, hypertension, hyperlipidemia, pacemaker placement, COPD and TIA who presented with 1 week of worsening back pain found to have a likely acute T12 compression fracture along with L4-L5 spondylolisthesis with associated severe spinal canal stenosis. She was admitted under observation status for pain control and orthopedic evaluation. Ortho did not recommend surgical intervention, but rather TLSO brace for comfort, WBAT, and PT/OT. To follow up with [facility] in 1-2 weeks for serial evaluation. Pain control with APAP 1000mg TID PRN and low dose oxycodone. Discharging home with PT/OT/RN.    Constipation. Had not had BM x 7 days PTA. CT abd/pelvis showed prominent fecal debris throughout the colon without any evidence of obstruction or acute inflammatory process. Ultimately had good sized BM with stool softeners, laxative, and fleets enema. Discussed scheduling OTC Senokot-s while taking oxycodone and increasing water intake.    Chronic afib. Lastly, patients CrCl <50, therefore Xarelto dose changed from 20mg to 15mg daily.    HTN. SBP mildly elevated 140-150s. Defer dose further mgnt to primary.    The remainder of her chronic medical conditions remained stable with no changes in medical mngt.    Please see H&P by Darell Alcantara PA-C from 12/22/2018 for complete details of admission.    This patient was discussed with Dr. Qureshi of the  Hospitalist Service who agrees with current plans as outlined above.    JoAnna K. Barthell, PA-C    Significant Results and Procedures   As below.    Code Status   DNR / DNI       Primary Care Physician   Jeffery Sherwood    Physical Exam   Temp: 96.5  F (35.8  C) Temp src: Oral BP: 157/58 Pulse: 65 Heart Rate: 67 Resp: 16 SpO2: 94 % O2 Device: None (Room air)    Vitals:    12/22/18 0838   Weight: 74.8 kg (165 lb)     Vital Signs with Ranges  Temp:  [96.2  F (35.7  C)-96.5  F (35.8  C)] 96.5  F (35.8  C)  Pulse:  [65] 65  Heart Rate:  [60-67] 67  Resp:  [16] 16  BP: (146-157)/(50-61) 157/58  SpO2:  [94 %-95 %] 94 %  I/O last 3 completed shifts:  In: 160 [P.O.:160]  Out: -   Constitutional: Pleasant female who appears stated age. Looks comfortable semirecumbent in bed.  Respiratory: Breath sounds CTA. No increased work of breathing.  Cardiovascular: RRR, no rub or murmur. Trace-1+ peripheral edema.  GI: Soft, non-tender, non-distended. Bowel sounds present.  Skin: Warm, dry, no rashes or lesions.  MSK: Distal CMS upper and lower extremities intact.    Discharge Disposition   Admited to home care:   Agency: [facility]  Discharged to home  Condition at discharge: Stable    Consultations This Hospital Stay   SOCIAL WORK IP CONSULT  PHYSICAL THERAPY ADULT IP CONSULT  ORTHOPEDIC SURGERY IP CONSULT  WOUND OSTOMY CONTINENCE NURSE  IP CONSULT  ORTHOSIS BRACE IP CONSULT    Time Spent on this Encounter   I, JoAnna K. Barthell, personally saw the patient today and spent less than or equal to 30 minutes discharging this patient.    Discharge Orders      Home Care PT Referral for Hospital Discharge      Home care nursing referral      Home Care OT Referral for Hospital Discharge      Reason for your hospital stay    Back pain found to have compression fracture in thoracic spine.     Follow-up and recommended labs and tests     Follow up with Dr. Sherwood on Friday, January 4th at 2pm. Check BMP. Note Xarelto dose change d/t CrCl  41ml/min.     Activity    Your activity upon discharge: activity as tolerated     Discharge Instructions    1. Pain control.  - Tylenol 1000mg every 8 hours as needed for pain.  - Oxycodone 1/2-1 tablet every 4 hours as needed for severe pain 8-10/10. This can be constipating, drink plenty of water and take Senokot-s 1-2 tablets daily while taking oxycodone to keep bowel movements regular.  - Back brace for comfort.  - Gentle activity like leisurely walk and stretching.  - Heating pad/ice pack (thin towel between skin and compress to prevent burn) several times per day x 15-20 minutes.  - If no improvement in 1-2 weeks, call [facility] ([phone]) to arrange follow up with spine specialist.    2. Xarelto dose changed. Stop previous prescription with 20mg tablets and instead start new prescription with 15mg tablets.     MD face to face encounter    Documentation of Face to Face and Certification for Home Health Services    I certify that patient: Lindsey Hale is under my care and that I, or a nurse practitioner or physician's assistant working with me, had a face-to-face encounter that meets the physician face-to-face encounter requirements with this patient on: 12/24/2018.    This encounter with the patient was in whole, or in part, for the following medical condition, which is the primary reason for home health care: compression fracture and back pain.    I certify that, based on my findings, the following services are medically necessary home health services: Nursing and Physical Therapy.    My clinical findings support the need for the above services because: Nurse is needed: To assess pain after changes in medications or other medical regimen.. and Physical Therapy Services are needed to assess and treat the following functional impairments: back pain.    Further, I certify that my clinical findings support that this patient is homebound (i.e. absences from home require considerable and  taxing effort and are for medical reasons or Church services or infrequently or of short duration when for other reasons) because: Requires assistance of another person or specialized equipment to access medical services because patient: Has prohibitive pain during ambulation. and Is unable to operate assistive equipment on their own...    Based on the above findings. I certify that this patient is confined to the home and needs intermittent skilled nursing care, physical therapy and/or speech therapy.  The patient is under my care, and I have initiated the establishment of the plan of care.  This patient will be followed by a physician who will periodically review the plan of care.  Physician/Provider to provide follow up care: Jeffery Sherwood    Attending hospital physician (the Medicare certified [place] provider): Dr. Qureshi  Physician Signature: See electronic signature associated with these discharge orders.  Date: 12/24/2018     DNR/DNI     Diet    Follow this diet upon discharge:    Regular Diet Adult     Discharge Medications   Current Discharge Medication List      START taking these medications    Details   acetaminophen (TYLENOL) 500 MG tablet Take 2 tablets (1,000 mg) by mouth every 8 hours as needed for mild pain    Associated Diagnoses: T12 compression fracture (H)      oxyCODONE (ROXICODONE) 5 MG tablet Take 0.5-1 tablets (2.5-5 mg) by mouth every 4 hours as needed for moderate to severe pain (8-10/10)  Qty: 10 tablet, Refills: 0    Comments: Future refills by PCP Dr. Jeffery Sherwood with phone number [phone].  Associated Diagnoses: T12 compression fracture (H)      senna-docusate (SENOKOT-S/PERICOLACE) 8.6-50 MG tablet Take 1-2 tablets by mouth 2 times daily as needed for constipation (hold for loose stools)  Qty: 30 tablet, Refills: 0    Comments: Future refills by PCP Dr. Jeffery Sherwood with phone number [phone].  Associated Diagnoses: T12 compression fracture (H)         CONTINUE these  medications which have CHANGED    Details   rivaroxaban ANTICOAGULANT (XARELTO) 15 MG TABS tablet Take 1 tablet (15 mg) by mouth daily (with dinner)  Qty: 30 tablet, Refills: 0    Comments: Future refills by PCP Dr. Jeffery Sherwood with phone number [phone].  Associated Diagnoses: Paroxysmal atrial fibrillation (H)         CONTINUE these medications which have NOT CHANGED    Details   budesonide-formoterol (SYMBICORT) 160-4.5 MCG/ACT inhaler Inhale 2 puffs into the lungs 2 times daily       cephALEXin (KEFLEX) 500 MG capsule Take 500 mg by mouth 2 times daily Patient is taking 1 tablet by mouth twice daily for 7 days, started 12/20, will end on 12/27.      flecainide acetate 150 MG TABS Take 1/2 tablet by mouth twice daily  Qty: 90 tablet, Refills: 3    Associated Diagnoses: Atrial fibrillation (H)      Furosemide (LASIX PO) Take 20 mg by mouth daily as needed       levothyroxine (SYNTHROID/LEVOTHROID) 75 MCG tablet TAKE 1 TABLET(75 MCG) BY MOUTH DAILY  Qty: 90 tablet, Refills: 0    Associated Diagnoses: Hypothyroidism, unspecified type      metoprolol tartrate (LOPRESSOR) 50 MG tablet TAKE 1 AND 1/2 TABLETS BY MOUTH TWICE DAILY  Qty: 270 tablet, Refills: 0    Associated Diagnoses: Atrial fibrillation, unspecified type (H); Benign essential hypertension      mupirocin (BACTROBAN) 2 % external ointment Apply topically 2 times daily         STOP taking these medications       traMADol (ULTRAM) 50 MG tablet Comments:   Reason for Stopping:             Allergies   Allergies   Allergen Reactions     Clindamycin Hcl Other (See Comments)     Difficulty swallowing     Ampicillin Potassium      Rash nausea and vomiting       Celebrex [Celecoxib]      rash     Ciprofloxacin      rash     Erythromycin      rash     Indocin      Ended up [facility]( [facility] with severe head ache     Penicillins      Rash,nausea and vomiting     Vibramycin [Doxycycline Hyclate]      Rash      Xylocaine-Epinephrine  [Epinephrine-Lidocaine-Na Metabisulfite]      Xylocaine with epi caused a rapid heart beat     Data   Most Recent 3 CBC's:  Recent Labs   Lab Test 12/22/18  0955 08/15/18  0925 04/03/18  0859   WBC 8.4 13.2* 8.8   HGB 11.8 11.9 12.9    100 104*    231 314      Most Recent 3 BMP's:  Recent Labs   Lab Test 12/22/18  0955 08/15/18  0925 04/03/18  0859    137 140   POTASSIUM 4.3 4.6 4.0   CHLORIDE 108 104 107   CO2 25 30 26   BUN 25 20 21   CR 0.76 0.72 0.73   ANIONGAP 7 3 7   FRANCISCO 9.0 8.7 9.0   * 145* 167*     Most Recent 2 LFT's:  Recent Labs   Lab Test 12/22/18  0955 02/16/14  1050   AST 14 27   ALT 15 45   ALKPHOS 68 61   BILITOTAL 0.5 1.4*     Most Recent 6 Bacteria Isolates From Any Culture (See EPIC Reports for Culture Details):  Recent Labs   Lab Test 08/15/18  0814 01/11/18  1031 07/18/17  1608 05/16/16  0941 01/21/12  0956   CULT <10,000 colonies/mL  urogenital los    Susceptibility testing not routinely done >100,000 colonies/mL  Escherichia coli  * >100,000 colonies/mL Escherichia coli* >100,000 colonies/mL Escherichia coli* No growth     Most Recent TSH, T4 and A1c Labs:  Recent Labs   Lab Test 04/03/18  0859   TSH 0.53   A1C 6.3     Results for orders placed or performed during the hospital encounter of 12/22/18   CT Abdomen Pelvis w Contrast    Narrative    CT ABDOMEN AND PELVIS WITH CONTRAST  12/22/2018 11:29 AM     HISTORY: Abdominal distension.    CONTRAST DOSE: 83 cc nonspecified IV contrast material    Radiation dose for this scan was reduced using automated exposure  control, adjustment of the mA and/or kV according to patient size, or  iterative reconstruction technique.    FINDINGS:  Prominent fecal debris is noted throughout the colon.  Colonic diverticulosis is noted without pericolonic inflammatory  stranding to definitively indicate diverticulitis. There is no  evidence of bowel obstruction. No free peritoneal fluid or air. The  liver, spleen, kidneys, adrenal  glands, pancreas, and gallbladder  appear within normal limits. Although pelvic contents are somewhat  obscured by metallic artifact related to left hip arthroplasty, pelvic  contents appear to be grossly normal.      Impression    IMPRESSION:  1. Colonic diverticulosis without CT evidence of diverticulitis.  2. Prominent fecal debris throughout the colon.  3. No evidence of bowel obstruction or acute inflammatory process in  the abdomen or pelvis.    DEVONTE ADAMS MD   CT Thoracic Spine w/o Contrast    Narrative    CT THORACIC SPINE WITHOUT CONTRAST   12/22/2018 1:22 PM     HISTORY: Mid thoracic back pain.     TECHNIQUE: Axial images of the thoracic spine were obtained without  intravenous contrast. Multiplanar reformations were performed.   Radiation dose for this scan was reduced using automated exposure  control, adjustment of the mA and/or kV according to patient size, or  iterative reconstruction technique.    COMPARISON: None.    FINDINGS:  There is a recent-appearing compression fracture of the  superior endplate of T12 with mild adjacent soft tissue swelling.  There is no retropulsion. Fracture lines are best seen on the axial  images and on coronal images. No other fracture is seen. There is  multilevel degenerative disc disease from T3 through L1. Facet joints  appear normal. Mild scoliosis convex to the left in the upper thoracic  spine and to the right in the lower thoracic spine. Pacemaker is seen  in the heart.      Impression    IMPRESSION:  1. Compression fracture of the superior endplate of T12 with  recent-appearing fracture lines. Mild adjacent soft tissue swelling.  2. Multilevel degenerative disc disease.  3. Scoliosis.    BERYL CERVANTES MD   Lumbar spine CT w/o contrast    Narrative    CT LUMBAR SPINE WITHOUT CONTRAST  12/22/2018 1:22 PM     HISTORY: Back pain. Symptoms less than six weeks.     TECHNIQUE: Axial images of the lumbar spine were obtained without  intravenous contrast.  Multiplanar reformations were performed.   Radiation dose for this scan was reduced using automated exposure  control, adjustment of the mA and/or kV according to patient size, or  iterative reconstruction technique.    COMPARISON: None.    FINDINGS: Old compression fractures are seen in the superior and  inferior endplates of L3. No acute fracture is evident.    There is multilevel degenerative disc disease and degenerative facet  arthropathy. There is grade 2 spondylolisthesis at L4-L5. This causes  severe spinal canal stenosis along with what appears to be a central  posterior superiorly extruded disc herniation at this level as seen on  the sagittal images. There is bilateral severe foraminal stenosis at  L3-L4 and L4-L5. There is moderate spinal canal stenosis at L2-L3,  L3-L4 and L5-S1. There is mild spinal canal stenosis at L1-L2.  Paraspinous soft tissues are unremarkable.      Impression    IMPRESSION:  1. No evidence of acute fracture.  2. Old fractures of L3 superior and inferior endplates.  3. Multilevel degenerative disc disease and degenerative facet  arthropathy.  4. L4-L5 grade 2 spondylolisthesis.  5. Severe spinal canal stenosis at L4-L5, moderate spinal canal  stenosis L2-L3, L3-L4 and L5-S1 and mild spinal canal stenosis L1-L2.    BERYL CERVANTES MD

## 2018-12-24 NOTE — PROGRESS NOTES
PRIMARY DIAGNOSIS: ACUTE PAIN  OUTPATIENT/OBSERVATION GOALS TO BE MET BEFORE DISCHARGE:  1. Pain Status: Partially met with PRN meds   2. Return to near baseline physical activity: Partially met, fitted for TLSO brace  3. Cleared for discharge by consultants (if involved): not met , Plan to discontinue today.

## 2018-12-24 NOTE — PROGRESS NOTES
PRIMARY DIAGNOSIS: ACUTE PAIN  OUTPATIENT/OBSERVATION GOALS TO BE MET BEFORE DISCHARGE:  1. Pain Status: Partially met, pt received 2.5mg Roxicodone   2. Return to near baseline physical activity: Partially met, fitted for TLSO brace  3. Cleared for discharge by consultants (if involved): NO

## 2018-12-24 NOTE — PROGRESS NOTES
PRIMARY DIAGNOSIS: ACUTE PAIN  OUTPATIENT/OBSERVATION GOALS TO BE MET BEFORE DISCHARGE:  1. Pain Status: Partially met with PRN meds   2. Return to near baseline physical activity: Partially met, fitted for TLSO brace  3. Cleared for discharge by consultants (if involved): not met

## 2018-12-24 NOTE — RESULT ENCOUNTER NOTE
Discussed results with patient. There is no evidence of UTI.    Please send result letter to patient

## 2018-12-24 NOTE — PLAN OF CARE
A&Ox4, VSS on RA. C/o lower back pain managed with PRN 5 mg oxycodone with relief. No nausea this shift. Up SBA with walker. 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 Dressing CDI. Reg diet, voiding adequately. IV SL. Mild edema to bilateral ankles, elevated in bed. Declined repositioning. Possible discharge today. SW following and Wound consult in AM. Will continue to monitor.

## 2018-12-26 ENCOUNTER — TELEPHONE (OUTPATIENT)
Dept: FAMILY MEDICINE | Facility: CLINIC | Age: 83
End: 2018-12-26

## 2018-12-26 ENCOUNTER — DOCUMENTATION ONLY (OUTPATIENT)
Dept: FAMILY MEDICINE | Facility: CLINIC | Age: 83
End: 2018-12-26

## 2018-12-26 NOTE — TELEPHONE ENCOUNTER
Discharge Orders             Home Care PT Referral for Hospital Discharge       Home care nursing referral       Home Care OT Referral for Hospital Discharge       Reason for your hospital stay     Back pain found to have compression fracture in thoracic spine.          Follow-up and recommended labs and tests      Follow up with Dr. Sherwood on Friday, January 4th at 2pm. Check BMP. Note Xarelto dose change d/t CrCl 41ml/min.          Activity     Your activity upon discharge: activity as tolerated          Discharge Instructions     1. Pain control.  - Tylenol 1000mg every 8 hours as needed for pain.  - Oxycodone 1/2-1 tablet every 4 hours as needed for severe pain 8-10/10. This can be constipating, drink plenty of water and take Senokot-s 1-2 tablets daily while taking oxycodone to keep bowel movements regular.  - Back brace for comfort.  - Gentle activity like leisurely walk and stretching.  - Heating pad/ice pack (thin towel between skin and compress to prevent burn) several times per day x 15-20 minutes.  - If no improvement in 1-2 weeks, call [facility] ([phone]) to arrange follow up with spine specialist.     2. Xarelto dose changed. Stop previous prescription with 20mg tablets and instead start new prescription with 15mg tablets.          MD face to face encounter     Documentation of Face to Face and Certification for Home Health Services     I certify that patient: Lindsey Hale is under my care and that I, or a nurse practitioner or physician's assistant working with me, had a face-to-face encounter that meets the physician face-to-face encounter requirements with this patient on: 12/24/2018.     This encounter with the patient was in whole, or in part, for the following medical condition, which is the primary reason for home health care: compression fracture and back pain.     I certify that, based on my findings, the following services are medically necessary home health  services: Nursing and Physical Therapy.     My clinical findings support the need for the above services because: Nurse is needed: To assess pain after changes in medications or other medical regimen.. and Physical Therapy Services are needed to assess and treat the following functional impairments: back pain.     Further, I certify that my clinical findings support that this patient is homebound (i.e. absences from home require considerable and taxing effort and are for medical reasons or Spiritism services or infrequently or of short duration when for other reasons) because: Requires assistance of another person or specialized equipment to access medical services because patient: Has prohibitive pain during ambulation. and Is unable to operate assistive equipment on their own...     Based on the above findings. I certify that this patient is confined to the home and needs intermittent skilled nursing care, physical therapy and/or speech therapy.  The patient is under my care, and I have initiated the establishment of the plan of care.  This patient will be followed by a physician who will periodically review the plan of care.  Physician/Provider to provide follow up care: Jeffery Sherwood     Attending hospital physician (the Medicare certified [place] provider): Dr. Qureshi  Physician Signature: See electronic signature associated with these discharge orders.  Date: 12/24/2018      DNR/DNI          Diet     Follow this diet upon discharge:    Regular Diet Adult         Discharge Medications           Current Discharge Medication List       START taking these medications     Details   acetaminophen (TYLENOL) 500 MG tablet Take 2 tablets (1,000 mg) by mouth every 8 hours as needed for mild pain     Associated Diagnoses: T12 compression fracture (H)       oxyCODONE (ROXICODONE) 5 MG tablet Take 0.5-1 tablets (2.5-5 mg) by mouth every 4 hours as needed for moderate to severe pain (8-10/10)  Qty: 10 tablet, Refills:  0     Comments: Future refills by PCP Dr. Jeffery Sherwood with phone number [phone].  Associated Diagnoses: T12 compression fracture (H)       senna-docusate (SENOKOT-S/PERICOLACE) 8.6-50 MG tablet Take 1-2 tablets by mouth 2 times daily as needed for constipation (hold for loose stools)  Qty: 30 tablet, Refills: 0     Comments: Future refills by PCP Dr. Jeffery Sherwood with phone number [phone].  Associated Diagnoses: T12 compression fracture (H)                 CONTINUE these medications which have CHANGED     Details   rivaroxaban ANTICOAGULANT (XARELTO) 15 MG TABS tablet Take 1 tablet (15 mg) by mouth daily (with dinner)  Qty: 30 tablet, Refills: 0     Comments: Future refills by PCP Dr. Jeffery Sherwood with phone number [phone].  Associated Diagnoses: Paroxysmal atrial fibrillation (H)                 CONTINUE these medications which have NOT CHANGED     Details   budesonide-formoterol (SYMBICORT) 160-4.5 MCG/ACT inhaler Inhale 2 puffs into the lungs 2 times daily        cephALEXin (KEFLEX) 500 MG capsule Take 500 mg by mouth 2 times daily Patient is taking 1 tablet by mouth twice daily for 7 days, started 12/20, will end on 12/27.       flecainide acetate 150 MG TABS Take 1/2 tablet by mouth twice daily  Qty: 90 tablet, Refills: 3     Associated Diagnoses: Atrial fibrillation (H)       Furosemide (LASIX PO) Take 20 mg by mouth daily as needed        levothyroxine (SYNTHROID/LEVOTHROID) 75 MCG tablet TAKE 1 TABLET(75 MCG) BY MOUTH DAILY  Qty: 90 tablet, Refills: 0     Associated Diagnoses: Hypothyroidism, unspecified type       metoprolol tartrate (LOPRESSOR) 50 MG tablet TAKE 1 AND 1/2 TABLETS BY MOUTH TWICE DAILY  Qty: 270 tablet, Refills: 0     Associated Diagnoses: Atrial fibrillation, unspecified type (H); Benign essential hypertension       mupirocin (BACTROBAN) 2 % external ointment Apply topically 2 times daily                STOP taking these medications         traMADol (ULTRAM) 50 MG tablet  Comments:   Reason for Stopping:

## 2018-12-26 NOTE — TELEPHONE ENCOUNTER
"Patient is scheduled with PCP 1/4/19 but is having lots of back pain and asked that we call her daughter Oxana. Spoke with Oxana, vertebrae in back is broken, wants to first have home health care help her and assess her. Is unsure how to transport her. She will encourage her to use Oxycodone as needed. Will have home care call with update     Rasheeda VELIZ RN        ED / Discharge Outreach Protocol    Patient Contact    Attempt # 1    Was call answered?  Yes.  \"May I please speak with <Lindsey>\"  Is patient available?   Yes    ED/Discharge Protocol    \"Hi, my name is Rasheeda Pabon, a registered nurse, and I am calling on behalf of Dr. Sherwood's office at [facility].  I am calling to follow up and see how things are going for you after your recent visit.\"    How are you doing now that you are home?\" having lots of pain, therapist will be out to visit patient. Difficulty getting minimal pain relief - currently using Oxycodone and APAP. Trying to avoid use - only got 9 pills at ER     Is patient experiencing symptoms that may require a hospital visit?  No     Discharge Instructions    \"Let's review your discharge instructions.  What is/are the follow-up recommendations?  Pt. Response: follow up with PCP     \"Were you instructed to make a follow-up appointment?\"  Pt. Response: Yes.  Has appointment been made?   Yes      \"When you see the provider, I would recommend that you bring your discharge instructions with you.    Medications    \"How many new medications are you on since your hospitalization/ED visit?\"    0-1  \"How many of your current medicines changed (dose, timing, name, etc.) while you were in the hospital/ED visit?\"   0-1  \"Do you have questions about your medications?\"   No  \"Were you newly diagnosed with heart failure, COPD, diabetes or did you have a heart attack?\"   No  For patients on insulin: \"Did you start on insulin in the hospital or did you have your insulin dose changed?\"   No    Medication " "reconciliation completed? Yes    Was MTM referral placed (*Make sure to put transitions as reason for referral)?   No    Call Summary    \"Do you have any questions or concerns about your condition or care plan at the moment?\"    No    Patient was in ER 2x in the past year (assess appropriateness of ER visits.)      \"If you have questions or things don't continue to improve, we encourage you contact us through the main clinic number,  ([phone]).  Even if the clinic is not open, triage nurses are available 24/7 to help you.     We would like you to know that our clinic has extended hours (provide information).  We also have urgent care (provide details on closest location and hours/contact info)\"    \"Thank you for your time and take care!\"    Rasheeda VELIZ RN      "

## 2018-12-26 NOTE — TELEPHONE ENCOUNTER
Chief Complaint: Other Closed Fracture Of Twelfth Thoracic Vertebra, Initial Encounter (H), T12 Compression Fracture (H),MON 24-DEC-2018,ED/IP 1/0    [phone] (home)

## 2018-12-27 NOTE — PROGRESS NOTES
"[facility] now requests orders and shares plan of care/discharge summaries for some patients through [employer].  Please REPLY TO THIS MESSAGE OR ROUTE BACK TO THE AUTHOR in order to give authorization for orders when needed.  This is considered a verbal order, you will still receive a faxed copy of orders for signature.  Thank you for your assistance in improving collaboration for our patients.    ORDER    Requesting:   SN 2 week 2, 1 week 3, 3 as needed  PT eval and treat for strength/mobility  HHA 1 week 5 for hygiene    MD SUMMARY/PLAN OF CARE    SUMMARY TO MD  SITUATION/BACKGROUND    Pt presented to [facility] ER for worsening backpain x1 week and was found to have probable t12 compression fracture, Lumbar spondylolisthesis with severe spinal canal stenosis. She was admitted to observation from 12/22 to 12/24 for pain control, was evaluated by orthopedics and was recommended TLSO brace for comfort, WBAT, and PT/OT. Additionally, she had not had a bm in 3 days. This resolved with enema and stool softeners. Her dose of xarelto was lowered from 20mg to 15mg due to decreased creatinine clearance. She was discharged home with orders for homecare.     She reports that oxycodone 5mg tablet \"takes the edge off\", that the 1000mg tylenol q8 does nothing for her. She was discharged with 8 tabs of oxycodone, scared to use them because she is afraid of running out. She has taken one the last two days. Rates pain 9/10 at rest. Neurologically intact. Denies chest pain/pressure/palpitations. HR regular. 3+ edema to ble- lasix ordered prn and pt takes when swelling increases. Pt dyspnic with moderate exertion. Denies cough/shortness of breath. PRATIK diminished, all other lung sounds clear. Occasionally incontinent of urine during the day. Rarely incontinent of stool. SHe is easily constipated, reports BM yesterday. Reports good appetite, states she eats lots of salads and meats. Ambulating with walker, this is a functional " decline for her. Had worked her way off of her cane before back pain started. Two wounds from MOHS procedure to LLE, daughter providing bandage dressing daily.      Pt has past medical history including  atrial fibrillation on Xarelto, mitral regurgitation, hypertension, hyperlipidemia, pacemaker placement, COPD,  DM II, recent MOHS surgery to lle, and TIA.     WOUND DESCRIPTION AND MEASUREMENTS 2 wounds to lle related to MOHS procedure. Dark pink granulating wound bed. Wound margins wnl. No s/s infection. Daughter applying mupirocin gel, applying nonadherant square dressing and paper tape over the top daily. Wound to shin 1.9cm by 2.2cm. Wound to medial calf 1.8cm by 1.3cm.   PHYSICAL PSYCHOSOCIAL IMPAIRMENT OR LIMITATIONS Pt and her daughter state that it will be very difficult for patient to make it to the clinic, pt barely able to get into care and sit in seat due to pain.   NUTRITION CONCERNS...Pt used to cook every meal prior to back pain. When she was discharged, she had carroll dinner and now has leftovers. She is unsure whether she will continue to be able to cook all of her own meals. Daughter to assist if she is unable to.   HOME ENVIRONMENT AFFECTING CARE...Pt able to meet needs on main level of home. She has adaptive equipment for bathroom including grab bars, shower chair, raised toilet seat. She has a handle on her bed to grab.   CAREGIVER SUPPORT...Support provided by daughter. Daughter experiencing caregiver burnout related to ill . Pt also has three sons that are less involved.     ANALYSIS...Pt at risk for hospitalization related to high risk for falls while anticoagulated and severe uncontrolled pain.   RECOMMENDATION...PT for strength/mobility, SN for pain management education, GI assess/educate, nutrion/hydration assess/educate.    Vicki GROVES RN  [phone]

## 2018-12-28 DIAGNOSIS — S22.080A T12 COMPRESSION FRACTURE (H): ICD-10-CM

## 2018-12-28 RX ORDER — OXYCODONE HYDROCHLORIDE 5 MG/1
2.5-5 TABLET ORAL EVERY 4 HOURS PRN
Qty: 10 TABLET | Refills: 0 | Status: SHIPPED | OUTPATIENT
Start: 2018-12-28 | End: 2019-01-04

## 2018-12-28 NOTE — TELEPHONE ENCOUNTER
Reason for Call:  Medication or medication refill:    Do you use a [facility]?  Name of the pharmacy and phone number for the current request:  [facility] [address] [place], [place] - [address] AT [facility] [address]    Name of the medication requested: oxyCODONE (ROXICODONE) 5 MG tablet    Other request: pt was given this rx at [facility] but she needs refill for the pain. Pt has 4 left. Please refill    Can we leave a detailed message on this number? YES    Phone number patient can be reached at: Cell number on file:    [phone]       Best Time: any    Call taken on 12/28/2018 at 9:17 AM by Miguel Urena

## 2018-12-28 NOTE — TELEPHONE ENCOUNTER
Patient's daughter Sophia asking if this can be ready for  today please  Call once ready    Thank you

## 2018-12-28 NOTE — TELEPHONE ENCOUNTER
oxyCODONE (ROXICODONE) 5 MG tablet  Last Written Prescription Date:  12/24/2018  Last Fill Quantity: 10,   # refills: 0  Last Office Visit: Kiana Petersen APRN CNP  Future Office visit:  01/4/2019  Next 5 appointments (look out 90 days)    Jan 04, 2019  2:00 PM CST  [facility] [address] [facility] [address]  [phone]           Routing refill request to provider for review/approval because:  Drug not on the [facility], P or [facility] refill protocol or controlled substance      Requested Prescriptions   Pending Prescriptions Disp Refills     oxyCODONE (ROXICODONE) 5 MG tablet 10 tablet 0     Sig: Take 0.5-1 tablets (2.5-5 mg) by mouth every 4 hours as needed for moderate to severe pain (8-10/10)    There is no refill protocol information for this order

## 2018-12-28 NOTE — TELEPHONE ENCOUNTER
Oxana calling again - after speaking to Dr. Barragan, he has agreed to give Pat enough medication to get her through until she see's Dr. Sherwood on 1/4/18. Oxana will  - I offered to walk it to the pharmacy here in our clinic and she agrees with this. Routing to Dr. Barragan for approval - Please bring to our pharmacy [place] [id number] [address]. So. She will  tonight.

## 2018-12-31 ENCOUNTER — HOSPITAL ENCOUNTER (EMERGENCY)
Facility: CLINIC | Age: 83
Discharge: HOME | End: 2019-01-01
Attending: EMERGENCY MEDICINE | Admitting: EMERGENCY MEDICINE
Payer: MEDICARE

## 2018-12-31 DIAGNOSIS — M54.9 CHRONIC RIGHT-SIDED BACK PAIN, UNSPECIFIED BACK LOCATION: ICD-10-CM

## 2018-12-31 DIAGNOSIS — G89.29 CHRONIC RIGHT-SIDED BACK PAIN, UNSPECIFIED BACK LOCATION: ICD-10-CM

## 2018-12-31 PROCEDURE — 25000132 ZZH RX MED GY IP 250 OP 250 PS 637: Mod: GY | Performed by: EMERGENCY MEDICINE

## 2018-12-31 PROCEDURE — 85025 COMPLETE CBC W/AUTO DIFF WBC: CPT | Performed by: EMERGENCY MEDICINE

## 2018-12-31 PROCEDURE — 99283 EMERGENCY DEPT VISIT LOW MDM: CPT

## 2018-12-31 PROCEDURE — A9270 NON-COVERED ITEM OR SERVICE: HCPCS | Mod: GY | Performed by: EMERGENCY MEDICINE

## 2018-12-31 PROCEDURE — 81001 URINALYSIS AUTO W/SCOPE: CPT | Performed by: EMERGENCY MEDICINE

## 2018-12-31 PROCEDURE — 80053 COMPREHEN METABOLIC PANEL: CPT | Performed by: EMERGENCY MEDICINE

## 2018-12-31 PROCEDURE — 83690 ASSAY OF LIPASE: CPT | Performed by: EMERGENCY MEDICINE

## 2018-12-31 PROCEDURE — 25000131 ZZH RX MED GY IP 250 OP 636 PS 637: Mod: GY | Performed by: EMERGENCY MEDICINE

## 2018-12-31 RX ORDER — ONDANSETRON 4 MG/1
4 TABLET, ORALLY DISINTEGRATING ORAL ONCE
Status: COMPLETED | OUTPATIENT
Start: 2018-12-31 | End: 2018-12-31

## 2018-12-31 RX ORDER — OXYCODONE AND ACETAMINOPHEN 5; 325 MG/1; MG/1
2 TABLET ORAL ONCE
Status: COMPLETED | OUTPATIENT
Start: 2018-12-31 | End: 2018-12-31

## 2018-12-31 RX ADMIN — OXYCODONE HYDROCHLORIDE AND ACETAMINOPHEN 2 TABLET: 5; 325 TABLET ORAL at 23:34

## 2018-12-31 RX ADMIN — ONDANSETRON 4 MG: 4 TABLET, ORALLY DISINTEGRATING ORAL at 23:34

## 2018-12-31 NOTE — ED AVS SNAPSHOT
Emergency Department  [address] [facility] [address]  Phone:  [phone]  Fax:  [phone]                                    Lindsey Hale   MRN: [id number]    Department:   Emergency Department   Date of Visit:  12/31/2018           After Visit Summary Signature Page    I have received my discharge instructions, and my questions have been answered. I have discussed any challenges I see with this plan with the nurse or doctor.    ..........................................................................................................................................  Patient/Patient Representative Signature      ..........................................................................................................................................  Patient Representative Print Name and Relationship to Patient    ..................................................               ................................................  Date                                   Time    ..........................................................................................................................................  Reviewed by Signature/Title    ...................................................              ..............................................  Date                                               Time          22EPIC Rev 08/18

## 2019-01-01 VITALS
OXYGEN SATURATION: 97 % | RESPIRATION RATE: 16 BRPM | HEART RATE: 73 BPM | SYSTOLIC BLOOD PRESSURE: 130 MMHG | DIASTOLIC BLOOD PRESSURE: 55 MMHG | TEMPERATURE: 98.7 F

## 2019-01-01 LAB
ALBUMIN SERPL-MCNC: 3.3 G/DL (ref 3.4–5)
ALBUMIN UR-MCNC: 10 MG/DL
ALP SERPL-CCNC: 76 U/L (ref 40–150)
ALT SERPL W P-5'-P-CCNC: 14 U/L (ref 0–50)
ANION GAP SERPL CALCULATED.3IONS-SCNC: 9 MMOL/L (ref 3–14)
APPEARANCE UR: CLEAR
AST SERPL W P-5'-P-CCNC: 11 U/L (ref 0–45)
BACTERIA #/AREA URNS HPF: ABNORMAL /HPF
BASOPHILS # BLD AUTO: 0 10E9/L (ref 0–0.2)
BASOPHILS NFR BLD AUTO: 0.2 %
BILIRUB SERPL-MCNC: 0.4 MG/DL (ref 0.2–1.3)
BILIRUB UR QL STRIP: NEGATIVE
BUN SERPL-MCNC: 21 MG/DL (ref 7–30)
CALCIUM SERPL-MCNC: 8.8 MG/DL (ref 8.5–10.1)
CHLORIDE SERPL-SCNC: 99 MMOL/L (ref 94–109)
CO2 SERPL-SCNC: 25 MMOL/L (ref 20–32)
COLOR UR AUTO: YELLOW
CREAT SERPL-MCNC: 0.85 MG/DL (ref 0.52–1.04)
DIFFERENTIAL METHOD BLD: ABNORMAL
EOSINOPHIL # BLD AUTO: 0.1 10E9/L (ref 0–0.7)
EOSINOPHIL NFR BLD AUTO: 0.9 %
ERYTHROCYTE [DISTWIDTH] IN BLOOD BY AUTOMATED COUNT: 13.4 % (ref 10–15)
GFR SERPL CREATININE-BSD FRML MDRD: 59 ML/MIN/{1.73_M2}
GLUCOSE SERPL-MCNC: 115 MG/DL (ref 70–99)
GLUCOSE UR STRIP-MCNC: NEGATIVE MG/DL
HCT VFR BLD AUTO: 36 % (ref 35–47)
HGB BLD-MCNC: 12.2 G/DL (ref 11.7–15.7)
HGB UR QL STRIP: NEGATIVE
HYALINE CASTS #/AREA URNS LPF: 1 /LPF (ref 0–2)
IMM GRANULOCYTES # BLD: 0 10E9/L (ref 0–0.4)
IMM GRANULOCYTES NFR BLD: 0.3 %
KETONES UR STRIP-MCNC: NEGATIVE MG/DL
LEUKOCYTE ESTERASE UR QL STRIP: NEGATIVE
LIPASE SERPL-CCNC: 73 U/L (ref 73–393)
LYMPHOCYTES # BLD AUTO: 1.4 10E9/L (ref 0.8–5.3)
LYMPHOCYTES NFR BLD AUTO: 15.5 %
MCH RBC QN AUTO: 33.8 PG (ref 26.5–33)
MCHC RBC AUTO-ENTMCNC: 33.9 G/DL (ref 31.5–36.5)
MCV RBC AUTO: 100 FL (ref 78–100)
MONOCYTES # BLD AUTO: 0.8 10E9/L (ref 0–1.3)
MONOCYTES NFR BLD AUTO: 8.6 %
MUCOUS THREADS #/AREA URNS LPF: PRESENT /LPF
NEUTROPHILS # BLD AUTO: 6.6 10E9/L (ref 1.6–8.3)
NEUTROPHILS NFR BLD AUTO: 74.5 %
NITRATE UR QL: NEGATIVE
NRBC # BLD AUTO: 0 10*3/UL
NRBC BLD AUTO-RTO: 0 /100
PH UR STRIP: 5.5 PH (ref 5–7)
PLATELET # BLD AUTO: 340 10E9/L (ref 150–450)
POTASSIUM SERPL-SCNC: 4.3 MMOL/L (ref 3.4–5.3)
PROT SERPL-MCNC: 7.7 G/DL (ref 6.8–8.8)
RBC # BLD AUTO: 3.61 10E12/L (ref 3.8–5.2)
RBC #/AREA URNS AUTO: 1 /HPF (ref 0–2)
SODIUM SERPL-SCNC: 133 MMOL/L (ref 133–144)
SOURCE: ABNORMAL
SP GR UR STRIP: 1.02 (ref 1–1.03)
SQUAMOUS #/AREA URNS AUTO: 1 /HPF (ref 0–1)
UROBILINOGEN UR STRIP-MCNC: NORMAL MG/DL (ref 0–2)
WBC # BLD AUTO: 8.8 10E9/L (ref 4–11)
WBC #/AREA URNS AUTO: 1 /HPF (ref 0–5)

## 2019-01-01 RX ORDER — OXYCODONE AND ACETAMINOPHEN 5; 325 MG/1; MG/1
1 TABLET ORAL EVERY 6 HOURS PRN
Qty: 18 TABLET | Refills: 0 | Status: SHIPPED | OUTPATIENT
Start: 2019-01-01 | End: 2019-01-15 | Stop reason: ALTCHOICE

## 2019-01-01 RX ORDER — ONDANSETRON 4 MG/1
4 TABLET, ORALLY DISINTEGRATING ORAL EVERY 8 HOURS PRN
Qty: 10 TABLET | Refills: 0 | Status: SHIPPED | OUTPATIENT
Start: 2019-01-01 | End: 2019-01-04

## 2019-01-01 ASSESSMENT — ENCOUNTER SYMPTOMS
BACK PAIN: 1
NAUSEA: 1

## 2019-01-01 NOTE — ED TRIAGE NOTES
"Pt here from home by [facility] for lower back pain. Pt states she was dx with a fractured vertebra in her lower back a week ago. She states she was seen here at [facility] ED. She comes in today because she is unable to control her pain at home. She states she took her prescribed \"OxyContin\" at home around 7pm. She states she feels weak in her lower extremities. Pt received 4mg Zofran IV and 100mcg Fent IV during transportation. Pt states that this helped. She's now at 7/10 pain. Upon arrival, Neuros intact. Edema in her lower extremities. \"They are more swollen than normal. I haven't been able to take my water pill\".    "

## 2019-01-01 NOTE — ED PROVIDER NOTES
History     Chief Complaint:  Back Pain    HPI   Lindsey Hale is a 89 year old female with a history of back pain who is on Xarelto and with a compression fracture thoracic spine who presents with back pain. The patient is coming in today with a heavy pain in her spine and nauseation. The daughter states that since the patient came home from observation unit after the non-traumatic compression fracture on 12/22 the pain has never been controllable to the point where she is nauseated. The patient first noticed this pain about a week before she was diagnosed with a compression fracture, but it was not immediately diagnosed. The patient had a CT scan at that time, results below The daughter states the patient called her neighbor tonight as she felt very nauseated and the worst pain she had had since she was diagnosed with the fracture. The patient states she was given 4 mg of Zofran by EMS en route today. The daughter states the patient last had oxycodone at 1730. The patient states she is taking 5 mg of Oxycodone every four hours.     12/22  CT Thoracic Spine   1. Compression fracture of the superior endplate of T12 with  recent-appearing fracture lines. Mild adjacent soft tissue swelling.  2. Multilevel degenerative disc disease.  3. Scoliosis.  BERYL CERVANTES MD      Allergies:  Clindamycin  Ampicillin  Celebrex  Ciprofloxacin  Erythromycin  Indocin  Penicillin  Vibramycin  Xylocaine    Medications:    Tylenol  Leflex  Symbicort  Lasix  Synthroid  Bactroban  Semokot  Xarelto    Past Medical History:    Abnormal echocardiogram   Arthritis   Atrial fibrillation   Chest pain   Chronic LBP   COPD   Cysts, breast   Dizzy   Dysphagia   Elevated blood sugar   Environmental allergies   Hematuria   Hemoptyses   Hyperlipidemia   Hypertension   Hypothyroid   Mitral regurgitation   nausea   Osteopenia   Pacemaker   Palpitations   PMR (polymyalgia rheumatica)    SOB (shortness of breath)   Supraventricular premature  beats   TIA (transient Ischaemic attack)   Treadmill stress test negative for angina pectoris   Urine incontinence   Urosepsis   Vision changes     Past Surgical History:    Arthroplasty  Foot surgery  Mastectomy  Matthew not bso    Family History:    Father: CAD, lymphoma  Mother: caner  Brother: lymphoma    Social History:  The patient was accompanied to the ED by daughter.  Smoking Status: former smoker  Smokeless Tobacco: Never Used  Alcohol Use: Positive  Marital Status:       Review of Systems   Gastrointestinal: Positive for nausea.   Musculoskeletal: Positive for back pain.   All other systems reviewed and are negative.      Physical Exam     Patient Vitals for the past 24 hrs:   BP Temp Temp src Pulse Resp SpO2   12/31/18 2238 -- 98.7  F (37.1  C) Oral -- -- --   12/31/18 2236 139/63 -- -- 70 20 98 %     Physical Exam  Vitals: reviewed by me  General: Pt seen on [facility], cooperative, and alert to conversation  Eyes: Tracking well, clear conjunctiva BL  ENT: MMM, midline trachea.   Lungs:  No tachypnea, no accessory muscle use. No respiratory distress.   CV: Rate as above, regular rhythm.    Abd: Soft, non tender, no guarding, no rebound. Non distended  MSK: no peripheral edema or joint effusion.  No evidence of trauma  + mild ttp to thoracic area, paraspinal muscles.  No midline tenderness, full range of motion to back.  Skin: No rash, normal turgor and temperature  Neuro: Clear speech and no facial droop.  Ambulatory with strong steady gait.  5 out of 5 motor in bilateral lower extremities.  Psych: Not RIS, no e/o AH/VH      Emergency Department Course   Laboratory:  Laboratory findings were communicated with the patient who voiced understanding of the findings.    UA with microscopic: protein albumin 10 (A), bacteria few (A), mucous present (A) o/w WNL    CBC: WBC 8.8, HGB 12.2,   CMP: glucose 115(H), GFR estimate 59(H), albumin 3.3(L) o/w WNL (Creatinine 0.85)  Lipase:  73    Interventions:  2334 Zofran 4 mg Oral  2334 Percocet 2 tablets Oral    Emergency Department Course:    2240 IV was inserted and blood was drawn for laboratory testing, results above.    2331 Nursing notes and vitals reviewed.    2335 I performed an exam of the patient as documented above.     2359 The patient provided a urine sample here in the emergency department. This was sent for laboratory testing, findings above.    0040 I returned to reassess the patient. I personally reviewed the laboratory results with the patient and answered all related questions prior to discharge.    Impression & Plan      Medical Decision Making:  Lindsey Hale is a 89 year old female who presents to the emergency department today for evaluation of back pain, identical to the pain she has had for the last several weeks. CT scan does show a T10 compression fracture which is exactly where her pain is on her spine. In fact, her pain is not midline but just slightly to the right, leading me to believe she may have a small element of radicular pain as well. She has a normal neurologic exam, pain is controlled int he ER, and it appears the pain is controlled with oxycodone and her pain pill, but she is afraid to take it as it makes her nauseated. As such, I have given her more pain medication as well as some Zofran which I do think will help and has helped in the ED. There are not signs of cauda equina, spinal epidural abscess, or cord compression. I do think she will do well in the outpatient setting. She is ambulatory here in the ED with a wonderful gait and is her baseline per family. Has   Good family support, family is ok with plan to discharge home as well     Diagnosis:    ICD-10-CM    1. Chronic right-sided back pain, unspecified back location M54.9     G89.29         Disposition:   The patient is discharged to home.    Discharge Medications:        START taking      Dose / Directions   ondansetron 4 MG ODT tab  Commonly  known as:  ZOFRAN ODT      Dose:  4 mg  Take 1 tablet (4 mg) by mouth every 8 hours as needed for nausea  Quantity:  10 tablet  Refills:  0     oxycodone-acetaminophen 5-325 MG tablet  Commonly known as:  PERCOCET      Dose:  1 tablet  Take 1 tablet by mouth every 6 hours as needed for moderate to severe pain  Quantity:  18 tablet  Refills:  0           Where to get your medicines      Some of these will need a paper prescription and others can be bought over the counter. Ask your nurse if you have questions.    Bring a paper prescription for each of these medications    ondansetron 4 MG ODT tab    oxycodone-acetaminophen 5-325 MG tablet       Scribe Disclosure:  I, Vanessa Nance, am serving as a scribe at 12:02 AM on 1/1/2019 to document services personally performed by Gene Oconnell based on my observations and the provider's statements to me.   EMERGENCY DEPARTMENT       Gene Oconnell MD  01/01/19 0436

## 2019-01-02 ENCOUNTER — TELEPHONE (OUTPATIENT)
Dept: FAMILY MEDICINE | Facility: CLINIC | Age: 84
End: 2019-01-02

## 2019-01-02 ENCOUNTER — PATIENT OUTREACH (OUTPATIENT)
Dept: CARE COORDINATION | Facility: CLINIC | Age: 84
End: 2019-01-02

## 2019-01-02 NOTE — TELEPHONE ENCOUNTER
Spoke with Rachel [facility] nurse:     No BM for week   Has been pushing fluids   Positive bowel sounds   Passing gas  2 tab twice a day senna   Miralax once per day     Pt has a fleets enema and also ducolax suppository in the home, so they are going to first try an enema then a suppository after - HC nurse wanted PCP aware she is recommending this     Also - she reports pt was in ER 12/31/18 and switched from Oxycodone to Percocet. Now taking Percocet three times a day - but it's barely touching her pain. At best is a 6 or 7 and 10/10 at worst. Pt is not sleeping through the night. HC nurse is asking if patient would benefit more from Oxycontin.     Pt is scheduled with PCP 1/4/19.     Please advise   Rasheeda VELIZ RN

## 2019-01-02 NOTE — PROGRESS NOTES
"Clinic Care Coordination Contact    Clinic Care Coordination Contact  OUTREACH    Referral Information:  Referral Source: ED Follow-Up    Primary Diagnosis: Chronic Pain    Chief Complaint   Patient presents with     Clinic Care Coordination - [facility]     ED discharge follow-up        Universal Utilization: Patient was admitted to [facility] from 12/22 to 12/24/18 with a T12 compression fracture. She came to the ED on 12/31/18 with right-sided pain, even with prescribed medication, and nausea. Patient was discharged home with daughter with new prescriptions and instruction to follow-up with Dr. Sherwood on 1/4/19, as previously scheduled.  Clinic Utilization  Compliance Concerns: Patient's daughter Oxana stated it is very difficult for patient to get in and out of the car.   No-Show Concerns: No  No PCP office visit in Past Year: No  Utilization    Last refreshed: 1/1/2019  1:46 AM:  Hospital Admissions 1           Last refreshed: 1/1/2019  1:46 AM:  ED Visits 2           Last refreshed: 1/1/2019  1:46 AM:  No Show Count (past year) 1              Current as of: 1/1/2019  1:46 AM            Clinical Concerns:  Current Medical Concerns: Patient was hospitalized for a T12 compression fracture and went home with Home Care Services - [facility]. Patient's pain is not controlled with current medication regimen, which led her to come to the ED on 12/31/18.     RN Care Coordinator called patient's daughter: Oxana (consent to communicate, from 10/06/14, on file) to follow-up on patient's discharge and introduced self. Oxana said Lindsey did not leave the ED without pain. Oxana said their \"experience at the ED was horrible and my  will get onto that today. First of all, she was not given the bracelet with her name.Then, they draw her blood, left it in the room and then came later to attempt to do it again. And it was already done. They also wanted to do another CT scan and I had to remind them she had just had " "one on the 22nd. We left and she was still in pain.\"   Patient's daughter said her Mom's nausea subsided with the medication that was given (Zofran) at the ED and she was leaving to check on her. She informed writer that the Home Care RN was going to be at her Mom's house at 10 and she wanted to be there as well.   Patient's daughter is concerned about bringing her to see Dr. Sherwood on Friday. \"It's really hard for her to get in and out of the car because of the pain. And my mom is not one to complain about pain; she has never been like that.\"    Current Behavioral Concerns: Patient is not being able to \"be herself\" due to uncontrolled pain.      Education Provided to patient's daughter: about Medication Therapy Management.     Pain (GOAL): Yes  Progression: Constant  Limitation of routine activities due to chronic pain: Yes  Description: Unable to perform most daily activities (chores, hobbies, social activities, driving)  Aggravating Factors: Positioning  Health Maintenance Reviewed:    Clinical Pathway: Pain control    Medication Management:  Currently, patient has prescriptions for:    Tylenol 500 mg, 2 tablets, every 8 hours PRN;    Oxycodone 2.5 -5 mg, 0.5 to 1 tablet, every 4 hours PRN;    Oxycodone-acetaminophen 5-325 mg, every 6 hours PRN.      Functional Status:  Dependent ADLs: Ambulation-cane, Ambulation-walker  Dependent IADLs: Medication Management, Transportation, Cleaning, Laundry, Shopping  Bed or wheelchair confined: No  Mobility Status: Independent w/Device    Living Situation:  Current living arrangement:Patient lives in a private home by herself.  Type of residence: Private home - no stairs      Transportation:  Transportation concerns (GOAL): No  Transportation means: Family       Resources:   Current Resources: Home Care Services  List of home care services: Skilled Nursing, Physicial Therapy, Occupational Therapy.       Equipment Currently Used at Home: cane, straight, walker, " rolling    Advanced Care Plans/Directives on file:: Yes  Type Advanced Care Plans/Directives: Advanced Directive - On File    Interventions:  RN CC engaged in AIDET communication with patient's daughter and apologized for their experience at the ED. Patient Relations' phone number was provided to her by writer for properly addressing her concerns. She was satisfied with that.  Writer discussed with patient's daughter about involving Munira Pereira, Pharmacist, in the care team for evaluation of medication regimen, along with Dr. Sherwood. She was very receptive to that.        Future Appointments              In 2 days Jeffery Sherwood MD [facility],     In 2 months [facility]          Plan:   RN Care Coordinator will:     Contact Munira Pereira by phone and place referral for MTM.    Route this encounter to PCP, Dr. Sherwood.    Route this encounter to Hospital's Patient Relations.    Contact patient's daughter this afternoon to update her on the care plan for patient.  Patient will follow-up with PCP on 1/4/19, as scheduled.      Zoe Sims RN   Care Coordinator  [facility]  Phone:  [phone]  Email: [email or web address]      Addendum - 1/2/19 - 2:55 pm    Patient's daughter was called and updated about Care Plan.    Zoe Sims RN CC

## 2019-01-02 NOTE — TELEPHONE ENCOUNTER
Reason for call:  Patient reporting a symptom    Symptom or request: Constipation    Duration (how long have symptoms been present): 1 week    Have you been treated for this before? No    Additional comments: No BM in 1 week- Rachel with [facility] called to report    Ph. [phone]      Can we leave a detailed message on this number:  YES    Call taken on 1/2/2019 at 10:53 AM by Misa Land

## 2019-01-04 ENCOUNTER — TELEPHONE (OUTPATIENT)
Dept: FAMILY MEDICINE | Facility: CLINIC | Age: 84
End: 2019-01-04

## 2019-01-04 ENCOUNTER — OFFICE VISIT (OUTPATIENT)
Dept: FAMILY MEDICINE | Facility: CLINIC | Age: 84
End: 2019-01-04
Payer: MEDICARE

## 2019-01-04 VITALS
BODY MASS INDEX: 32.59 KG/M2 | TEMPERATURE: 98 F | OXYGEN SATURATION: 99 % | SYSTOLIC BLOOD PRESSURE: 128 MMHG | HEIGHT: 60 IN | DIASTOLIC BLOOD PRESSURE: 55 MMHG | WEIGHT: 166 LBS | HEART RATE: 71 BPM

## 2019-01-04 DIAGNOSIS — I48.0 PAROXYSMAL ATRIAL FIBRILLATION (H): ICD-10-CM

## 2019-01-04 DIAGNOSIS — M48.061 SPINAL STENOSIS OF LUMBAR REGION WITHOUT NEUROGENIC CLAUDICATION: ICD-10-CM

## 2019-01-04 DIAGNOSIS — R11.0 NAUSEA: ICD-10-CM

## 2019-01-04 DIAGNOSIS — M35.3 PMR (POLYMYALGIA RHEUMATICA) (H): ICD-10-CM

## 2019-01-04 DIAGNOSIS — S22.080A T12 COMPRESSION FRACTURE (H): Primary | ICD-10-CM

## 2019-01-04 PROCEDURE — 99495 TRANSJ CARE MGMT MOD F2F 14D: CPT | Performed by: INTERNAL MEDICINE

## 2019-01-04 RX ORDER — ZOLEDRONIC ACID 5 MG/100ML
5 INJECTION, SOLUTION INTRAVENOUS ONCE
Status: CANCELLED
Start: 2019-01-04 | End: 2019-01-04

## 2019-01-04 RX ORDER — POLYETHYLENE GLYCOL 3350 17 G/17G
1 POWDER, FOR SOLUTION ORAL DAILY
COMMUNITY
End: 2019-11-14

## 2019-01-04 RX ORDER — HYDROMORPHONE HYDROCHLORIDE 2 MG/1
2 TABLET ORAL EVERY 4 HOURS PRN
Qty: 50 TABLET | Refills: 0 | Status: SHIPPED | OUTPATIENT
Start: 2019-01-04 | End: 2019-01-14

## 2019-01-04 RX ORDER — ONDANSETRON 4 MG/1
4 TABLET, ORALLY DISINTEGRATING ORAL EVERY 8 HOURS PRN
Qty: 15 TABLET | Refills: 3 | Status: SHIPPED | OUTPATIENT
Start: 2019-01-04 | End: 2019-11-14

## 2019-01-04 ASSESSMENT — ANXIETY QUESTIONNAIRES
1. FEELING NERVOUS, ANXIOUS, OR ON EDGE: MORE THAN HALF THE DAYS
5. BEING SO RESTLESS THAT IT IS HARD TO SIT STILL: SEVERAL DAYS
6. BECOMING EASILY ANNOYED OR IRRITABLE: NOT AT ALL
IF YOU CHECKED OFF ANY PROBLEMS ON THIS QUESTIONNAIRE, HOW DIFFICULT HAVE THESE PROBLEMS MADE IT FOR YOU TO DO YOUR WORK, TAKE CARE OF THINGS AT HOME, OR GET ALONG WITH OTHER PEOPLE: SOMEWHAT DIFFICULT
7. FEELING AFRAID AS IF SOMETHING AWFUL MIGHT HAPPEN: NOT AT ALL
GAD7 TOTAL SCORE: 4
2. NOT BEING ABLE TO STOP OR CONTROL WORRYING: NOT AT ALL
3. WORRYING TOO MUCH ABOUT DIFFERENT THINGS: NOT AT ALL

## 2019-01-04 ASSESSMENT — MIFFLIN-ST. JEOR: SCORE: 1079.47

## 2019-01-04 ASSESSMENT — PATIENT HEALTH QUESTIONNAIRE - PHQ9
SUM OF ALL RESPONSES TO PHQ QUESTIONS 1-9: 13
5. POOR APPETITE OR OVEREATING: SEVERAL DAYS

## 2019-01-04 NOTE — PROGRESS NOTES
SUBJECTIVE:   Lindsey Hale is a 89 year old female who presents to clinic today for the following health issues:          Hospital Follow-up Visit:    Hospital/Nursing Home/IP Rehab Facility: [facility]  Date of Admission: 12/22/18  Date of Discharge: 12/24/18  Reason(s) for Admission: compression fracture            Problems taking medications regularly:  None       Medication changes since discharge: None       Problems adhering to non-medication therapy:  None  Misa Brown MA    Summary of hospitalization:  [facility] discharge summary reviewed  Diagnostic Tests/Treatments reviewed.  Follow up needed: none  Other Healthcare Providers Involved in Patient s Care:         None  Update since discharge: improved.     Post Discharge Medication Reconciliation: discharge medications reconciled and changed, per note/orders (see AVS).  Plan of care communicated with patient and family     Coding guidelines for this visit:  Type of Medical   Decision Making Face-to-Face Visit       within 7 Days of discharge Face-to-Face Visit        within 14 days of discharge   Moderate Complexity [id number]   High Complexity [id number]            ED/UC Followup:    Facility:   ED  Date of visit: 12/31/18  Reason for visit: back pain  Current Status: still in pain     The patient presents with her daughter for hospital as well as ER follow-up.  I reviewed those notes as well as x-rays and labs.    The patient's pain started after she had a Mohs procedure on December 5.  There was no trauma or other injury.  Since then the pain is been fairly constant, better and worse at times.  It is better when she sits in a chair with a hot pad.  It is worse when she gets in and out of a chair or bed.  When she is in bed it still hurts but it is better.  She also was having constipation as well as nausea.  She is on Zofran and that is helped her nausea.  She continues to have constipation.  She is using either  senna or MiraLAX on a daily basis but not both.  No focal abdominal pain.  She has not fallen and does not feel unsteady.  No fevers or night sweats.  She is been using oxycodone which really has not helped with the pain.  In the past she is used tramadol but this is not helped as well.  She is allergic to Celebrex.  She does have a history of osteoporosis and I gave her Fosamax to use several months ago but she was intolerant of that due to esophageal issues.  She has a prior hip fracture as noted.  She used to be on steroids because of polymyalgia rheumatica.  She is on Xarelto for her A. Fib.    Past Medical History:   Diagnosis Date     Abnormal echocardiogram 04/2017    mild mr, ar, mitral stenosis, nl ef, lvh.  Done for episode of vision change     Arthritis 99         Atrial fibrillation (H) 2011 and 2009    neg nuc est 2009, Dr. Vazquez, on coumadin     Chest pain 2000    neg est echo, neg ct chest abd, pelvis Faith     Chronic LBP      COPD (chronic obstructive pulmonary disease) (H)     seen by pulmonary md Dr. Whitt, and given inhaler     Cysts, breast 1980s    bilat mastectomies     Dizzy 2007    ct neg, carotid us neg     Dysphagia 2005    egd nl     Elevated blood sugar      Environmental allergies      Hematuria 2004    neg cysto and us     Hemoptyses 2008    ct neg, bronch neg 2009     Hyperlipidemia      Hypertension      Hypothyroid 1995    Dr. Ortiz     Mitral regurgitation 6/15    on echo     nausea 2006    ct abd and ;pelvis neg     Osteopenia     fu dexa better 2011     Pacemaker 2011    afib with pauses and syncope     Palpitations 2009    neg est     PMR (polymyalgia rheumatica) (H) 2004    not active in years     SOB (shortness of breath) 5/15    echo nl ef, 2+mr, cxr clear, seen by pulm and given inhaler     Supraventricular premature beats      TIA (transient ischaemic attack) 2009    carotids neg, mri neg     Treadmill stress test negative for angina pectoris 2011    nuclear  est     Urine incontinence     Dr. Westfall     Urosepsis 2009    hospitalized     Vision changes 2011    eval by neuro and ophtho and no cause found     Past Surgical History:   Procedure Laterality Date     ARTHROPLASTY HIP Left 2017    Procedure: ARTHROPLASTY HIP;  LEFT HIP ARBEN ARTHROPLASTY(SORAYA);  Surgeon: Darell Nathan MD;  Location:  OR     ARTHROPLASTY PATELLO-FEMORAL (KNEE)   and      ARTHROSCOPY KNEE  1997     BIOPSY BREAST Right 2014    Procedure: BIOPSY BREAST;  Surgeon: David Carter MD;  Location:  SD     breast implants      4x     cataract  2011     FOOT SURGERY       MASTECTOMY  1980    bilat, cysts     [place] not bso  70's    not for ca     Social History     Socioeconomic History     Marital status:      Spouse name: Not on file     Number of children: 4     Years of education: Not on file     Highest education level: Not on file   Social Needs     Financial resource strain: Not on file     Food insecurity - worry: Not on file     Food insecurity - inability: Not on file     Transportation needs - medical: Not on file     Transportation needs - non-medical: Not on file   Occupational History     Not on file   Tobacco Use     Smoking status: Former Smoker     Types: Cigarettes     Last attempt to quit: 1955     Years since quittin.9     Smokeless tobacco: Never Used     Tobacco comment: quit in    had smoked about 2 cigarettes a day or more   Substance and Sexual Activity     Alcohol use: Yes     Alcohol/week: 0.0 oz     Comment: occ wine     Drug use: No     Sexual activity: Not Currently   Other Topics Concern     Parent/sibling w/ CABG, MI or angioplasty before 65F 55M? Not Asked      Service Not Asked     Blood Transfusions Not Asked     Caffeine Concern No     Comment: coffee: 1 cup a week.  Occ green tea     Occupational Exposure Not Asked     Hobby Hazards Not Asked     Sleep Concern No     Stress Concern No     Weight Concern No      Special Diet No     Back Care Not Asked     Exercise Yes     Comment: walking daily     Bike Helmet Not Asked     Seat Belt Yes     Self-Exams Not Asked   Social History Narrative     Not on file     Current Outpatient Medications   Medication Sig Dispense Refill     acetaminophen (TYLENOL) 500 MG tablet Take 2 tablets (1,000 mg) by mouth every 8 hours as needed for mild pain       budesonide-formoterol (SYMBICORT) 160-4.5 MCG/ACT inhaler Inhale 2 puffs into the lungs 2 times daily        flecainide acetate 150 MG TABS Take 1/2 tablet by mouth twice daily 90 tablet 3     Furosemide (LASIX PO) Take 20 mg by mouth daily as needed        HYDROmorphone (DILAUDID) 2 MG tablet Take 1 tablet (2 mg) by mouth every 4 hours as needed for severe pain 50 tablet 0     levothyroxine (SYNTHROID/LEVOTHROID) 75 MCG tablet TAKE 1 TABLET(75 MCG) BY MOUTH DAILY 90 tablet 0     metoprolol tartrate (LOPRESSOR) 50 MG tablet TAKE 1 AND 1/2 TABLETS BY MOUTH TWICE DAILY 270 tablet 0     mupirocin (BACTROBAN) 2 % external ointment Apply topically 2 times daily       ondansetron (ZOFRAN ODT) 4 MG ODT tab Take 1 tablet (4 mg) by mouth every 8 hours as needed for nausea 15 tablet 3     oxyCODONE-acetaminophen (PERCOCET) 5-325 MG tablet Take 1 tablet by mouth every 6 hours as needed for moderate to severe pain 18 tablet 0     polyethylene glycol (MIRALAX/GLYCOLAX) packet Take 1 packet by mouth daily       rivaroxaban ANTICOAGULANT (XARELTO) 15 MG TABS tablet Take 1 tablet (15 mg) by mouth daily (with dinner) 30 tablet 0     senna-docusate (SENOKOT-S/PERICOLACE) 8.6-50 MG tablet Take 1-2 tablets by mouth 2 times daily as needed for constipation (hold for loose stools) 30 tablet 0     Allergies   Allergen Reactions     Clindamycin Hcl Other (See Comments)     Difficulty swallowing     Ampicillin Potassium      Rash nausea and vomiting       Celebrex [Celecoxib]      rash     Ciprofloxacin      rash     Erythromycin      rash     Indocin       Ended up going to( [facility] with severe head ache     Penicillins      Rash,nausea and vomiting     Vibramycin [Doxycycline Hyclate]      Rash      Xylocaine-Epinephrine [Epinephrine-Lidocaine-Na Metabisulfite]      Xylocaine with epi caused a rapid heart beat     FAMILY HISTORY NOTED AND REVIEWED    REVIEW OF SYSTEMS: above    PHYSICAL EXAM    /55 (BP Location: Left arm, Cuff Size: Adult Large)   Pulse 71   Temp 98  F (36.7  C) (Oral)   Ht 1.524 m (5')   Wt 75.3 kg (166 lb)   SpO2 99%   BMI 32.42 kg/m      Patient appears non toxic  Lungs clear  cv reglar rate and rhythm  Abdomen non-tender    ASSESSMENT:  1. t12 comp fx causing severe pain, I doubt this is due to mets, infection or other cause, doubt gi or genitourinary issue, no signs brandi  2. Nausea, due to pain meds  3. Constipation, due to pain meds  4. Osteopor, based on low impact fx  5. Afib, on meds so can't use nsaids  6. Pmr, prior steroid use, not active now  7. Lumbar spinal stenosis, follow    PLAN:  Discontinue percocet  Dilaudid 2mg every 4 hours prn, she knows to call if unsteady and be careful when up  Use senna at hs and miralax am  zofran prn  Call if worsens, changes  Follow up 2 to 3 weeks  Harvey Sherwood M.D.

## 2019-01-04 NOTE — PATIENT INSTRUCTIONS
1. Stop the oxycodone    2. Start the new pain medicine called dilaudid and take 1 every 4 hours as needed for pain    3. Use both the senna and the miralax daily    4. Call if you get unsteady or have problems with the meds    5. Use the zofran as needed for nausea    6. I will have someone call you to set up the iv for the bones    7. See me in 10 to 14 days    Jeffery Sherwood M.D.

## 2019-01-04 NOTE — TELEPHONE ENCOUNTER
Message   Received: Today   Message Contents   Jeffery Sherwood MD  [facility] Triage             Please arrange iv reclast for this patient.    Office Visit for Hospital F/U   1/4/2019   Jeffery Sherwood MD - [facility] Encounter Summary   Diagnoses      T12 Compression Fracture (h) (Primary)   PMR (polymyalgia rheumatica) (H); Paroxysmal atrial fibrillation (H); Spinal stenosis of lumbar region without neurogenic claudication; Nausea

## 2019-01-04 NOTE — TELEPHONE ENCOUNTER
PCP see order pended for IV reclast  Please adjust associated dx if needed (currently osteopenia, T12 compression fracture)    Rasheeda VELIZ RN

## 2019-01-05 ASSESSMENT — ANXIETY QUESTIONNAIRES: GAD7 TOTAL SCORE: 4

## 2019-01-07 ENCOUNTER — TELEPHONE (OUTPATIENT)
Dept: FAMILY MEDICINE | Facility: CLINIC | Age: 84
End: 2019-01-07

## 2019-01-07 NOTE — TELEPHONE ENCOUNTER
Daughter calling back please call her at [phone]  [facility] Unit Coordinator

## 2019-01-07 NOTE — TELEPHONE ENCOUNTER
"Called and spoke to pt's daughter Oxana. Oxana reports that the dilaudid has taken the edge off of the pain. Pt has been taking the dilaudid every 4 hours. Pt does all right sitting in a chair with a heating pad but really struggles getting in and out of bed.    Oxana reports that pt currently rates her pain on dilaudid at a \"7 or an 8\". Pt is alert and orientated and still has her whitts about her per daughter.    Home Care has started to visit pt in her home.    Daughter wondering if her Mother's pain can be reduced more? Can pt take tylenol and dilaudid together? Would a muscle relaxer help? Will route to  for advisement  "

## 2019-01-07 NOTE — TELEPHONE ENCOUNTER
LISSET Serrato reports receiving a voice message today from patient's daughter, Oxana.    States message reports no pain relief on new RX Dilaudid.   Patient was seen in clinic Friday 1-4-19 by PCP.  Per notes, patient had poor results with Percocet, so that med was DC'd and changed to Dilaudid.      Message also included question relating to HC services and how long those would be provided.     Left message now for daughter to please return call to triage.   Plan to confirm message and questions prior to sending to Dr Sherwood for advise.     Dinora MINAYA RN,BSN

## 2019-01-08 NOTE — TELEPHONE ENCOUNTER
Glad she is somewhat better.  Let's add tylenol 650mg tid and see if that helps more.  Please call me with update Thur or Friday    Jeffery Sherwood M.D.

## 2019-01-11 DIAGNOSIS — S22.080A T12 COMPRESSION FRACTURE (H): ICD-10-CM

## 2019-01-11 NOTE — TELEPHONE ENCOUNTER
Controlled Substance Refill Request for Hydromorphone 2mg  Problem List Complete:  No     PROVIDER TO CONSIDER COMPLETION OF PROBLEM LIST AND OVERVIEW/CONTROLLED SUBSTANCE AGREEMENT    Last Written Prescription Date:  1/4/19  Last Fill Quantity: 50,   # refills: 0    THE MOST RECENT OFFICE VISIT MUST BE WITHIN THE PAST 3 MONTHS. (AT LEAST ONE FACE TO FACE VISIT MUST OCCUR EVERY 6 MONTHS. ADDITIONAL VISITS CAN BE VIRTUAL.)    [place] Office Visit with [facility] [place]: 1/4/19    Future Office visit:   Next 5 appointments (look out 90 days)    Jan 31, 2019  1:30 PM CST  Office Visit with Jeffery Sherwood MD  [facility] ([facility]) [address] [id number] [phone]          Controlled substance agreement: [unfilled]    Last Urine Drug Screen: No results found for: CDAUT, No results found for: COMDAT, No results found for: THC13, PCP13, COC13, MAMP13, OPI13, AMP13, BZO13, TCA13, MTD13, BAR13, OXY13, PPX13, BUP13     Processing:  Staff will hand deliver Rx to on-site pharmacy     [email or web address]       checked in past 3 months?  No, route to RN

## 2019-01-14 ENCOUNTER — TELEPHONE (OUTPATIENT)
Dept: FAMILY MEDICINE | Facility: CLINIC | Age: 84
End: 2019-01-14

## 2019-01-14 RX ORDER — HYDROMORPHONE HYDROCHLORIDE 2 MG/1
2 TABLET ORAL EVERY 4 HOURS PRN
Qty: 50 TABLET | Refills: 0 | Status: SHIPPED | OUTPATIENT
Start: 2019-01-14 | End: 2019-01-23

## 2019-01-14 NOTE — PROGRESS NOTES
"SUBJECTIVE/OBJECTIVE:                           Lindsey Hale is a 89 year old female called for an initial visit for Medication Therapy Management.  She was referred to me from care coordination. Spoke to daughter Oxana today for visit as pt is hard of hearing, consent to communicate on file.    Chief Complaint: Referred for uncontrolled pain management after vertebral fracture. \"Pt has tried 3 different pain medications and nothing has seemed to help.\"    Allergies/ADRs: Reviewed in Epic  Tobacco: No tobacco use  Alcohol: Less than 1 beverages / week (not since being on narcotic pain medications)  Caffeine: very rare tea or decaf coffee  Activity: swims and is active (prior to fracture)    PMH: Reviewed in Epic    Medication Adherence/Access:  no issues reported - pt uses pillboxes and fills them herself every 2 weeks. Daughter reports pt is pretty good with managing her medications.    Chronic pain:  Current medications include: hydromorphone 2mg PRN (taking about 4x daily, started 5 days ago switched from Percocet) and acetaminophen 650mg PRN (about 4x daily, started about 3 days ago). Tramadol has not been helpful in the past. Percocet 5-325mg also not helpful which she tried prior to hydromorphone. Heat seems to comfort her but she needs something more with the healing vertebrae. Lidocaine patches didn't seem to help. Tried an herbal cream which didn't help either. Daughter reports pt has no side effects from the current medications. She is not getting out of bed b/c it is so painful. She has been trying to walk and be more active but can't do physical therapy right now with the pain. Her daughter reports that this is really unusual for her mom to be in such great pain and that she normally isn't a \"complainer\" so she knows she is in a great deal of pain. She has seen some improvement in pain control over the last few days with hydromorphone.  She is also using mupiorocin ointment daily on legs from Moh's " surgery.   Pain location: Right side of back with vertebral fracture in Dec  Pain is described as dull, tender and numb constant pain. Sharp pain if she moves.   Are you able to sleep? Awake with occasional pain, last few nights have been a great improvement compared to not being able to sleep before the hydromorphone. Has been sleeping 7-8 hours now since few days ago.  Estimated Creatinine Clearance: 37.5 mL/min (based on SCr of 0.85 mg/dL).    Osteoporosis: Has Reclast infusion planned (in a month or so once pain improves). Couldn't tolerate Fosamax.  Pt is getting the following sources of dietary calcium: 2 gallons milk per week  Risk factors: recent fracture    Afib/HTN: Patient is currently taking Xarelto 15mg daily for anticoagulation, flecainide 75mg BID, and metoprolol 75mg BID. Pt also takes furosemide 20mg daily PRN (last took earlier this week d/t recent swelling which was effective). Patient reports no current concerns of bruising or bleeding.  Patient does not self-monitor BP.  BP Readings from Last 3 Encounters:   01/04/19 128/55   01/01/19 130/55   12/24/18 157/58     Hypothyroidism: Patient is taking levothyroxine 75 mcg daily. Patient is having the following symptoms: none.   TSH   Date Value Ref Range Status   04/03/2018 0.53 0.40 - 4.00 mU/L Final     Constipation: Pt currently taking senna-docusate daily and Miralax daily. Daughter had to do enema twice in the past month b/c she was constipated. When this happened, pt hadn't been taking scheduled Miralax or Senna daily and now with taking both daily, constipation has much improved. Daughter reports pt is close to being back to normal now and reports no side effects.    Nausea: Pt takes ondansetron 4mg PRN (hasn't needed for over a week). Reports no side effects.    COPD: Pt takes Symbicort 160-4.5 mcg/act 2 puffs BID. Daughter reports that pt's breathing has been good recently and that she has not had any complaints or side effects.    Today's  Vitals: There were no vitals taken for this visit. - telemedicine visit    ASSESSMENT:                             Current medications were reviewed today.     Medication Adherence: no issues identified    Chronic pain: Pt may benefit from diclofenac 1% gel PRN for additional pain relief. Topical may be helpful if able to find a medication that is effective for her b/c she finds heat pads comforting. Unable to try oral NSAIDs consider pt's renal function.    Osteoporosis: Plan in place for Reclast. Of note, pt's CrCl is near cutoff of 35 mL/min. Will need to closely monitor this.     Afib/HTN: Stable    Hypothyroidism: Stable. Last TSH is within normal limits.     Constipation: Improving, educated daughter on narcotics causing constipation.    COPD: Stable    PLAN:                            Recommendations to Dr. Sherwood:  1. Pt may benefit from addition of diclofenac 1% gel four times daily PRN.    I spent 40 minutes with this patient today. I offer these suggestions for consideration by Jeffery Sherwood. A copy of the visit note was provided to the patient's primary care provider.    Will follow up in 1 month via phone with daughter.    The patient declined a summary of these recommendations as an after visit summary.     Wilda Hernandez PharmD  Medication Therapy Management Resident, [facility]  Pager: [phone]    The patient was seen independently by Dr. Hernandez.  I have read the note and agree with the assessment and plan.  Munira Pereira PharmD, [facility]  Medication Therapy Management Provider  Pager: [phone]

## 2019-01-14 NOTE — TELEPHONE ENCOUNTER
PCP,   Spoke to homecare on this, requesting refill.   Please review rx if able. Pt has 8 tablets left on hand, very tearful, in a lot of pain - 8/10, constant.  Medication helps keep pain within tolerable limit, but upon further discussion, pt was not taking tylenol 650 tid as discussed last week. Last week was relayed to daughter, and pt was worried about kidneys.  Advised very clearly that this is safe to take for kidneys, reviewed LIVER labs with pt. Advised to take tylenol tid starting today at 650mg each, along with dilaudid.      Homecare will call Wed or Thursday with update on pain control.   Jacquie's call back - homecare nurse.  [phone]

## 2019-01-14 NOTE — TELEPHONE ENCOUNTER
Bebe SUAREZ with  Homecare to check status of refill pt has 8 pills left and takes 1 tab q 4 hours. Pt will run out.  Can we expedite the refill.

## 2019-01-14 NOTE — TELEPHONE ENCOUNTER
Spoke with patient daughter Oxana, patient has broken vertebrae.  She has asked we call her in one month to schedule.

## 2019-01-15 ENCOUNTER — ALLIED HEALTH/NURSE VISIT (OUTPATIENT)
Dept: PHARMACY | Facility: CLINIC | Age: 84
End: 2019-01-15
Payer: COMMERCIAL

## 2019-01-15 DIAGNOSIS — J44.9 CHRONIC OBSTRUCTIVE PULMONARY DISEASE, UNSPECIFIED COPD TYPE (H): ICD-10-CM

## 2019-01-15 DIAGNOSIS — K59.03 DRUG-INDUCED CONSTIPATION: ICD-10-CM

## 2019-01-15 DIAGNOSIS — M54.50 CHRONIC LOW BACK PAIN WITHOUT SCIATICA, UNSPECIFIED BACK PAIN LATERALITY: Primary | ICD-10-CM

## 2019-01-15 DIAGNOSIS — G89.29 CHRONIC LOW BACK PAIN WITHOUT SCIATICA, UNSPECIFIED BACK PAIN LATERALITY: Primary | ICD-10-CM

## 2019-01-15 DIAGNOSIS — I10 BENIGN ESSENTIAL HYPERTENSION: ICD-10-CM

## 2019-01-15 DIAGNOSIS — I48.0 PAROXYSMAL ATRIAL FIBRILLATION (H): ICD-10-CM

## 2019-01-15 DIAGNOSIS — M85.859 OSTEOPENIA OF HIP, UNSPECIFIED LATERALITY: ICD-10-CM

## 2019-01-15 DIAGNOSIS — E03.9 HYPOTHYROIDISM, UNSPECIFIED TYPE: ICD-10-CM

## 2019-01-15 PROCEDURE — 99607 MTMS BY PHARM ADDL 15 MIN: CPT | Performed by: PHARMACIST

## 2019-01-15 PROCEDURE — 99605 MTMS BY PHARM NP 15 MIN: CPT | Performed by: PHARMACIST

## 2019-01-15 RX ORDER — SENNOSIDES 8.6 MG
650 CAPSULE ORAL EVERY 4 HOURS PRN
Status: ON HOLD | COMMUNITY
End: 2019-02-15

## 2019-01-15 NOTE — Clinical Note
Sky Sherwood - I spoke to Oxana, Isabel's daughter, regarding Isabel's pain medications. I would recommend trying diclofenac 1% gel PRN for her in addition to her current regimen. If you agree, I can send in a prescription.Thanks!Wilda

## 2019-01-23 ENCOUNTER — TELEPHONE (OUTPATIENT)
Dept: FAMILY MEDICINE | Facility: CLINIC | Age: 84
End: 2019-01-23

## 2019-01-23 DIAGNOSIS — G89.29 CHRONIC PAIN: ICD-10-CM

## 2019-01-23 DIAGNOSIS — S22.080A T12 COMPRESSION FRACTURE (H): ICD-10-CM

## 2019-01-23 RX ORDER — HYDROMORPHONE HYDROCHLORIDE 2 MG/1
2 TABLET ORAL EVERY 4 HOURS PRN
Qty: 50 TABLET | Refills: 0 | Status: SHIPPED | OUTPATIENT
Start: 2019-01-23 | End: 2019-01-31

## 2019-01-23 NOTE — TELEPHONE ENCOUNTER
Patient's daughter, Oxana, called to request the Rx.  Patient will be out of the med this Saturday. Requesting to have it walked to [facility] pharmacy next door.    Asking for call when RX done.  [phone]      Controlled Substance Refill Request for Hydromorphone   Problem List Complete:  No     PROVIDER TO CONSIDER COMPLETION OF PROBLEM LIST AND OVERVIEW/CONTROLLED SUBSTANCE AGREEMENT    Last Written Prescription Date:  1-14-19  Last Fill Quantity: 50,   # refills: 0    Last Office Visit with [facility] primary care provider: 1-4-19    Future Office visit:   Next 5 appointments (look out 90 days)    Jan 31, 2019  1:30 PM CST  [facility] [address] [id number] [phone]          Controlled substance agreement: not on file       Processing:  Staff will hand deliver Rx to on-site pharmacy        checked in past 3 months?  Yes 1-23-19  SN   [place] reviewed:  reviewed- no concerns    [place]  [email or web address]    Dinora MINAYA RN,BSN

## 2019-01-23 NOTE — TELEPHONE ENCOUNTER
Reason for Call:  Medication or medication refill:    Do you use a [facility]?  Name of the pharmacy and phone number for the current request: written, please walk over to [facility] pharmacy       Name of the medication requested: HYDROmorphone (DILAUDID) 2       Other request: pt will be out on Saturday.  Please walk over the the pharmacy in [address]  Also, please call santosh after it has been walked over  Can we leave a detailed message on this number? YES    Phone number patient can be reached at: Cell number on file:    Telephone Information:   Mobile [phone]       Best Time: any    Call taken on 1/23/2019 at 12:44 PM by Flower Anders

## 2019-01-31 ENCOUNTER — OFFICE VISIT (OUTPATIENT)
Dept: FAMILY MEDICINE | Facility: CLINIC | Age: 84
End: 2019-01-31
Payer: MEDICARE

## 2019-01-31 VITALS
DIASTOLIC BLOOD PRESSURE: 69 MMHG | SYSTOLIC BLOOD PRESSURE: 142 MMHG | WEIGHT: 154 LBS | HEIGHT: 60 IN | OXYGEN SATURATION: 98 % | TEMPERATURE: 96 F | HEART RATE: 59 BPM | BODY MASS INDEX: 30.23 KG/M2

## 2019-01-31 DIAGNOSIS — J44.9 CHRONIC OBSTRUCTIVE PULMONARY DISEASE, UNSPECIFIED COPD TYPE (H): ICD-10-CM

## 2019-01-31 DIAGNOSIS — S22.080A T12 COMPRESSION FRACTURE (H): ICD-10-CM

## 2019-01-31 PROCEDURE — 99213 OFFICE O/P EST LOW 20 MIN: CPT | Performed by: INTERNAL MEDICINE

## 2019-01-31 RX ORDER — HYDROMORPHONE HYDROCHLORIDE 2 MG/1
2 TABLET ORAL EVERY 4 HOURS PRN
Qty: 50 TABLET | Refills: 0 | Status: SHIPPED | OUTPATIENT
Start: 2019-01-31 | End: 2019-05-09

## 2019-01-31 ASSESSMENT — MIFFLIN-ST. JEOR: SCORE: 1025.04

## 2019-01-31 NOTE — PROGRESS NOTES
Number the patient presents with her daughter to follow-up on her low back pain due to her T12 compression fracture.  As noted she was hospitalized for this at the end of December and a CT of her thoracic spine showed this.  She was discharged but had poor pain control and when I saw her last on November 4 was started on Dilaudid.  This significantly improved her pain control and she remains on that but at a lower dose of about 3 a day now.  She was having nausea, vomiting and constipation but for the most part this is resolved with just a little bit of nausea intermittently.  She has not fallen.    The patient has osteoporosis and has not tolerated oral Fosamax and therefore has been scheduled for IV Reclast.  She has not gotten it yet but plans to do it in the near future.    She otherwise has been feeling quite well.  She is not having chest pain or shortness of breath.  She uses her inhaler regularly for her COPD and that has been stable.  Her edema has been about the same.    Labs have been done at the end of December and were stable as noted.    Past Medical History:   Diagnosis Date     Abnormal echocardiogram 04/2017    mild mr, ar, mitral stenosis, nl ef, lvh.  Done for episode of vision change     Arthritis 99         Atrial fibrillation (H) 2011 and 2009    neg nuc est 2009, Dr. Vazquez, on coumadin     Chest pain 2000    neg est echo, neg ct chest abd, pelvis Baptism     Chronic LBP      COPD (chronic obstructive pulmonary disease) (H)     seen by pulmonary md Dr. Whitt, and given inhaler     Cysts, breast 1980s    bilat mastectomies     Dizzy 2007    ct neg, carotid us neg     Dysphagia 2005    egd nl     Elevated blood sugar      Environmental allergies      Hematuria 2004    neg cysto and us     Hemoptyses 2008    ct neg, bronch neg 2009     Hyperlipidemia      Hypertension      Hypothyroid 1995    Dr. Ortiz     Lumbar spinal stenosis 12/2018    seen on ct done for lbp     Mitral  regurgitation 6/15    on echo     nausea     ct abd and ;pelvis neg     Osteopenia     fu dexa better 2011     Pacemaker     afib with pauses and syncope     Palpitations 2009    neg est     PMR (polymyalgia rheumatica) (H)     not active in years     SOB (shortness of breath) 5/15    echo nl ef, 2+mr, cxr clear, seen by pulm and given inhaler     Supraventricular premature beats      T12 compression fracture (H) 2018    non traumatic     TIA (transient ischaemic attack) 2009    carotids neg, mri neg     Treadmill stress test negative for angina pectoris     nuclear est     Urine incontinence     Dr. Westfall     Urosepsis     hospitalized     Vision changes     eval by neuro and ophtho and no cause found     Past Surgical History:   Procedure Laterality Date     ARTHROPLASTY HIP Left 2017    Procedure: ARTHROPLASTY HIP;  LEFT HIP ARBEN ARTHROPLASTY(SORAYA);  Surgeon: Darell Nathan MD;  Location:  OR     ARTHROPLASTY PATELLO-FEMORAL (KNEE)   and      ARTHROSCOPY KNEE  1997     BIOPSY BREAST Right 2014    Procedure: BIOPSY BREAST;  Surgeon: David Carter MD;  Location:  SD     breast implants      4x     cataract       FOOT SURGERY       MASTECTOMY  1980    bilat, cysts     [place] not bso  70's    not for ca     Social History     Socioeconomic History     Marital status:      Spouse name: Not on file     Number of children: 4     Years of education: Not on file     Highest education level: Not on file   Social Needs     Financial resource strain: Not on file     Food insecurity - worry: Not on file     Food insecurity - inability: Not on file     Transportation needs - medical: Not on file     Transportation needs - non-medical: Not on file   Occupational History     Not on file   Tobacco Use     Smoking status: Former Smoker     Types: Cigarettes     Last attempt to quit: 1955     Years since quittin.0     Smokeless tobacco: Never Used      Tobacco comment: quit in 1955   had smoked about 2 cigarettes a day or more   Substance and Sexual Activity     Alcohol use: Yes     Alcohol/week: 0.0 oz     Comment: occ wine     Drug use: No     Sexual activity: Not Currently   Other Topics Concern     Parent/sibling w/ CABG, MI or angioplasty before 65F 55M? Not Asked      Service Not Asked     Blood Transfusions Not Asked     Caffeine Concern No     Comment: coffee: 1 cup a week.  Occ green tea     Occupational Exposure Not Asked     Hobby Hazards Not Asked     Sleep Concern No     Stress Concern No     Weight Concern No     Special Diet No     Back Care Not Asked     Exercise Yes     Comment: walking daily     Bike Helmet Not Asked     Seat Belt Yes     Self-Exams Not Asked   Social History Narrative     Not on file     Current Outpatient Medications   Medication Sig Dispense Refill     acetaminophen (ACETAMINOPHEN 8 HOUR) 650 MG CR tablet Take 650 mg by mouth every 4 hours as needed for mild pain or fever       budesonide-formoterol (SYMBICORT) 160-4.5 MCG/ACT inhaler Inhale 2 puffs into the lungs 2 times daily        flecainide acetate 150 MG TABS Take 1/2 tablet by mouth twice daily 90 tablet 3     Furosemide (LASIX PO) Take 20 mg by mouth daily as needed        HYDROmorphone (DILAUDID) 2 MG tablet Take 1 tablet (2 mg) by mouth every 4 hours as needed for severe pain 50 tablet 0     levothyroxine (SYNTHROID/LEVOTHROID) 75 MCG tablet TAKE 1 TABLET(75 MCG) BY MOUTH DAILY 90 tablet 0     metoprolol tartrate (LOPRESSOR) 50 MG tablet TAKE 1 AND 1/2 TABLETS BY MOUTH TWICE DAILY 270 tablet 0     mupirocin (BACTROBAN) 2 % external ointment Apply topically 2 times daily       ondansetron (ZOFRAN ODT) 4 MG ODT tab Take 1 tablet (4 mg) by mouth every 8 hours as needed for nausea 15 tablet 3     polyethylene glycol (MIRALAX/GLYCOLAX) packet Take 1 packet by mouth daily       rivaroxaban ANTICOAGULANT (XARELTO) 15 MG TABS tablet Take 1 tablet (15 mg) by mouth  daily (with dinner) 30 tablet 0     Allergies   Allergen Reactions     Clindamycin Hcl Other (See Comments)     Difficulty swallowing     Ampicillin Potassium      Rash nausea and vomiting       Celebrex [Celecoxib]      rash     Ciprofloxacin      rash     Erythromycin      rash     Indocin      Ended up going to( [facility] with severe head ache     Penicillins      Rash,nausea and vomiting     Vibramycin [Doxycycline Hyclate]      Rash      Xylocaine-Epinephrine [Epinephrine-Lidocaine-Na Metabisulfite]      Xylocaine with epi caused a rapid heart beat     FAMILY HISTORY NOTED AND REVIEWED    REVIEW OF SYSTEMS: above    PHYSICAL EXAM    /69 (BP Location: Right arm, Patient Position: Chair, Cuff Size: Adult Large)   Pulse 59   Temp 96  F (35.6  C) (Oral)   Ht 1.524 m (5')   Wt 69.9 kg (154 lb)   SpO2 98%   Breastfeeding? No   BMI 30.08 kg/m       Patient appears non toxic  Lungs clear  cv reglar rate and rhythm 2/6 sm, no jvp, 1+ edema bilat    ASSESSMENT:  1. Comp fx t12, pain improving  2. Osteopor, to get iv reclast  3. Copd, stable  4. Afib, on coum    PLAN:  Continue to wean off dilaudid  Iv reclast  Call if changes  Office visit 3 months    Jeffery Sherwood M.D.

## 2019-02-01 ENCOUNTER — TELEPHONE (OUTPATIENT)
Dept: FAMILY MEDICINE | Facility: CLINIC | Age: 84
End: 2019-02-01

## 2019-02-01 NOTE — TELEPHONE ENCOUNTER
Spoke with [facility] nurse  Gave verbal on below requested HC orders    Rasheeda VELIZ RN

## 2019-02-01 NOTE — TELEPHONE ENCOUNTER
Reason for Call:  Home Health Care    TATY with [facility] called regarding (reason for call): ORDERS    Orders are needed for this patient. PT,SKILLED NURSING, HOME HEALTH AIDE    PT: EVAL AND TREAT  OT: LAURA    Skilled Nursing: ONCE TIME A WEEK FOR THREE WEEKS    HOME HEALTH AIDE: TWICE WEEKLY FOR 2 WEEKS AND ONCE WEEKLY FOR ONE WEEK    Pt Provider: DR PARTIDA    Phone Number Homecare Nurse can be reached at: [phone]    Can we leave a detailed message on this number? YES    Phone number patient can be reached at: Home number on file [phone] (home)    Best Time: ANYTIME    Call taken on 2/1/2019 at 11:15 AM by Macie Miles

## 2019-02-04 ENCOUNTER — TELEPHONE (OUTPATIENT)
Dept: FAMILY MEDICINE | Facility: CLINIC | Age: 84
End: 2019-02-04

## 2019-02-04 NOTE — TELEPHONE ENCOUNTER
Reason for Call:  Home Health Care    Jarrett with [facility] called regarding (reason for call): verbal orders    Orders are needed for this patient.   yes    PT: 1 time a week for one week, 2 times a week for 2 weeks- For Dailey training, Home safety, Falls Prevention, Home exsercise program    OT: n/a    Skilled Nursing: n/a    Pt Provider: Kaela    Phone Number Homecare Nurse can be reached at: [phone]    Can we leave a detailed message on this number? YES    Phone number patient can be reached at: Other phone number:  n/a*    Best Time: today    Call taken on 2/4/2019 at 4:18 PM by Xiomara Bedoya

## 2019-02-04 NOTE — TELEPHONE ENCOUNTER
Returned call. OK'd requested orders.  Orders will be faxed to PCP for review and signature.   Dinora Christiansen RN

## 2019-02-11 ENCOUNTER — TELEPHONE (OUTPATIENT)
Dept: FAMILY MEDICINE | Facility: CLINIC | Age: 84
End: 2019-02-11

## 2019-02-11 ENCOUNTER — TELEPHONE (OUTPATIENT)
Dept: PHARMACY | Facility: CLINIC | Age: 84
End: 2019-02-11

## 2019-02-11 NOTE — TELEPHONE ENCOUNTER
Called daughter Oxana to f/up on last visit for pt. Oxana states pt has not used diclofenac gel as prescribed from last visit b/c she was nervous from the list of side effects from the package insert. Oxana tried talking pt into using it but she would not. Pat has also minimized hydromorphone use now and has not used over last few days. Oxana states pt still struggles with pain and constipation. Oxana assisted Pat with an enema a week or 2 ago and states Pat is currently using Miralax once daily and senna-s 2 tabs/day. I educated Oxana to instruct Pat she can use more senna if needed and try 3-4 tabs/day. Also further educated Oxana on diclofenac topical side effect profile to communicate with Pat. Pat saw Dr. Sherwood 2 weeks ago and will have f/up visit in 3 months. Will f/up after to see if pt needs f/up visit with MTM.    Wilda Hernandez, PharmD  Medication Therapy Management Resident, [facility]  Pager: [phone]    I have read the note as written by Dr. Hernandez and agree with the plan.  Munira Pereira PharmD, [facility]  Medication Therapy Management Provider  Pager: [phone]

## 2019-02-11 NOTE — TELEPHONE ENCOUNTER
Prior Authorization Retail Medication Request    Medication/Dose: Ondansetron ODT 4mg          ICD code (if different than what is on RX):  s22.080a  Previously Tried and Failed:    Rationale:  Stable on current med    Insurance Name:  Medicareblue RX Part D      Insurance ID:  [id number]           Pharmacy Information (if different than what is on RX)  Name:  [facility]   Phone:  [phone]

## 2019-02-11 NOTE — TELEPHONE ENCOUNTER
Please do not close this encounter until this has been addressed.  (prior auth approved/denied, prescriber refusal to complete prior auth or medication changed/discontinued)    Prior Authorization needed on: Ondansetron ODT 4mg   Drug NDC: [address]-10     Insurance: Medicareblue RX Part D  Member ID: [id number]   Insurance phone #: [phone]    Pharmacy NPI: [id number]  Pharmacy Phone #: [phone]  Pharmacy Fax #: [phone]    Please let us know if the PA gets approved or denied or if medication is changed

## 2019-02-14 ENCOUNTER — HOSPITAL ENCOUNTER (OUTPATIENT)
Facility: CLINIC | Age: 84
Setting detail: OBSERVATION
Discharge: HOME | End: 2019-02-15
Attending: EMERGENCY MEDICINE | Admitting: INTERNAL MEDICINE
Payer: MEDICARE

## 2019-02-14 ENCOUNTER — APPOINTMENT (OUTPATIENT)
Dept: CT IMAGING | Facility: CLINIC | Age: 84
End: 2019-02-14
Attending: EMERGENCY MEDICINE
Payer: MEDICARE

## 2019-02-14 ENCOUNTER — APPOINTMENT (OUTPATIENT)
Dept: GENERAL RADIOLOGY | Facility: CLINIC | Age: 84
End: 2019-02-14
Attending: EMERGENCY MEDICINE
Payer: MEDICARE

## 2019-02-14 DIAGNOSIS — K59.01 SLOW TRANSIT CONSTIPATION: Primary | ICD-10-CM

## 2019-02-14 DIAGNOSIS — R07.89 CHEST TIGHTNESS: ICD-10-CM

## 2019-02-14 DIAGNOSIS — K59.00 CONSTIPATION, UNSPECIFIED CONSTIPATION TYPE: ICD-10-CM

## 2019-02-14 DIAGNOSIS — S22.080A T12 COMPRESSION FRACTURE (H): ICD-10-CM

## 2019-02-14 DIAGNOSIS — M54.6 LEFT-SIDED THORACIC BACK PAIN, UNSPECIFIED CHRONICITY: ICD-10-CM

## 2019-02-14 PROBLEM — M54.9 BACK PAIN: Status: ACTIVE | Noted: 2019-02-14

## 2019-02-14 LAB
ALBUMIN SERPL-MCNC: 3.3 G/DL (ref 3.4–5)
ALBUMIN UR-MCNC: NEGATIVE MG/DL
ALP SERPL-CCNC: 62 U/L (ref 40–150)
ALT SERPL W P-5'-P-CCNC: 13 U/L (ref 0–50)
ANION GAP SERPL CALCULATED.3IONS-SCNC: 9 MMOL/L (ref 3–14)
APPEARANCE UR: CLEAR
APTT PPP: 34 SEC (ref 22–37)
AST SERPL W P-5'-P-CCNC: 13 U/L (ref 0–45)
BASOPHILS # BLD AUTO: 0 10E9/L (ref 0–0.2)
BASOPHILS NFR BLD AUTO: 0.3 %
BILIRUB SERPL-MCNC: 1 MG/DL (ref 0.2–1.3)
BILIRUB UR QL STRIP: NEGATIVE
BUN SERPL-MCNC: 20 MG/DL (ref 7–30)
CALCIUM SERPL-MCNC: 9.2 MG/DL (ref 8.5–10.1)
CHLORIDE SERPL-SCNC: 105 MMOL/L (ref 94–109)
CO2 SERPL-SCNC: 24 MMOL/L (ref 20–32)
COLOR UR AUTO: YELLOW
CREAT SERPL-MCNC: 0.85 MG/DL (ref 0.52–1.04)
DIFFERENTIAL METHOD BLD: ABNORMAL
EOSINOPHIL # BLD AUTO: 0.1 10E9/L (ref 0–0.7)
EOSINOPHIL NFR BLD AUTO: 1.9 %
ERYTHROCYTE [DISTWIDTH] IN BLOOD BY AUTOMATED COUNT: 14.1 % (ref 10–15)
GFR SERPL CREATININE-BSD FRML MDRD: 59 ML/MIN/{1.73_M2}
GLUCOSE SERPL-MCNC: 118 MG/DL (ref 70–99)
GLUCOSE UR STRIP-MCNC: NEGATIVE MG/DL
HCT VFR BLD AUTO: 37.4 % (ref 35–47)
HEMOCCULT STL QL: NEGATIVE
HGB BLD-MCNC: 12.5 G/DL (ref 11.7–15.7)
HGB UR QL STRIP: NEGATIVE
HYALINE CASTS #/AREA URNS LPF: 2 /LPF (ref 0–2)
IMM GRANULOCYTES # BLD: 0 10E9/L (ref 0–0.4)
IMM GRANULOCYTES NFR BLD: 0.3 %
INR PPP: 1.86 (ref 0.86–1.14)
INTERPRETATION ECG - MUSE: NORMAL
KETONES UR STRIP-MCNC: NEGATIVE MG/DL
LEUKOCYTE ESTERASE UR QL STRIP: NEGATIVE
LIPASE SERPL-CCNC: 71 U/L (ref 73–393)
LYMPHOCYTES # BLD AUTO: 1 10E9/L (ref 0.8–5.3)
LYMPHOCYTES NFR BLD AUTO: 14 %
MCH RBC QN AUTO: 33.6 PG (ref 26.5–33)
MCHC RBC AUTO-ENTMCNC: 33.4 G/DL (ref 31.5–36.5)
MCV RBC AUTO: 101 FL (ref 78–100)
MONOCYTES # BLD AUTO: 0.6 10E9/L (ref 0–1.3)
MONOCYTES NFR BLD AUTO: 8.5 %
NEUTROPHILS # BLD AUTO: 5.6 10E9/L (ref 1.6–8.3)
NEUTROPHILS NFR BLD AUTO: 75 %
NITRATE UR QL: NEGATIVE
NRBC # BLD AUTO: 0 10*3/UL
NRBC BLD AUTO-RTO: 0 /100
PH UR STRIP: 5.5 PH (ref 5–7)
PLATELET # BLD AUTO: 260 10E9/L (ref 150–450)
POTASSIUM SERPL-SCNC: 4.1 MMOL/L (ref 3.4–5.3)
PROT SERPL-MCNC: 7.4 G/DL (ref 6.8–8.8)
RBC # BLD AUTO: 3.72 10E12/L (ref 3.8–5.2)
RBC #/AREA URNS AUTO: <1 /HPF (ref 0–2)
SODIUM SERPL-SCNC: 138 MMOL/L (ref 133–144)
SOURCE: NORMAL
SP GR UR STRIP: 1.01 (ref 1–1.03)
SQUAMOUS #/AREA URNS AUTO: 1 /HPF (ref 0–1)
TROPONIN I SERPL-MCNC: <0.015 UG/L (ref 0–0.04)
TROPONIN I SERPL-MCNC: <0.015 UG/L (ref 0–0.04)
UROBILINOGEN UR STRIP-MCNC: NORMAL MG/DL (ref 0–2)
WBC # BLD AUTO: 7.4 10E9/L (ref 4–11)
WBC #/AREA URNS AUTO: <1 /HPF (ref 0–5)

## 2019-02-14 PROCEDURE — A9270 NON-COVERED ITEM OR SERVICE: HCPCS | Mod: GY | Performed by: INTERNAL MEDICINE

## 2019-02-14 PROCEDURE — 74177 CT ABD & PELVIS W/CONTRAST: CPT

## 2019-02-14 PROCEDURE — 99220 ZZC INITIAL OBSERVATION CARE,LEVL III: CPT | Performed by: INTERNAL MEDICINE

## 2019-02-14 PROCEDURE — 25000128 H RX IP 250 OP 636: Performed by: EMERGENCY MEDICINE

## 2019-02-14 PROCEDURE — 82272 OCCULT BLD FECES 1-3 TESTS: CPT | Performed by: EMERGENCY MEDICINE

## 2019-02-14 PROCEDURE — 83690 ASSAY OF LIPASE: CPT | Performed by: EMERGENCY MEDICINE

## 2019-02-14 PROCEDURE — 71046 X-RAY EXAM CHEST 2 VIEWS: CPT

## 2019-02-14 PROCEDURE — 96376 TX/PRO/DX INJ SAME DRUG ADON: CPT

## 2019-02-14 PROCEDURE — 85610 PROTHROMBIN TIME: CPT | Performed by: EMERGENCY MEDICINE

## 2019-02-14 PROCEDURE — 81001 URINALYSIS AUTO W/SCOPE: CPT | Performed by: EMERGENCY MEDICINE

## 2019-02-14 PROCEDURE — 25000125 ZZHC RX 250: Performed by: EMERGENCY MEDICINE

## 2019-02-14 PROCEDURE — 80053 COMPREHEN METABOLIC PANEL: CPT | Performed by: EMERGENCY MEDICINE

## 2019-02-14 PROCEDURE — G0378 HOSPITAL OBSERVATION PER HR: HCPCS

## 2019-02-14 PROCEDURE — 36415 COLL VENOUS BLD VENIPUNCTURE: CPT | Performed by: INTERNAL MEDICINE

## 2019-02-14 PROCEDURE — 85025 COMPLETE CBC W/AUTO DIFF WBC: CPT | Performed by: EMERGENCY MEDICINE

## 2019-02-14 PROCEDURE — 99285 EMERGENCY DEPT VISIT HI MDM: CPT | Mod: 25

## 2019-02-14 PROCEDURE — 93005 ELECTROCARDIOGRAM TRACING: CPT

## 2019-02-14 PROCEDURE — 85730 THROMBOPLASTIN TIME PARTIAL: CPT | Performed by: EMERGENCY MEDICINE

## 2019-02-14 PROCEDURE — 84484 ASSAY OF TROPONIN QUANT: CPT | Performed by: EMERGENCY MEDICINE

## 2019-02-14 PROCEDURE — 84484 ASSAY OF TROPONIN QUANT: CPT | Performed by: INTERNAL MEDICINE

## 2019-02-14 PROCEDURE — 25000132 ZZH RX MED GY IP 250 OP 250 PS 637: Mod: GY | Performed by: INTERNAL MEDICINE

## 2019-02-14 PROCEDURE — 96374 THER/PROPH/DIAG INJ IV PUSH: CPT | Mod: 59

## 2019-02-14 RX ORDER — AMOXICILLIN 250 MG
2 CAPSULE ORAL 2 TIMES DAILY
Status: DISCONTINUED | OUTPATIENT
Start: 2019-02-14 | End: 2019-02-15 | Stop reason: HOSPADM

## 2019-02-14 RX ORDER — NITROGLYCERIN 0.4 MG/1
0.4 TABLET SUBLINGUAL EVERY 5 MIN PRN
Status: DISCONTINUED | OUTPATIENT
Start: 2019-02-14 | End: 2019-02-15 | Stop reason: HOSPADM

## 2019-02-14 RX ORDER — ACETAMINOPHEN 500 MG
1000 TABLET ORAL EVERY 8 HOURS
Status: DISCONTINUED | OUTPATIENT
Start: 2019-02-14 | End: 2019-02-15 | Stop reason: HOSPADM

## 2019-02-14 RX ORDER — AMOXICILLIN 250 MG
1 CAPSULE ORAL 2 TIMES DAILY
Status: DISCONTINUED | OUTPATIENT
Start: 2019-02-14 | End: 2019-02-15 | Stop reason: HOSPADM

## 2019-02-14 RX ORDER — HYDROMORPHONE HYDROCHLORIDE 1 MG/ML
0.5 INJECTION, SOLUTION INTRAMUSCULAR; INTRAVENOUS; SUBCUTANEOUS ONCE
Status: COMPLETED | OUTPATIENT
Start: 2019-02-14 | End: 2019-02-14

## 2019-02-14 RX ORDER — LEVOTHYROXINE SODIUM 75 UG/1
75 TABLET ORAL
Status: DISCONTINUED | OUTPATIENT
Start: 2019-02-14 | End: 2019-02-15 | Stop reason: HOSPADM

## 2019-02-14 RX ORDER — IOPAMIDOL 755 MG/ML
72 INJECTION, SOLUTION INTRAVASCULAR ONCE
Status: COMPLETED | OUTPATIENT
Start: 2019-02-14 | End: 2019-02-14

## 2019-02-14 RX ORDER — HYDROMORPHONE HYDROCHLORIDE 2 MG/1
2-4 TABLET ORAL EVERY 4 HOURS PRN
Status: DISCONTINUED | OUTPATIENT
Start: 2019-02-14 | End: 2019-02-15

## 2019-02-14 RX ORDER — LIDOCAINE 40 MG/G
CREAM TOPICAL
Status: DISCONTINUED | OUTPATIENT
Start: 2019-02-14 | End: 2019-02-15 | Stop reason: HOSPADM

## 2019-02-14 RX ORDER — HYDROMORPHONE HYDROCHLORIDE 1 MG/ML
0.3 INJECTION, SOLUTION INTRAMUSCULAR; INTRAVENOUS; SUBCUTANEOUS
Status: DISCONTINUED | OUTPATIENT
Start: 2019-02-14 | End: 2019-02-15

## 2019-02-14 RX ORDER — NALOXONE HYDROCHLORIDE 0.4 MG/ML
.1-.4 INJECTION, SOLUTION INTRAMUSCULAR; INTRAVENOUS; SUBCUTANEOUS
Status: DISCONTINUED | OUTPATIENT
Start: 2019-02-14 | End: 2019-02-15 | Stop reason: HOSPADM

## 2019-02-14 RX ORDER — LORATADINE 10 MG/1
10 TABLET ORAL ONCE
Status: DISCONTINUED | OUTPATIENT
Start: 2019-02-14 | End: 2019-02-14 | Stop reason: CLARIF

## 2019-02-14 RX ORDER — POLYETHYLENE GLYCOL 3350 17 G/17G
17 POWDER, FOR SOLUTION ORAL DAILY PRN
Status: DISCONTINUED | OUTPATIENT
Start: 2019-02-14 | End: 2019-02-15 | Stop reason: HOSPADM

## 2019-02-14 RX ORDER — BUDESONIDE AND FORMOTEROL FUMARATE DIHYDRATE 160; 4.5 UG/1; UG/1
2 AEROSOL RESPIRATORY (INHALATION) 2 TIMES DAILY
Status: DISCONTINUED | OUTPATIENT
Start: 2019-02-14 | End: 2019-02-14 | Stop reason: CLARIF

## 2019-02-14 RX ORDER — BISACODYL 10 MG
10 SUPPOSITORY, RECTAL RECTAL DAILY PRN
Status: DISCONTINUED | OUTPATIENT
Start: 2019-02-14 | End: 2019-02-15 | Stop reason: HOSPADM

## 2019-02-14 RX ORDER — ONDANSETRON 2 MG/ML
4 INJECTION INTRAMUSCULAR; INTRAVENOUS EVERY 6 HOURS PRN
Status: DISCONTINUED | OUTPATIENT
Start: 2019-02-14 | End: 2019-02-15 | Stop reason: HOSPADM

## 2019-02-14 RX ORDER — POLYETHYLENE GLYCOL 3350 17 G/17G
17 POWDER, FOR SOLUTION ORAL 2 TIMES DAILY
Status: DISCONTINUED | OUTPATIENT
Start: 2019-02-14 | End: 2019-02-15 | Stop reason: HOSPADM

## 2019-02-14 RX ORDER — ONDANSETRON 4 MG/1
4 TABLET, ORALLY DISINTEGRATING ORAL EVERY 6 HOURS PRN
Status: DISCONTINUED | OUTPATIENT
Start: 2019-02-14 | End: 2019-02-15 | Stop reason: HOSPADM

## 2019-02-14 RX ADMIN — SENNOSIDES AND DOCUSATE SODIUM 2 TABLET: 8.6; 5 TABLET ORAL at 20:39

## 2019-02-14 RX ADMIN — Medication 0.5 MG: at 12:01

## 2019-02-14 RX ADMIN — HYDROMORPHONE HYDROCHLORIDE 2 MG: 2 TABLET ORAL at 18:41

## 2019-02-14 RX ADMIN — METOPROLOL TARTRATE 75 MG: 50 TABLET ORAL at 20:39

## 2019-02-14 RX ADMIN — Medication 0.5 MG: at 14:39

## 2019-02-14 RX ADMIN — FLECAINIDE ACETATE 75 MG: 50 TABLET ORAL at 20:39

## 2019-02-14 RX ADMIN — Medication 0.5 MG: at 10:22

## 2019-02-14 RX ADMIN — SODIUM CHLORIDE 62 ML: 9 INJECTION, SOLUTION INTRAVENOUS at 13:16

## 2019-02-14 RX ADMIN — Medication 10 MG: at 18:37

## 2019-02-14 RX ADMIN — ACETAMINOPHEN 1000 MG: 500 TABLET, FILM COATED ORAL at 17:33

## 2019-02-14 RX ADMIN — IOPAMIDOL 72 ML: 755 INJECTION, SOLUTION INTRAVENOUS at 13:16

## 2019-02-14 RX ADMIN — HYDROMORPHONE HYDROCHLORIDE 2 MG: 2 TABLET ORAL at 21:55

## 2019-02-14 RX ADMIN — POLYETHYLENE GLYCOL 3350 17 G: 17 POWDER, FOR SOLUTION ORAL at 17:32

## 2019-02-14 ASSESSMENT — ENCOUNTER SYMPTOMS
HEMATURIA: 0
BLOOD IN STOOL: 0
SHORTNESS OF BREATH: 0
FREQUENCY: 0
BACK PAIN: 1

## 2019-02-14 ASSESSMENT — MIFFLIN-ST. JEOR
SCORE: 975.14
SCORE: 1021.86

## 2019-02-14 NOTE — PROGRESS NOTES
RECEIVING UNIT ED HANDOFF REVIEW    ED Nurse Handoff Report was reviewed by: Bradley Guzman on February 14, 2019 at 3:49 PM

## 2019-02-14 NOTE — ED NOTES
"[facility]  ED Nurse Handoff Report    ED Chief complaint: Hematemesis (pt had 2 episode of bloody vomit this morning - pt had fracture vertbrae Jan. 1 pain ever since - pt is on blood thinner )      ED Diagnosis:   Final diagnoses:   Left-sided thoracic back pain, unspecified chronicity   Chest tightness   Constipation, unspecified constipation type       Code Status: DNR    Allergies:   Allergies   Allergen Reactions     Clindamycin Hcl Other (See Comments)     Difficulty swallowing     Ampicillin Potassium      Rash nausea and vomiting       Celebrex [Celecoxib]      rash     Ciprofloxacin      rash     Erythromycin      rash     Indocin      Ended up going to( [facility] hospital with severe head ache     Penicillins      Rash,nausea and vomiting     Vibramycin [Doxycycline Hyclate]      Rash      Xylocaine-Epinephrine [Epinephrine-Lidocaine-Na Metabisulfite]      Xylocaine with epi caused a rapid heart beat       Activity level - Baseline/Home:  Stand with Assist of 2, uses a walker    Activity Level - Current:   Unable to Assess, pt has not been out of bed     Needed?: No    Isolation: No  Infection: Not Applicable  Bariatric?: No    Vital Signs:   Vitals:    02/14/19 1006 02/14/19 1200   BP: (!) 150/93 143/60   Pulse:  74   Resp: 16    Temp: 98.8  F (37.1  C)    TempSrc: Oral    SpO2: 96%    Weight: 64.9 kg (143 lb)    Height: 1.524 m (5')        Cardiac Rhythm: ,        Pain level: 0-10 Pain Scale: 5    Is this patient confused?: No   Does this patient have a guardian?  No         If yes, is there guardianship documents in the Epic \"Code/ACP\" activity?  N/A         Guardian Notified?  N/A  Independence - Suicide Severity Rating Scale Completed?  Yes  If yes, what color did the patient score?  White    Patient Report: Initial Complaint: back pain  Focused Assessment: pt arrives alert and oriented. C/o left lower back pain,flank pain starting yesterday. Pt has dilaudid that she takes at home " for a vertebral fx. Last December. Dilaudid is not helping much with her current pain. Left lower back,tender with minimal palpation  Tests Performed: blood.urine .ct  Abnormal Results: nothing acute. Ct showed stool, cardiomegaly Treatments provided: dilaudid    Family Comments: daughter here     OBS brochure/video discussed/provided to patient/family: Yes              Name of person given brochure if not patient:               Relationship to patient:daughter    ED Medications:   Medications   HYDROmorphone (PF) (DILAUDID) injection 0.5 mg (0.5 mg Intravenous Given 2/14/19 1022)   HYDROmorphone (PF) (DILAUDID) injection 0.5 mg (0.5 mg Intravenous Given 2/14/19 1201)   iopamidol (ISOVUE-370) solution 72 mL (72 mLs Intravenous Given 2/14/19 1316)   Saline (62 mLs Intravenous Given 2/14/19 1316)   HYDROmorphone (PF) (DILAUDID) injection 0.5 mg (0.5 mg Intravenous Given 2/14/19 1439)       Drips infusing?:  No    For the majority of the shift this patient was Green.   Interventions performed were 0  .    Severe Sepsis OR Septic Shock Diagnosis Present: No    To be done/followed up on inpatient unit:  bob    ED NURSE PHONE NUMBER: [phone]

## 2019-02-14 NOTE — TELEPHONE ENCOUNTER
PA Initiation    Medication: Ondansetron ODT 4mg - INITIATED  Insurance Company: [employer] - Phone [phone] Fax [phone]  Pharmacy Filling the Rx: [id number] - [place] - [facility] [facility] [address]  Filling Pharmacy Phone: [phone]  Filling Pharmacy Fax:    Start Date: 2/14/2019

## 2019-02-14 NOTE — TELEPHONE ENCOUNTER
Prior Authorization Approval    Authorization Effective Date: 11/16/2018  Authorization Expiration Date: 2/14/2020  Medication: Ondansetron ODT 4mg - APPROVED  Approved Dose/Quantity: 15 FOR 5  Reference #:     Insurance Company: [employer] [phone] Fax [phone]  Expected CoPay:       CoPay Card Available:      Foundation Assistance Needed:    Which Pharmacy is filling the prescription (Not needed for infusion/clinic administered): [facility] [address] [place] [address] [facility] AT [facility] [address]  Pharmacy Notified: Yes  Patient Notified: Yes

## 2019-02-14 NOTE — ED PROVIDER NOTES
History     Chief Complaint:  Back pain     HPI   Lindsey Hale is a 89 year old female with a history of chronic obstructive pulmonary disease, lumbar spinal stenosis, TIA, atrial fibrillation, hyperlipidemia, hypertension, among others who is currently taking Xarelto and presents with back pain . The patient fractured her vertebrae in December (imaging shown below). There was no clear cause or mechanism of injury. The patient has had chronic back pain for years which has always been on the right side. She has visited the ED and physicians for this multiple times in the past. The patient has been taking dilaudid. The patient's pain has not been alleviated with this medication. Yesterday, the patient noticed pain on the left side of her back with no clear cause or mechanism of injury. The pain is also worse than before. She also coughed up around <1/4 a cup of blood and had a nosebleed (now resolved) in the last 24 hrs. Due to concern, the patient has visited the ED for evaluation and treatment. Upon arrival, the patient denies any frequency, urgency, blood in urine, blood in stool, leg swelling, or shortness of breath. The patient has not coughed up blood before and states she felt it come from her lungs. She has not taken any medications today.    [facility] ED 12/22/2018:    CT Abdomen/Pelvis with IV contrast:   1. Colonic diverticulosis without CT evidence of diverticulitis.  2. Prominent fecal debris throughout the colon.  3. No evidence of bowel obstruction or acute inflammatory process in the abdomen or pelvis. As per radiology.     CT Lumbar Spine w/o contrast:  1. No evidence of acute fracture.  2. Old fractures of L3 superior and inferior endplates.  3. Multilevel degenerative disc disease and degenerative facet  arthropathy.  4. L4-L5 grade 2 spondylolisthesis.  5. Severe spinal canal stenosis at L4-L5, moderate spinal canal  stenosis L2-L3, L3-L4 and L5-S1 and mild spinal canal  stenosis L1-L2. As per radiology.      CT Thoracic Spine w/o contrast:  1. Compression fracture of the superior endplate of T12 with  recent-appearing fracture lines. Mild adjacent soft tissue swelling.  2. Multilevel degenerative disc disease.  3. Scoliosis. As per radiology.     Allergies:  clindamycin  Ampicillin  celebrex  Ciprofloxacin  Erythromycin  Indocin  Penicillin  Vibramycin  Xylocaine     Medications:    symbicort  Flecainide acetate  Lasix  Dilaudid  Synthroid  Lopressor  bactroban  zofran  miralax  xarelto     Past Medical History:    abnormal electrocardiogram  Arthritis  Atrial fibrillation  Chest pain  Chronic lbp   Chronic obstructive pulmonary disease  Cysts breast  Dizzy   Dysphagia  Elevated blood sugar  Environmental allergies  Hematuria  Hemoptyses   Hyperlipidemia  Hypertension  Hypothyroid  Lumbar spinal stenosis  Mitral regurgitation  Nausea  Osteopenia  Pacemaker  Palpitations  PMR  Shortness of breath   Supraventricular premature beats  T12 compression fracture  TIA  Treadmill stress test negative for angina pectoris  Urine incontinence  Urosepsis  Vision changes    Past Surgical History:    arthropasty hip  Arthroplasty knee  Biopsy breast  Cataract  foot surgery   Mastectomy    Family History:    coronary artery disease  Lymphoma  Cancer  Osteoporosis  Myocardial infarction     Social History:  Marital Status:     Smoker:   Former 1995  Smokeless:   Never   Alcohol:   Yes - occasional wine   Drugs:   No   Lives at:   Home   Arrives with:   Daughter and son in law   Clinic:    Unknown   Work:   Unknown      Review of Systems   Respiratory: Negative for shortness of breath.    Cardiovascular: Negative for leg swelling.   Gastrointestinal: Negative for blood in stool.   Genitourinary: Negative for frequency, hematuria and urgency.   Musculoskeletal: Positive for back pain.   All other systems reviewed and are negative.      Physical Exam     Patient Vitals for the past 24 hrs:   BP  Temp Temp src Pulse Heart Rate Resp SpO2 Height Weight   02/14/19 1200 143/60 -- -- 74 -- -- -- -- --   02/14/19 1006 (!) 150/93 98.8  F (37.1  C) Oral -- 71 16 96 % 1.524 m (5') 64.9 kg (143 lb)     Physical Exam     General: Alert and cooperative with exam. Patient in mild distress. Normal mentation. Frail elderly appearance.  Head:  Scalp is NC/AT  Eyes:  No scleral icterus, PERRL  ENT:  The external nose and ears are normal. The oropharynx is normal and without erythema; mucus membranes are dry. Uvula midline, no evidence of deep space infection.  Neck:  Normal range of motion without rigidity.  CV:  Regular rate and rhythm    Pace maker present  Resp:  Breath sounds are clear bilaterally    Non-labored, no retractions or accessory muscle use  GI:  Abdomen is soft, no distension, Epigastric and LUQ tenderness. No peritoneal signs  MS:  No lower extremity edema.  Tenderness to palpation to left lateral inferior ribs with no underyling skin changes   Skin:  Warm and dry, No rash or lesions noted.  Neuro: Oriented x 3. No gross motor deficits.    Emergency Department Course   ECG:  Indication: chest tightness  Time: 1404  Vent. Rate 73 bpm. AR interval *. QRS duration 176. QT/QTc 480/528. P-R-T axis 22 73 46. Ventricular paced rhythm. Abnormal ECG. Read time: 1414    Imaging:  Radiographic findings were communicated with the patient who voiced understanding of the findings.    XR Chest PA & LAT:   No radiographic evidence of acute chest abnormality.  as per radiology.     CT Abdomen/Pelvis with IV contrast:   1. Large volume of retained colonic stool suggests constipation.  2. Cardiomegaly.  3. Cirrhotic appearing liver. as per radiology.    Laboratory:  CMP: Glucose 118, GFR 59, albumin 3.3, o/w WNL (Creatinine: 0.85)  INR: 1.86  Lipase: 71  CBC: WBC: 7.4, HGB: 12.5, PLT: 260  Partial Thromboplastin Time: 34  UA with Microscopic: WNL  Occult stool: negative  Troponin: <0.015    Interventions:  1022: Dilaudid 0.5  mg IV  1201: Dilaudid 0.5 mg IV  Dilaudid 0.5 mg IV    Emergency Department Course:  (0959) I performed an exam of the patient as documented above.     (9175) I rechecked the patient and discussed the results of their workup thus far.   (3965)  I consulted with Dr. Qureshi of the hospitalist services. They are in agreement to accept the patient for admission.    Findings and plan explained to the Patient and daughter who consents to admission. Discussed the patient with Dr. Qureshi, who will admit the patient to a OBS bed for further monitoring, evaluation, and treatment.    Impression & Plan    Medical Decision Making:  Patient is a 89-year-old female who presents with acute on chronic back pain as well as epigastric/left upper quadrant pain and small episode of hematemesis versus hemoptysis; history of vertebral fracture 12/2018.  Patient's medical history and records reviewed.  Patient denied history of trauma.  Labs essentially unremarkable as noted above.  Patient was provided repeated doses of Dilaudid with only moderate improvement in pain.  CT scan of the abdomen pelvis demonstrates evidence of constipation and patient reports no bowel movement for the last several days however no emergent pathology identified.  Chest x-ray without significant findings.  Later in ED course patient did report chest tightness that resolved without intervention.  EKG demonstrated paced rhythm.  Patient is anticoagulated on Xarelto secondary to history of A. fib.  Troponin negative.  Patient will be admitted observation with the hospitalist service for further care and evaluation.  Patient's pain seems to be musculoskeletal in nature. Will admit to observation for further pain control and treatment of constipation.    Diagnosis:    ICD-10-CM    1. Left-sided thoracic back pain, unspecified chronicity M54.6 Troponin I   2. Chest tightness R07.89    3. Constipation, unspecified constipation type K59.00      Disposition:  Admitted to  OBS    Scribe Disclosure:  I, Mitch Gamble, am serving as a scribe on 2/14/2019 at 9:59 AM to personally document services performed by Telly Brush DO based on my observations and the provider's statements to me.     Mitch Gamble  2/14/2019    EMERGENCY DEPARTMENT       Telly Brush DO  02/14/19 1545

## 2019-02-15 VITALS
OXYGEN SATURATION: 96 % | HEIGHT: 60 IN | WEIGHT: 153.3 LBS | BODY MASS INDEX: 30.1 KG/M2 | SYSTOLIC BLOOD PRESSURE: 133 MMHG | RESPIRATION RATE: 16 BRPM | TEMPERATURE: 98.4 F | HEART RATE: 66 BPM | DIASTOLIC BLOOD PRESSURE: 49 MMHG

## 2019-02-15 LAB
TROPONIN I SERPL-MCNC: <0.015 UG/L (ref 0–0.04)
TROPONIN I SERPL-MCNC: <0.015 UG/L (ref 0–0.04)

## 2019-02-15 PROCEDURE — A9270 NON-COVERED ITEM OR SERVICE: HCPCS | Mod: GY | Performed by: INTERNAL MEDICINE

## 2019-02-15 PROCEDURE — 25000131 ZZH RX MED GY IP 250 OP 636 PS 637: Mod: GY | Performed by: INTERNAL MEDICINE

## 2019-02-15 PROCEDURE — A9270 NON-COVERED ITEM OR SERVICE: HCPCS | Mod: GY | Performed by: PHYSICIAN ASSISTANT

## 2019-02-15 PROCEDURE — G0378 HOSPITAL OBSERVATION PER HR: HCPCS

## 2019-02-15 PROCEDURE — 84484 ASSAY OF TROPONIN QUANT: CPT | Performed by: INTERNAL MEDICINE

## 2019-02-15 PROCEDURE — 25000132 ZZH RX MED GY IP 250 OP 250 PS 637: Mod: GY | Performed by: PHYSICIAN ASSISTANT

## 2019-02-15 PROCEDURE — 25000132 ZZH RX MED GY IP 250 OP 250 PS 637: Mod: GY | Performed by: INTERNAL MEDICINE

## 2019-02-15 PROCEDURE — 36415 COLL VENOUS BLD VENIPUNCTURE: CPT | Performed by: INTERNAL MEDICINE

## 2019-02-15 PROCEDURE — 99217 ZZC OBSERVATION CARE DISCHARGE: CPT | Performed by: PHYSICIAN ASSISTANT

## 2019-02-15 RX ORDER — BISACODYL 10 MG
10 SUPPOSITORY, RECTAL RECTAL DAILY PRN
Qty: 5 SUPPOSITORY | Refills: 0 | Status: SHIPPED | OUTPATIENT
Start: 2019-02-15 | End: 2019-05-09

## 2019-02-15 RX ORDER — CYCLOBENZAPRINE HCL 5 MG
5 TABLET ORAL 3 TIMES DAILY PRN
Qty: 42 TABLET | Refills: 0 | Status: SHIPPED | OUTPATIENT
Start: 2019-02-15 | End: 2019-05-09

## 2019-02-15 RX ORDER — BISACODYL 5 MG/1
10 TABLET, DELAYED RELEASE ORAL DAILY PRN
Qty: 15 TABLET | Refills: 0 | Status: SHIPPED | OUTPATIENT
Start: 2019-02-15 | End: 2019-05-09

## 2019-02-15 RX ORDER — CYCLOBENZAPRINE HCL 5 MG
5 TABLET ORAL 3 TIMES DAILY PRN
Status: DISCONTINUED | OUTPATIENT
Start: 2019-02-15 | End: 2019-02-15 | Stop reason: HOSPADM

## 2019-02-15 RX ORDER — AMOXICILLIN 250 MG
2 CAPSULE ORAL 2 TIMES DAILY
Qty: 120 TABLET | Refills: 0 | Status: SHIPPED | OUTPATIENT
Start: 2019-02-15 | End: 2019-03-17

## 2019-02-15 RX ORDER — ACETAMINOPHEN 500 MG
1000 TABLET ORAL 3 TIMES DAILY
Qty: 84 TABLET | Refills: 0 | Status: SHIPPED | OUTPATIENT
Start: 2019-02-15 | End: 2019-03-01

## 2019-02-15 RX ADMIN — LEVOTHYROXINE SODIUM 75 MCG: 75 TABLET ORAL at 06:22

## 2019-02-15 RX ADMIN — SENNOSIDES AND DOCUSATE SODIUM 2 TABLET: 8.6; 5 TABLET ORAL at 08:50

## 2019-02-15 RX ADMIN — CYCLOBENZAPRINE HYDROCHLORIDE 5 MG: 5 TABLET, FILM COATED ORAL at 13:17

## 2019-02-15 RX ADMIN — ACETAMINOPHEN 1000 MG: 500 TABLET, FILM COATED ORAL at 00:49

## 2019-02-15 RX ADMIN — POLYETHYLENE GLYCOL 3350 17 G: 17 POWDER, FOR SOLUTION ORAL at 08:54

## 2019-02-15 RX ADMIN — ONDANSETRON 4 MG: 4 TABLET, ORALLY DISINTEGRATING ORAL at 09:37

## 2019-02-15 RX ADMIN — FLUTICASONE FUROATE AND VILANTEROL TRIFENATATE 1 PUFF: 200; 25 POWDER RESPIRATORY (INHALATION) at 08:57

## 2019-02-15 RX ADMIN — HYDROMORPHONE HYDROCHLORIDE 2 MG: 2 TABLET ORAL at 06:41

## 2019-02-15 RX ADMIN — ACETAMINOPHEN 1000 MG: 500 TABLET, FILM COATED ORAL at 08:53

## 2019-02-15 RX ADMIN — METOPROLOL TARTRATE 75 MG: 50 TABLET ORAL at 08:50

## 2019-02-15 RX ADMIN — FLECAINIDE ACETATE 75 MG: 50 TABLET ORAL at 08:50

## 2019-02-15 NOTE — PROGRESS NOTES
[facility]  Patient is currently open to home care services with [facility]. The patient is currently receiving   Skilled Nursing, PT and HHA services. [facility]  and team have been notified that patient is under OBSERVATION STATUS. [facility] liaison will continue to follow patient during stay. If patient is admitted to inpatient status please provide orders to resume home care at time of discharge if appropriate.

## 2019-02-15 NOTE — PROGRESS NOTES
Observation goals PRIOR TO DISCHARGE    Comments:   Pain controlled on oral meds: Met     Able to ambulate at baseline: Partially met     SW consult complete: Not met     Having bowel movements: Not met

## 2019-02-15 NOTE — PROGRESS NOTES
Observation goals PRIOR TO DISCHARGE    Comments:   Pain controlled on oral meds: Met     Able to ambulate at baseline: Met     SW consult complete: Not met     Having bowel movements: Met, small BM after Tap water enema.

## 2019-02-15 NOTE — PLAN OF CARE
Pt arrived from ED at 1630hrs. A/OX4. Tele: A-paced.[place], bilat HA in place.Tmax 100, elevated BP, other VSS on RA. Trops negative x2 +BS, +flatus, c/o constipation, miralax, senna and suppository given w/ small BM reported. Pain managed w/ Tylenol  And Dilaudid. Incontinent of bladder. A1+GB and walker.

## 2019-02-15 NOTE — PLAN OF CARE
Observation goals PRIOR TO DISCHARGE     Comments: Pain controlled on oral meds, able to ambulate at baseline, SW consult complete, having bowel movements   Not met  Nurse to notify provider when observation goals have been met and patient is ready for discharge Yes

## 2019-02-15 NOTE — PLAN OF CARE
A&O.  AVSS.  Scheduled tylenol and dilaudid PO given with relief.  Saline locked.  Troponins negative.

## 2019-02-15 NOTE — H&P
Admitted:     02/14/2019      DATE OF ADMISSION:  02/14/2019      PRIMARY CARE PHYSICIAN:  Dr. Jeffery Sherwood.      CODE STATUS:  DNR/DNI, discussed with the patient and her family.      CHIEF COMPLAINT:  Back pain.      HISTORY OF PRESENT ILLNESS:  Ms. Hale is a 89-year-old female with a past medical history significant for chronic back pain with recent diagnosis of T12 vertebral fracture, hypertension, hypothyroidism, PMR who presents to the Emergency Department with the above concern.  History is obtained through discussion with the ED physician, the patient and her family at bedside.  The patient notes that she was diagnosed with a T12 vertebral fracture in December and has been at home taking  pain medication for this and also seeing home therapy.  The patient notes that today she was sitting when she had the acute onset of very severe left-sided lower back discomfort, when most of her pain chronically has been on the right side.  She has not fallen nor was she doing any activity.  She occasionally has flareups of her pain at home, but usually is able to manage with the Dilaudid which was not helping this time.  The patient does take only p.r.n. Tylenol and was told to take up to 2.  I do note that she was previously on a different pain medication, unclear if it also had Tylenol in it at that point.  The patient denies any recent trauma and has not fallen for quite some time.  She denies any fevers or chills.  She did have a nosebleed yesterday.  She is anticoagulated on Xarelto.  This stopped spontaneously.  This morning she did note that she seemed to cough up some blood, but it has not happened since then.  She also complains of some abdominal discomfort.  She is on a bowel regimen which the daughter notes includes daily MiraLax and daily Senna.  Her last bowel movement was about 2 days ago and over the weekend she did require an enema as she had not had a bowel movement in 5 days.  She denies any shortness of  breath or coughing.  No recent change to her medications.      In the Emergency Department, the patient had an abdominal and pelvis CT because of the abdominal pain which did show a large volume of retained colonic stool and a cirrhotic-appearing liver, but no other acute abnormalities.  A chest x-ray was done and negative.  She has not had any further hemoptysis.  While in the ED, she did complain of some chest tightness that was lasting for 15 to 30 minutes and then resolved spontaneously.  EKG was done showed a V-paced rhythm.  Initial troponin was drawn in the ED and returned as undetectable.  Of note, occult stool was also negative.      When I spoke with the patient she had received multiple doses of IV Dilaudid and had improvement in her pain.  She still has some ongoing abdominal discomfort but denies any chest pain.  She does get home therapy and has an aide that comes in to help with some bathing but otherwise lives independently.  She is interested in possibly increasing the cares at home.      PAST MEDICAL HISTORY:   1.  Hypertension.   2.  Dyslipidemia.   3.  Hypothyroidism.   4.  Chronic back pain with a T12 compression vertebral fracture diagnosed 2 months ago in December, and a previous L3 compression fracture.   5.  PMR.   6.  Status post pacemaker.   7.  TIA.   8.  Osteopenia.   9.  Lumbar spinal stenosis.   10.  Dysphagia.   11.  COPD.   12.  Atrial fibrillation.   13.  Arthritis.      MEDICATIONS:    Prior to Admission medications    Medication Sig Last Dose Taking? Auth Provider   acetaminophen (TYLENOL) 500 MG tablet Take 2 tablets (1,000 mg) by mouth 3 times daily for 14 days  Yes Nilsa Nix PA-C   bisacodyl (DULCOLAX) 10 MG suppository Place 1 suppository (10 mg) rectally daily as needed for constipation  Yes Nilsa Nix PA-C   bisacodyl (DULCOLAX) 5 MG EC tablet Take 2 tablets (10 mg) by mouth daily as needed for constipation  Yes Nilsa Nix PA-C    budesonide-formoterol (SYMBICORT) 160-4.5 MCG/ACT inhaler Inhale 2 puffs into the lungs 2 times daily  2/13/2019 at pm Yes Reported, Patient   cyclobenzaprine (FLEXERIL) 5 MG tablet Take 1 tablet (5 mg) by mouth 3 times daily as needed for muscle spasms  Yes Nilsa Nix PA-C   flecainide acetate 150 MG TABS Take 1/2 tablet by mouth twice daily 2/13/2019 at pm Yes Efra Vazquez MD   Furosemide (LASIX PO) Take 20 mg by mouth daily as needed  Past Week at prn Yes Reported, Patient   HYDROmorphone (DILAUDID) 2 MG tablet Take 1 tablet (2 mg) by mouth every 4 hours as needed for severe pain 2/13/2019 at pm Yes Jeffery Sherwood MD   levothyroxine (SYNTHROID/LEVOTHROID) 75 MCG tablet TAKE 1 TABLET(75 MCG) BY MOUTH DAILY 2/13/2019 at Unknown time Yes Jeffery Sherwood MD   metoprolol tartrate (LOPRESSOR) 50 MG tablet TAKE 1 AND 1/2 TABLETS BY MOUTH TWICE DAILY 2/13/2019 at pm Yes Jeffery Sherwood MD   mupirocin (BACTROBAN) 2 % external ointment Apply topically 2 times daily 2/13/2019 at pm Yes Unknown, Entered By History   polyethylene glycol (MIRALAX/GLYCOLAX) packet Take 1 packet by mouth daily Past Week at Unknown time Yes Reported, Patient   rivaroxaban ANTICOAGULANT (XARELTO) 15 MG TABS tablet Take 15 mg by mouth daily (with dinner) 2/13/2019 at Unknown time Yes Unknown, Entered By History   senna-docusate (SENOKOT-S/PERICOLACE) 8.6-50 MG tablet Take 2 tablets by mouth 2 times daily  Yes Nilsa Nix PA-C   ondansetron (ZOFRAN ODT) 4 MG ODT tab Take 1 tablet (4 mg) by mouth every 8 hours as needed for nausea   Jeffery Sherwood MD           SOCIAL HISTORY:  The patient denies any use of tobacco and drinks alcohol infrequently with her family.      FAMILY HISTORY:  Father had heart disease, mother and sister had cancer, and sister also had heart disease.      ALLERGIES:       Allergies   Allergen Reactions     Clindamycin Hcl Other (See Comments)     Difficulty  swallowing     Ampicillin Potassium      Rash nausea and vomiting       Celebrex [Celecoxib]      rash     Ciprofloxacin      rash     Erythromycin      rash     Indocin      Ended up [facility] with severe head ache     Penicillins      Rash,nausea and vomiting     Vibramycin [Doxycycline Hyclate]      Rash      Xylocaine-Epinephrine [Epinephrine-Lidocaine-Na Metabisulfite]      Xylocaine with epi caused a rapid heart beat           REVIEW OF SYSTEMS:  The complete review of systems reviewed and negative save for the pertinent positives recorded in the HPI.      PHYSICAL EXAMINATION.   VITAL SIGNS:  Blood pressure of 135/70, heart rate 68, respirations 20, satting 93% on room air with a temperature of 98.8 degrees Fahrenheit.   GENERAL:  The patient is sitting in bed and appears fairly comfortable.   HEENT:  Pupils equal and reactive to light, extraocular muscle function intact.  No scleral icterus.  Oropharynx is clear.   NECK:  No lymphadenopathy or thyromegaly.   CARDIOVASCULAR:  Heart is regular rate and rhythm with a 2/6 systolic murmur heard best at the left sternal border.   PULMONARY:  Lungs are clear to auscultation bilaterally without wheeze or rhonchi.   GASTROINTESTINAL:  Positive bowel sounds, soft with mild tenderness mostly in the upper quadrants, but no rebound or guarding.   SKIN:  No rash or lesion.   LYMPHATICS:  Trace edema of the bilateral lower extremities.   PSYCHIATRIC:  Alert and oriented x 3, normal affect.   NEUROLOGIC:  Cranial nerves II-XII are grossly intact without any focal deficits.      LABORATORY DATA:  CBC, BMP, LFTs all unremarkable.  UA is bland.  Imaging is as above.  Troponin is undetectable.      I personally reviewed her EKG which is V-paced.      ASSESSMENT AND PLAN:  Ms. Hale is a 89-year-old female with a past medical history significant for chronic back pain and recent diagnosis of T12 vertebral fracture, hypertension, hypothyroidism and PMR who presents to  the Emergency Department with acute on chronic back pain.   1.  Acute on chronic back pain:  The patient has known chronic low back issues and a recent T12 vertebral fracture.  She is roughly 2 months out from the diagnosis.  No new trauma and the pain in her back is more paraspinal than over the spine.  I suspect some muscle spasm and not a new or acute fracture.  We discussed that scheduling Tylenol may help and will then continue with p.r.n. Dilaudid.  I will have IV Dilaudid available as needed as well.  The patient can resume physical therapy at home.  Social work will be consulted to help determine if additional resources are available at home.   2.  Chest pain:  Short-lived and self-resolved.  EKG is V-paced.  Initial troponin is negative, but will trend.  If her troponins are negative and she has no further symptoms, I do not think we need to pursue additional workup.   3.  Atrial fibrillation:  Continue with metoprolol, flecainide, and Xarelto.   4.  Hypothyroidism:  Continue with Synthroid.   5.  Chronic obstructive pulmonary disease:  Continue with PT, inhalers.   6.  Possible hemoptysis:  The patient has clear lungs and a chest x-ray that is negative.  Given her epistaxis, I suspect that it was some residual from that.  We will monitor for any recurrence or respiratory symptoms.   7.  Disposition:  Brought under observation status and hopefully can discharge to home tomorrow if pain is controlled.   8.  Abdominal pain with constipation:  She has substantial stool in her colon.  I am going to increase her MiraLax to b.i.d., have Senokot b.i.d., and have p.r.n. suppositories and enema available.         JASON WATSON DO             D: 2019   T: 2019   MT: WT      Name:     CASIMIRO PRIEST   MRN:      [id number]        Account:      [id number]   :      1925        Admitted:     2019                   Document: [id number]       cc: Jeffery Sherwood MD

## 2019-02-15 NOTE — PROGRESS NOTES
Observation goals PRIOR TO DISCHARGE    Comments:   Pain controlled on oral meds: Met     Able to ambulate at baseline: Met     SW consult complete: Not met     Having bowel movements: Partially met, small BM reported.

## 2019-02-15 NOTE — PROGRESS NOTES
Observation goals PRIOR TO DISCHARGE    Comments:   Pain controlled on oral meds: Met    Able to ambulate at baseline: Not met    SW consult complete: Not met    Having bowel movements: Not met

## 2019-02-15 NOTE — PLAN OF CARE
Pt discharging home at this time accompanied by pt's daughter. AVS, meds and education given. Questions answered.

## 2019-02-15 NOTE — DISCHARGE SUMMARY
[facility]  Hospitalist Discharge Summary       Date of Admission:  2/14/2019  Date of Discharge:  2/15/2019  Discharging Provider: Nilsa Nix PA-C      Discharge Diagnoses   Acute on chronic back pain  Chest pain, resolved  Atrial fibrillation  Hypothyroidism  COPD  Possible hemoptysis, resolved  Abdominal pain  Constipation    Follow-ups Needed After Discharge   Follow up with Dr. Todd for continued pain management and weaning off of Dilaudid.    Hospital Course   Day of discharge pt feeling well without complaints. Pain better controlled 4/10 at present. Agreeable to start decreasing her dilaudid dosing and ready for discharge. Small BM today after enema with relief of abd pain.    Ms. Hale is a 89-year-old female with a past medical history significant for chronic back pain and recent diagnosis of T12 vertebral fracture, hypertension, hypothyroidism and PMR who presents to the Emergency Department with acute on chronic back pain.      1.  Acute on chronic back pain:    The patient has known chronic low back issues and a recent T12 vertebral fracture.  She is roughly 2 months out from the diagnosis.  No new trauma and the pain in her back is more paraspinal than over the spine. I suspect some muscle spasm and not a new or acute fracture. Scheduling Tylenol may help and will then continue with p.r.n. Dilaudid with goal to wean down at home. Start Flexeril to help with muscle spasms. The patient can resume physical therapy at home.      2.  Chest pain:    Short-lived and self-resolved.  EKG is V-paced.  Initial troponin is negative, but will trend.  If her troponins are negative and she has no further symptoms, I do not think we need to pursue additional workup. Serial trops neg x4     3.  Atrial fibrillation:  Continue with metoprolol, flecainide, and Xarelto.      4.  Hypothyroidism:  Continue with Synthroid.      5.  Chronic obstructive pulmonary disease:  Continue with PT, inhalers.       6.  Possible hemoptysis:    The patient has clear lungs and a chest x-ray that is negative.  Given her epistaxis, I suspect that it was some residual from that. No recurrence or respiratory symptoms.      7.  Abdominal pain with constipation:    She has substantial stool in her colon. Bowel regimen increased and she had a small BM after enema this AM with relief of abd pain and improvement in back pain. I d/w pt and daughter regarding being diligent with weaning down opioids and increasing bowel regimen, they are agreeable.    Consultations This Hospital Stay   SOCIAL WORK IP CONSULT    Code Status   DNR/DNI    Time Spent on this Encounter   I, Nilsa Nix, personally saw the patient today and spent greater than 30 minutes discharging this patient.       Nilsa Nix, TIM  [facility]  ______________________________________________________________________    Physical Exam   Vital Signs: Temp: 98.4  F (36.9  C) Temp src: Oral BP: 133/49   Heart Rate: 66 Resp: 16 SpO2: 96 % O2 Device: None (Room air)    Weight: 153 lbs 4.8 oz  Constitutional: awake, alert, cooperative, no apparent distress, and appears stated age  Eyes: Lids and lashes normal, pupils equal, round and reactive to light, extra ocular muscles intact, sclera clear, conjunctiva normal  ENT: Normocephalic, without obvious abnormality, atraumatic, sinuses nontender on palpation, external ears without lesions, oral pharynx with moist mucous membranes, tonsils without erythema or exudates, gums normal and good dentition.  Respiratory: No increased work of breathing, good air exchange, clear to auscultation bilaterally, no crackles or wheezing  Cardiovascular: Normal apical impulse, regular rate and rhythm, normal S1 and S2, no S3 or S4, and no murmur noted  GI: No scars, normal bowel sounds, soft, non-distended, non-tender, no masses palpated, no hepatosplenomegally  Skin: no bruising or bleeding, normal skin color, texture,  turgor and no redness, warmth, or swelling  Musculoskeletal: There is no redness, warmth, or swelling of the joints.  Full range of motion noted.  Motor strength is 5 out of 5 all extremities bilaterally.  Tone is normal.  Neurologic: Awake, alert, oriented to name, place and time.  Cranial nerves II-XII are grossly intact.  Motor is 5 out of 5 bilaterally.  Cerebellar finger to nose, heel to shin intact.  Sensory is intact.  Babinski down going, Romberg negative, and gait is normal.       Primary Care Physician   Jeffery Sherwood    Discharge Disposition   Discharged to home, resume home services.  Condition at discharge: Stable    Significant Results and Procedures   Most Recent 3 CBC's:  Recent Labs   Lab Test 02/14/19  1010 12/31/18  2240 12/22/18  0955   WBC 7.4 8.8 8.4   HGB 12.5 12.2 11.8   * 100 100    340 287     Most Recent 3 BMP's:  Recent Labs   Lab Test 02/14/19  1010 12/31/18  2240 12/22/18  0955    133 140   POTASSIUM 4.1 4.3 4.3   CHLORIDE 105 99 108   CO2 24 25 25   BUN 20 21 25   CR 0.85 0.85 0.76   ANIONGAP 9 9 7   FRANCISCO 9.2 8.8 9.0   * 115* 120*   ,   Results for orders placed or performed during the hospital encounter of 02/14/19   Chest XR,  PA & LAT    Narrative    CHEST TWO VIEWS 2/14/2019 10:42 AM     HISTORY: Episode of hemoptysis versus hematemesis.    COMPARISON: 2/18/2018    FINDINGS: Left-sided pacer device unchanged. The heart appears mildly  enlarged. Pulmonary vascularity within normal limits. No airspace  consolidation, pneumothorax, or pleural effusion.       Impression    IMPRESSION: No radiographic evidence of acute chest abnormality.     MARYCRUZ PARADA MD   CT Abdomen Pelvis w Contrast    Narrative    CT ABDOMEN/PELVIS WITH CONTRAST February 14, 2019 1:19 PM     HISTORY: Left upper quadrant pain.    TECHNIQUE: 72mL Isovue-370. CT images of the abdomen and pelvis  following nonionic intravenous contrast. Radiation dose for this scan  was reduced using  automated exposure control, adjustment of the mA  and/or kV according to patient size, or iterative reconstruction  technique.    COMPARISON: 12/22/2018.    FINDINGS: The heart appears enlarged and pacemaker wires are present.  Lobular contour of the liver with relative hypertrophy of the left  lateral segment is compatible with cirrhosis. No focal liver lesions  demonstrated. The gallbladder, spleen, pancreas, and adrenal glands  are unremarkable. There is no hydronephrosis or solid renal mass. The  appendix is not identified but there is no secondary evidence of  appendicitis. There is a large volume of retained colonic stool.  Sigmoid diverticulosis is noted without evidence of acute  diverticulitis. There is aortic atherosclerosis without aneurysm. No  abdominal or retroperitoneal lymphadenopathy. No pelvic adenopathy,  free fluid, or mass. The uterus is absent. The left hip has been  replaced.      Impression    IMPRESSION:  1. Large volume of retained colonic stool suggests constipation.  2. Cardiomegaly.  3. Cirrhotic appearing liver.    MARYCRUZ PARADA MD       Discharge Orders      Reason for your hospital stay    Admitted with acute on chronic back pain and constipation.     Follow-up and recommended labs and tests     Follow up with primary care provider, Jeffery Sherwood, within 2 weeks or as needed to evaluate medication change, to evaluate treatment change and for hospital follow- up.  No follow up labs or test are needed.     Activity    Your activity upon discharge: activity as tolerated     Discharge Instructions    - Start weaning down your dilaudid as soon as possible.   - You should communicate closely with Dr. Sherwood about any further pain management problems. You should not need pain medicine more than another 1-2 months. The goal is to stop the pain medicine completely.  - You should take tylenol three times daily as well as flexeril for muscular pain. Dilaudid could be used for only severe pain  or prior to physical therapy.   - Keep on top of your bowel regimen, you should have a bowel movement at least every 3 days or utilize an enema.     DNR/DNI     Diet    Follow this diet upon discharge: Orders Placed This Encounter      Regular Diet Adult     Discharge Medications   Discharge Medication List as of 2/15/2019  3:44 PM      START taking these medications    Details   acetaminophen (TYLENOL) 500 MG tablet Take 2 tablets (1,000 mg) by mouth 3 times daily for 14 days, Disp-84 tablet, R-0, E-PrescribeFuture refills by PCP Dr. Jeffery Sherwood with phone number [phone].      bisacodyl (DULCOLAX) 10 MG suppository Place 1 suppository (10 mg) rectally daily as needed for constipation, Disp-5 suppository, R-0, E-PrescribeFuture refills by PCP Dr. Jeffery Sherwood with phone number [phone].      bisacodyl (DULCOLAX) 5 MG EC tablet Take 2 tablets (10 mg) by mouth daily as needed for constipation, Disp-15 tablet, R-0, E-PrescribeFuture refills by PCP Dr. Jeffery Sherwood with phone number [phone].      cyclobenzaprine (FLEXERIL) 5 MG tablet Take 1 tablet (5 mg) by mouth 3 times daily as needed for muscle spasms, Disp-42 tablet, R-0, E-PrescribeFuture refills by PCP Dr. Jeffery Sherwood with phone number [phone].      senna-docusate (SENOKOT-S/PERICOLACE) 8.6-50 MG tablet Take 2 tablets by mouth 2 times daily, Disp-120 tablet, R-0, E-Prescribe         CONTINUE these medications which have NOT CHANGED    Details   budesonide-formoterol (SYMBICORT) 160-4.5 MCG/ACT inhaler Inhale 2 puffs into the lungs 2 times daily , Historical      flecainide acetate 150 MG TABS Take 1/2 tablet by mouth twice daily, Disp-90 tablet, R-3, E-Prescribe      Furosemide (LASIX PO) Take 20 mg by mouth daily as needed , Historical      HYDROmorphone (DILAUDID) 2 MG tablet Take 1 tablet (2 mg) by mouth every 4 hours as needed for severe pain, Disp-50 tablet, R-0, Local Print      levothyroxine (SYNTHROID/LEVOTHROID) 75 MCG tablet TAKE 1  TABLET(75 MCG) BY MOUTH DAILY, Disp-90 tablet, R-0, E-Prescribe      metoprolol tartrate (LOPRESSOR) 50 MG tablet TAKE 1 AND 1/2 TABLETS BY MOUTH TWICE DAILY, Disp-270 tablet, R-0, E-Prescribe      mupirocin (BACTROBAN) 2 % external ointment Apply topically 2 times dailyHistorical      ondansetron (ZOFRAN ODT) 4 MG ODT tab Take 1 tablet (4 mg) by mouth every 8 hours as needed for nausea, Disp-15 tablet, R-3, Local Print      polyethylene glycol (MIRALAX/GLYCOLAX) packet Take 1 packet by mouth daily, Historical      rivaroxaban ANTICOAGULANT (XARELTO) 15 MG TABS tablet Take 15 mg by mouth daily (with dinner), Historical         STOP taking these medications       acetaminophen (ACETAMINOPHEN 8 HOUR) 650 MG CR tablet Comments:   Reason for Stopping:             Allergies   Allergies   Allergen Reactions     Clindamycin Hcl Other (See Comments)     Difficulty swallowing     Ampicillin Potassium      Rash nausea and vomiting       Celebrex [Celecoxib]      rash     Ciprofloxacin      rash     Erythromycin      rash     Indocin      Ended up going to( E.R.) hospital with severe head ache     Penicillins      Rash,nausea and vomiting     Vibramycin [Doxycycline Hyclate]      Rash      Xylocaine-Epinephrine [Epinephrine-Lidocaine-Na Metabisulfite]      Xylocaine with epi caused a rapid heart beat

## 2019-02-18 ENCOUNTER — TELEPHONE (OUTPATIENT)
Dept: FAMILY MEDICINE | Facility: CLINIC | Age: 84
End: 2019-02-18

## 2019-02-18 ENCOUNTER — PATIENT OUTREACH (OUTPATIENT)
Dept: CARE COORDINATION | Facility: CLINIC | Age: 84
End: 2019-02-18

## 2019-02-18 NOTE — TELEPHONE ENCOUNTER
"ED/Discharge Protocol    \"Hi, my name is Cele Sandoval, a registered nurse, and I am calling on behalf of Dr. Sherwood's office at [facility].  I am calling to follow up and see how things are going for you after your recent visit.\"    \"I see that you were in the (ER/UC/IP) on 2/14/2019-2/15/2019.    How are you doing now that you are home?\" patient still having a lot of pain - on Dilaudid, Tylenol, and Flexeril, has tried heating pad for pain - not helping much either    Is patient experiencing symptoms that may require a hospital visit?  No    Discharge Instructions    \"Let's review your discharge instructions.  What is/are the follow-up recommendations?  Pt. Response: F/U with PCP    \"Were you instructed to make a follow-up appointment?\"  Pt. Response: Yes.  Has appointment been made?   No.  \"Can I help you schedule that appointment?\" daughter will callback to schedule f/u appt      \"When you see the provider, I would recommend that you bring your discharge instructions with you.    Medications    \"How many new medications are you on since your hospitalization/ED visit?\"    0-1  \"How many of your current medicines changed (dose, timing, name, etc.) while you were in the hospital/ED visit?\"   0-1  \"Do you have questions about your medications?\"   No  \"Were you newly diagnosed with heart failure, COPD, diabetes or did you have a heart attack?\"   No  For patients on insulin: \"Did you start on insulin in the hospital or did you have your insulin dose changed?\"   No    Medication reconciliation completed? Yes    Was MTM referral placed (*Make sure to put transitions as reason for referral)?   No    Call Summary    \"Do you have any questions or concerns about your condition or care plan at the moment?\"    No  Triage nurse advice given: callback to schedule f/u with PCP    Patient was in ER 4 times in the past year (assess appropriateness of ER visits.)      \"If you have questions or things don't continue to improve, we " "encourage you contact us through the main clinic number,  [phone].  Even if the clinic is not open, triage nurses are available 24/7 to help you.     We would like you to know that our clinic has extended hours (provide information).  We also have urgent care (provide details on closest location and hours/contact info)\"      \"Thank you for your time and take care!\"    Cele WASHINGTON RN    "

## 2019-02-18 NOTE — LETTER
Health Care Home - Access Care Plan    About Me  Patient Name:  Lindsey Hale    YOB: 1925  Age:                             89 year old   Ashvin MRN:            [id number] Telephone Information:   Home Phone [phone]   Mobile [phone]       Address:    [address] [address] Email address:  No e-mail address on record      Emergency Contact(s)  Name Relationship Lgl Grd Work Phone Home Phone Mobile Phone   1. PATRICE GUNN Daughter  [phone]   2. NICOL HALE Son   [phone]             Health Maintenance: Routine Health maintenance Reviewed: Up to date    My Access Plan  Medical Emergency 911   Questions or concerns during clinic hours Primary Clinic Line, I will call the clinic directly: [facility] [phone]   24 Hour Appointment Line [phone] or  [phone]) (toll free)   24 Hour Nurse Line [phone] (toll free)   Questions or concerns outside clinic hours 24 Hour Appointment Line, I will call the after-hours on-call line:   [facility] [phone] or [phone]) (toll-free)   Preferred Urgent Care     Preferred Hospital [facility]  [phone]   [facility] [place] - [address] [place] [facility] [place] [address]     Behavioral Health Crisis Line The National Suicide Prevention Lifeline at 1-290.188.1937 or 917     My Care Team Members  Patient Care Team       Relationship Specialty Notifications Start End    Jeffery Sherwood MD PCP - General Internal Medicine  9/20/11     Phone: [phone] Fax: [phone] [address] [place] [address]    Jeffery Sherwood MD PCP - Assigned PCP   11/4/18     Phone: [phone] Fax: [phone] [address] [facility] [address]    Jenny Carrasquillo Home Care Nurse   1/3/18     Phone: [phone]          [facility] ([facility] ([place])  12/24/18     Phone: [phone]         Zoe Sims RN Clinic Care Coordinator Primary Care - CC  2/18/19            My Medical and Care Information  Problem List   Patient Active Problem List   Diagnosis     Urine incontinence     Transient cerebral ischemia     Arthritis     Osteopenia     Pacemaker     Hyperlipidemia LDL goal <160     Chronic low back pain     Benign essential hypertension     PMR (polymyalgia rheumatica) (H)     Advanced directives, counseling/discussion     SOB (shortness of breath)     Mitral regurgitation     Hypothyroidism, unspecified type     Chronic obstructive pulmonary disease, unspecified COPD type (H)     Paroxysmal atrial fibrillation (H)     Abnormal echocardiogram     Physical deconditioning     Anemia due to blood loss, acute     S/P hip hemiarthroplasty     Closed fracture of neck of left femur with routine healing     Sick sinus syndrome (H)     Diabetes mellitus, type 2 (H)     Bilateral leg edema     Nausea     Slow transit constipation     Insomnia     T12 compression fracture (H)     Spinal stenosis of lumbar region without neurogenic claudication     Lumbar spinal stenosis     Chronic pain     Back pain      Current Medications and Allergies:  See printed Medication Report

## 2019-02-18 NOTE — TELEPHONE ENCOUNTER
"ED / Discharge Outreach Protocol    Patient Contact    Attempt # 1    Was call answered?  Yes.  \"May I please speak with <patient name>\"  Is patient available?   No. Left message with daughter for patient to call Mariela Triage back    Cele WASHINGTON RN    Discharge Orders             Reason for your hospital stay     Admitted with acute on chronic back pain and constipation.          Follow-up and recommended labs and tests      Follow up with primary care provider, Jeffery Sherwood, within 2 weeks or as needed to evaluate medication change, to evaluate treatment change and for hospital follow- up.  No follow up labs or test are needed.          Activity     Your activity upon discharge: activity as tolerated          Discharge Instructions     - Start weaning down your dilaudid as soon as possible.   - You should communicate closely with Dr. Sherwood about any further pain management problems. You should not need pain medicine more than another 1-2 months. The goal is to stop the pain medicine completely.  - You should take tylenol three times daily as well as flexeril for muscular pain. Dilaudid could be used for only severe pain or prior to physical therapy.   - Keep on top of your bowel regimen, you should have a bowel movement at least every 3 days or utilize an enema.      DNR/DNI          Diet     Follow this diet upon discharge: Orders Placed This Encounter      Regular Diet Adult         Discharge Medications           Discharge Medication List as of 2/15/2019  3:44 PM             START taking these medications     Details   acetaminophen (TYLENOL) 500 MG tablet Take 2 tablets (1,000 mg) by mouth 3 times daily for 14 days, Disp-84 tablet, R-0, E-PrescribeFuture refills by PCP Dr. Jeffery Sherwood with phone number [phone].       bisacodyl (DULCOLAX) 10 MG suppository Place 1 suppository (10 mg) rectally daily as needed for constipation, Disp-5 suppository, R-0, E-PrescribeFuture refills by PCP Dr. Jeffery Sherwood with " phone number [phone].       bisacodyl (DULCOLAX) 5 MG EC tablet Take 2 tablets (10 mg) by mouth daily as needed for constipation, Disp-15 tablet, R-0, E-PrescribeFuture refills by PCP Dr. Jeffery Sherwood with phone number [phone].       cyclobenzaprine (FLEXERIL) 5 MG tablet Take 1 tablet (5 mg) by mouth 3 times daily as needed for muscle spasms, Disp-42 tablet, R-0, E-PrescribeFuture refills by PCP Dr. Jeffery Sherwood with phone number [phone].       senna-docusate (SENOKOT-S/PERICOLACE) 8.6-50 MG tablet Take 2 tablets by mouth 2 times daily, Disp-120 tablet, R-0, E-Prescribe

## 2019-02-18 NOTE — PROGRESS NOTES
"Clinic Care Coordination Contact  Chart review    Situation: Patient was hospitalized at [facility] from 2/14/19 to 2/15/19 with diagnoses of acute on chronic back pain; abdominal pain; constipation; and history of atrial fibrillation, hypothyroidism and COPD.      Background: As per ED provider's note: \"Patient has known chronic low back issues and a recent T12 vertebral fracture.  She is roughly 2 months out from the diagnosis. (...) I suspect some muscle spasm and not a new or acute fracture. Scheduling Tylenol may help and will then continue with p.r.n. Dilaudid with goal to wean down at home. Start Flexeril to help with muscle spasms.(...)\"    Patient was discharged home with pain level of 4/10. She also had relief of abdominal pain, after enema and small bowel movement    Assessment: Patient and patient's daughter had already been contacted today by Clinic RN to follow up on patient's discharge, instructions and questions/concerns. RN Care Coordinator made a decision to perform chart review only.   RN Care Coordinator engaged in AIDET communication on previous encounter with patient's daughter on 1/2/2019 and Care Coordination services were declined by her.      Plan/Recommendations: RN Care Coordinator will mail  Care Coordination Introduction Letter, Access Care Plan and current medication list to patient. No further outreaches will be made at this time, unless a new referral is made or a change in the pt's status occurs. Patient was provided with this writer's contact information and encouraged to call with any questions or concerns.    Zoe Sims RN Care Coordinator  [facility]  Phone:  [phone] (Mondays, Wednesdays & Fridays)  Phone:  [phone] (Tuesdays & Thursdays)  Email: [email or web address]        "

## 2019-02-18 NOTE — TELEPHONE ENCOUNTER
Left-Sided Thoracic Back Pain, Unspecified Chronicity, Slow Transit Constipation 02/17/2019 6 mo ED/IP 0/3  [phone] (home)

## 2019-02-18 NOTE — LETTER
[facility]IN[facility]  [address]  [place] [address]    February 18, 2019    Lindsey [facility]ez [facility]  [address]  [place] [address]      Dear Lindsey,    I am a Clinic Care Coordinator who works with Jeffery Sherwood MD at the [facility]. I have talked to your daughter Oxana after your January 2, 2019 visit to the Emergency Department. Today, when I was about to do it, I noticed another Clinic RN had already contacted you, so I elected to not reach out again. Still, I wanted to provide the two of you with my contact information. I do not believe I did that by regular mail last time. Below is a description of clinic care coordination and how we can further assist you.     The clinic care coordinator is a registered nurse and/or  who understand the health care system. The goal of clinic care coordination is to help you manage your health and improve access to the [facility] system in the most efficient manner. The registered nurse can assist you in meeting your health care goals by providing education, coordinating services, and strengthening the communication among your providers. The  can assist you with financial, behavioral, psychosocial, chemical dependency, counseling, and/or psychiatric resources.    Please feel free to contact me with any questions or concerns. We at [facility] are focused on providing you with the highest-quality healthcare experience possible and that all starts with you.     Sincerely,     Zoe Sims RN Care Coordinator  [facility]  Phone:  [phone] (Mondays, Wednesdays & Fridays)  Phone:  [phone] (Tuesdays & Thursdays)  Email: [email or web address]    Enclosed: I have enclosed a copy of a 24 Hour Access Plan. This has helpful phone numbers for you to call when needed. Please keep this in an easy to access place to use as needed.

## 2019-02-18 NOTE — TELEPHONE ENCOUNTER
Daughter called back to speak with RNs after receiving message. She is with mother, Lindsey until 3:30 today. Lindsey is hard of hearing.     Callback to [phone]

## 2019-02-20 ENCOUNTER — TELEPHONE (OUTPATIENT)
Dept: FAMILY MEDICINE | Facility: CLINIC | Age: 84
End: 2019-02-20

## 2019-02-20 NOTE — TELEPHONE ENCOUNTER
Reason for Call: Request for an order or referral:    Order or referral being requested: NURSING 2 X A WK FOR 2 WKS, 1 X A WK FOR 2 WKS, 2 PRN  HOME HEALTH AIDE 1 X A WK THIS WK, 2 X A WK FOR 4 WKS    Date needed: as soon as possible    Has the patient been seen by the PCP for this problem? YES    Additional comments:     Phone number Patient can be reached at:  Other phone number:    [phone]  TATY  Best Time:      Can we leave a detailed message on this number?  YES    Call taken on 2/20/2019 at 11:49 AM by Luis Cameron

## 2019-03-05 ENCOUNTER — ANCILLARY PROCEDURE (OUTPATIENT)
Dept: CARDIOLOGY | Facility: CLINIC | Age: 84
End: 2019-03-05
Attending: INTERNAL MEDICINE
Payer: MEDICARE

## 2019-03-05 DIAGNOSIS — I49.5 SICK SINUS SYNDROME (H): ICD-10-CM

## 2019-03-05 PROCEDURE — 93294 REM INTERROG EVL PM/LDLS PM: CPT | Performed by: INTERNAL MEDICINE

## 2019-03-05 PROCEDURE — 93296 REM INTERROG EVL PM/IDS: CPT | Performed by: INTERNAL MEDICINE

## 2019-03-06 ENCOUNTER — INFUSION THERAPY VISIT (OUTPATIENT)
Dept: INFUSION THERAPY | Facility: CLINIC | Age: 84
End: 2019-03-06
Attending: INTERNAL MEDICINE
Payer: MEDICARE

## 2019-03-06 VITALS
TEMPERATURE: 97.3 F | DIASTOLIC BLOOD PRESSURE: 71 MMHG | SYSTOLIC BLOOD PRESSURE: 129 MMHG | RESPIRATION RATE: 16 BRPM | WEIGHT: 148.8 LBS | HEART RATE: 68 BPM | OXYGEN SATURATION: 98 % | BODY MASS INDEX: 29.06 KG/M2

## 2019-03-06 DIAGNOSIS — M85.859 OSTEOPENIA OF HIP, UNSPECIFIED LATERALITY: ICD-10-CM

## 2019-03-06 DIAGNOSIS — S22.080A T12 COMPRESSION FRACTURE (H): Primary | ICD-10-CM

## 2019-03-06 LAB
MDC_IDC_EPISODE_DTM: NORMAL
MDC_IDC_EPISODE_DURATION: 590 S
MDC_IDC_EPISODE_ID: NORMAL
MDC_IDC_EPISODE_TYPE: NORMAL
MDC_IDC_LEAD_IMPLANT_DT: NORMAL
MDC_IDC_LEAD_IMPLANT_DT: NORMAL
MDC_IDC_LEAD_LOCATION: NORMAL
MDC_IDC_LEAD_LOCATION: NORMAL
MDC_IDC_LEAD_MFG: NORMAL
MDC_IDC_LEAD_MFG: NORMAL
MDC_IDC_LEAD_MODEL: NORMAL
MDC_IDC_LEAD_MODEL: NORMAL
MDC_IDC_LEAD_POLARITY_TYPE: NORMAL
MDC_IDC_LEAD_POLARITY_TYPE: NORMAL
MDC_IDC_LEAD_SERIAL: NORMAL
MDC_IDC_LEAD_SERIAL: NORMAL
MDC_IDC_MSMT_BATTERY_DTM: NORMAL
MDC_IDC_MSMT_BATTERY_REMAINING_LONGEVITY: 59 MO
MDC_IDC_MSMT_BATTERY_REMAINING_PERCENTAGE: 51 %
MDC_IDC_MSMT_BATTERY_RRT_TRIGGER: NORMAL
MDC_IDC_MSMT_BATTERY_STATUS: NORMAL
MDC_IDC_MSMT_BATTERY_VOLTAGE: 2.87 V
MDC_IDC_MSMT_LEADCHNL_RA_IMPEDANCE_VALUE: 410 OHM
MDC_IDC_MSMT_LEADCHNL_RA_LEAD_CHANNEL_STATUS: NORMAL
MDC_IDC_MSMT_LEADCHNL_RA_PACING_THRESHOLD_AMPLITUDE: 1.12 V
MDC_IDC_MSMT_LEADCHNL_RA_PACING_THRESHOLD_PULSEWIDTH: 0.5 MS
MDC_IDC_MSMT_LEADCHNL_RA_SENSING_INTR_AMPL: 1.9 MV
MDC_IDC_MSMT_LEADCHNL_RV_IMPEDANCE_VALUE: 530 OHM
MDC_IDC_MSMT_LEADCHNL_RV_LEAD_CHANNEL_STATUS: NORMAL
MDC_IDC_MSMT_LEADCHNL_RV_PACING_THRESHOLD_AMPLITUDE: 0.75 V
MDC_IDC_MSMT_LEADCHNL_RV_PACING_THRESHOLD_PULSEWIDTH: 0.5 MS
MDC_IDC_MSMT_LEADCHNL_RV_SENSING_INTR_AMPL: 12 MV
MDC_IDC_PG_IMPLANT_DTM: NORMAL
MDC_IDC_PG_MFG: NORMAL
MDC_IDC_PG_MODEL: NORMAL
MDC_IDC_PG_SERIAL: NORMAL
MDC_IDC_PG_TYPE: NORMAL
MDC_IDC_SESS_CLINIC_NAME: NORMAL
MDC_IDC_SESS_DTM: NORMAL
MDC_IDC_SESS_REPROGRAMMED: NO
MDC_IDC_SESS_TYPE: NORMAL
MDC_IDC_SET_BRADY_AT_MODE_SWITCH_MODE: NORMAL
MDC_IDC_SET_BRADY_AT_MODE_SWITCH_RATE: 180 {BEATS}/MIN
MDC_IDC_SET_BRADY_LOWRATE: 60 {BEATS}/MIN
MDC_IDC_SET_BRADY_MAX_SENSOR_RATE: 120 {BEATS}/MIN
MDC_IDC_SET_BRADY_MAX_TRACKING_RATE: 120 {BEATS}/MIN
MDC_IDC_SET_BRADY_MODE: NORMAL
MDC_IDC_SET_BRADY_PAV_DELAY_HIGH: 100 MS
MDC_IDC_SET_BRADY_PAV_DELAY_LOW: 275 MS
MDC_IDC_SET_BRADY_SAV_DELAY_HIGH: 100 MS
MDC_IDC_SET_BRADY_SAV_DELAY_LOW: 250 MS
MDC_IDC_SET_LEADCHNL_RA_PACING_AMPLITUDE: 2.12
MDC_IDC_SET_LEADCHNL_RA_PACING_ANODE_ELECTRODE_1: NORMAL
MDC_IDC_SET_LEADCHNL_RA_PACING_ANODE_LOCATION_1: NORMAL
MDC_IDC_SET_LEADCHNL_RA_PACING_CAPTURE_MODE: NORMAL
MDC_IDC_SET_LEADCHNL_RA_PACING_CATHODE_ELECTRODE_1: NORMAL
MDC_IDC_SET_LEADCHNL_RA_PACING_CATHODE_LOCATION_1: NORMAL
MDC_IDC_SET_LEADCHNL_RA_PACING_POLARITY: NORMAL
MDC_IDC_SET_LEADCHNL_RA_PACING_PULSEWIDTH: 0.5 MS
MDC_IDC_SET_LEADCHNL_RA_SENSING_ADAPTATION_MODE: NORMAL
MDC_IDC_SET_LEADCHNL_RA_SENSING_ANODE_ELECTRODE_1: NORMAL
MDC_IDC_SET_LEADCHNL_RA_SENSING_ANODE_LOCATION_1: NORMAL
MDC_IDC_SET_LEADCHNL_RA_SENSING_CATHODE_ELECTRODE_1: NORMAL
MDC_IDC_SET_LEADCHNL_RA_SENSING_CATHODE_LOCATION_1: NORMAL
MDC_IDC_SET_LEADCHNL_RA_SENSING_POLARITY: NORMAL
MDC_IDC_SET_LEADCHNL_RA_SENSING_SENSITIVITY: 0.5 MV
MDC_IDC_SET_LEADCHNL_RV_PACING_AMPLITUDE: 2 V
MDC_IDC_SET_LEADCHNL_RV_PACING_ANODE_ELECTRODE_1: NORMAL
MDC_IDC_SET_LEADCHNL_RV_PACING_ANODE_LOCATION_1: NORMAL
MDC_IDC_SET_LEADCHNL_RV_PACING_CAPTURE_MODE: NORMAL
MDC_IDC_SET_LEADCHNL_RV_PACING_CATHODE_ELECTRODE_1: NORMAL
MDC_IDC_SET_LEADCHNL_RV_PACING_CATHODE_LOCATION_1: NORMAL
MDC_IDC_SET_LEADCHNL_RV_PACING_POLARITY: NORMAL
MDC_IDC_SET_LEADCHNL_RV_PACING_PULSEWIDTH: 0.5 MS
MDC_IDC_SET_LEADCHNL_RV_SENSING_ADAPTATION_MODE: NORMAL
MDC_IDC_SET_LEADCHNL_RV_SENSING_ANODE_ELECTRODE_1: NORMAL
MDC_IDC_SET_LEADCHNL_RV_SENSING_ANODE_LOCATION_1: NORMAL
MDC_IDC_SET_LEADCHNL_RV_SENSING_CATHODE_ELECTRODE_1: NORMAL
MDC_IDC_SET_LEADCHNL_RV_SENSING_CATHODE_LOCATION_1: NORMAL
MDC_IDC_SET_LEADCHNL_RV_SENSING_POLARITY: NORMAL
MDC_IDC_SET_LEADCHNL_RV_SENSING_SENSITIVITY: 2 MV
MDC_IDC_STAT_AT_BURDEN_PERCENT: 1 %
MDC_IDC_STAT_AT_DTM_END: NORMAL
MDC_IDC_STAT_AT_DTM_START: NORMAL
MDC_IDC_STAT_AT_MODE_SW_COUNT: 1
MDC_IDC_STAT_AT_MODE_SW_COUNT_PER_DAY: 0
MDC_IDC_STAT_AT_MODE_SW_MAX_DURATION: 590 S
MDC_IDC_STAT_AT_MODE_SW_PERCENT_TIME: 1 %
MDC_IDC_STAT_BRADY_AP_VP_PERCENT: 57 %
MDC_IDC_STAT_BRADY_AP_VS_PERCENT: 1 %
MDC_IDC_STAT_BRADY_AS_VP_PERCENT: 43 %
MDC_IDC_STAT_BRADY_AS_VS_PERCENT: 1 %
MDC_IDC_STAT_BRADY_DTM_END: NORMAL
MDC_IDC_STAT_BRADY_DTM_START: NORMAL
MDC_IDC_STAT_BRADY_RA_PERCENT_PACED: 56 %
MDC_IDC_STAT_BRADY_RV_PERCENT_PACED: 99 %
MDC_IDC_STAT_CRT_DTM_END: NORMAL
MDC_IDC_STAT_CRT_DTM_START: NORMAL
MDC_IDC_STAT_HEART_RATE_ATRIAL_MAX: 290 {BEATS}/MIN
MDC_IDC_STAT_HEART_RATE_ATRIAL_MEAN: 68 {BEATS}/MIN
MDC_IDC_STAT_HEART_RATE_ATRIAL_MIN: 40 {BEATS}/MIN
MDC_IDC_STAT_HEART_RATE_DTM_END: NORMAL
MDC_IDC_STAT_HEART_RATE_DTM_START: NORMAL
MDC_IDC_STAT_HEART_RATE_VENTRICULAR_MAX: 170 {BEATS}/MIN
MDC_IDC_STAT_HEART_RATE_VENTRICULAR_MEAN: 68 {BEATS}/MIN
MDC_IDC_STAT_HEART_RATE_VENTRICULAR_MIN: 40 {BEATS}/MIN

## 2019-03-06 PROCEDURE — 25000128 H RX IP 250 OP 636: Performed by: INTERNAL MEDICINE

## 2019-03-06 PROCEDURE — 96365 THER/PROPH/DIAG IV INF INIT: CPT

## 2019-03-06 RX ORDER — ZOLEDRONIC ACID 5 MG/100ML
5 INJECTION, SOLUTION INTRAVENOUS ONCE
Status: COMPLETED | OUTPATIENT
Start: 2019-03-06 | End: 2019-03-06

## 2019-03-06 RX ORDER — ZOLEDRONIC ACID 5 MG/100ML
5 INJECTION, SOLUTION INTRAVENOUS ONCE
Status: CANCELLED
Start: 2019-03-06 | End: 2019-03-06

## 2019-03-06 RX ADMIN — ZOLEDRONIC ACID 5 MG: 0.05 INJECTION, SOLUTION INTRAVENOUS at 13:47

## 2019-03-06 ASSESSMENT — PAIN SCALES - GENERAL: PAINLEVEL: MODERATE PAIN (5)

## 2019-03-06 NOTE — PROGRESS NOTES
Infusion Nursing Note:  Lindsey Hale presents today for Reclast, labs drawn prior.    Patient seen by provider today: No   present during visit today: Not Applicable.    Note: N/A.    Intravenous Access:  Peripheral IV placed.    Treatment Conditions:  Lab Results   Component Value Date     02/14/2019                   Lab Results   Component Value Date    POTASSIUM 4.1 02/14/2019           No results found for: MAG         Lab Results   Component Value Date    CR 0.85 02/14/2019                   Lab Results   Component Value Date    FRANCISCO 9.2 02/14/2019                Lab Results   Component Value Date    BILITOTAL 1.0 02/14/2019           Lab Results   Component Value Date    ALBUMIN 3.3 02/14/2019                    Lab Results   Component Value Date    ALT 13 02/14/2019           Lab Results   Component Value Date    AST 13 02/14/2019       Results reviewed, labs MET treatment parameters, ok to proceed with treatment.      Post Infusion Assessment:  Patient tolerated infusion without incident.  Site patent and intact, free from redness, edema or discomfort.  No evidence of extravasations.  Access discontinued per protocol.    Discharge Plan:   Patient discharged in stable condition accompanied by: self.  Departure Mode: Ambulatory.    Isaura Cheung RN

## 2019-03-14 DIAGNOSIS — E03.9 HYPOTHYROIDISM, UNSPECIFIED TYPE: ICD-10-CM

## 2019-03-14 NOTE — TELEPHONE ENCOUNTER
"Pending Prescriptions:                       Disp   Refills    levothyroxine (SYNTHROID/LEVOTHROID) 75 M*90 tab*0            Sig: TAKE 1 TABLET(75 MCG) BY MOUTH DAILY    Last Written Prescription Date:  11/12/18  Last Fill Quantity: 90,  # refills: 0   Last office visit: 1/31/2019 with prescribing provider:     Future Office Visit:    Requested Prescriptions   Pending Prescriptions Disp Refills     levothyroxine (SYNTHROID/LEVOTHROID) 75 MCG tablet [Pharmacy Med Name: LEVOTHYROXINE 0.075MG (75MCG) TABS] 90 tablet 0     Sig: TAKE 1 TABLET(75 MCG) BY MOUTH DAILY    Thyroid Protocol Passed - 3/14/2019 11:59 AM       Passed - Patient is 12 years or older       Passed - Recent (12 mo) or future (30 days) visit within the authorizing provider's specialty    Patient had office visit in the last 12 months or has a visit in the next 30 days with authorizing provider or within the authorizing provider's specialty.  See \"Patient Info\" tab in inbasket, or \"Choose Columns\" in Meds & Orders section of the refill encounter.             Passed - Medication is active on med list       Passed - Normal TSH on file in past 12 months    Recent Labs   Lab Test 04/03/18  0859   TSH 0.53             Passed - No active pregnancy on record    If patient is pregnant or has had a positive pregnancy test, please check TSH.         Passed - No positive pregnancy test in past 12 months    If patient is pregnant or has had a positive pregnancy test, please check TSH.            "

## 2019-03-15 RX ORDER — LEVOTHYROXINE SODIUM 75 UG/1
TABLET ORAL
Qty: 90 TABLET | Refills: 3 | Status: SHIPPED | OUTPATIENT
Start: 2019-03-15 | End: 2020-01-21

## 2019-04-01 DIAGNOSIS — I10 BENIGN ESSENTIAL HYPERTENSION: ICD-10-CM

## 2019-04-01 DIAGNOSIS — I48.91 ATRIAL FIBRILLATION, UNSPECIFIED TYPE (H): ICD-10-CM

## 2019-04-01 NOTE — TELEPHONE ENCOUNTER
"metoprolol tartrate (LOPRESSOR) 50 MG tablet    Last Written Prescription Date:  11/12/2018  Last Fill Quantity: 270,  # refills: 0   Last office visit: 1/31/2019 with prescribing provider:  Kaela   Future Office Visit:  04/30/2019    Requested Prescriptions   Pending Prescriptions Disp Refills     metoprolol tartrate (LOPRESSOR) 50 MG tablet [Pharmacy Med Name: METOPROLOL TARTRATE 50MG TABLETS] 270 tablet 0     Sig: TAKE 1 AND 1/2 TABLETS BY MOUTH TWICE DAILY    Beta-Blockers Protocol Passed - 4/1/2019  2:53 PM       Passed - Blood pressure under 140/90 in past 12 months    BP Readings from Last 3 Encounters:   03/06/19 129/71   02/15/19 133/49   01/31/19 142/69                Passed - Patient is age 6 or older       Passed - Recent (12 mo) or future (30 days) visit within the authorizing provider's specialty    Patient had office visit in the last 12 months or has a visit in the next 30 days with authorizing provider or within the authorizing provider's specialty.  See \"Patient Info\" tab in inbasket, or \"Choose Columns\" in Meds & Orders section of the refill encounter.             Passed - Medication is active on med list          "

## 2019-04-03 DIAGNOSIS — I48.91 ATRIAL FIBRILLATION (H): ICD-10-CM

## 2019-04-03 RX ORDER — FLECAINIDE ACETATE 150 MG/1
TABLET ORAL
Qty: 90 TABLET | Refills: 3 | Status: SHIPPED | OUTPATIENT
Start: 2019-04-03 | End: 2020-01-21

## 2019-04-03 NOTE — TELEPHONE ENCOUNTER
Received refill request for:  Flecainide  Last OV was: 4/6/2018 with CARMEN Barrientos  Labs/EKG: last EKG 2/14/2019  F/U scheduled: 4/18/2019 with Dr. Vazquez  New script sent to: [facility]

## 2019-04-04 RX ORDER — METOPROLOL TARTRATE 50 MG
TABLET ORAL
Qty: 270 TABLET | Refills: 0 | Status: SHIPPED | OUTPATIENT
Start: 2019-04-04 | End: 2019-07-05

## 2019-04-04 NOTE — TELEPHONE ENCOUNTER
Prescription approved per [facility] [place].  Rasheeda VELIZ RN

## 2019-04-18 ENCOUNTER — OFFICE VISIT (OUTPATIENT)
Dept: CARDIOLOGY | Facility: CLINIC | Age: 84
End: 2019-04-18
Payer: MEDICARE

## 2019-04-18 VITALS
DIASTOLIC BLOOD PRESSURE: 71 MMHG | WEIGHT: 150 LBS | BODY MASS INDEX: 29.45 KG/M2 | HEIGHT: 60 IN | HEART RATE: 76 BPM | SYSTOLIC BLOOD PRESSURE: 130 MMHG

## 2019-04-18 DIAGNOSIS — I48.0 PAROXYSMAL ATRIAL FIBRILLATION (H): ICD-10-CM

## 2019-04-18 DIAGNOSIS — I49.5 SSS (SICK SINUS SYNDROME) (H): Primary | ICD-10-CM

## 2019-04-18 PROCEDURE — 99214 OFFICE O/P EST MOD 30 MIN: CPT | Performed by: INTERNAL MEDICINE

## 2019-04-18 ASSESSMENT — MIFFLIN-ST. JEOR: SCORE: 1006.9

## 2019-04-18 NOTE — PROGRESS NOTES
HPI and Plan:   See dictation    Orders Placed This Encounter   Procedures     Follow-Up with Cardiac Advanced Practice Provider     EKG 12-lead complete w/read - Clinics (to be scheduled)       No orders of the defined types were placed in this encounter.      There are no discontinued medications.      Encounter Diagnosis   Name Primary?     SSS (sick sinus syndrome) (H) Yes       CURRENT MEDICATIONS:  Current Outpatient Medications   Medication Sig Dispense Refill     bisacodyl (DULCOLAX) 10 MG suppository Place 1 suppository (10 mg) rectally daily as needed for constipation 5 suppository 0     bisacodyl (DULCOLAX) 5 MG EC tablet Take 2 tablets (10 mg) by mouth daily as needed for constipation 15 tablet 0     budesonide-formoterol (SYMBICORT) 160-4.5 MCG/ACT inhaler Inhale 2 puffs into the lungs 2 times daily        flecainide (TAMBOCOR) 150 MG tablet Take 1/2 tablet by mouth twice daily 90 tablet 3     Furosemide (LASIX PO) Take 20 mg by mouth daily as needed        HYDROmorphone (DILAUDID) 2 MG tablet Take 1 tablet (2 mg) by mouth every 4 hours as needed for severe pain 50 tablet 0     levothyroxine (SYNTHROID/LEVOTHROID) 75 MCG tablet TAKE 1 TABLET(75 MCG) BY MOUTH DAILY 90 tablet 3     metoprolol tartrate (LOPRESSOR) 50 MG tablet TAKE 1 AND 1/2 TABLETS BY MOUTH TWICE DAILY 270 tablet 0     mupirocin (BACTROBAN) 2 % external ointment Apply topically 2 times daily       ondansetron (ZOFRAN ODT) 4 MG ODT tab Take 1 tablet (4 mg) by mouth every 8 hours as needed for nausea 15 tablet 3     polyethylene glycol (MIRALAX/GLYCOLAX) packet Take 1 packet by mouth daily       rivaroxaban ANTICOAGULANT (XARELTO) 15 MG TABS tablet Take 15 mg by mouth daily (with dinner)         ALLERGIES     Allergies   Allergen Reactions     Clindamycin Hcl Other (See Comments)     Difficulty swallowing     Ampicillin Potassium      Rash nausea and vomiting       Celebrex [Celecoxib]      rash     Ciprofloxacin      rash     Erythromycin       rash     Indocin      Ended up going to( E.) hospitals with severe head ache     Penicillins      Rash,nausea and vomiting     Vibramycin [Doxycycline Hyclate]      Rash      Xylocaine-Epinephrine [Epinephrine-Lidocaine-Na Metabisulfite]      Xylocaine with epi caused a rapid heart beat       PAST MEDICAL HISTORY:  Past Medical History:   Diagnosis Date     Abnormal echocardiogram 04/2017    mild mr, ar, mitral stenosis, nl ef, lvh.  Done for episode of vision change     Arthritis 89         Atrial fibrillation (H) 2011 and 2009    neg nuc est 2009, Dr. Vazquez, on coumadin     Chest pain 2000    neg est echo, neg ct chest abd, pelvis [employer]     Chronic LBP      COPD (chronic obstructive pulmonary disease) (H)     seen by pulmonary md Dr. Whitt, and given inhaler     Cysts, breast 1980s    bilat mastectomies     Dizzy 2007    ct neg, carotid us neg     Dysphagia 2005    egd nl     Elevated blood sugar      Environmental allergies      Hematuria 2004    neg cysto and us     Hemoptyses 2008    ct neg, bronch neg 2009     Hyperlipidemia      Hypertension      Hypothyroid 1995    Dr. Ortiz     Lumbar spinal stenosis 12/2018    seen on ct done for lbp     Mitral regurgitation 6/15    on echo     nausea 2006    ct abd and ;pelvis neg     Osteopenia     fu dexa better 2011     Pacemaker 2011    afib with pauses and syncope     Palpitations 2009    neg est     PMR (polymyalgia rheumatica) (H) 2004    not active in years     SOB (shortness of breath) 5/15    echo nl ef, 2+mr, cxr clear, seen by pulm and given inhaler     Supraventricular premature beats      T12 compression fracture (H) 12/2018    non traumatic     TIA (transient ischaemic attack) 2009    carotids neg, mri neg     Treadmill stress test negative for angina pectoris 2011    nuclear est     Urine incontinence     Dr. Westfall     Urosepsis 2009    hospitalized     Vision changes 2011    eval by neuro and ophtho and no cause found       PAST  SURGICAL HISTORY:  Past Surgical History:   Procedure Laterality Date     ARTHROPLASTY HIP Left 2017    Procedure: ARTHROPLASTY HIP;  LEFT HIP ARBEN ARTHROPLASTY(SORAYA);  Surgeon: Darell Nathan MD;  Location: [facility] OR     ARTHROPLASTY PATELLO-FEMORAL (KNEE)   and      ARTHROSCOPY KNEE  1997     BIOPSY BREAST Right 2014    Procedure: BIOPSY BREAST;  Surgeon: David Carter MD;  Location:  [facility]     breast implants      4x     cataract  2011     FOOT SURGERY       MASTECTOMY      bilat, cysts     [place] not bso  70's    not for ca       FAMILY HISTORY:  Family History   Problem Relation Age of Onset     C.A.D. Father      Lymphoma Father      Cancer Mother      Lymphoma Brother      Breast Cancer Sister      Osteoporosis Sister      Myocardial Infarction Sister      Unknown/Adopted Maternal Grandmother      Unknown/Adopted Maternal Grandfather      Unknown/Adopted Paternal Grandmother      Unknown/Adopted Paternal Grandfather        SOCIAL HISTORY:  Social History     Socioeconomic History     Marital status:      Spouse name: None     Number of children: 4     Years of education: None     Highest education level: None   Occupational History     None   Social Needs     Financial resource strain: None     Food insecurity:     Worry: None     Inability: None     Transportation needs:     Medical: None     Non-medical: None   Tobacco Use     Smoking status: Former Smoker     Types: Cigarettes     Last attempt to quit: 1955     Years since quittin.2     Smokeless tobacco: Never Used     Tobacco comment: quit in    had smoked about 2 cigarettes a day or more   Substance and Sexual Activity     Alcohol use: Yes     Alcohol/week: 0.0 oz     Comment: occ wine     Drug use: No     Sexual activity: Not Currently   Lifestyle     Physical activity:     Days per week: None     Minutes per session: None     Stress: None   Relationships     Social connections:     Talks on phone:  None     Gets together: None     Attends [employer] service: None     Active member of club or organization: None     Attends meetings of clubs or organizations: None     Relationship status: None     Intimate partner violence:     Fear of current or ex partner: None     Emotionally abused: None     Physically abused: None     Forced sexual activity: None   Other Topics Concern     Parent/sibling w/ CABG, MI or angioplasty before 65F 55M? Not Asked      Service Not Asked     Blood Transfusions Not Asked     Caffeine Concern No     Comment: coffee: 1 cup a week.  Occ green tea     Occupational Exposure Not Asked     Hobby Hazards Not Asked     Sleep Concern No     Stress Concern No     Weight Concern No     Special Diet No     Back Care Not Asked     Exercise Yes     Comment: walking daily     Bike Helmet Not Asked     Seat Belt Yes     Self-Exams Not Asked   Social History Narrative     None       Review of Systems:  Skin:  Negative       Eyes:  Positive for glasses    ENT:  not assessed      Respiratory:  Negative       Cardiovascular:  Negative      Gastroenterology: Negative      Genitourinary:  Negative      Musculoskeletal:  Positive for arthritis    Neurologic:  Negative      Psychiatric:  Negative      Heme/Lymph/Imm:  Positive for allergies    Endocrine:  Positive for thyroid disorder      Ph         [id number]

## 2019-04-18 NOTE — LETTER
"4/18/2019      Jeffery Sherwood MD  [address] [facility] [address]  [place] [address]      RE: Lindsey Hlae       Dear Colleague,    I had the pleasure of seeing Lindsey Hale in the [facility].    Service Date: 04/18/2019      HISTORY OF PRESENT ILLNESS:    It is my pleasure seeing Ms. Lindsey Hale, a delightful 89-year-old female with the following cardiac/medical issues:   A.  Sick sinus syndrome with pacemaker implantation in 2011 ([facility]).   B.  Paroxysmal atrial fibrillation.  Treated with flecainide for rhythm control, metoprolol and rivaroxaban.   C.  Normal LV function with mild valvular abnormalities by echocardiography in 2017.   D.  Chronic lung disease.  Previous use of amiodarone which was stopped because of decline in PFTs.   E.  Osteoporosis with hip fracture in 2017 and T12 vertebral fracture in 02/2019.      Isabel came to clinic accompanied by her daughter.  She still lives independently.  Unfortunately, she suffered a spontaneous T12 compression fracture in February which led to hospitalization.  She had severe pain for weeks.  She is slowly getting better.  She was unable to tolerate Fosamax because the \"pill was too large.\"      She has not had issues with her heart that she knows of.  No chest pain, palpitation or unusual dyspnea.  Her blood pressure has been under good control.      PHYSICAL EXAMINATION:   VITAL SIGNS:  Blood pressure 130/71, pulse 76 and regular, weight 68 kg, height 152 cm.   GENERAL:  She is a pleasant, elderly woman in no distress.   HEENT:  Normocephalic and atraumatic.  She wears glasses.   NECK:  Supple with 1+ carotid pulses.  No clear bruits.   LUNGS:  With mildly decreased breath sounds.   CARDIOVASCULAR:  Nicely healed left pectoral incision.  Regular rhythm with a 1/6 systolic ejection murmur at the base.   ABDOMEN:  Obese, soft, nontender.   EXTREMITIES:  Trace edema.   SKIN:  With multiple seborrheic keratoses lesions.    "   DIAGNOSTIC STUDIES:    Most recent 12-lead ECG on 2019 showed sinus rhythm with PACs and V-pacing.  I cannot exclude coarse atrial fibrillation.    Most recent Device Clinic interrogation on 2019 showed normal device function, estimated longevity of 5 years.  Only 1 mode switching episode lasting about 10 minutes.        IMPRESSION:   1.  Sick sinus syndrome with paroxysmal atrial fibrillation (tachycardia/bradycardia syndrome).  Isabel is  appropriately anticoagulated with rivaroxaban.  Her pacemaker is functioning well.  She is asymptomatic during rare occurrences of atrial fibrillation.  She has stayed in normal rhythm for the most part on low-dose flecainide.  No changes are necessary.   2.  Valvular lesions.  She had mild to moderate valvular abnormalities on her echocardiogram in 2017.  She asked me whether she needs a repeat echocardiogram.  I do not think this is necessary given her of age 89 and the absence of symptoms.   3.  Hypertension.  She was normotensive today.      RECOMMENDATIONS:     A.  Continue current medications.   B.  See Zaina in the clinic in 1 year with 12-lead ECG.   C.  Continue routine Device Clinic checks.     Time spent was 25 minutes.  More than 50% of time was discussion and counseling.         TORI JULIEN MD, [facility]         cc:   [place] [address]             D: 2019   T: 2019   MT: RONALD      Name:     CASIMIRO PRIEST   MRN:      [id number]        Account:      [id number]   :      1925           Service Date: 2019      Document: [id number]           Outpatient Encounter Medications as of 2019   Medication Sig Dispense Refill     bisacodyl (DULCOLAX) 10 MG suppository Place 1 suppository (10 mg) rectally daily as needed for constipation 5 suppository 0     bisacodyl (DULCOLAX) 5 MG EC tablet Take 2 tablets (10 mg) by mouth daily as needed for  constipation 15 tablet 0     budesonide-formoterol (SYMBICORT) 160-4.5 MCG/ACT inhaler Inhale 2 puffs into the lungs 2 times daily        flecainide (TAMBOCOR) 150 MG tablet Take 1/2 tablet by mouth twice daily 90 tablet 3     Furosemide (LASIX PO) Take 20 mg by mouth daily as needed        HYDROmorphone (DILAUDID) 2 MG tablet Take 1 tablet (2 mg) by mouth every 4 hours as needed for severe pain 50 tablet 0     levothyroxine (SYNTHROID/LEVOTHROID) 75 MCG tablet TAKE 1 TABLET(75 MCG) BY MOUTH DAILY 90 tablet 3     metoprolol tartrate (LOPRESSOR) 50 MG tablet TAKE 1 AND 1/2 TABLETS BY MOUTH TWICE DAILY 270 tablet 0     mupirocin (BACTROBAN) 2 % external ointment Apply topically 2 times daily       ondansetron (ZOFRAN ODT) 4 MG ODT tab Take 1 tablet (4 mg) by mouth every 8 hours as needed for nausea 15 tablet 3     polyethylene glycol (MIRALAX/GLYCOLAX) packet Take 1 packet by mouth daily       rivaroxaban ANTICOAGULANT (XARELTO) 15 MG TABS tablet Take 15 mg by mouth daily (with dinner)       [] acetaminophen (TYLENOL) 500 MG tablet Take 2 tablets (1,000 mg) by mouth 3 times daily for 14 days 84 tablet 0     [] cyclobenzaprine (FLEXERIL) 5 MG tablet Take 1 tablet (5 mg) by mouth 3 times daily as needed for muscle spasms 42 tablet 0     [] senna-docusate (SENOKOT-S/PERICOLACE) 8.6-50 MG tablet Take 2 tablets by mouth 2 times daily 120 tablet 0     No facility-administered encounter medications on file as of 2019.        Again, thank you for allowing me to participate in the care of your patient.      Sincerely,    Efra Vazquez MD     [facility]

## 2019-04-18 NOTE — LETTER
4/18/2019    Jeffery Sherwood MD  [address]  [place] [address]    RE: Lindsey Hale       Dear Colleague,    I had the pleasure of seeing Lindsey Hale in the [facility].    HPI and Plan:   See dictation    Orders Placed This Encounter   Procedures     Follow-Up with Cardiac Advanced Practice Provider     EKG 12-lead complete w/read - Clinics (to be scheduled)       No orders of the defined types were placed in this encounter.      There are no discontinued medications.      Encounter Diagnosis   Name Primary?     SSS (sick sinus syndrome) (H) Yes       CURRENT MEDICATIONS:  Current Outpatient Medications   Medication Sig Dispense Refill     bisacodyl (DULCOLAX) 10 MG suppository Place 1 suppository (10 mg) rectally daily as needed for constipation 5 suppository 0     bisacodyl (DULCOLAX) 5 MG EC tablet Take 2 tablets (10 mg) by mouth daily as needed for constipation 15 tablet 0     budesonide-formoterol (SYMBICORT) 160-4.5 MCG/ACT inhaler Inhale 2 puffs into the lungs 2 times daily        flecainide (TAMBOCOR) 150 MG tablet Take 1/2 tablet by mouth twice daily 90 tablet 3     Furosemide (LASIX PO) Take 20 mg by mouth daily as needed        HYDROmorphone (DILAUDID) 2 MG tablet Take 1 tablet (2 mg) by mouth every 4 hours as needed for severe pain 50 tablet 0     levothyroxine (SYNTHROID/LEVOTHROID) 75 MCG tablet TAKE 1 TABLET(75 MCG) BY MOUTH DAILY 90 tablet 3     metoprolol tartrate (LOPRESSOR) 50 MG tablet TAKE 1 AND 1/2 TABLETS BY MOUTH TWICE DAILY 270 tablet 0     mupirocin (BACTROBAN) 2 % external ointment Apply topically 2 times daily       ondansetron (ZOFRAN ODT) 4 MG ODT tab Take 1 tablet (4 mg) by mouth every 8 hours as needed for nausea 15 tablet 3     polyethylene glycol (MIRALAX/GLYCOLAX) packet Take 1 packet by mouth daily       rivaroxaban ANTICOAGULANT (XARELTO) 15 MG TABS tablet Take 15 mg by mouth daily (with dinner)         ALLERGIES      Allergies   Allergen Reactions     Clindamycin Hcl Other (See Comments)     Difficulty swallowing     Ampicillin Potassium      Rash nausea and vomiting       Celebrex [Celecoxib]      rash     Ciprofloxacin      rash     Erythromycin      rash     Indocin      Ended up going to( [facility] with severe head ache     Penicillins      Rash,nausea and vomiting     Vibramycin [Doxycycline Hyclate]      Rash      Xylocaine-Epinephrine [Epinephrine-Lidocaine-Na Metabisulfite]      Xylocaine with epi caused a rapid heart beat       PAST MEDICAL HISTORY:  Past Medical History:   Diagnosis Date     Abnormal echocardiogram 04/2017    mild mr, ar, mitral stenosis, nl ef, lvh.  Done for episode of vision change     Arthritis 99         Atrial fibrillation (H) 2011 and 2009    neg nuc est 2009, Dr. Vazquez, on coumadin     Chest pain 2000    neg est echo, neg ct chest abd, pelvis [facility]     Chronic LBP      COPD (chronic obstructive pulmonary disease) (H)     seen by pulmonary md Dr. Whitt, and given inhaler     Cysts, breast 1980s    bilat mastectomies     Dizzy 2007    ct neg, carotid us neg     Dysphagia 2005    egd nl     Elevated blood sugar      Environmental allergies      Hematuria 2004    neg cysto and us     Hemoptyses 2008    ct neg, bronch neg 2009     Hyperlipidemia      Hypertension      Hypothyroid 1995    Dr. Ortiz     Lumbar spinal stenosis 12/2018    seen on ct done for lbp     Mitral regurgitation 6/15    on echo     nausea 2006    ct abd and ;pelvis neg     Osteopenia     fu dexa better 2011     Pacemaker 2011    afib with pauses and syncope     Palpitations 2009    neg est     PMR (polymyalgia rheumatica) (H) 2004    not active in years     SOB (shortness of breath) 5/15    echo nl ef, 2+mr, cxr clear, seen by pulm and given inhaler     Supraventricular premature beats      T12 compression fracture (H) 12/2018    non traumatic     TIA (transient ischaemic attack) 2009    carotids neg, mri  neg     Treadmill stress test negative for angina pectoris     nuclear est     Urine incontinence     Dr. Westfall     Urosepsis     hospitalized     Vision changes 2011    eval by neuro and ophtho and no cause found       PAST SURGICAL HISTORY:  Past Surgical History:   Procedure Laterality Date     ARTHROPLASTY HIP Left 2017    Procedure: ARTHROPLASTY HIP;  LEFT HIP ARBEN ARTHROPLASTY(SORAYA);  Surgeon: Darell Nathan MD;  Location: [facility]     ARTHROPLASTY PATELLO-FEMORAL (KNEE)   and      ARTHROSCOPY KNEE  1997     BIOPSY BREAST Right 2014    Procedure: BIOPSY BREAST;  Surgeon: David Carter MD;  [facility]     breast implants      4x     cataract  2011     FOOT SURGERY       MASTECTOMY      bilat, cysts     [place] not bso  70's    not for ca       FAMILY HISTORY:  Family History   Problem Relation Age of Onset     C.A.D. Father      Lymphoma Father      Cancer Mother      Lymphoma Brother      Breast Cancer Sister      Osteoporosis Sister      Myocardial Infarction Sister      Unknown/Adopted Maternal Grandmother      Unknown/Adopted Maternal Grandfather      Unknown/Adopted Paternal Grandmother      Unknown/Adopted Paternal Grandfather        SOCIAL HISTORY:  Social History     Socioeconomic History     Marital status:      Spouse name: None     Number of children: 4     Years of education: None     Highest education level: None   Occupational History     None   Social Needs     Financial resource strain: None     Food insecurity:     Worry: None     Inability: None     Transportation needs:     Medical: None     Non-medical: None   Tobacco Use     Smoking status: Former Smoker     Types: Cigarettes     Last attempt to quit: 1955     Years since quittin.2     Smokeless tobacco: Never Used     Tobacco comment: quit in    had smoked about 2 cigarettes a day or more   Substance and Sexual Activity     Alcohol use: Yes     Alcohol/week: 0.0 oz      Comment: occ wine     Drug use: No     Sexual activity: Not Currently   Lifestyle     Physical activity:     Days per week: None     Minutes per session: None     Stress: None   Relationships     Social connections:     Talks on phone: None     Gets together: None     Attends Muslim service: None     Active member of club or organization: None     Attends meetings of clubs or organizations: None     Relationship status: None     Intimate partner violence:     Fear of current or ex partner: None     Emotionally abused: None     Physically abused: None     Forced sexual activity: None   Other Topics Concern     Parent/sibling w/ CABG, MI or angioplasty before 65F 55M? Not Asked      Service Not Asked     Blood Transfusions Not Asked     Caffeine Concern No     Comment: coffee: 1 cup a week.  Occ green tea     Occupational Exposure Not Asked     Hobby Hazards Not Asked     Sleep Concern No     Stress Concern No     Weight Concern No     Special Diet No     Back Care Not Asked     Exercise Yes     Comment: walking daily     Bike Helmet Not Asked     Seat Belt Yes     Self-Exams Not Asked   Social History Narrative     None       Review of Systems:  Skin:  Negative       Eyes:  Positive for glasses    ENT:  not assessed      Respiratory:  Negative       Cardiovascular:  Negative      Gastroenterology: Negative      Genitourinary:  Negative      Musculoskeletal:  Positive for arthritis    Neurologic:  Negative      Psychiatric:  Negative      Heme/Lymph/Imm:  Positive for allergies    Endocrine:  Positive for thyroid disorder      Ph         [id number]        Thank you for allowing me to participate in the care of your patient.      Sincerely,     Efra Vazquez MD     [facility]    cc:   No referring provider defined for this encounter.

## 2019-04-18 NOTE — PROGRESS NOTES
"Service Date: 04/18/2019      HISTORY OF PRESENT ILLNESS:    It is my pleasure seeing Ms. Lindsey Hale, a delightful 89-year-old female with the following cardiac/medical issues:   A.  Sick sinus syndrome with pacemaker implantation in 2011 ([facility]).   B.  Paroxysmal atrial fibrillation.  Treated with flecainide for rhythm control, metoprolol and rivaroxaban.   C.  Normal LV function with mild valvular abnormalities by echocardiography in 2017.   D.  Chronic lung disease.  Previous use of amiodarone which was stopped because of decline in PFTs.   E.  Osteoporosis with hip fracture in 2017 and T12 vertebral fracture in 02/2019.      Isabel came to clinic accompanied by her daughter.  She still lives independently.  Unfortunately, she suffered a spontaneous T12 compression fracture in February which led to hospitalization.  She had severe pain for weeks.  She is slowly getting better.  She was unable to tolerate Fosamax because the \"pill was too large.\"      She has not had issues with her heart that she knows of.  No chest pain, palpitation or unusual dyspnea.  Her blood pressure has been under good control.      PHYSICAL EXAMINATION:   VITAL SIGNS:  Blood pressure 130/71, pulse 76 and regular, weight 68 kg, height 152 cm.   GENERAL:  She is a pleasant, elderly woman in no distress.   HEENT:  Normocephalic and atraumatic.  She wears glasses.   NECK:  Supple with 1+ carotid pulses.  No clear bruits.   LUNGS:  With mildly decreased breath sounds.   CARDIOVASCULAR:  Nicely healed left pectoral incision.  Regular rhythm with a 1/6 systolic ejection murmur at the base.   ABDOMEN:  Obese, soft, nontender.   EXTREMITIES:  Trace edema.   SKIN:  With multiple seborrheic keratoses lesions.      DIAGNOSTIC STUDIES:    Most recent 12-lead ECG on 02/14/2019 showed sinus rhythm with PACs and V-pacing.  I cannot exclude coarse atrial fibrillation.    Most recent Device Clinic interrogation on 03/05/2019 showed normal device " function, estimated longevity of 5 years.  Only 1 mode switching episode lasting about 10 minutes.        IMPRESSION:   1.  Sick sinus syndrome with paroxysmal atrial fibrillation (tachycardia/bradycardia syndrome).  Isabel is  appropriately anticoagulated with rivaroxaban.  Her pacemaker is functioning well.  She is asymptomatic during rare occurrences of atrial fibrillation.  She has stayed in normal rhythm for the most part on low-dose flecainide.  No changes are necessary.   2.  Valvular lesions.  She had mild to moderate valvular abnormalities on her echocardiogram in 2017.  She asked me whether she needs a repeat echocardiogram.  I do not think this is necessary given her of age 89 and the absence of symptoms.   3.  Hypertension.  She was normotensive today.      RECOMMENDATIONS:     A.  Continue current medications.   B.  See Zaina in the clinic in 1 year with 12-lead ECG.   C.  Continue routine Device Clinic checks.     Time spent was 25 minutes.  More than 50% of time was discussion and counseling.         TORI JULIEN MD, FACC         cc:   [place] [address]             D: 2019   T: 2019   MT: RONALD      Name:     CASIMIRO PRISET   MRN:      [id number]        Account:      [id number]   :      1925           Service Date: 2019      Document: [id number]

## 2019-05-09 ENCOUNTER — APPOINTMENT (OUTPATIENT)
Dept: GENERAL RADIOLOGY | Facility: CLINIC | Age: 84
End: 2019-05-09
Attending: EMERGENCY MEDICINE
Payer: MEDICARE

## 2019-05-09 ENCOUNTER — OFFICE VISIT (OUTPATIENT)
Dept: FAMILY MEDICINE | Facility: CLINIC | Age: 84
End: 2019-05-09
Payer: MEDICARE

## 2019-05-09 ENCOUNTER — APPOINTMENT (OUTPATIENT)
Dept: CT IMAGING | Facility: CLINIC | Age: 84
End: 2019-05-09
Attending: EMERGENCY MEDICINE
Payer: MEDICARE

## 2019-05-09 ENCOUNTER — HOSPITAL ENCOUNTER (EMERGENCY)
Facility: CLINIC | Age: 84
Discharge: HOME | End: 2019-05-09
Attending: EMERGENCY MEDICINE | Admitting: EMERGENCY MEDICINE
Payer: MEDICARE

## 2019-05-09 ENCOUNTER — TELEPHONE (OUTPATIENT)
Dept: FAMILY MEDICINE | Facility: CLINIC | Age: 84
End: 2019-05-09

## 2019-05-09 VITALS
BODY MASS INDEX: 30.43 KG/M2 | TEMPERATURE: 99.4 F | WEIGHT: 155 LBS | OXYGEN SATURATION: 95 % | HEIGHT: 60 IN | RESPIRATION RATE: 18 BRPM | HEART RATE: 72 BPM | DIASTOLIC BLOOD PRESSURE: 57 MMHG | SYSTOLIC BLOOD PRESSURE: 125 MMHG

## 2019-05-09 VITALS
TEMPERATURE: 101.6 F | HEIGHT: 60 IN | SYSTOLIC BLOOD PRESSURE: 114 MMHG | DIASTOLIC BLOOD PRESSURE: 51 MMHG | OXYGEN SATURATION: 94 % | HEART RATE: 63 BPM | BODY MASS INDEX: 30.43 KG/M2 | WEIGHT: 155 LBS

## 2019-05-09 DIAGNOSIS — E11.8 TYPE 2 DIABETES MELLITUS WITH COMPLICATION, WITHOUT LONG-TERM CURRENT USE OF INSULIN (H): ICD-10-CM

## 2019-05-09 DIAGNOSIS — R50.9 ACUTE FEBRILE ILLNESS: Primary | ICD-10-CM

## 2019-05-09 DIAGNOSIS — I48.0 PAROXYSMAL ATRIAL FIBRILLATION (H): ICD-10-CM

## 2019-05-09 DIAGNOSIS — J18.9 PNEUMONIA OF RIGHT LOWER LOBE DUE TO INFECTIOUS ORGANISM: ICD-10-CM

## 2019-05-09 LAB
ALBUMIN SERPL-MCNC: 3.6 G/DL (ref 3.4–5)
ALBUMIN UR-MCNC: NEGATIVE MG/DL
ALP SERPL-CCNC: 54 U/L (ref 40–150)
ALT SERPL W P-5'-P-CCNC: 16 U/L (ref 0–50)
ANION GAP SERPL CALCULATED.3IONS-SCNC: 5 MMOL/L (ref 3–14)
APPEARANCE UR: CLEAR
AST SERPL W P-5'-P-CCNC: 13 U/L (ref 0–45)
BASOPHILS # BLD AUTO: 0 10E9/L (ref 0–0.2)
BASOPHILS # BLD AUTO: 0 10E9/L (ref 0–0.2)
BASOPHILS NFR BLD AUTO: 0 %
BASOPHILS NFR BLD AUTO: 0.2 %
BILIRUB SERPL-MCNC: 1.1 MG/DL (ref 0.2–1.3)
BILIRUB UR QL STRIP: NEGATIVE
BUN SERPL-MCNC: 22 MG/DL (ref 7–30)
CALCIUM SERPL-MCNC: 9 MG/DL (ref 8.5–10.1)
CHLORIDE SERPL-SCNC: 104 MMOL/L (ref 94–109)
CO2 SERPL-SCNC: 28 MMOL/L (ref 20–32)
COLOR UR AUTO: YELLOW
CREAT SERPL-MCNC: 0.78 MG/DL (ref 0.52–1.04)
DIFFERENTIAL METHOD BLD: ABNORMAL
DIFFERENTIAL METHOD BLD: ABNORMAL
EOSINOPHIL # BLD AUTO: 0 10E9/L (ref 0–0.7)
EOSINOPHIL # BLD AUTO: 0 10E9/L (ref 0–0.7)
EOSINOPHIL NFR BLD AUTO: 0 %
EOSINOPHIL NFR BLD AUTO: 0.1 %
ERYTHROCYTE [DISTWIDTH] IN BLOOD BY AUTOMATED COUNT: 13.9 % (ref 10–15)
ERYTHROCYTE [DISTWIDTH] IN BLOOD BY AUTOMATED COUNT: 13.9 % (ref 10–15)
GFR SERPL CREATININE-BSD FRML MDRD: 65 ML/MIN/{1.73_M2}
GLUCOSE SERPL-MCNC: 125 MG/DL (ref 70–99)
GLUCOSE UR STRIP-MCNC: NEGATIVE MG/DL
HBA1C MFR BLD: 5.7 % (ref 0–5.6)
HCT VFR BLD AUTO: 34.7 % (ref 35–47)
HCT VFR BLD AUTO: 35.3 % (ref 35–47)
HGB BLD-MCNC: 11.7 G/DL (ref 11.7–15.7)
HGB BLD-MCNC: 11.8 G/DL (ref 11.7–15.7)
HGB UR QL STRIP: NEGATIVE
IMM GRANULOCYTES # BLD: 0.2 10E9/L (ref 0–0.4)
IMM GRANULOCYTES NFR BLD: 0.7 %
KETONES UR STRIP-MCNC: NEGATIVE MG/DL
LACTATE BLD-SCNC: 1.5 MMOL/L (ref 0.7–2)
LEUKOCYTE ESTERASE UR QL STRIP: ABNORMAL
LYMPHOCYTES # BLD AUTO: 0.7 10E9/L (ref 0.8–5.3)
LYMPHOCYTES # BLD AUTO: 0.7 10E9/L (ref 0.8–5.3)
LYMPHOCYTES NFR BLD AUTO: 3 %
LYMPHOCYTES NFR BLD AUTO: 3 %
MCH RBC QN AUTO: 34.9 PG (ref 26.5–33)
MCH RBC QN AUTO: 35.5 PG (ref 26.5–33)
MCHC RBC AUTO-ENTMCNC: 33.4 G/DL (ref 31.5–36.5)
MCHC RBC AUTO-ENTMCNC: 33.7 G/DL (ref 31.5–36.5)
MCV RBC AUTO: 104 FL (ref 78–100)
MCV RBC AUTO: 106 FL (ref 78–100)
MONOCYTES # BLD AUTO: 1.5 10E9/L (ref 0–1.3)
MONOCYTES # BLD AUTO: 1.6 10E9/L (ref 0–1.3)
MONOCYTES NFR BLD AUTO: 6.5 %
MONOCYTES NFR BLD AUTO: 6.8 %
MUCOUS THREADS #/AREA URNS LPF: PRESENT /LPF
NEUTROPHILS # BLD AUTO: 20.3 10E9/L (ref 1.6–8.3)
NEUTROPHILS # BLD AUTO: 20.5 10E9/L (ref 1.6–8.3)
NEUTROPHILS NFR BLD AUTO: 89.8 %
NEUTROPHILS NFR BLD AUTO: 89.9 %
NITRATE UR QL: NEGATIVE
NRBC # BLD AUTO: 0 10*3/UL
NRBC BLD AUTO-RTO: 0 /100
PH UR STRIP: 5 PH (ref 5–7)
PLATELET # BLD AUTO: 278 10E9/L (ref 150–450)
PLATELET # BLD AUTO: 284 10E9/L (ref 150–450)
POTASSIUM SERPL-SCNC: 4.2 MMOL/L (ref 3.4–5.3)
PROT SERPL-MCNC: 7.5 G/DL (ref 6.8–8.8)
RBC # BLD AUTO: 3.32 10E12/L (ref 3.8–5.2)
RBC # BLD AUTO: 3.35 10E12/L (ref 3.8–5.2)
RBC #/AREA URNS AUTO: 1 /HPF (ref 0–2)
SODIUM SERPL-SCNC: 137 MMOL/L (ref 133–144)
SOURCE: ABNORMAL
SP GR UR STRIP: 1.02 (ref 1–1.03)
SQUAMOUS #/AREA URNS AUTO: <1 /HPF (ref 0–1)
UROBILINOGEN UR STRIP-MCNC: NORMAL MG/DL (ref 0–2)
WBC # BLD AUTO: 22.6 10E9/L (ref 4–11)
WBC # BLD AUTO: 22.8 10E9/L (ref 4–11)
WBC #/AREA URNS AUTO: 9 /HPF (ref 0–5)

## 2019-05-09 PROCEDURE — 36415 COLL VENOUS BLD VENIPUNCTURE: CPT | Performed by: INTERNAL MEDICINE

## 2019-05-09 PROCEDURE — 81001 URINALYSIS AUTO W/SCOPE: CPT | Performed by: EMERGENCY MEDICINE

## 2019-05-09 PROCEDURE — 99215 OFFICE O/P EST HI 40 MIN: CPT | Performed by: INTERNAL MEDICINE

## 2019-05-09 PROCEDURE — 87040 BLOOD CULTURE FOR BACTERIA: CPT | Mod: 91 | Performed by: INTERNAL MEDICINE

## 2019-05-09 PROCEDURE — 25000128 H RX IP 250 OP 636: Performed by: EMERGENCY MEDICINE

## 2019-05-09 PROCEDURE — 85025 COMPLETE CBC W/AUTO DIFF WBC: CPT | Performed by: INTERNAL MEDICINE

## 2019-05-09 PROCEDURE — 80053 COMPREHEN METABOLIC PANEL: CPT | Performed by: EMERGENCY MEDICINE

## 2019-05-09 PROCEDURE — 87086 URINE CULTURE/COLONY COUNT: CPT | Performed by: EMERGENCY MEDICINE

## 2019-05-09 PROCEDURE — 80048 BASIC METABOLIC PNL TOTAL CA: CPT | Performed by: INTERNAL MEDICINE

## 2019-05-09 PROCEDURE — 99285 EMERGENCY DEPT VISIT HI MDM: CPT | Mod: 25

## 2019-05-09 PROCEDURE — 25000125 ZZHC RX 250: Performed by: EMERGENCY MEDICINE

## 2019-05-09 PROCEDURE — 83605 ASSAY OF LACTIC ACID: CPT | Performed by: EMERGENCY MEDICINE

## 2019-05-09 PROCEDURE — 83036 HEMOGLOBIN GLYCOSYLATED A1C: CPT | Performed by: INTERNAL MEDICINE

## 2019-05-09 PROCEDURE — 74177 CT ABD & PELVIS W/CONTRAST: CPT

## 2019-05-09 PROCEDURE — A9270 NON-COVERED ITEM OR SERVICE: HCPCS | Performed by: EMERGENCY MEDICINE

## 2019-05-09 PROCEDURE — 85025 COMPLETE CBC W/AUTO DIFF WBC: CPT | Performed by: EMERGENCY MEDICINE

## 2019-05-09 PROCEDURE — 71046 X-RAY EXAM CHEST 2 VIEWS: CPT

## 2019-05-09 PROCEDURE — 25000132 ZZH RX MED GY IP 250 OP 250 PS 637: Performed by: EMERGENCY MEDICINE

## 2019-05-09 RX ORDER — CEFDINIR 300 MG/1
300 CAPSULE ORAL 2 TIMES DAILY
Qty: 20 CAPSULE | Refills: 0 | Status: SHIPPED | OUTPATIENT
Start: 2019-05-09 | End: 2019-11-14

## 2019-05-09 RX ORDER — CEFDINIR 300 MG/1
300 CAPSULE ORAL ONCE
Status: COMPLETED | OUTPATIENT
Start: 2019-05-09 | End: 2019-05-09

## 2019-05-09 RX ORDER — AZITHROMYCIN 250 MG/1
500 TABLET, FILM COATED ORAL ONCE
Status: COMPLETED | OUTPATIENT
Start: 2019-05-09 | End: 2019-05-09

## 2019-05-09 RX ORDER — IOPAMIDOL 755 MG/ML
78 INJECTION, SOLUTION INTRAVASCULAR ONCE
Status: COMPLETED | OUTPATIENT
Start: 2019-05-09 | End: 2019-05-09

## 2019-05-09 RX ORDER — AZITHROMYCIN 250 MG/1
250 TABLET, FILM COATED ORAL DAILY
Qty: 4 TABLET | Refills: 0 | Status: SHIPPED | OUTPATIENT
Start: 2019-05-09 | End: 2019-05-14

## 2019-05-09 RX ADMIN — IOPAMIDOL 78 ML: 755 INJECTION, SOLUTION INTRAVENOUS at 20:43

## 2019-05-09 RX ADMIN — AZITHROMYCIN MONOHYDRATE 500 MG: 250 TABLET ORAL at 21:48

## 2019-05-09 RX ADMIN — CEFDINIR 300 MG: 300 CAPSULE ORAL at 21:48

## 2019-05-09 RX ADMIN — SODIUM CHLORIDE 64 ML: 9 INJECTION, SOLUTION INTRAVENOUS at 20:43

## 2019-05-09 ASSESSMENT — ENCOUNTER SYMPTOMS
FEVER: 1
HEMATURIA: 0
CHILLS: 1
SHORTNESS OF BREATH: 1
DIARRHEA: 0
FREQUENCY: 1
DYSURIA: 0
ABDOMINAL PAIN: 1
NAUSEA: 1
VOMITING: 0
ABDOMINAL DISTENTION: 1

## 2019-05-09 ASSESSMENT — MIFFLIN-ST. JEOR
SCORE: 1029.58
SCORE: 1029.58

## 2019-05-09 NOTE — ED AVS SNAPSHOT
Emergency Department  [address] [facility] [address]  Phone:  [phone]  Fax:  [phone]                                    Lindsey Hale   MRN: [id number]    Department:   Emergency Department   Date of Visit:  5/9/2019           After Visit Summary Signature Page    I have received my discharge instructions, and my questions have been answered. I have discussed any challenges I see with this plan with the nurse or doctor.    ..........................................................................................................................................  Patient/Patient Representative Signature      ..........................................................................................................................................  Patient Representative Print Name and Relationship to Patient    ..................................................               ................................................  Date                                   Time    ..........................................................................................................................................  Reviewed by Signature/Title    ...................................................              ..............................................  Date                                               Time          22EPIC Rev 08/18

## 2019-05-09 NOTE — TELEPHONE ENCOUNTER
Pt is at --    S: Pt is at [facility] in [place] with unexplained fever    B:     A:     Fever 101.6     All other VSS- HR WNL     Urinary symptoms   Frequency     UA- negative     Denies cough, LS Clear    PA is considering ER for evaluation.     Huddled with PCP who will see pt if she can arrive prior to 3:15. Scheduled. PA will discharge patient from  and they will proceed to clinic     Jnaay CAMACHO RN

## 2019-05-09 NOTE — PROGRESS NOTES
The patient is seen with her daughter as an urgent work in for an acute febrile illness that started last night.  Patient notes she had been feeling fine yesterday but then overnight was up 9 times urinating.  She has had less urination but some frequency today.  No burning, blood or discharge.  No abdominal pain but slight nausea without vomiting.  No coughs, colds, earaches or sore throat.  No headaches or dizziness.  No chest pain or shortness of breath or rashes.  No travel.  She does have a history of urosepsis.    The patient was seen at an urgent care and due to the acuity of her illness they called here and we had her come right over.    Her past medical history is significant and reviewed.  She has had a recent T12 compression fracture but her back pain is well controlled at this time.    Past Medical History:   Diagnosis Date     Abnormal echocardiogram 04/2017    mild mr, ar, mitral stenosis, nl ef, lvh.  Done for episode of vision change     Arthritis 99         Atrial fibrillation (H) 2011 and 2009    neg nuc est 2009, Dr. Vazquez, on coumadin     Chest pain 2000    neg est echo, neg ct chest abd, pelvis [facility]     Chronic LBP      COPD (chronic obstructive pulmonary disease) (H)     seen by pulmonary md Dr. Whitt, and given inhaler     Cysts, breast 1980s    bilat mastectomies     Dizzy 2007    ct neg, carotid us neg     Dysphagia 2005    egd nl     Elevated blood sugar      Environmental allergies      Hematuria 2004    neg cysto and us     Hemoptyses 2008    ct neg, bronch neg 2009     Hyperlipidemia      Hypertension      Hypothyroid 1995    Dr. Ortiz     Lumbar spinal stenosis 12/2018    seen on ct done for lbp     Mitral regurgitation 6/15    on echo     nausea 2006    ct abd and ;pelvis neg     Osteopenia     fu dexa better 2011     Pacemaker 2011    afib with pauses and syncope     Palpitations 2009    neg est     PMR (polymyalgia rheumatica) (H) 2004    not active in years     SOB  (shortness of breath) 5/15    echo nl ef, 2+mr, cxr clear, seen by pulm and given inhaler     Supraventricular premature beats      T12 compression fracture (H) 2018    non traumatic     TIA (transient ischaemic attack)     carotids neg, mri neg     Treadmill stress test negative for angina pectoris     nuclear est     Urine incontinence     Dr. Westfall     Urosepsis     hospitalized     Vision changes     eval by neuro and ophtho and no cause found     Past Surgical History:   Procedure Laterality Date     ARTHROPLASTY HIP Left 2017    Procedure: ARTHROPLASTY HIP;  LEFT HIP ARBEN ARTHROPLASTY(SORAYA);  Surgeon: Darell Nathan MD;  Location: [facility]     ARTHROPLASTY PATELLO-FEMORAL (KNEE)   and      ARTHROSCOPY KNEE  1997     BIOPSY BREAST Right 2014    Procedure: BIOPSY BREAST;  Surgeon: David Carter MD;  Location:  [facility]     breast implants      4x     cataract       FOOT SURGERY       MASTECTOMY  1980    bilat, cysts     nj not bso  70's    not for ca     Social History     Socioeconomic History     Marital status:      Spouse name: Not on file     Number of children: 4     Years of education: Not on file     Highest education level: Not on file   Occupational History     Not on file   Social Needs     Financial resource strain: Not on file     Food insecurity:     Worry: Not on file     Inability: Not on file     Transportation needs:     Medical: Not on file     Non-medical: Not on file   Tobacco Use     Smoking status: Former Smoker     Types: Cigarettes     Last attempt to quit: 1955     Years since quittin.3     Smokeless tobacco: Never Used     Tobacco comment: quit in    had smoked about 2 cigarettes a day or more   Substance and Sexual Activity     Alcohol use: Yes     Alcohol/week: 0.0 oz     Comment: occ wine     Drug use: No     Sexual activity: Not Currently   Lifestyle     Physical activity:     Days per week: Not on file      Minutes per session: Not on file     Stress: Not on file   Relationships     Social connections:     Talks on phone: Not on file     Gets together: Not on file     Attends Adventism service: Not on file     Active member of club or organization: Not on file     Attends meetings of clubs or organizations: Not on file     Relationship status: Not on file     Intimate partner violence:     Fear of current or ex partner: Not on file     Emotionally abused: Not on file     Physically abused: Not on file     Forced sexual activity: Not on file   Other Topics Concern     Parent/sibling w/ CABG, MI or angioplasty before 65F 55M? Not Asked      Service Not Asked     Blood Transfusions Not Asked     Caffeine Concern No     Comment: coffee: 1 cup a week.  Occ green tea     Occupational Exposure Not Asked     Hobby Hazards Not Asked     Sleep Concern No     Stress Concern No     Weight Concern No     Special Diet No     Back Care Not Asked     Exercise Yes     Comment: walking daily     Bike Helmet Not Asked     Seat Belt Yes     Self-Exams Not Asked   Social History Narrative     Not on file     Current Outpatient Medications   Medication Sig Dispense Refill     budesonide-formoterol (SYMBICORT) 160-4.5 MCG/ACT inhaler Inhale 2 puffs into the lungs 2 times daily        flecainide (TAMBOCOR) 150 MG tablet Take 1/2 tablet by mouth twice daily 90 tablet 3     Furosemide (LASIX PO) Take 20 mg by mouth daily as needed        levothyroxine (SYNTHROID/LEVOTHROID) 75 MCG tablet TAKE 1 TABLET(75 MCG) BY MOUTH DAILY 90 tablet 3     metoprolol tartrate (LOPRESSOR) 50 MG tablet TAKE 1 AND 1/2 TABLETS BY MOUTH TWICE DAILY 270 tablet 0     mupirocin (BACTROBAN) 2 % external ointment Apply topically 2 times daily       ondansetron (ZOFRAN ODT) 4 MG ODT tab Take 1 tablet (4 mg) by mouth every 8 hours as needed for nausea 15 tablet 3     polyethylene glycol (MIRALAX/GLYCOLAX) packet Take 1 packet by mouth daily       rivaroxaban  ANTICOAGULANT (XARELTO) 15 MG TABS tablet Take 15 mg by mouth daily (with dinner)       Allergies   Allergen Reactions     Clindamycin Hcl Other (See Comments)     Difficulty swallowing     Ampicillin Potassium      Rash nausea and vomiting       Celebrex [Celecoxib]      rash     Ciprofloxacin      rash     Erythromycin      rash     Indocin      Ended up going to( [facility] hospital with severe head ache     Penicillins      Rash,nausea and vomiting     Vibramycin [Doxycycline Hyclate]      Rash      Xylocaine-Epinephrine [Epinephrine-Lidocaine-Na Metabisulfite]      Xylocaine with epi caused a rapid heart beat     FAMILY HISTORY NOTED AND REVIEWED    REVIEW OF SYSTEMS: above    PHYSICAL EXAM    /51 (BP Location: Right arm, Cuff Size: Adult Large)   Pulse 63   Temp 101.6  F (38.7  C) (Oral)   Ht 1.524 m (5')   Wt 70.3 kg (155 lb)   SpO2 94%   BMI 30.27 kg/m      Patient appears non toxic  Neck supple  Mouth and eyes within normal limits  No supraclavicular, cervical or axillary lymphadenopathy  Lungs clear, normal flow  cv irreg irreg, 2/6 sm, across chest, no jvp, min edema  Abdomen normal active bowel sounds, soft, slightly tender right lower abdomen, no mgr, no hepatosplenomegaly    Stat labs sent; cbc noted, unable to give ua right now    ASSESSMENT:  Probable uti with sepsis, doubt pneumonia, doubt sbe but does have loud murmer, blood cx sent, doubt other cause of infection    Other med issues stable, pmr not active, has diabetes and I sent a1c.  Has afib and on coum, rate controlled with bblocker    PLAN:  To emergency room right away, I called and spoke with emergency room doctor    Jeffery Sherwood M.D.

## 2019-05-10 ENCOUNTER — PATIENT OUTREACH (OUTPATIENT)
Dept: CARE COORDINATION | Facility: CLINIC | Age: 84
End: 2019-05-10

## 2019-05-10 LAB
ANION GAP SERPL CALCULATED.3IONS-SCNC: 5 MMOL/L (ref 3–14)
BACTERIA SPEC CULT: NORMAL
BUN SERPL-MCNC: 21 MG/DL (ref 7–30)
CALCIUM SERPL-MCNC: 9.1 MG/DL (ref 8.5–10.1)
CHLORIDE SERPL-SCNC: 105 MMOL/L (ref 94–109)
CO2 SERPL-SCNC: 25 MMOL/L (ref 20–32)
CREAT SERPL-MCNC: 0.86 MG/DL (ref 0.52–1.04)
GFR SERPL CREATININE-BSD FRML MDRD: 58 ML/MIN/{1.73_M2}
GLUCOSE SERPL-MCNC: 132 MG/DL (ref 70–99)
Lab: NORMAL
POTASSIUM SERPL-SCNC: 4.6 MMOL/L (ref 3.4–5.3)
SODIUM SERPL-SCNC: 135 MMOL/L (ref 133–144)
SPECIMEN SOURCE: NORMAL

## 2019-05-10 ASSESSMENT — ACTIVITIES OF DAILY LIVING (ADL): DEPENDENT_IADLS:: TRANSPORTATION;CLEANING;LAUNDRY;SHOPPING

## 2019-05-10 NOTE — PROGRESS NOTES
Clinic Care Coordination Contact  [facility]/Voicemail    Referral Source: ED Follow-Up  Clinical Data: Care Coordinator Outreach  Outreach attempted x 1.  Left message on voicemail with call back information and requested return call.  Plan: Care Coordinator will try to reach patient again in 1-2 business days.    Zoe Sims RN Care Coordinator  [facility]  Phone:  [phone] (Mondays, Wednesdays & Fridays)  Phone:  [phone] (Tuesdays & Thursdays)  Email: [email or web address]

## 2019-05-10 NOTE — PROGRESS NOTES
Clinic Care Coordination Contact  OUTREACH    Referral Information:  Referral Source: ED Follow-Up    Primary Diagnosis: Pneumonia    Chief Complaint   Patient presents with     Clinic Care Coordination - [facility]     ED discharge follow-up     RN Care Coordinator called patient's daughter, Oxana, as directed by instructions in patient's chart. Consent to communicate is on file, dated 10/06/2014. RN CC engaged in AIDET communication with Oxana during the encounter. She was very appreciative of the call. She politely declined care coordination assistance at this time.     Universal Utilization: Patient was seen at the ED of [facility] on 5/9/19 with chief complaint of fever and high white blood cell count.  Clinic Utilization  Difficulty keeping appointments:: No  Compliance Concerns: No  No-Show Concerns: No  No PCP office visit in Past Year: No  Utilization    Last refreshed: 5/9/2019 10:30 PM:  Hospital Admissions 2           Last refreshed: 5/9/2019 10:30 PM:  ED Visits 3           Last refreshed: 5/9/2019 10:30 PM:  No Show Count (past year) 1              Current as of: 5/9/2019 10:30 PM            Clinical Concerns:  Current Medical Concerns:  Patient was discharged home with diagnosis of pneumonia or right lower lobe due to infectious organism. Patient was also concerned about having an urinary infection due to increased urinary frequency.  Current Behavioral Concerns: None, according to patient's daughter.    Education Provided to patient's daughter: care coordination role.   Pain  Pain (GOAL):: No  Health Maintenance Reviewed: Up to date  Clinical Pathway: Clinic Care Coordination Pneumonia Assessment  Discharge:    Hospital summary: see above    Day of hospital discharge: 5/9/19    What recommendations were made for follow up after her ED visit? Follow up with PCP in 2 days maximum.    Has the follow up appointment been scheduled? Yes    Transportation concerns (GOAL):: No    Is there a referral/need for  "Pulmonary Rehab? no    Is the patient enrolled in [facility] Tele-Assurance program? No    Symptom Review:    How has she been feeling now that she is home?  \"She is tired and the appetite has decreased, but she was able to sleep better last night.\", said Oxana.    Is she having any increased shortness of breath? No    Is she having any fevers? No    When she coughs, is she coughing up anything? No    Medications:     Was she started on any new medications? Yes, two antibiotics.    Were any of her previous medications changed? No    Does she have all of her medications? Yes.    Have you had trouble filling her prescriptions? No    Are her medications effective in controlling her symptoms? Yes    Medication reconciliation completed? No. List printed and sent to patient.    Was MTM or Diabetic Education referral placed? No    Oxygen/DME    Is she currently on oxygen? No   Activity: Not assessed.  Diet: Not assessed. Patient's daughter just reported her appetite had decreased today. Writer reinforced proper diet due to antibiotic intake.  Emotions:  Not assessed.  Follow Up Plan:    Care Coordinator follow up plan: None at this time.    Referrals to consider: None.    Functional Status:  Dependent ADLs:: Ambulation-cane, Ambulation-walker  Dependent IADLs:: Transportation, Cleaning, Laundry, Shopping  Bed or wheelchair confined:: No  Mobility Status: Independent w/Device  Fallen 2 or more times in the past year?: No  Any fall with injury in the past year?: No    Living Situation:  Current living arrangement: Patient lives alone in the house where her children were raised. Oxana said she calls her mother two times a day and goes there at least 3 times a week.    Diet/Sleep:  Inadequate nutrition (GOAL):: No  Inadequate activity/exercise (GOAL):: No  Significant changes in sleep pattern (GOAL): No    Transportation:  Transportation concerns (GOAL):: No  Transportation means:: Family     Psychosocial:  Religion or spiritual " beliefs that impact treatment:: No  Mental health DX:: No  Mental health management concern (GOAL):: No  Informal Support system:: Children     Financial/Insurance:   Financial/Insurance concerns (GOAL):: No     Resources:  Community Resources: None  Supplies used at home:: None  Equipment Currently Used at Home: cane, straight, walker, rolling    Advance Care Plan/Directive  Advanced Care Plans/Directives on file:: Yes  Type Advanced Care Plans/Directives: Advanced Directive - On File  Advanced Care Plan/Directive Status: Not Applicable     Future Appointments              In 4 days Jeffery Sherwood MD [facility],     In 1 month [address], [facility]        Plan: RN Clinic Care Coordinator will mail care coordination introduction letter, Access Care Plan and current medication list to patient. No further scheduled outreaches will be done at this time. RN Clinic Care Coordinator will remain available should any new need arises for this patient.    Zoe Sims RN Care Coordinator  [facility]  Phone:  [phone] (Mondays, Wednesdays & Fridays)  Phone:  [phone] (Tuesdays & Thursdays)  Email: [email or web address]

## 2019-05-10 NOTE — LETTER
[facility] CARE COORDINATION  [address]  [place] [address]    May 10, 2019    [facility]  [address]  [place] [address]      Dear Lnidsey,    I am a clinic care coordinator who works at the [facility]. I wanted to thank your daughter Oxana for spending the time to talk with me on the phone today.  I wanted to provide you with my contact information so that you two can call me with questions or concerns about your health care. Below is a description of clinic care coordination and how we can further assist you.     The goal of clinic care coordination is to help you manage your health and improve access to the [facility] system in the most efficient manner. The registered nurse can assist you in meeting your health care goals by providing education, coordinating services, and strengthening the communication among your providers. The  can assist you with financial, behavioral, psychosocial, chemical dependency, counseling, and/or psychiatric resources.    Please feel free to contact me Monday to Friday, from 8:30 am to 4:30 pm. We at [facility] are focused on providing you with the highest-quality healthcare experience possible and that all starts with you.     Sincerely,       Zoe Sims, RN Care Coordinator  [facility]  Phone:  [phone] (Mondays, Wednesdays & Fridays)  Phone:  [phone] (Tuesdays & Thursdays)  Email: [email or web address]      Enclosed: I have enclosed a copy of a 24 Hour Access Plan. This has helpful phone numbers for you to call when needed. Please keep this in an easy to access place to use as needed.

## 2019-05-10 NOTE — ED PROVIDER NOTES
History     Chief Complaint:  Fever     HPI   Lindsey Hale is a 89 year old female with a history of COPD, hypertension, and atrial fibrillation anticoagulated on Xarelto s/p pacemaker who presents with a fever. The patient reports that she has been feeling unwell the last few days with chills and urinary frequency. She was seen by her PCP Dr. Sherwood earlier today and had blood work done. She was febrile with a temperature of 101 and a white count of  22.8, prompting her referral to the ED for further evaluation. Here in the ED, she complains of having some right-sided abdominal discomfort with some abdominal distension. She endorses recent nausea, but no episodes of vomiting or diarrhea. She complains of having shortness of breath at baseline which she attributes to her COPD. No worsening shortness of breath, increased work of breathing or chest pain. She denies having any chest pain. She denies having any dysuria or hematuria. She has not had any rashes. She denies having any leg swelling above baseline. No recent URI symptoms. No diarrhea. Appetite has been normal.    Allergies:  Clindamycin Hcl  Ampicillin Potassium  Celebrex [Celecoxib]  Ciprofloxacin  Erythromycin  Indocin  Penicillins  Vibramycin [Doxycycline Hyclate]  Xylocaine-Epinephrine [Epinephrine-Lidocaine-Na Metabisulfite]    Medications:    Symbicort  Tambocor  Lasix  Levothyroxine  Metoprolol tartrate  Miralax  Xarelto    Past Medical History:    Abnormal echocardiogram   Anemia  Arthritis   Atrial fibrillation   Chronic low back pain  Chronic pain   COPD (chronic obstructive pulmonary disease)   Cysts, breast   Dysphagia    Environmental allergies     Hyperlipidemia   Hypertension   Hypothyroidism   Insomnia  Lumbar spinal stenosis   Mitral regurgitation   Osteopenia   Pacemaker    PMR (polymyalgia rheumatica)   Sick sinus syndrome  Spinal stenosis    Supraventricular premature beats   T12 compression fracture    TIA (transient ischemic  attack)   Urine incontinence   Urosepsis    Past Surgical History:    Arthroplasty hip  Arthroplasty patellofemoral  Arthroplasty knee  Biopsy knee  Breast implants x4  Cataract surgery  Foot surgery  Mastectomy  MOHS surgery  Total abdominal hysterectomy    Family History:    CAD  Lymphoma  Breast cancer  Osteoporosis  MI    Social History:  The patient presents to the ED with her daughter.  Marital status:   Smoking status: Former Smoker; quit 1955  Alcohol use: Yes  Drug use: No    Review of Systems   Constitutional: Positive for chills and fever.   Respiratory: Positive for shortness of breath.    Cardiovascular: Negative for chest pain and leg swelling.   Gastrointestinal: Positive for abdominal distention, abdominal pain and nausea. Negative for diarrhea and vomiting.   Genitourinary: Positive for frequency. Negative for dysuria and hematuria.   Skin: Negative for rash.   All other systems reviewed and are negative.    Physical Exam     Patient Vitals for the past 24 hrs:   BP Temp Temp src Pulse Heart Rate Resp SpO2 Height Weight   05/09/19 1930 125/57 -- -- 72 -- -- 95 % -- --   05/09/19 1655 114/45 99.4  F (37.4  C) Oral 75 75 18 95 % 1.524 m (5') 70.3 kg (155 lb)       Physical Exam  General: Well appearing, nontoxic. Appears younger than stated age. Resting comfortably  Head:  Scalp, face, and head appear normal  Eyes:  Pupils are equal, round, and reactive to light    Conjunctivae non-injected and sclerae white  ENT:    The external nose is normal    Pinnae are normal    The oropharynx is normal, mucous membranes moist    Posterior pharynx clear without swelling, exudates or erythema     Uvula is in the midline  Neck:  Normal range of motion    There is no rigidity noted    Trachea is in the midline  CV:  Regular rate and rhythm     Normal S1/S2, no S3/S4    No murmur or rub  Resp:  Lungs are clear and equal bilaterally    There is no tachypnea    No increased work of breathing    No rales,  wheezing, or rhonchi  GI:  Abdomen is soft, no rigidity or guarding    No distension, or mass    No tenderness or rebound tenderness   MS:  Normal muscular tone    Symmetric motor strength    No lower extremity edema  Skin:  No rash or acute skin lesions noted  Neuro:  Awake and alert    Speech is normal and fluent    Moves all extremities spontaneously  Psych: Normal affect.  Appropriate interactions.      Emergency Department Course     Imaging:  Radiographic findings were communicated with the patient and family who voiced understanding of the findings.    XR CHEST 2 VIEWS:  IMPRESSION: No radiographic evidence of acute chest abnormality.   As read by Radiology.    CT ABDOMEN PELVIS W CONTRAST:  IMPRESSION:  1. Mild airspace opacity in the medial right lower lobe may represent  developing pneumonia or atelectasis.  2. Cardiomegaly.  3. Cirrhotic appearance of the liver.  4. Sigmoid diverticulosis without evidence of acute diverticulitis.    As read by Radiology     Laboratory:  CBC: WBC 22.6 (H), o/w WNL (HGB 11.7, )  CMP: Glucose 125 (H), o/w WNL (Creatinine 0.78)  Lactic Acid Whole Blood: 1.5    UA: Leukocyte Esterase--Small, WBC 9 (H), Mucous Present, o/w Negative  Urine Culture: in process    Interventions:  2148: Azithromycin 500 mg PO  2148: Cefdinir 300 mg PO    Emergency Department Course:  Past medical records, nursing notes, and vitals reviewed.  1946: I performed an exam of the patient and obtained history, as documented above.   IV inserted and blood samples were collected and sent for laboratory testing, findings above.  A urine sample was collected and sent for laboratory testing, findings above.  The patient was sent for a chest x-ray and CT of the abdomen and pelvis while in the emergency department, findings above.    2123: I rechecked the patient and explained the findings.    Findings and plan explained to the patient and her daughter. Patient discharged home with instructions regarding  supportive care and reasons to return. The importance of close follow-up was reviewed.     Impression & Plan      Medical Decision Making:  Lindsey Hale is a 89 year old female who presents for evaluation of a fever and chills.  She was seen in clinic and referred to the ED for further evaluation. Clinically she has no definite localizing symptoms to suggest source of infection. She reports mild abdominal pain but has a benign nonperitoneal exam without tenderness. No rash or evidence of skin or soft tissue infection. UTI considered as well as viral syndrome. I felt that viral syndrome less likely due to significant leukocytosis with neutrophil predominance. Given this CT abd/pelvis obtained which does not reveal any acute intra abdominal process but does show likely early pneumonia in the RLL. CT reveals mild airspace opacity in the medial right lower lobe suggestive of developing pneumonia. There are no signs of complications of pneumonia at this point such as septic shock, bacteremia, empyema, hypoxia, respiratory failure or compromise. UA bland but will be sent for culture. She is otherwise well appearing. IVF given. No fever in the ED. No evidence of sepsis. I will start her on antibiotics with Cefdinir and Azithromycin to cover for typical and atypical pneumonia. This would also cover for possible UTI. She was given first dose of antibiotics here in the ED. Patient will follow-up with primary care physician regardless of disease course within 2 days maximum.  She will return to the ED for any new or worsening symptoms. Pneumonia precautions given for home.  Return precautions were discussed with patient. The patient's questions were answered and the patient was agreeable with discharge.     Diagnosis:    ICD-10-CM   1. Pneumonia of right lower lobe due to infectious organism (H) J18.1       Disposition:  Discharged to home    Discharge Medications:   Details   azithromycin (ZITHROMAX) 250 MG tablet Take  1 tablet (250 mg) by mouth daily for 4 days, Disp-4 tablet, R-0, Local Print      cefdinir (OMNICEF) 300 MG capsule Take 1 capsule (300 mg) by mouth 2 times daily for 10 days, Disp-20 capsule, R-0, Local Print         Talia Johnson  5/9/2019    EMERGENCY DEPARTMENT  I, Talia Johnson, am serving as a scribe at 7:46 PM on 5/9/2019 to document services personally performed by Narendra Powell MD based on my observations and the provider's statements to me.        Narendra Powell MD  05/10/19 3932

## 2019-05-10 NOTE — LETTER
Health Care Home - Access Care Plan    About Me:    Patient Name:  Lindsey Hale    YOB: 1925  Age:                      89 year old   [place] MRN:     [id number] Telephone Information:   Home Phone [phone]   Mobile [phone]       Address:  [address] [address] Email address:  No e-mail address on record      Emergency Contact(s)   Name Relationship Lgl Grd Work Phone Home Phone Mobile Phone   1. PATRICE GUNN Daughter  [phone]   2. NICOL HALE Son   [phone]   3. RIGO GUNN Relative                 Health Maintenance: Routine Health maintenance Reviewed: Up to date    My Access Plan  Medical Emergency 911   Questions or concerns during clinic hours Primary Clinic Line, I will call the clinic directly: [facility] [phone]   24 Hour Appointment Line [phone] or  [phone]) (toll free)   24 Hour Nurse Line [phone] (toll free)   Questions or concerns outside clinic hours 24 Hour Appointment Line, I will call the after-hours on-call line:   [facility] [phone] or [phone]) (toll-free)   Preferred Urgent Care     Preferred Hospital [facility]  [phone]   Preferred Pharmacy [facility] [address] [place] [address] [place], [place] - [address] [facility] [address]     Behavioral Health Crisis Line The National Suicide Prevention Lifeline at 1-425.199.7619 or 919         My Care Team Members  Patient Care Team       Relationship Specialty Notifications Start End    Jeffery Sherwood MD PCP - General Internal Medicine  9/20/11     Phone: [phone] Fax: [phone] [address] [place] [address]    Jenny Carrasquillo [facility] Nurse   1/3/18     Phone: [phone]         Jeffery Sherwood MD Assigned PCP   11/4/18     Phone: [phone] Fax: [phone] [address] [place]  [address]    Zoe Sims, RN Clinic Care Coordinator Primary Care - [facility]  5/10/19            My Medical and Care Information  Problem List   Patient Active Problem List   Diagnosis     Urine incontinence     Transient cerebral ischemia     Arthritis     Osteopenia     Pacemaker     Hyperlipidemia LDL goal <160     Chronic low back pain     Benign essential hypertension     PMR (polymyalgia rheumatica) (H)     Advanced directives, counseling/discussion     SOB (shortness of breath)     Mitral regurgitation     Hypothyroidism, unspecified type     Chronic obstructive pulmonary disease, unspecified COPD type (H)     Paroxysmal atrial fibrillation (H)     Abnormal echocardiogram     Physical deconditioning     Anemia due to blood loss, acute     S/P hip hemiarthroplasty     Closed fracture of neck of left femur with routine healing     Sick sinus syndrome (H)     Type 2 diabetes mellitus with complication, without long-term current use of insulin (H)     Bilateral leg edema     Nausea     Slow transit constipation     Insomnia     T12 compression fracture (H)     Spinal stenosis of lumbar region without neurogenic claudication     Lumbar spinal stenosis     Chronic pain     Back pain        Current Medications and Allergies:  See printed Medication Report

## 2019-05-14 ENCOUNTER — OFFICE VISIT (OUTPATIENT)
Dept: FAMILY MEDICINE | Facility: CLINIC | Age: 84
End: 2019-05-14
Payer: MEDICARE

## 2019-05-14 VITALS
HEART RATE: 66 BPM | DIASTOLIC BLOOD PRESSURE: 65 MMHG | TEMPERATURE: 97 F | HEIGHT: 60 IN | WEIGHT: 155 LBS | BODY MASS INDEX: 30.43 KG/M2 | SYSTOLIC BLOOD PRESSURE: 130 MMHG | OXYGEN SATURATION: 98 %

## 2019-05-14 DIAGNOSIS — I48.0 PAROXYSMAL ATRIAL FIBRILLATION (H): ICD-10-CM

## 2019-05-14 DIAGNOSIS — E11.8 TYPE 2 DIABETES MELLITUS WITH COMPLICATION, WITHOUT LONG-TERM CURRENT USE OF INSULIN (H): ICD-10-CM

## 2019-05-14 DIAGNOSIS — L03.115 CELLULITIS OF RIGHT LEG: Primary | ICD-10-CM

## 2019-05-14 LAB
ERYTHROCYTE [DISTWIDTH] IN BLOOD BY AUTOMATED COUNT: 13.6 % (ref 10–15)
HCT VFR BLD AUTO: 34.1 % (ref 35–47)
HGB BLD-MCNC: 11.3 G/DL (ref 11.7–15.7)
MCH RBC QN AUTO: 35.1 PG (ref 26.5–33)
MCHC RBC AUTO-ENTMCNC: 33.1 G/DL (ref 31.5–36.5)
MCV RBC AUTO: 106 FL (ref 78–100)
PLATELET # BLD AUTO: 314 10E9/L (ref 150–450)
RBC # BLD AUTO: 3.22 10E12/L (ref 3.8–5.2)
WBC # BLD AUTO: 14.9 10E9/L (ref 4–11)

## 2019-05-14 PROCEDURE — 36415 COLL VENOUS BLD VENIPUNCTURE: CPT | Performed by: INTERNAL MEDICINE

## 2019-05-14 PROCEDURE — 99214 OFFICE O/P EST MOD 30 MIN: CPT | Performed by: INTERNAL MEDICINE

## 2019-05-14 PROCEDURE — 85027 COMPLETE CBC AUTOMATED: CPT | Performed by: INTERNAL MEDICINE

## 2019-05-14 RX ORDER — CEPHALEXIN 500 MG/1
500 CAPSULE ORAL 3 TIMES DAILY
Qty: 30 CAPSULE | Refills: 0 | Status: SHIPPED | OUTPATIENT
Start: 2019-05-14 | End: 2019-11-14

## 2019-05-14 ASSESSMENT — MIFFLIN-ST. JEOR: SCORE: 1029.58

## 2019-05-14 NOTE — PROGRESS NOTES
The patient presents with her daughter for follow-up to her recent febrile illness.  As noted, I saw her 5 days ago for this and she had a markedly elevated white count so I sent her to the ER for further evaluation.  Her urine culture ultimately to not negative.  They did a CT of her abdomen and pelvis showing cirrhosis as well as a possible right middle lobe infiltrate.  They started her on Omnicef.    Since then she is feeling better.  No fevers chills or night sweats.  No chest pain or shortness of breath.  No GI or  symptoms.    She does note that 2 days ago the right leg got red and swollen.  She has a history of Mohs surgery 6 to 8 weeks ago with an open wound but not redness or swelling.  There is some discomfort in the back of the leg.  The left leg is fine.    Past Medical History:   Diagnosis Date     Abnormal echocardiogram 04/2017    mild mr, ar, mitral stenosis, nl ef, lvh.  Done for episode of vision change     Arthritis 99         Atrial fibrillation (H) 2011 and 2009    neg nuc est 2009, Dr. Vazquez, on coumadin     Chest pain 2000    neg est echo, neg ct chest abd, pelvis Hinduism     Chronic LBP      COPD (chronic obstructive pulmonary disease) (H)     seen by pulmonary md Dr. Whitt, and given inhaler     Cysts, breast 1980s    bilat mastectomies     Dizzy 2007    ct neg, carotid us neg     Dysphagia 2005    egd nl     Elevated blood sugar      Environmental allergies      Hematuria 2004    neg cysto and us     Hemoptyses 2008    ct neg, bronch neg 2009     Hyperlipidemia      Hypertension      Hypothyroid 1995    Dr. Ortiz     Lumbar spinal stenosis 12/2018    seen on ct done for lbp     Mitral regurgitation 6/15    on echo     nausea 2006    ct abd and ;pelvis neg     Osteopenia     fu dexa better 2011     Pacemaker 2011    afib with pauses and syncope     Palpitations 2009    neg est     PMR (polymyalgia rheumatica) (H) 2004    not active in years     SOB (shortness of breath) 5/15     echo nl ef, 2+mr, cxr clear, seen by pulm and given inhaler     Supraventricular premature beats      T12 compression fracture (H) 2018    non traumatic     TIA (transient ischaemic attack)     carotids neg, mri neg     Treadmill stress test negative for angina pectoris     nuclear est     Urine incontinence     Dr. Westfall     Urosepsis     hospitalized     Vision changes     eval by neuro and ophtho and no cause found     Past Surgical History:   Procedure Laterality Date     ARTHROPLASTY HIP Left 2017    Procedure: ARTHROPLASTY HIP;  LEFT HIP ARBEN ARTHROPLASTY(SORAYA);  Surgeon: Darell Nathan MD;  Location: [facility]     ARTHROPLASTY PATELLO-FEMORAL (KNEE)   and      ARTHROSCOPY KNEE  1997     BIOPSY BREAST Right 2014    Procedure: BIOPSY BREAST;  Surgeon: David Carter MD;  Location:  [facility]     breast implants      4x     cataract       FOOT SURGERY       MASTECTOMY  1980    bilat, cysts     [place] not bso  70's    not for ca     Social History     Socioeconomic History     Marital status:      Spouse name: Not on file     Number of children: 4     Years of education: Not on file     Highest education level: Not on file   Occupational History     Not on file   Social Needs     Financial resource strain: Not on file     Food insecurity:     Worry: Not on file     Inability: Not on file     Transportation needs:     Medical: Not on file     Non-medical: Not on file   Tobacco Use     Smoking status: Former Smoker     Types: Cigarettes     Last attempt to quit: 1955     Years since quittin.3     Smokeless tobacco: Never Used     Tobacco comment: quit in    had smoked about 2 cigarettes a day or more   Substance and Sexual Activity     Alcohol use: Yes     Alcohol/week: 0.0 oz     Comment: occ wine     Drug use: No     Sexual activity: Not Currently   Lifestyle     Physical activity:     Days per week: Not on file     Minutes per session: Not on  file     Stress: Not on file   Relationships     Social connections:     Talks on phone: Not on file     Gets together: Not on file     Attends Protestant service: Not on file     Active member of club or organization: Not on file     Attends meetings of clubs or organizations: Not on file     Relationship status: Not on file     Intimate partner violence:     Fear of current or ex partner: Not on file     Emotionally abused: Not on file     Physically abused: Not on file     Forced sexual activity: Not on file   Other Topics Concern     Parent/sibling w/ CABG, MI or angioplasty before 65F 55M? Not Asked      Service Not Asked     Blood Transfusions Not Asked     Caffeine Concern No     Comment: coffee: 1 cup a week.  Occ green tea     Occupational Exposure Not Asked     Hobby Hazards Not Asked     Sleep Concern No     Stress Concern No     Weight Concern No     Special Diet No     Back Care Not Asked     Exercise Yes     Comment: walking daily     Bike Helmet Not Asked     Seat Belt Yes     Self-Exams Not Asked   Social History Narrative     Not on file     Current Outpatient Medications   Medication Sig Dispense Refill     budesonide-formoterol (SYMBICORT) 160-4.5 MCG/ACT inhaler Inhale 2 puffs into the lungs 2 times daily        cefdinir (OMNICEF) 300 MG capsule Take 1 capsule (300 mg) by mouth 2 times daily for 10 days 20 capsule 0     cephALEXin (KEFLEX) 500 MG capsule Take 1 capsule (500 mg) by mouth 3 times daily 30 capsule 0     flecainide (TAMBOCOR) 150 MG tablet Take 1/2 tablet by mouth twice daily 90 tablet 3     Furosemide (LASIX PO) Take 20 mg by mouth daily as needed        levothyroxine (SYNTHROID/LEVOTHROID) 75 MCG tablet TAKE 1 TABLET(75 MCG) BY MOUTH DAILY 90 tablet 3     metoprolol tartrate (LOPRESSOR) 50 MG tablet TAKE 1 AND 1/2 TABLETS BY MOUTH TWICE DAILY 270 tablet 0     mupirocin (BACTROBAN) 2 % external ointment Apply topically 2 times daily       ondansetron (ZOFRAN ODT) 4 MG ODT tab  Take 1 tablet (4 mg) by mouth every 8 hours as needed for nausea 15 tablet 3     polyethylene glycol (MIRALAX/GLYCOLAX) packet Take 1 packet by mouth daily       rivaroxaban ANTICOAGULANT (XARELTO) 15 MG TABS tablet Take 15 mg by mouth daily (with dinner)       Allergies   Allergen Reactions     Clindamycin Hcl Other (See Comments)     Difficulty swallowing     Ampicillin Potassium      Rash nausea and vomiting       Celebrex [Celecoxib]      rash     Ciprofloxacin      rash     Erythromycin      rash     Indocin      [facility] with [facility]     Penicillins      Rash,nausea and vomiting     Vibramycin [Doxycycline Hyclate]      Rash      Xylocaine-Epinephrine [Epinephrine-Lidocaine-Na Metabisulfite]      Xylocaine with epi caused a rapid heart beat     FAMILY HISTORY NOTED AND REVIEWED    REVIEW OF SYSTEMS: above    PHYSICAL EXAM    /65 (BP Location: Right arm, Patient Position: Chair, Cuff Size: Adult Large)   Pulse 66   Temp 97  F (36.1  C) (Oral)   Ht 1.524 m (5')   Wt 70.3 kg (155 lb)   SpO2 98%   Breastfeeding? No   BMI 30.27 kg/m      Patient appears non toxic  mouthh and eyes within normal limits  Neck within normal limits  No supraclavicular, cervical or axillary lymphadenopathy  Lungs clear  cv reglar rate and rhythm 2/6 sm, no jvp   Abdomen normal active bowel sounds, soft non-tender  Left leg within normal limits  Right lower leg from below the knee to the foot red, swollen and warm, somewhat tender where the is the most swelling.  On the distal anterior right lower leg there is an open area with some clear drainage but no pussy drainage.  It is not ulcerated    Lab sent    ASSESSMENT:  1. Cellulitis of right leg, doubt dvt as on noac  2. Pneumonia, doing fine  3. Elevated wbc count, due to pneumonia  4. afib on noac  5. Diabetes, no issues, a1c fine    PLAN:  Leg elevation  Wraps  Change ab to keflex 500mg tid for 10 days for better g+ coverage  Call if fever,  worsens  Follow up with me 7 days    Jeffery Sherwood M.D.

## 2019-05-14 NOTE — PATIENT INSTRUCTIONS
Change the antibiotic to cephalexin and take this 3x daily.    Try to keep your right leg elevated    Call me if you feel ill or have a fever    Is you can use compression on the right leg    See me in a week, Tuesday at 10am    Jeffery Sherwood M.D.

## 2019-05-15 LAB
BACTERIA SPEC CULT: NO GROWTH
BACTERIA SPEC CULT: NO GROWTH
Lab: NORMAL
Lab: NORMAL
SPECIMEN SOURCE: NORMAL
SPECIMEN SOURCE: NORMAL

## 2019-05-21 ENCOUNTER — OFFICE VISIT (OUTPATIENT)
Dept: FAMILY MEDICINE | Facility: CLINIC | Age: 84
End: 2019-05-21
Payer: MEDICARE

## 2019-05-21 VITALS
TEMPERATURE: 97 F | DIASTOLIC BLOOD PRESSURE: 60 MMHG | BODY MASS INDEX: 30.23 KG/M2 | SYSTOLIC BLOOD PRESSURE: 136 MMHG | HEIGHT: 60 IN | WEIGHT: 154 LBS | HEART RATE: 62 BPM | OXYGEN SATURATION: 97 %

## 2019-05-21 DIAGNOSIS — L03.115 CELLULITIS OF RIGHT LEG: Primary | ICD-10-CM

## 2019-05-21 PROCEDURE — 99214 OFFICE O/P EST MOD 30 MIN: CPT | Performed by: INTERNAL MEDICINE

## 2019-05-21 ASSESSMENT — MIFFLIN-ST. JEOR: SCORE: 1025.04

## 2019-05-21 NOTE — PATIENT INSTRUCTIONS
See Dr. Cristobal this week and if you can not let me know.    Call if you get worse or have a fever    Try to keep your right leg elevated as much as possible    Jeffery Sherwood M.D.

## 2019-05-21 NOTE — PROGRESS NOTES
The patient presents with her daughter for follow-up of her right leg cellulitis.  As noted, I changed her to Keflex last office visit.  She is taking this and elevating her leg but is unable to wrap it due to a painful lump in the right calf region that is been present several weeks.  She otherwise has been doing well and does not feel ill.  No fevers or chills.  No nausea or vomiting.  No chest pain or breathing changes.  Overall she feels like her leg has improved.    Past Medical History:   Diagnosis Date     Abnormal echocardiogram 04/2017    mild mr, ar, mitral stenosis, nl ef, lvh.  Done for episode of vision change     Arthritis 99         Atrial fibrillation (H) 2011 and 2009    neg nuc est 2009, Dr. Vazquez, on coumadin     Chest pain 2000    neg est echo, neg ct chest abd, pelvis Hindu     Chronic LBP      COPD (chronic obstructive pulmonary disease) (H)     seen by pulmonary md Dr. Whitt, and given inhaler     Cysts, breast 1980s    bilat mastectomies     Dizzy 2007    ct neg, carotid us neg     Dysphagia 2005    egd nl     Elevated blood sugar      Environmental allergies      Hematuria 2004    neg cysto and us     Hemoptyses 2008    ct neg, bronch neg 2009     Hyperlipidemia      Hypertension      Hypothyroid 1995    Dr. Ortiz     Lumbar spinal stenosis 12/2018    seen on ct done for lbp     Mitral regurgitation 6/15    on echo     nausea 2006    ct abd and ;pelvis neg     Osteopenia     fu dexa better 2011     Pacemaker 2011    afib with pauses and syncope     Palpitations 2009    neg est     PMR (polymyalgia rheumatica) (H) 2004    not active in years     SOB (shortness of breath) 5/15    echo nl ef, 2+mr, cxr clear, seen by pulm and given inhaler     Supraventricular premature beats      T12 compression fracture (H) 12/2018    non traumatic     TIA (transient ischaemic attack) 2009    carotids neg, mri neg     Treadmill stress test negative for angina pectoris 2011    nuclear est      Urine incontinence     Dr. Westfall     Urosepsis 2009    hospitalized     Vision changes 2011    eval by neuro and ophtho and no cause found     Past Surgical History:   Procedure Laterality Date     ARTHROPLASTY HIP Left 2017    Procedure: ARTHROPLASTY HIP;  LEFT HIP ARBEN ARTHROPLASTY(SORAYA);  Surgeon: Darell Nathan MD;  Location: [facility]     ARTHROPLASTY PATELLO-FEMORAL (KNEE)   and      ARTHROSCOPY KNEE  1997     BIOPSY BREAST Right 2014    Procedure: BIOPSY BREAST;  Surgeon: David Carter MD;  Location:  [facility]     breast implants      4x     cataract  2011     FOOT SURGERY       MASTECTOMY  1980    bilat, cysts     [place] not bso  70's    not for ca     Social History     Socioeconomic History     Marital status:      Spouse name: Not on file     Number of children: 4     Years of education: Not on file     Highest education level: Not on file   Occupational History     Not on file   Social Needs     Financial resource strain: Not on file     Food insecurity:     Worry: Not on file     Inability: Not on file     Transportation needs:     Medical: Not on file     Non-medical: Not on file   Tobacco Use     Smoking status: Former Smoker     Types: Cigarettes     Last attempt to quit: 1955     Years since quittin.3     Smokeless tobacco: Never Used     Tobacco comment: quit in    had smoked about 2 cigarettes a day or more   Substance and Sexual Activity     Alcohol use: Yes     Alcohol/week: 0.0 oz     Comment: occ wine     Drug use: No     Sexual activity: Not Currently   Lifestyle     Physical activity:     Days per week: Not on file     Minutes per session: Not on file     Stress: Not on file   Relationships     Social connections:     Talks on phone: Not on file     Gets together: Not on file     Attends Confucianist service: Not on file     Active member of club or organization: Not on file     Attends meetings of clubs or organizations: Not on file     Relationship  status: Not on file     Intimate partner violence:     Fear of current or ex partner: Not on file     Emotionally abused: Not on file     Physically abused: Not on file     Forced sexual activity: Not on file   Other Topics Concern     Parent/sibling w/ CABG, MI or angioplasty before 65F 55M? Not Asked      Service Not Asked     Blood Transfusions Not Asked     Caffeine Concern No     Comment: coffee: 1 cup a week.  Occ green tea     Occupational Exposure Not Asked     Hobby Hazards Not Asked     Sleep Concern No     Stress Concern No     Weight Concern No     Special Diet No     Back Care Not Asked     Exercise Yes     Comment: walking daily     Bike Helmet Not Asked     Seat Belt Yes     Self-Exams Not Asked   Social History Narrative     Not on file     Current Outpatient Medications   Medication Sig Dispense Refill     budesonide-formoterol (SYMBICORT) 160-4.5 MCG/ACT inhaler Inhale 2 puffs into the lungs 2 times daily        cephALEXin (KEFLEX) 500 MG capsule Take 1 capsule (500 mg) by mouth 3 times daily 30 capsule 0     flecainide (TAMBOCOR) 150 MG tablet Take 1/2 tablet by mouth twice daily 90 tablet 3     Furosemide (LASIX PO) Take 20 mg by mouth daily as needed        levothyroxine (SYNTHROID/LEVOTHROID) 75 MCG tablet TAKE 1 TABLET(75 MCG) BY MOUTH DAILY 90 tablet 3     metoprolol tartrate (LOPRESSOR) 50 MG tablet TAKE 1 AND 1/2 TABLETS BY MOUTH TWICE DAILY 270 tablet 0     mupirocin (BACTROBAN) 2 % external ointment Apply topically 2 times daily       ondansetron (ZOFRAN ODT) 4 MG ODT tab Take 1 tablet (4 mg) by mouth every 8 hours as needed for nausea 15 tablet 3     polyethylene glycol (MIRALAX/GLYCOLAX) packet Take 1 packet by mouth daily       rivaroxaban ANTICOAGULANT (XARELTO) 15 MG TABS tablet Take 15 mg by mouth daily (with dinner)       Allergies   Allergen Reactions     Clindamycin Hcl Other (See Comments)     Difficulty swallowing     Ampicillin Potassium      Rash nausea and vomiting        Celebrex [Celecoxib]      rash     Ciprofloxacin      rash     Erythromycin      rash     Indocin      Ended up going to( [facility] with severe head ache     Penicillins      Rash,nausea and vomiting     Vibramycin [Doxycycline Hyclate]      Rash      Xylocaine-Epinephrine [Epinephrine-Lidocaine-Na Metabisulfite]      Xylocaine with epi caused a rapid heart beat     FAMILY HISTORY NOTED AND REVIEWED    REVIEW OF SYSTEMS: above    PHYSICAL EXAM    /60 (BP Location: Left arm, Patient Position: Chair, Cuff Size: Adult Large)   Pulse 62   Temp 97  F (36.1  C) (Oral)   Ht 1.524 m (5')   Wt 69.9 kg (154 lb)   SpO2 97%   Breastfeeding? No   BMI 30.08 kg/m      Patient appears non toxic  The right lower extremity below the knee is definitely less red today and a bit swollen as well.  The superficial ulceration that she has on the distal anterior right lower leg looks a little bit better with no pus.  In the posterior region of the right leg in the calf area there is a very small very tender subcutaneous mass.    ASSESSMENT:  Cellulitis, improved.  I am a bit concerned about the lump that is tender and I am wondering if it is an abscess although it is fairly firm.  At this point I would like her to follow-up with her dermatologist, Dr. Cristobal, who did her Mohs surgery on the leg.  I would like to see if he feels this could be an infected abscess and therefore he could remove it.  She will call right away and get in this week and if she can she will let me know.  She will keep her leg elevated and continue the Keflex at this point.  This was discussed with the patient and her daughter.    Jeffery Sherwood M.D.

## 2019-05-29 DIAGNOSIS — I48.91 ATRIAL FIBRILLATION, UNSPECIFIED TYPE (H): ICD-10-CM

## 2019-05-29 NOTE — TELEPHONE ENCOUNTER
XARELTO 20 MG TABS tablet    Last Written Prescription Date:  Unknown  Last Fill Quantity: unknown,  # refills: unknown   Last office visit: 5/21/2019 with prescribing provider:  Kaela   Future Office Visit:  Unknown       Requested Prescriptions   Pending Prescriptions Disp Refills     XARELTO 20 MG TABS tablet [Pharmacy Med Name: XARELTO 20MG TABLETS] 90 tablet 0     Sig: TAKE 1 TABLET(20 MG) BY MOUTH DAILY WITH DINNER       Direct Oral Anticoagulant Agents Failed - 5/29/2019  2:35 PM        Failed - Creatinine Clearance greater than 50 ml/min on file in past 3 mos     No lab results found.          Passed - Normal Platelets on file in past 12 months     Recent Labs   Lab Test 05/14/19  1132                  Passed - Medication is active on med list        Passed - Serum creatinine less than or equal to 1.4 on file in past 3 mos     Recent Labs   Lab Test 05/09/19  1755   CR 0.78             Passed - Patient is 18 years of age or older        Passed - No active pregnancy on record        Passed - No positive pregnancy test within past 12 months        Passed - Recent (6 mo) or future (30 days) visit within the authorizing provider's specialty

## 2019-05-31 RX ORDER — RIVAROXABAN 20 MG/1
TABLET, FILM COATED ORAL
Qty: 90 TABLET | Refills: 0 | Status: SHIPPED | OUTPATIENT
Start: 2019-05-31 | End: 2019-09-04

## 2019-05-31 NOTE — TELEPHONE ENCOUNTER
Dr. Sherwood,  Routing refill request to provider for review/approval because:  Medication is reported/historical  Thanks,  Jewell Flores RN

## 2019-06-11 ENCOUNTER — ANCILLARY PROCEDURE (OUTPATIENT)
Dept: CARDIOLOGY | Facility: CLINIC | Age: 84
End: 2019-06-11
Attending: INTERNAL MEDICINE
Payer: MEDICARE

## 2019-06-11 DIAGNOSIS — I49.5 SICK SINUS SYNDROME (H): ICD-10-CM

## 2019-06-11 PROCEDURE — 93294 REM INTERROG EVL PM/LDLS PM: CPT | Performed by: INTERNAL MEDICINE

## 2019-06-11 PROCEDURE — 93296 REM INTERROG EVL PM/IDS: CPT | Performed by: INTERNAL MEDICINE

## 2019-06-14 LAB
MDC_IDC_EPISODE_DTM: NORMAL
MDC_IDC_EPISODE_DTM: NORMAL
MDC_IDC_EPISODE_DURATION: 4 S
MDC_IDC_EPISODE_DURATION: 6 S
MDC_IDC_EPISODE_ID: NORMAL
MDC_IDC_EPISODE_ID: NORMAL
MDC_IDC_EPISODE_TYPE: NORMAL
MDC_IDC_EPISODE_TYPE: NORMAL
MDC_IDC_LEAD_IMPLANT_DT: NORMAL
MDC_IDC_LEAD_IMPLANT_DT: NORMAL
MDC_IDC_LEAD_LOCATION: NORMAL
MDC_IDC_LEAD_LOCATION: NORMAL
MDC_IDC_LEAD_MFG: NORMAL
MDC_IDC_LEAD_MFG: NORMAL
MDC_IDC_LEAD_MODEL: NORMAL
MDC_IDC_LEAD_MODEL: NORMAL
MDC_IDC_LEAD_POLARITY_TYPE: NORMAL
MDC_IDC_LEAD_POLARITY_TYPE: NORMAL
MDC_IDC_LEAD_SERIAL: NORMAL
MDC_IDC_LEAD_SERIAL: NORMAL
MDC_IDC_MSMT_BATTERY_DTM: NORMAL
MDC_IDC_MSMT_BATTERY_REMAINING_LONGEVITY: 59 MO
MDC_IDC_MSMT_BATTERY_REMAINING_PERCENTAGE: 51 %
MDC_IDC_MSMT_BATTERY_RRT_TRIGGER: NORMAL
MDC_IDC_MSMT_BATTERY_STATUS: NORMAL
MDC_IDC_MSMT_BATTERY_VOLTAGE: 2.87 V
MDC_IDC_MSMT_LEADCHNL_RA_IMPEDANCE_VALUE: 410 OHM
MDC_IDC_MSMT_LEADCHNL_RA_LEAD_CHANNEL_STATUS: NORMAL
MDC_IDC_MSMT_LEADCHNL_RA_PACING_THRESHOLD_AMPLITUDE: 1 V
MDC_IDC_MSMT_LEADCHNL_RA_PACING_THRESHOLD_PULSEWIDTH: 0.5 MS
MDC_IDC_MSMT_LEADCHNL_RA_SENSING_INTR_AMPL: 1.5 MV
MDC_IDC_MSMT_LEADCHNL_RV_IMPEDANCE_VALUE: 530 OHM
MDC_IDC_MSMT_LEADCHNL_RV_LEAD_CHANNEL_STATUS: NORMAL
MDC_IDC_MSMT_LEADCHNL_RV_PACING_THRESHOLD_AMPLITUDE: 0.75 V
MDC_IDC_MSMT_LEADCHNL_RV_PACING_THRESHOLD_PULSEWIDTH: 0.5 MS
MDC_IDC_MSMT_LEADCHNL_RV_SENSING_INTR_AMPL: 12 MV
MDC_IDC_PG_IMPLANT_DTM: NORMAL
MDC_IDC_PG_MFG: NORMAL
MDC_IDC_PG_MODEL: NORMAL
MDC_IDC_PG_SERIAL: NORMAL
MDC_IDC_PG_TYPE: NORMAL
MDC_IDC_SESS_CLINIC_NAME: NORMAL
MDC_IDC_SESS_DTM: NORMAL
MDC_IDC_SESS_REPROGRAMMED: NO
MDC_IDC_SESS_TYPE: NORMAL
MDC_IDC_SET_BRADY_AT_MODE_SWITCH_MODE: NORMAL
MDC_IDC_SET_BRADY_AT_MODE_SWITCH_RATE: 180 {BEATS}/MIN
MDC_IDC_SET_BRADY_LOWRATE: 60 {BEATS}/MIN
MDC_IDC_SET_BRADY_MAX_SENSOR_RATE: 120 {BEATS}/MIN
MDC_IDC_SET_BRADY_MAX_TRACKING_RATE: 120 {BEATS}/MIN
MDC_IDC_SET_BRADY_MODE: NORMAL
MDC_IDC_SET_BRADY_PAV_DELAY_HIGH: 100 MS
MDC_IDC_SET_BRADY_PAV_DELAY_LOW: 275 MS
MDC_IDC_SET_BRADY_SAV_DELAY_HIGH: 100 MS
MDC_IDC_SET_BRADY_SAV_DELAY_LOW: 250 MS
MDC_IDC_SET_LEADCHNL_RA_PACING_AMPLITUDE: 2 V
MDC_IDC_SET_LEADCHNL_RA_PACING_ANODE_ELECTRODE_1: NORMAL
MDC_IDC_SET_LEADCHNL_RA_PACING_ANODE_LOCATION_1: NORMAL
MDC_IDC_SET_LEADCHNL_RA_PACING_CAPTURE_MODE: NORMAL
MDC_IDC_SET_LEADCHNL_RA_PACING_CATHODE_ELECTRODE_1: NORMAL
MDC_IDC_SET_LEADCHNL_RA_PACING_CATHODE_LOCATION_1: NORMAL
MDC_IDC_SET_LEADCHNL_RA_PACING_POLARITY: NORMAL
MDC_IDC_SET_LEADCHNL_RA_PACING_PULSEWIDTH: 0.5 MS
MDC_IDC_SET_LEADCHNL_RA_SENSING_ADAPTATION_MODE: NORMAL
MDC_IDC_SET_LEADCHNL_RA_SENSING_ANODE_ELECTRODE_1: NORMAL
MDC_IDC_SET_LEADCHNL_RA_SENSING_ANODE_LOCATION_1: NORMAL
MDC_IDC_SET_LEADCHNL_RA_SENSING_CATHODE_ELECTRODE_1: NORMAL
MDC_IDC_SET_LEADCHNL_RA_SENSING_CATHODE_LOCATION_1: NORMAL
MDC_IDC_SET_LEADCHNL_RA_SENSING_POLARITY: NORMAL
MDC_IDC_SET_LEADCHNL_RA_SENSING_SENSITIVITY: 0.5 MV
MDC_IDC_SET_LEADCHNL_RV_PACING_AMPLITUDE: 2 V
MDC_IDC_SET_LEADCHNL_RV_PACING_ANODE_ELECTRODE_1: NORMAL
MDC_IDC_SET_LEADCHNL_RV_PACING_ANODE_LOCATION_1: NORMAL
MDC_IDC_SET_LEADCHNL_RV_PACING_CAPTURE_MODE: NORMAL
MDC_IDC_SET_LEADCHNL_RV_PACING_CATHODE_ELECTRODE_1: NORMAL
MDC_IDC_SET_LEADCHNL_RV_PACING_CATHODE_LOCATION_1: NORMAL
MDC_IDC_SET_LEADCHNL_RV_PACING_POLARITY: NORMAL
MDC_IDC_SET_LEADCHNL_RV_PACING_PULSEWIDTH: 0.5 MS
MDC_IDC_SET_LEADCHNL_RV_SENSING_ADAPTATION_MODE: NORMAL
MDC_IDC_SET_LEADCHNL_RV_SENSING_ANODE_ELECTRODE_1: NORMAL
MDC_IDC_SET_LEADCHNL_RV_SENSING_ANODE_LOCATION_1: NORMAL
MDC_IDC_SET_LEADCHNL_RV_SENSING_CATHODE_ELECTRODE_1: NORMAL
MDC_IDC_SET_LEADCHNL_RV_SENSING_CATHODE_LOCATION_1: NORMAL
MDC_IDC_SET_LEADCHNL_RV_SENSING_POLARITY: NORMAL
MDC_IDC_SET_LEADCHNL_RV_SENSING_SENSITIVITY: 2 MV
MDC_IDC_STAT_AT_BURDEN_PERCENT: 0 %
MDC_IDC_STAT_AT_DTM_END: NORMAL
MDC_IDC_STAT_AT_DTM_START: NORMAL
MDC_IDC_STAT_AT_MODE_SW_COUNT: 2
MDC_IDC_STAT_AT_MODE_SW_COUNT_PER_DAY: 0
MDC_IDC_STAT_AT_MODE_SW_MAX_DURATION: 6 S
MDC_IDC_STAT_AT_MODE_SW_PERCENT_TIME: 1 %
MDC_IDC_STAT_BRADY_AP_VP_PERCENT: 56 %
MDC_IDC_STAT_BRADY_AP_VS_PERCENT: 1 %
MDC_IDC_STAT_BRADY_AS_VP_PERCENT: 44 %
MDC_IDC_STAT_BRADY_AS_VS_PERCENT: 1 %
MDC_IDC_STAT_BRADY_DTM_END: NORMAL
MDC_IDC_STAT_BRADY_DTM_START: NORMAL
MDC_IDC_STAT_BRADY_RA_PERCENT_PACED: 55 %
MDC_IDC_STAT_BRADY_RV_PERCENT_PACED: 99 %
MDC_IDC_STAT_CRT_DTM_END: NORMAL
MDC_IDC_STAT_CRT_DTM_START: NORMAL
MDC_IDC_STAT_HEART_RATE_ATRIAL_MAX: 310 {BEATS}/MIN
MDC_IDC_STAT_HEART_RATE_ATRIAL_MEAN: 69 {BEATS}/MIN
MDC_IDC_STAT_HEART_RATE_ATRIAL_MIN: 60 {BEATS}/MIN
MDC_IDC_STAT_HEART_RATE_DTM_END: NORMAL
MDC_IDC_STAT_HEART_RATE_DTM_START: NORMAL
MDC_IDC_STAT_HEART_RATE_VENTRICULAR_MAX: 150 {BEATS}/MIN
MDC_IDC_STAT_HEART_RATE_VENTRICULAR_MEAN: 68 {BEATS}/MIN
MDC_IDC_STAT_HEART_RATE_VENTRICULAR_MIN: 50 {BEATS}/MIN

## 2019-07-05 DIAGNOSIS — I48.91 ATRIAL FIBRILLATION, UNSPECIFIED TYPE (H): ICD-10-CM

## 2019-07-05 DIAGNOSIS — I10 BENIGN ESSENTIAL HYPERTENSION: ICD-10-CM

## 2019-07-05 NOTE — TELEPHONE ENCOUNTER
"metoprolol tartrate (LOPRESSOR) 50 MG tablet    Last Written Prescription Date:  04/04/2019  Last Fill Quantity: 270,  # refills: 0   Last office visit: 5/21/2019 with prescribing provider:  Kaela   Future Office Visit:  Unknown     Requested Prescriptions   Pending Prescriptions Disp Refills     metoprolol tartrate (LOPRESSOR) 50 MG tablet [Pharmacy Med Name: METOPROLOL TARTRATE 50MG TABLETS] 270 tablet 0     Sig: TAKE 1 AND 1/2 TABLETS BY MOUTH TWICE DAILY       Beta-Blockers Protocol Passed - 7/5/2019  9:55 AM        Passed - Blood pressure under 140/90 in past 12 months     BP Readings from Last 3 Encounters:   05/21/19 136/60   05/14/19 130/65   05/09/19 125/57                 Passed - Patient is age 6 or older        Passed - Recent (12 mo) or future (30 days) visit within the authorizing provider's specialty     Patient had office visit in the last 12 months or has a visit in the next 30 days with authorizing provider or within the authorizing provider's specialty.  See \"Patient Info\" tab in inbasket, or \"Choose Columns\" in Meds & Orders section of the refill encounter.              Passed - Medication is active on med list          "

## 2019-07-08 ENCOUNTER — TRANSFERRED RECORDS (OUTPATIENT)
Dept: HEALTH INFORMATION MANAGEMENT | Facility: CLINIC | Age: 84
End: 2019-07-08

## 2019-07-08 RX ORDER — METOPROLOL TARTRATE 50 MG
TABLET ORAL
Qty: 270 TABLET | Refills: 2 | Status: SHIPPED | OUTPATIENT
Start: 2019-07-08 | End: 2020-04-02

## 2019-07-08 NOTE — TELEPHONE ENCOUNTER
Prescription approved per [facility] Refill Protocol.  Rasheeda VELIZ RN

## 2019-07-15 ENCOUNTER — TRANSFERRED RECORDS (OUTPATIENT)
Dept: HEALTH INFORMATION MANAGEMENT | Facility: CLINIC | Age: 84
End: 2019-07-15

## 2019-07-25 ENCOUNTER — TELEPHONE (OUTPATIENT)
Dept: PHARMACY | Facility: CLINIC | Age: 84
End: 2019-07-25

## 2019-07-25 NOTE — TELEPHONE ENCOUNTER
We have been unable to reach this patient for MTM follow-up after several attempts. We will stop reaching out to the patient at this time. Please let us know if we can assist in this patient's care in the future.    Routing to PCP as Sharon AngD, [facility]  Medication Therapy Management Provider  Pager: [phone]

## 2019-09-04 DIAGNOSIS — I48.91 ATRIAL FIBRILLATION, UNSPECIFIED TYPE (H): ICD-10-CM

## 2019-09-05 RX ORDER — RIVAROXABAN 20 MG/1
TABLET, FILM COATED ORAL
Qty: 90 TABLET | Refills: 1 | Status: ON HOLD | OUTPATIENT
Start: 2019-09-05 | End: 2019-11-15

## 2019-09-05 NOTE — TELEPHONE ENCOUNTER
Last Written Prescription Date:  Historic  Last Fill Quantity: unkown,   # refills:   Last Office Visit: 5/21/19  Future Office visit:       Routing refill request to provider for review/approval because:  Medication is reported/historical  Please authorize if appropriate.  Thanks,  Alem Moody RN        Requested Prescriptions   Pending Prescriptions Disp Refills     XARELTO 20 MG TABS tablet [Pharmacy Med Name: XARELTO 20MG TABLETS] 90 tablet 0     Sig: TAKE 1 TABLET(20 MG) BY MOUTH DAILY WITH DINNER       Direct Oral Anticoagulant Agents Failed - 9/4/2019  8:13 PM        Failed - Creatinine Clearance greater than 50 ml/min on file in past 3 mos     No lab results found.          Failed - Serum creatinine less than or equal to 1.4 on file in past 3 mos     Recent Labs   Lab Test 05/09/19  1755   CR 0.78             Passed - Normal Platelets on file in past 12 months     Recent Labs   Lab Test 05/14/19  1132                  Passed - Medication is active on med list        Passed - Patient is 18 years of age or older        Passed - No active pregnancy on record        Passed - No positive pregnancy test within past 12 months        Passed - Recent (6 mo) or future (30 days) visit within the authorizing provider's specialty

## 2019-09-13 ENCOUNTER — ANCILLARY PROCEDURE (OUTPATIENT)
Dept: CARDIOLOGY | Facility: CLINIC | Age: 84
End: 2019-09-13
Attending: INTERNAL MEDICINE
Payer: MEDICARE

## 2019-09-13 DIAGNOSIS — I49.5 SICK SINUS SYNDROME (H): ICD-10-CM

## 2019-09-13 PROCEDURE — 93294 REM INTERROG EVL PM/LDLS PM: CPT | Performed by: INTERNAL MEDICINE

## 2019-09-13 PROCEDURE — 93296 REM INTERROG EVL PM/IDS: CPT | Performed by: INTERNAL MEDICINE

## 2019-10-01 LAB
MDC_IDC_LEAD_IMPLANT_DT: NORMAL
MDC_IDC_LEAD_IMPLANT_DT: NORMAL
MDC_IDC_LEAD_LOCATION: NORMAL
MDC_IDC_LEAD_LOCATION: NORMAL
MDC_IDC_LEAD_MFG: NORMAL
MDC_IDC_LEAD_MFG: NORMAL
MDC_IDC_LEAD_MODEL: NORMAL
MDC_IDC_LEAD_MODEL: NORMAL
MDC_IDC_LEAD_POLARITY_TYPE: NORMAL
MDC_IDC_LEAD_POLARITY_TYPE: NORMAL
MDC_IDC_LEAD_SERIAL: NORMAL
MDC_IDC_LEAD_SERIAL: NORMAL
MDC_IDC_MSMT_BATTERY_DTM: NORMAL
MDC_IDC_MSMT_BATTERY_REMAINING_LONGEVITY: 44 MO
MDC_IDC_MSMT_BATTERY_REMAINING_PERCENTAGE: 40 %
MDC_IDC_MSMT_BATTERY_RRT_TRIGGER: NORMAL
MDC_IDC_MSMT_BATTERY_STATUS: NORMAL
MDC_IDC_MSMT_BATTERY_VOLTAGE: 2.84 V
MDC_IDC_MSMT_LEADCHNL_RA_IMPEDANCE_VALUE: 390 OHM
MDC_IDC_MSMT_LEADCHNL_RA_LEAD_CHANNEL_STATUS: NORMAL
MDC_IDC_MSMT_LEADCHNL_RA_PACING_THRESHOLD_AMPLITUDE: 1.12 V
MDC_IDC_MSMT_LEADCHNL_RA_PACING_THRESHOLD_PULSEWIDTH: 0.5 MS
MDC_IDC_MSMT_LEADCHNL_RA_SENSING_INTR_AMPL: 1.4 MV
MDC_IDC_MSMT_LEADCHNL_RV_IMPEDANCE_VALUE: 460 OHM
MDC_IDC_MSMT_LEADCHNL_RV_LEAD_CHANNEL_STATUS: NORMAL
MDC_IDC_MSMT_LEADCHNL_RV_PACING_THRESHOLD_AMPLITUDE: 0.75 V
MDC_IDC_MSMT_LEADCHNL_RV_PACING_THRESHOLD_PULSEWIDTH: 0.5 MS
MDC_IDC_MSMT_LEADCHNL_RV_SENSING_INTR_AMPL: 12 MV
MDC_IDC_PG_IMPLANT_DTM: NORMAL
MDC_IDC_PG_MFG: NORMAL
MDC_IDC_PG_MODEL: NORMAL
MDC_IDC_PG_SERIAL: NORMAL
MDC_IDC_PG_TYPE: NORMAL
MDC_IDC_SESS_CLINIC_NAME: NORMAL
MDC_IDC_SESS_DTM: NORMAL
MDC_IDC_SESS_REPROGRAMMED: NO
MDC_IDC_SESS_TYPE: NORMAL
MDC_IDC_SET_BRADY_AT_MODE_SWITCH_MODE: NORMAL
MDC_IDC_SET_BRADY_AT_MODE_SWITCH_RATE: 180 {BEATS}/MIN
MDC_IDC_SET_BRADY_LOWRATE: 60 {BEATS}/MIN
MDC_IDC_SET_BRADY_MAX_SENSOR_RATE: 120 {BEATS}/MIN
MDC_IDC_SET_BRADY_MAX_TRACKING_RATE: 120 {BEATS}/MIN
MDC_IDC_SET_BRADY_MODE: NORMAL
MDC_IDC_SET_BRADY_PAV_DELAY_HIGH: 100 MS
MDC_IDC_SET_BRADY_PAV_DELAY_LOW: 275 MS
MDC_IDC_SET_BRADY_SAV_DELAY_HIGH: 100 MS
MDC_IDC_SET_BRADY_SAV_DELAY_LOW: 250 MS
MDC_IDC_SET_LEADCHNL_RA_PACING_AMPLITUDE: 2.12
MDC_IDC_SET_LEADCHNL_RA_PACING_ANODE_ELECTRODE_1: NORMAL
MDC_IDC_SET_LEADCHNL_RA_PACING_ANODE_LOCATION_1: NORMAL
MDC_IDC_SET_LEADCHNL_RA_PACING_CAPTURE_MODE: NORMAL
MDC_IDC_SET_LEADCHNL_RA_PACING_CATHODE_ELECTRODE_1: NORMAL
MDC_IDC_SET_LEADCHNL_RA_PACING_CATHODE_LOCATION_1: NORMAL
MDC_IDC_SET_LEADCHNL_RA_PACING_POLARITY: NORMAL
MDC_IDC_SET_LEADCHNL_RA_PACING_PULSEWIDTH: 0.5 MS
MDC_IDC_SET_LEADCHNL_RA_SENSING_ADAPTATION_MODE: NORMAL
MDC_IDC_SET_LEADCHNL_RA_SENSING_ANODE_ELECTRODE_1: NORMAL
MDC_IDC_SET_LEADCHNL_RA_SENSING_ANODE_LOCATION_1: NORMAL
MDC_IDC_SET_LEADCHNL_RA_SENSING_CATHODE_ELECTRODE_1: NORMAL
MDC_IDC_SET_LEADCHNL_RA_SENSING_CATHODE_LOCATION_1: NORMAL
MDC_IDC_SET_LEADCHNL_RA_SENSING_POLARITY: NORMAL
MDC_IDC_SET_LEADCHNL_RA_SENSING_SENSITIVITY: 0.5 MV
MDC_IDC_SET_LEADCHNL_RV_PACING_AMPLITUDE: 2 V
MDC_IDC_SET_LEADCHNL_RV_PACING_ANODE_ELECTRODE_1: NORMAL
MDC_IDC_SET_LEADCHNL_RV_PACING_ANODE_LOCATION_1: NORMAL
MDC_IDC_SET_LEADCHNL_RV_PACING_CAPTURE_MODE: NORMAL
MDC_IDC_SET_LEADCHNL_RV_PACING_CATHODE_ELECTRODE_1: NORMAL
MDC_IDC_SET_LEADCHNL_RV_PACING_CATHODE_LOCATION_1: NORMAL
MDC_IDC_SET_LEADCHNL_RV_PACING_POLARITY: NORMAL
MDC_IDC_SET_LEADCHNL_RV_PACING_PULSEWIDTH: 0.5 MS
MDC_IDC_SET_LEADCHNL_RV_SENSING_ADAPTATION_MODE: NORMAL
MDC_IDC_SET_LEADCHNL_RV_SENSING_ANODE_ELECTRODE_1: NORMAL
MDC_IDC_SET_LEADCHNL_RV_SENSING_ANODE_LOCATION_1: NORMAL
MDC_IDC_SET_LEADCHNL_RV_SENSING_CATHODE_ELECTRODE_1: NORMAL
MDC_IDC_SET_LEADCHNL_RV_SENSING_CATHODE_LOCATION_1: NORMAL
MDC_IDC_SET_LEADCHNL_RV_SENSING_POLARITY: NORMAL
MDC_IDC_SET_LEADCHNL_RV_SENSING_SENSITIVITY: 2 MV
MDC_IDC_STAT_AT_BURDEN_PERCENT: 0 %
MDC_IDC_STAT_AT_DTM_END: NORMAL
MDC_IDC_STAT_AT_DTM_START: NORMAL
MDC_IDC_STAT_AT_MODE_SW_COUNT: 0
MDC_IDC_STAT_AT_MODE_SW_COUNT_PER_DAY: 0
MDC_IDC_STAT_AT_MODE_SW_PERCENT_TIME: 0 %
MDC_IDC_STAT_BRADY_AP_VP_PERCENT: 62 %
MDC_IDC_STAT_BRADY_AP_VS_PERCENT: 1 %
MDC_IDC_STAT_BRADY_AS_VP_PERCENT: 37 %
MDC_IDC_STAT_BRADY_AS_VS_PERCENT: 1 %
MDC_IDC_STAT_BRADY_DTM_END: NORMAL
MDC_IDC_STAT_BRADY_DTM_START: NORMAL
MDC_IDC_STAT_BRADY_RA_PERCENT_PACED: 61 %
MDC_IDC_STAT_BRADY_RV_PERCENT_PACED: 99 %
MDC_IDC_STAT_CRT_DTM_END: NORMAL
MDC_IDC_STAT_CRT_DTM_START: NORMAL
MDC_IDC_STAT_HEART_RATE_ATRIAL_MAX: 180 {BEATS}/MIN
MDC_IDC_STAT_HEART_RATE_ATRIAL_MEAN: 68 {BEATS}/MIN
MDC_IDC_STAT_HEART_RATE_ATRIAL_MIN: 60 {BEATS}/MIN
MDC_IDC_STAT_HEART_RATE_DTM_END: NORMAL
MDC_IDC_STAT_HEART_RATE_DTM_START: NORMAL
MDC_IDC_STAT_HEART_RATE_VENTRICULAR_MAX: 150 {BEATS}/MIN
MDC_IDC_STAT_HEART_RATE_VENTRICULAR_MEAN: 67 {BEATS}/MIN
MDC_IDC_STAT_HEART_RATE_VENTRICULAR_MIN: 40 {BEATS}/MIN

## 2019-11-01 ENCOUNTER — MEDICAL CORRESPONDENCE (OUTPATIENT)
Dept: HEALTH INFORMATION MANAGEMENT | Facility: CLINIC | Age: 84
End: 2019-11-01

## 2019-11-06 ENCOUNTER — TRANSFERRED RECORDS (OUTPATIENT)
Dept: HEALTH INFORMATION MANAGEMENT | Facility: CLINIC | Age: 84
End: 2019-11-06

## 2019-11-07 ENCOUNTER — TRANSFERRED RECORDS (OUTPATIENT)
Dept: HEALTH INFORMATION MANAGEMENT | Facility: CLINIC | Age: 84
End: 2019-11-07

## 2019-11-14 ENCOUNTER — APPOINTMENT (OUTPATIENT)
Dept: GENERAL RADIOLOGY | Facility: CLINIC | Age: 84
DRG: 190 | End: 2019-11-14
Attending: EMERGENCY MEDICINE
Payer: MEDICARE

## 2019-11-14 ENCOUNTER — HOSPITAL ENCOUNTER (INPATIENT)
Facility: CLINIC | Age: 84
LOS: 1 days | Discharge: HOME HEALTH | DRG: 190 | End: 2019-11-15
Attending: EMERGENCY MEDICINE | Admitting: STUDENT IN AN ORGANIZED HEALTH CARE EDUCATION/TRAINING PROGRAM
Payer: MEDICARE

## 2019-11-14 DIAGNOSIS — I48.0 PAROXYSMAL ATRIAL FIBRILLATION (H): ICD-10-CM

## 2019-11-14 DIAGNOSIS — I50.9 CONGESTIVE HEART FAILURE, UNSPECIFIED HF CHRONICITY, UNSPECIFIED HEART FAILURE TYPE (H): ICD-10-CM

## 2019-11-14 DIAGNOSIS — J44.9 CHRONIC OBSTRUCTIVE PULMONARY DISEASE, UNSPECIFIED COPD TYPE (H): Primary | ICD-10-CM

## 2019-11-14 DIAGNOSIS — J44.1 COPD EXACERBATION (H): ICD-10-CM

## 2019-11-14 DIAGNOSIS — R06.02 SHORTNESS OF BREATH: ICD-10-CM

## 2019-11-14 PROBLEM — R11.0 NAUSEA: Status: RESOLVED | Noted: 2017-12-22 | Resolved: 2019-11-14

## 2019-11-14 PROBLEM — G47.00 INSOMNIA: Status: ACTIVE | Noted: 2017-12-26

## 2019-11-14 PROBLEM — D62 ANEMIA DUE TO BLOOD LOSS, ACUTE: Status: RESOLVED | Noted: 2017-12-12 | Resolved: 2019-11-14

## 2019-11-14 PROBLEM — I49.5 SICK SINUS SYNDROME (H): Status: ACTIVE | Noted: 2017-12-12

## 2019-11-14 LAB
ALBUMIN SERPL-MCNC: 3.4 G/DL (ref 3.4–5)
ALP SERPL-CCNC: 56 U/L (ref 40–150)
ALT SERPL W P-5'-P-CCNC: 19 U/L (ref 0–50)
ANION GAP SERPL CALCULATED.3IONS-SCNC: 4 MMOL/L (ref 3–14)
AST SERPL W P-5'-P-CCNC: 16 U/L (ref 0–45)
BASOPHILS # BLD AUTO: 0 10E9/L (ref 0–0.2)
BASOPHILS NFR BLD AUTO: 0.2 %
BILIRUB SERPL-MCNC: 1 MG/DL (ref 0.2–1.3)
BUN SERPL-MCNC: 22 MG/DL (ref 7–30)
CALCIUM SERPL-MCNC: 8.7 MG/DL (ref 8.5–10.1)
CHLORIDE SERPL-SCNC: 106 MMOL/L (ref 94–109)
CO2 SERPL-SCNC: 26 MMOL/L (ref 20–32)
CREAT SERPL-MCNC: 0.77 MG/DL (ref 0.52–1.04)
DIFFERENTIAL METHOD BLD: ABNORMAL
EOSINOPHIL # BLD AUTO: 0.2 10E9/L (ref 0–0.7)
EOSINOPHIL NFR BLD AUTO: 1.5 %
ERYTHROCYTE [DISTWIDTH] IN BLOOD BY AUTOMATED COUNT: 15 % (ref 10–15)
GFR SERPL CREATININE-BSD FRML MDRD: 66 ML/MIN/{1.73_M2}
GLUCOSE SERPL-MCNC: 122 MG/DL (ref 70–99)
HCT VFR BLD AUTO: 34.4 % (ref 35–47)
HGB BLD-MCNC: 11.1 G/DL (ref 11.7–15.7)
IMM GRANULOCYTES # BLD: 0.1 10E9/L (ref 0–0.4)
IMM GRANULOCYTES NFR BLD: 0.5 %
INTERPRETATION ECG - MUSE: NORMAL
LYMPHOCYTES # BLD AUTO: 1.2 10E9/L (ref 0.8–5.3)
LYMPHOCYTES NFR BLD AUTO: 11.3 %
MCH RBC QN AUTO: 33 PG (ref 26.5–33)
MCHC RBC AUTO-ENTMCNC: 32.3 G/DL (ref 31.5–36.5)
MCV RBC AUTO: 102 FL (ref 78–100)
MONOCYTES # BLD AUTO: 0.7 10E9/L (ref 0–1.3)
MONOCYTES NFR BLD AUTO: 6.7 %
NEUTROPHILS # BLD AUTO: 8.6 10E9/L (ref 1.6–8.3)
NEUTROPHILS NFR BLD AUTO: 79.8 %
NRBC # BLD AUTO: 0 10*3/UL
NRBC BLD AUTO-RTO: 0 /100
NT-PROBNP SERPL-MCNC: 1526 PG/ML (ref 0–1800)
PLATELET # BLD AUTO: 301 10E9/L (ref 150–450)
POTASSIUM SERPL-SCNC: 4.3 MMOL/L (ref 3.4–5.3)
PROT SERPL-MCNC: 7.8 G/DL (ref 6.8–8.8)
RBC # BLD AUTO: 3.36 10E12/L (ref 3.8–5.2)
SODIUM SERPL-SCNC: 136 MMOL/L (ref 133–144)
TROPONIN I SERPL-MCNC: <0.015 UG/L (ref 0–0.04)
WBC # BLD AUTO: 10.8 10E9/L (ref 4–11)

## 2019-11-14 PROCEDURE — 71046 X-RAY EXAM CHEST 2 VIEWS: CPT

## 2019-11-14 PROCEDURE — 25000128 H RX IP 250 OP 636: Performed by: EMERGENCY MEDICINE

## 2019-11-14 PROCEDURE — 40000275 ZZH STATISTIC RCP TIME EA 10 MIN

## 2019-11-14 PROCEDURE — 84484 ASSAY OF TROPONIN QUANT: CPT | Performed by: EMERGENCY MEDICINE

## 2019-11-14 PROCEDURE — 25000132 ZZH RX MED GY IP 250 OP 250 PS 637: Mod: GY | Performed by: PHYSICIAN ASSISTANT

## 2019-11-14 PROCEDURE — 80053 COMPREHEN METABOLIC PANEL: CPT | Performed by: EMERGENCY MEDICINE

## 2019-11-14 PROCEDURE — 12000000 ZZH R&B MED SURG/OB

## 2019-11-14 PROCEDURE — 99223 1ST HOSP IP/OBS HIGH 75: CPT | Mod: AI | Performed by: PHYSICIAN ASSISTANT

## 2019-11-14 PROCEDURE — 93005 ELECTROCARDIOGRAM TRACING: CPT

## 2019-11-14 PROCEDURE — 99285 EMERGENCY DEPT VISIT HI MDM: CPT | Mod: 25

## 2019-11-14 PROCEDURE — 83880 ASSAY OF NATRIURETIC PEPTIDE: CPT | Performed by: EMERGENCY MEDICINE

## 2019-11-14 PROCEDURE — 94640 AIRWAY INHALATION TREATMENT: CPT | Mod: 76

## 2019-11-14 PROCEDURE — 25000125 ZZHC RX 250: Performed by: PHYSICIAN ASSISTANT

## 2019-11-14 PROCEDURE — 94640 AIRWAY INHALATION TREATMENT: CPT

## 2019-11-14 PROCEDURE — 96375 TX/PRO/DX INJ NEW DRUG ADDON: CPT

## 2019-11-14 PROCEDURE — 96374 THER/PROPH/DIAG INJ IV PUSH: CPT

## 2019-11-14 PROCEDURE — 85025 COMPLETE CBC W/AUTO DIFF WBC: CPT | Performed by: EMERGENCY MEDICINE

## 2019-11-14 PROCEDURE — 25000125 ZZHC RX 250: Performed by: EMERGENCY MEDICINE

## 2019-11-14 RX ORDER — ALBUTEROL SULFATE 90 UG/1
2 AEROSOL, METERED RESPIRATORY (INHALATION) 4 TIMES DAILY PRN
Status: DISCONTINUED | OUTPATIENT
Start: 2019-11-14 | End: 2019-11-15 | Stop reason: HOSPADM

## 2019-11-14 RX ORDER — ACETAMINOPHEN 500 MG
1000 TABLET ORAL 3 TIMES DAILY
Status: DISCONTINUED | OUTPATIENT
Start: 2019-11-14 | End: 2019-11-15 | Stop reason: HOSPADM

## 2019-11-14 RX ORDER — PREDNISONE 20 MG/1
40 TABLET ORAL DAILY
Status: DISCONTINUED | OUTPATIENT
Start: 2019-11-15 | End: 2019-11-15 | Stop reason: HOSPADM

## 2019-11-14 RX ORDER — ACETAMINOPHEN 325 MG/1
650 TABLET ORAL EVERY 4 HOURS PRN
Status: DISCONTINUED | OUTPATIENT
Start: 2019-11-14 | End: 2019-11-15 | Stop reason: HOSPADM

## 2019-11-14 RX ORDER — CALCIUM CARBONATE 500 MG/1
1000 TABLET, CHEWABLE ORAL 4 TIMES DAILY PRN
Status: DISCONTINUED | OUTPATIENT
Start: 2019-11-14 | End: 2019-11-15 | Stop reason: HOSPADM

## 2019-11-14 RX ORDER — ONDANSETRON 4 MG/1
4 TABLET, ORALLY DISINTEGRATING ORAL EVERY 6 HOURS PRN
Status: DISCONTINUED | OUTPATIENT
Start: 2019-11-14 | End: 2019-11-15 | Stop reason: HOSPADM

## 2019-11-14 RX ORDER — ONDANSETRON 2 MG/ML
4 INJECTION INTRAMUSCULAR; INTRAVENOUS EVERY 6 HOURS PRN
Status: DISCONTINUED | OUTPATIENT
Start: 2019-11-14 | End: 2019-11-15 | Stop reason: HOSPADM

## 2019-11-14 RX ORDER — IPRATROPIUM BROMIDE AND ALBUTEROL SULFATE 2.5; .5 MG/3ML; MG/3ML
3 SOLUTION RESPIRATORY (INHALATION) ONCE
Status: COMPLETED | OUTPATIENT
Start: 2019-11-14 | End: 2019-11-14

## 2019-11-14 RX ORDER — ALBUTEROL SULFATE 0.83 MG/ML
2.5 SOLUTION RESPIRATORY (INHALATION)
Status: DISCONTINUED | OUTPATIENT
Start: 2019-11-14 | End: 2019-11-15 | Stop reason: HOSPADM

## 2019-11-14 RX ORDER — PROCHLORPERAZINE 25 MG
12.5 SUPPOSITORY, RECTAL RECTAL EVERY 12 HOURS PRN
Status: DISCONTINUED | OUTPATIENT
Start: 2019-11-14 | End: 2019-11-15 | Stop reason: HOSPADM

## 2019-11-14 RX ORDER — POLYETHYLENE GLYCOL 3350 17 G/17G
17 POWDER, FOR SOLUTION ORAL DAILY PRN
COMMUNITY

## 2019-11-14 RX ORDER — AMOXICILLIN 250 MG
2 CAPSULE ORAL 2 TIMES DAILY PRN
Status: DISCONTINUED | OUTPATIENT
Start: 2019-11-14 | End: 2019-11-15 | Stop reason: HOSPADM

## 2019-11-14 RX ORDER — MULTIVITAMIN WITH IRON
1 TABLET ORAL DAILY
COMMUNITY

## 2019-11-14 RX ORDER — IPRATROPIUM BROMIDE AND ALBUTEROL SULFATE 2.5; .5 MG/3ML; MG/3ML
3 SOLUTION RESPIRATORY (INHALATION)
Status: DISCONTINUED | OUTPATIENT
Start: 2019-11-14 | End: 2019-11-15 | Stop reason: HOSPADM

## 2019-11-14 RX ORDER — NALOXONE HYDROCHLORIDE 0.4 MG/ML
.1-.4 INJECTION, SOLUTION INTRAMUSCULAR; INTRAVENOUS; SUBCUTANEOUS
Status: DISCONTINUED | OUTPATIENT
Start: 2019-11-14 | End: 2019-11-15 | Stop reason: HOSPADM

## 2019-11-14 RX ORDER — PROCHLORPERAZINE MALEATE 5 MG
5 TABLET ORAL EVERY 6 HOURS PRN
Status: DISCONTINUED | OUTPATIENT
Start: 2019-11-14 | End: 2019-11-15 | Stop reason: HOSPADM

## 2019-11-14 RX ORDER — ACETAMINOPHEN 500 MG
1000 TABLET ORAL 3 TIMES DAILY
COMMUNITY

## 2019-11-14 RX ORDER — LIDOCAINE 40 MG/G
CREAM TOPICAL
Status: DISCONTINUED | OUTPATIENT
Start: 2019-11-14 | End: 2019-11-15 | Stop reason: HOSPADM

## 2019-11-14 RX ORDER — METHYLPREDNISOLONE SODIUM SUCCINATE 125 MG/2ML
125 INJECTION, POWDER, LYOPHILIZED, FOR SOLUTION INTRAMUSCULAR; INTRAVENOUS ONCE
Status: COMPLETED | OUTPATIENT
Start: 2019-11-14 | End: 2019-11-14

## 2019-11-14 RX ORDER — FUROSEMIDE 10 MG/ML
20 INJECTION INTRAMUSCULAR; INTRAVENOUS ONCE
Status: COMPLETED | OUTPATIENT
Start: 2019-11-14 | End: 2019-11-14

## 2019-11-14 RX ORDER — FUROSEMIDE 20 MG
20 TABLET ORAL EVERY MORNING
Status: DISCONTINUED | OUTPATIENT
Start: 2019-11-15 | End: 2019-11-15 | Stop reason: HOSPADM

## 2019-11-14 RX ORDER — AMOXICILLIN 250 MG
1 CAPSULE ORAL 2 TIMES DAILY PRN
Status: DISCONTINUED | OUTPATIENT
Start: 2019-11-14 | End: 2019-11-15 | Stop reason: HOSPADM

## 2019-11-14 RX ADMIN — IPRATROPIUM BROMIDE AND ALBUTEROL SULFATE 3 ML: .5; 3 SOLUTION RESPIRATORY (INHALATION) at 09:33

## 2019-11-14 RX ADMIN — ACETAMINOPHEN 1000 MG: 500 TABLET, FILM COATED ORAL at 22:00

## 2019-11-14 RX ADMIN — IPRATROPIUM BROMIDE AND ALBUTEROL SULFATE 3 ML: .5; 3 SOLUTION RESPIRATORY (INHALATION) at 09:43

## 2019-11-14 RX ADMIN — IPRATROPIUM BROMIDE AND ALBUTEROL SULFATE 3 ML: .5; 3 SOLUTION RESPIRATORY (INHALATION) at 14:45

## 2019-11-14 RX ADMIN — METOPROLOL TARTRATE 75 MG: 50 TABLET, FILM COATED ORAL at 20:09

## 2019-11-14 RX ADMIN — METHYLPREDNISOLONE SODIUM SUCCINATE 125 MG: 125 INJECTION, POWDER, FOR SOLUTION INTRAMUSCULAR; INTRAVENOUS at 10:25

## 2019-11-14 RX ADMIN — IPRATROPIUM BROMIDE AND ALBUTEROL SULFATE 3 ML: .5; 3 SOLUTION RESPIRATORY (INHALATION) at 10:27

## 2019-11-14 RX ADMIN — FUROSEMIDE 20 MG: 10 INJECTION, SOLUTION INTRAVENOUS at 10:24

## 2019-11-14 RX ADMIN — Medication 1 MG: at 22:00

## 2019-11-14 RX ADMIN — FLUTICASONE FUROATE AND VILANTEROL TRIFENATATE 1 PUFF: 200; 25 POWDER RESPIRATORY (INHALATION) at 17:11

## 2019-11-14 RX ADMIN — IPRATROPIUM BROMIDE AND ALBUTEROL SULFATE 3 ML: .5; 3 SOLUTION RESPIRATORY (INHALATION) at 19:33

## 2019-11-14 RX ADMIN — GUAIFENESIN 10 ML: 200 SOLUTION ORAL at 22:00

## 2019-11-14 RX ADMIN — RIVAROXABAN 15 MG: 15 TABLET, FILM COATED ORAL at 17:10

## 2019-11-14 RX ADMIN — ACETAMINOPHEN 1000 MG: 500 TABLET, FILM COATED ORAL at 17:11

## 2019-11-14 RX ADMIN — FLECAINIDE ACETATE 75 MG: 50 TABLET ORAL at 20:08

## 2019-11-14 ASSESSMENT — ENCOUNTER SYMPTOMS
VOMITING: 0
COUGH: 0
SHORTNESS OF BREATH: 1
WHEEZING: 0
FEVER: 0
UNEXPECTED WEIGHT CHANGE: 1

## 2019-11-14 ASSESSMENT — ACTIVITIES OF DAILY LIVING (ADL)
TOILETING: 0-->INDEPENDENT
ADLS_ACUITY_SCORE: 20
WHICH_OF_THE_ABOVE_FUNCTIONAL_RISKS_HAD_A_RECENT_ONSET_OR_CHANGE?: AMBULATION
AMBULATION: 0-->INDEPENDENT
BATHING: 0-->INDEPENDENT
TRANSFERRING: 0-->INDEPENDENT
COGNITION: 0 - NO COGNITION ISSUES REPORTED
RETIRED_COMMUNICATION: 0-->UNDERSTANDS/COMMUNICATES WITHOUT DIFFICULTY
SWALLOWING: 0-->SWALLOWS FOODS/LIQUIDS WITHOUT DIFFICULTY
RETIRED_EATING: 0-->INDEPENDENT
DRESS: 0-->INDEPENDENT
AMBULATION: 0-->INDEPENDENT
FALL_HISTORY_WITHIN_LAST_SIX_MONTHS: NO
ADLS_ACUITY_SCORE: 16

## 2019-11-14 ASSESSMENT — MIFFLIN-ST. JEOR: SCORE: 1029.11

## 2019-11-14 NOTE — ED NOTES
"[facility]  ED Nurse Handoff Report    ED Chief complaint: Shortness of Breath      ED Diagnosis:   Final diagnoses:   None       Code Status: not addressed     Allergies:   Allergies   Allergen Reactions     Clindamycin Hcl Other (See Comments)     Difficulty swallowing     Ampicillin Potassium      Rash nausea and vomiting       Celebrex [Celecoxib]      rash     Ciprofloxacin      rash     Erythromycin      rash     Indocin      Ended up going to( [facility] with severe head ache     Penicillins      Rash,nausea and vomiting     Vibramycin [Doxycycline Hyclate]      Rash      Xylocaine-Epinephrine [Epinephrine-Lidocaine-Na Metabisulfite]      Xylocaine with epi caused a rapid heart beat       Activity level - Baseline/Home:  Independent  Activity Level - Current:   Unable to Assess    Patient's Preferred language: English    Needed?: No    Isolation: No  Infection: Not Applicable  Bariatric?: No    Vital Signs:   Vitals:    11/14/19 0917   BP: (!) 143/62   Resp: 28   Temp: 97.1  F (36.2  C)   TempSrc: Temporal   SpO2: 97%   Weight: 70.8 kg (156 lb)   Height: 1.524 m (5')       Cardiac Rhythm: ,        Pain level: 0-10 Pain Scale: 0    Is this patient confused?: No   Does this patient have a guardian?  No         If yes, is there guardianship documents in the Epic \"Code/ACP\" activity?  No         Guardian Notified?  No  [place] - Suicide Severity Rating Scale Completed?  Yes  If yes, what color did the patient score?  White    Patient Report: Initial Complaint: sob, worsening, not taking lasix, 4 lb wt gain, no cp, hx a fib on xareleto   Focused Assessment: as above   Tests Performed: labs, cxr ecg   Abnormal Results: awaiting results   Treatments provided: hamlet     Family Comments: son here     OBS brochure/video discussed/provided to patient/family: No              Name of person given brochure if not patient: na              Relationship to patient: na    ED Medications: "   Medications   ipratropium - albuterol 0.5 mg/2.5 mg/3 mL (DUONEB) neb solution 3 mL (3 mLs Nebulization Given 11/14/19 0943)   ipratropium - albuterol 0.5 mg/2.5 mg/3 mL (DUONEB) neb solution 3 mL (3 mLs Nebulization Given 11/14/19 0933)       Drips infusing?:  No    For the majority of the shift this patient was Green.   Interventions performed were na.    Severe Sepsis OR Septic Shock Diagnosis Present: No    To be done/followed up on inpatient unit:  unknown     ED NURSE PHONE NUMBER: [phone]

## 2019-11-14 NOTE — H&P
Admitted:     11/14/2019      PRIMARY CARE PHYSICIAN:  Jeffery Sherwood MD.      CHIEF COMPLAINT:  Shortness of breath.      HISTORY OF PRESENT ILLNESS:  Lindsey Hale is a very pleasant 89-year-old female with a past medical history of atrial fibrillation on Xarelto, COPD, hypertension, hyperlipidemia, TIA and hypothyroidism who presented to the Emergency Department with her son for evaluation of shortness of breath.  The patient has been experiencing worsening dyspnea for the past 2 days.  She has had a slight cough.  She has been using her Symbicort inhaler without any relief.  She misplaced her albuterol inhaler so has not been able to use this.  She has also noticed increased leg swelling and gained 4 pounds over the last month.  She is prescribed Lasix, but does not normally take this.  She was unable to walk any significant distance without feeling short of breath.  She denies any chest pain, fevers, chills, lightheadedness, syncope or falls, abdominal pain, nausea, vomiting, diarrhea, dysuria or decreased oral intake.  She has not had any fever or flu-like symptoms.  No ill contacts, but she does live in a senior living apartment.      In the Emergency Department, the patient was evaluated by Dr. Perez.  There she was found to have a blood pressure of 143/62 with heart rate of 60 and temperature 97.1.  She is requiring 2 liters of oxygen by nasal cannula.  EKG shows AV dual paced rhythm with prolonged AV conduction and no significant change compared to previous.  X-ray shows clear lungs.  Heart is mildly enlarged, but appears stable.  No effusions present.  Lab work shows BNP of 1526 with troponin less than 0.015.  Glucose 122, creatinine 0.77, hemoglobin 11.1.  WBC 10.8, platelet count 301,000.  The patient was given 2 DuoNeb treatments as well as 20 mg IV Lasix and 125 mg of IV Solu-Medrol with improvement of her symptoms.  It is suspected that she is short of breath due to chronic obstructive pulmonary  disease exacerbation, but CHF may be contributing to this as well.  She is being admitted to the hospital for further monitoring and treatment of the above.      PAST MEDICAL HISTORY:   1.  Hypertension.   2.  Hyperlipidemia.   3.  TIA.   4.  Arthritis.   5.  Osteopenia.   6.  Polymyalgia rheumatica.   7.  Mitral regurgitation.   8.  Atrial fibrillation on Xarelto.   9.  COPD.   10.  Hypothyroidism.   11.  Sick sinus syndrome.   12.  Lumbar spinal stenosis.   13.  T12 compression fracture.   14.  Chronic pain.      PAST SURGICAL HISTORY:   1.  Left hip arthroplasty.   2.  Patellofemoral arthroplasty.   3.  Right breast biopsy.   4.  Breast implants.   5.  Cataract surgery.   6.  Foot surgery.   7.  Bilateral mastectomy.   8.  Total abdominal hysterectomy.      FAMILY HISTORY:  This is reviewed with the patient and noncontributory to this presentation.      SOCIAL HISTORY:  The patient is a nonsmoker.  She does not drink alcohol currently.  No illicit drugs.  She resides in a senior living apartment.  She normally is able to ambulate without the use of a walker or cane.  She presents to the Emergency Department with her son.      PRIOR TO ADMISSION MEDICATIONS:    Medications Prior to Admission   Medication Sig Dispense Refill Last Dose     acetaminophen (TYLENOL) 500 MG tablet Take 1,000 mg by mouth 3 times daily   11/13/2019 at Unknown time     budesonide-formoterol (SYMBICORT) 160-4.5 MCG/ACT inhaler Inhale 2 puffs into the lungs 2 times daily    11/14/2019 at am x 1 dose     flecainide (TAMBOCOR) 150 MG tablet Take 1/2 tablet by mouth twice daily 90 tablet 3 11/14/2019 at am x 1 dose     Furosemide (LASIX PO) Take 20 mg by mouth daily as needed (Patient states that she is supposed to be taking medication everyday but has only been using once in a while)   Past Month at Unknown time     levothyroxine (SYNTHROID/LEVOTHROID) 75 MCG tablet TAKE 1 TABLET(75 MCG) BY MOUTH DAILY 90 tablet 3 11/14/2019 at am      metoprolol tartrate (LOPRESSOR) 50 MG tablet TAKE 1 AND 1/2 TABLETS BY MOUTH TWICE DAILY 270 tablet 2 11/14/2019 at am x 1 dose     polyethylene glycol (MIRALAX/GLYCOLAX) powder Take 17 g by mouth daily as needed for constipation   prn med     vitamin (B COMPLEX-C) tablet Take 1 tablet by mouth daily   11/13/2019 at Unknown time     VITAMIN D PO Take 1 tablet by mouth daily (OTC: Patient unsure of strength)   11/13/2019 at Unknown time     XARELTO 20 MG TABS tablet TAKE 1 TABLET(20 MG) BY MOUTH DAILY WITH DINNER 90 tablet 1 11/13/2019 at pm     ALLERGIES:     1.  CLINDAMYCIN.     2.  AMPICILLIN.     3.  CELEBREX.     4.  CIPROFLOXACIN.     5.  ERYTHROMYCIN.     6.  INDOCIN.     7.  PENICILLINS.     8.  VIBRAMYCIN.     9.  XYLOCAINE - EPINEPHRINE.      REVIEW OF SYSTEMS:  A complete 10-point review of systems was performed and is negative other than the items previously mentioned above in HPI.      PHYSICAL EXAMINATION:   VITAL SIGNS:  Blood pressure 143/62, heart rate 60 beats per minute, temperature 97.1, respiratory rate 20, oxygen saturation 99% on 2 liters of oxygen.   GENERAL:  The patient is an elderly appearing female, alert, oriented to person, place and situation, cooperative, lying on the gurney in no apparent distress.   EYES:  Pupils equal, round, reactive to light, extraocular movements intact.   HENT:  Head normocephalic.  Throat, lips, mucosa and tongue appear moist.  Posterior pharynx clear.   NECK:  Supple, no cervical adenopathy.   CARDIOVASCULAR:  Heart regular rate and rhythm, no murmurs, rubs or gallops.  Distal pulses are intact.   PULMONARY:  Lungs are clear to auscultation bilaterally, no crackles, wheezes or rhonchi.  Breathing is nonlabored   GASTROINTESTINAL:  Abdomen soft, nontender, nondistended with normoactive bowel sounds.   MUSCULOSKELETAL:  The patient moves all 4 extremities equally with normal strength.   NEUROLOGIC:  Alert, cranial nerves II-XII are grossly intact.  Motor  function is intact.  No focal deficits.   SKIN:  Warm, dry, nondiaphoretic.   PSYCHIATRIC:  Normal mood and affect.      LABORATORY DATA:  CBC:  WBC 10.8, hemoglobin 11.1, platelet count 301,000, hematocrit 34.4, RBC 3.36, , otherwise within normal limits.  CMP:  Potassium 4.3, creatinine 0.77, glucose 122, otherwise within normal limits.  Troponin I less than 0.015.  ProBNP 1526.      EKG:  AV dual paced rhythm with prolonged AV conduction.  Rate 60 beats per minute.  No significant change compared to EKG dated 02/14/2019.  Personally reviewed.      IMAGING:     XR Chest 2 Views  IMPRESSION: PA and lateral views of the chest. Lungs are clear. Heart  is mildly enlarged and appears stable. No effusions are evident. No  pneumothorax. Implantable cardiac device left upper chest and leads  overlying the right atrium and right ventricle are unchanged in  position. Lower thoracic vertebral body compression fractures are  unchanged.  As read by Radiology.     ASSESSMENT AND PLAN:  Lindsey Hale is a pleasant 89-year-old female with a past medical history of atrial fibrillation, pacemaker placement, chronic Xarelto use, hypertension, hyperlipidemia, TIA, COPD, and hypothyroidism who presents to the Emergency Department with complaints of shortness of breath.  She was found to have a chronic obstructive pulmonary disease exacerbation which may also be contributed to by acute on chronic congestive heart failure.  She is admitted to the hospital for further monitoring and treatment.     1.  Acute respiratory failure secondary to chronic obstructive pulmonary disease exacerbation.  The patient presents with 2 days of worsening dyspnea as well as a nonproductive cough, leg swelling and 4-pound weight gain over the last month.  She has no wheezes on exam but diminished lung sounds bilaterally.  Chest x-ray is clear.  No evidence for CHF.  ProBNP is elevated at 1500.  Troponin negative.  EKG with AV paced rhythm.  No signs  of ischemia.  Lab work with normal WBC 10.8, hemoglobin 11.1.  CMP unremarkable.  DuoNebs x2 given along with IV Lasix in the Emergency Department, which improved her symptoms.  Still requiring supplemental O2 via NC in the emergency dept.  We will continue with scheduled DuoNeb 4 times daily and have albuterol p.r.n.  She will need a refill of her albuterol rescue inhaler at home.  She has misplaced this.  We will start prednisone 40 mg p.o. starting tomorrow.  Was given Solu-Medrol 125 mg IV in the ER.  Lasix 20 mg IV given in the ER.  We will order her Lasix tomorrow p.o., 20 mg.  Monitor intake and output.  Wean oxygen as tolerated.  Continue prior to admission Symbicort b.i.d.     2.  Chronic diastolic congestive heart failure, preserved ejection fraction.  There may be a component of acute exacerbation, although difficult to tell.  She has had increased leg swelling and an approximate 4-pound weight gain over the last month.  She is prescribed low-dose Lasix p.r.n., but states has not needed to take it in the last month.  Last echocardiogram on file from 2017 shows EF 60-65%, grade I early diastolic dysfunction.  She also has mild to moderate MR and mild mitral stenosis.  There is mild to moderate aortic regurgitation.  IV Lasix 20 mg given in the Emergency Department.  We will monitor intake and output, volume status.  Repeat echo as necessary, although patient does not have any overt signs of CHF on her imaging otherwise.  Continue prior to admission metoprolol.     3.  Atrial fibrillation.  The patient is status post pacemaker placement.  She is in AV paced rhythm currently.  Continue prior to admission metoprolol.  Continue prior to admission flecainide.  Continue chronic Xarelto, but we will reduce dose from 20 mg daily to 15 mg daily due to creatinine clearance less than 50, per pharmacy.     4.  Hypertension and hyperlipidemia.  The patient is currently moderately hypertensive.  Again, resume metoprolol  with holding parameters.  Diuresis as above.  Adjust as necessary.     5.  Hypothyroidism.  Continue prior to admission Synthroid.     6.  Deep venous thrombosis prophylaxis.  The patient is on Xarelto.      CODE STATUS:  DNR/DNI confirmed with the patient at time of admission.  Son is present.      DISPOSITION:  The patient lives independently in a senior living apartment.  I anticipate she will be able to return there at discharge.  We will consult physical therapy.  Anticipate 2 or more days of hospitalization for adequate treatment of the above.      The patient [facility]  He is in agreement with my assessment and plan of care.         SANYA SINGLETON MD       As dictated by CHELSEA TOSCANO PA-C            D: 2019   T: 2019   MT: INDRA      Name:     CASIMIRO PRIEST   MRN:      [id number]        Account:      [id number]   :      1925        Admitted:     2019                   Document: [id number]       cc: Jeffery Sherwood MD

## 2019-11-14 NOTE — PHARMACY-ADMISSION MEDICATION HISTORY
Pharmacy Medication History  Admission medication history interview status for the 11/14/2019  admission is complete. See EPIC admission navigator for prior to admission medications     Medication history sources: Patient and Surescripts  Medication history source reliability: Good  Adherence assessment: Moderate - Patient does not take furosemide 20mg daily as prescribed. She only uses medication once in a while.     Additional medication history information:   1)  Patient is on Xarelto 20mg daily for a.fib. Due to renal function (CrCl <50), Xarelto dosing is recommended to be decreased to 15mg daily. Left sticky note for MD.   2) Patient should have rescue inhaler prescribed at discharge. Patient states that she can't find hers at home and prescription is over a year old so she is not able to obtain a refill. Left sticky note for MD.     Medication reconciliation completed by provider prior to medication history? No    Time spent in this activity: 20 minutes      Prior to Admission medications    Medication Sig Last Dose Taking? Auth Provider   acetaminophen (TYLENOL) 500 MG tablet Take 1,000 mg by mouth 3 times daily 11/13/2019 at Unknown time Yes Unknown, Entered By History   budesonide-formoterol (SYMBICORT) 160-4.5 MCG/ACT inhaler Inhale 2 puffs into the lungs 2 times daily  11/14/2019 at am x 1 dose Yes Reported, Patient   flecainide (TAMBOCOR) 150 MG tablet Take 1/2 tablet by mouth twice daily 11/14/2019 at am x 1 dose Yes Efra Vazquez MD   Furosemide (LASIX PO) Take 20 mg by mouth daily as needed (Patient states that she is supposed to be taking medication everyday but has only been using once in a while) Past Month at Unknown time Yes Reported, Patient   levothyroxine (SYNTHROID/LEVOTHROID) 75 MCG tablet TAKE 1 TABLET(75 MCG) BY MOUTH DAILY 11/14/2019 at am Yes Jeffery Sherwood MD   metoprolol tartrate (LOPRESSOR) 50 MG tablet TAKE 1 AND 1/2 TABLETS BY MOUTH TWICE DAILY 11/14/2019 at  am x 1 dose Yes Jeffery Sherwood MD   polyethylene glycol (MIRALAX/GLYCOLAX) powder Take 17 g by mouth daily as needed for constipation prn med Yes Unknown, Entered By History   vitamin (B COMPLEX-C) tablet Take 1 tablet by mouth daily 11/13/2019 at Unknown time Yes Unknown, Entered By History   VITAMIN D PO Take 1 tablet by mouth daily (OTC: Patient unsure of strength) 11/13/2019 at Unknown time Yes Unknown, Entered By History   XARELTO 20 MG TABS tablet TAKE 1 TABLET(20 MG) BY MOUTH DAILY WITH DINNER 11/13/2019 at pm Yes Jeffery Sherwood MD

## 2019-11-14 NOTE — PROGRESS NOTES
RECEIVING UNIT ED HANDOFF REVIEW    ED Nurse Handoff Report was reviewed by: Huyen Shi RN on November 14, 2019 at 11:39 AM

## 2019-11-14 NOTE — ED PROVIDER NOTES
"  History     Chief Complaint:  Shortness of breath    HPI   Lindsey Hale is a 89 year old female, with a history of atrial fibrillation, COPD, hypertension, hyperlipidemia, TIA and hypothyroidism, who is on Xarelto for atrial fibrillation, who presents with her son to the emergency department for evaluation of shortness of breath. The patient reports she has been experiencing worsening shortness of breath for the past two days prior to evaluation. She notes using her Symbicort inhaler with minimal relief. She also notes she has been noticing increased leg swelling and gained four pounds over the last month. She reports she was normally able to walk an adequate amount but the last two days she has not been able to walk far distances without feeling short of breath. She denies any chest pain, fever, vomiting, cough or wheezing. She does note she has not taken her lasix for the last \"couple months\" because it makes her urinate too frequently. She denies any recent illness or experiencing these symptoms in the past.    Allergies:  Clindamycin  Ampicillin  Celebrex  Ciprofloxacin  Erythromycin  Indocin  Penicillins  Vibramycin  Xylocaine-Epinephrine     Medications:    Symbicort  Flecainide  Lasix (not taken for 2 months)  Levothyroxine  Metoprolol  Xarelto  Miralax    Past Medical History:    Chronic pain  Spinal stenosis  T12 compression fracture  Lumbar spinal stenosis  Sick sinus syndrome  Hypothyroidism  COPD  Atrial fibrillation  Mitral regurgitation  Polymyalgia rheumatica  HTN  HLD  Transient cerebral ischemia  Arthritis  Osteopenia  Environmental allergies  TIA    Past Surgical History:    Left hip arthroplasty  Patellofemoral arthroplasty  Right breast biopsy  Breast implants x4  Cataract IOL  Foot surgery  Mastectomy bilaterally  STEFAN    Family History:    CAD  Lymphoma  Cancer  Osteoporosis  MI    Social History:  Smoking status: Former smoker. Quit date: 2/1/1955  Alcohol use: Yes  Drug use: No  The " patient presents to the emergency department with her son.  PCP: Jeffery Sherwood  Marital Status:   [5]     Review of Systems   Constitutional: Positive for unexpected weight change. Negative for fever.   Respiratory: Positive for shortness of breath. Negative for cough and wheezing.    Cardiovascular: Positive for leg swelling.   Gastrointestinal: Negative for vomiting.     Physical Exam   Patient Vitals for the past 24 hrs:   BP Temp Temp src Pulse Heart Rate Resp SpO2 Height Weight   11/14/19 1105 (!) 153/75 -- -- 71 -- -- 99 % -- --   11/14/19 1030 (!) 158/68 -- -- 62 62 19 -- -- --   11/14/19 1000 (!) 152/64 -- -- -- -- -- 100 % -- --   11/14/19 0917 (!) 143/62 97.1  F (36.2  C) Temporal -- 60 28 97 % 1.524 m (5') 70.8 kg (156 lb)     Physical Exam    General: Resting comfortably on the gurney  Eyes:  The pupils are equal and round    Conjunctivae and sclerae are normal  ENT:    Moist mucous membranes  Neck:  Normal range of motion  CV:  Regular rate and rhythm    Skin warm and well perfused   Resp:  Decreased air movement bilaterally    Tachypnea    No rales    No wheezing   GI:  Abdomen is soft, there is no rigidity    No distension    No rebound tenderness     No abdominal tenderness  MS:  Bilateral leg edema  Skin:  No rash or acute skin lesions noted  Neuro:   Awake, alert.      Speech is normal and fluent.    Face is symmetric.     Moves all extremities equally  Psych: Normal affect.  Appropriate interactions.  Emergency Department Course   ECG (9:32:18):  Rate 60 bpm. MT interval 316. QRS duration 176. QT/QTc 518/518. P-R-T axes * 78 37. AV dual-paced rhythm with prolonged AV conduction. Abnormal ECG. No significant change when compared to EKG dated 2/14/19. Interpreted at 0936 by Brittanie Perez MD.    Imaging:  Radiographic findings were communicated with the patient who voiced understanding of the findings.    XR Chest 2 Views  IMPRESSION: PA and lateral views of the chest. Lungs  are clear. Heart  is mildly enlarged and appears stable. No effusions are evident. No  pneumothorax. Implantable cardiac device left upper chest and leads  overlying the right atrium and right ventricle are unchanged in  position. Lower thoracic vertebral body compression fractures are  unchanged.  As read by Radiology.    Laboratory:  CBC: HGB 11.1 (L) o/w WNL (WBC 10.8, )  CMP: Glucose 122 (H) o/w WNL (Creatinine 0.77)    Troponin I (0926): <0.015  BNP: 1526    Interventions:  0933 DuoNeb 3 mL Nebulization  0943 DuoNeb 3 mL Nebulization  1024 Lasix 20 mg IV  1025 Solu-Medrol 125 mg IV  1027 DuoNeb 3 mL Nebulization    Emergency Department Course:  Past medical records, nursing notes, and vitals reviewed.  0922: I performed an exam of the patient and obtained history, as documented above.     IV inserted and blood drawn.    EKG was obtained and reviewed in the emergency department.    The patient was sent for a chest x-ray while in the emergency department, findings above.    1016: I rechecked the patient. Explained findings to the patient and her son. Patient reports she feels much improved after the two DuoNebs. More air movement on lung exam    Findings and plan explained to the Patient and son who consents to admission.     1100: Discussed the patient with Dr. Singleton, who will admit the patient to an observation bed for further monitoring, evaluation, and treatment.   Impression & Plan    Medical Decision Making:  Lindsey Hale is a 84-year-old female who presented to the emergency department with shortness of breath.  Patient appears tachypneic and has decreased breath sounds bilaterally.  Chest x-ray without pneumonia or obvious signs of fluid.  Does have cardiomegaly.  Labs with normal troponin and elevated BNP similar to prior BNP level.  Suspect that primarily she is short of breath due to COPD but CHF may be contributing as she has not taken her Lasix in a long time and she has gained 4 pounds  over the last month along with leg swelling.  Patient given DuoNeb and felt better but still tachypneic so will hospitalize her for continued treatment.  Solu-Medrol Lasix.  Doubt PE given that she is on anticoagulation already. Still tachypneic in ED and hardly able to walk at home due to shortness of breath so will hospitalize.     Diagnosis:    ICD-10-CM   1. COPD exacerbation (H) J44.1   2. Shortness of breath R06.02     Disposition:  Admitted to observation.    Beatriz Puri  11/14/2019    EMERGENCY DEPARTMENT  Scribe Disclosure:  Beatriz MINA, am serving as a scribe at 9:22 AM on 11/14/2019 to document services personally performed by Brittanie Perez MD based on my observations and the provider's statements to me.      Brittanie Perez MD  11/14/19 0999

## 2019-11-15 VITALS
HEART RATE: 63 BPM | TEMPERATURE: 97.9 F | BODY MASS INDEX: 30.63 KG/M2 | OXYGEN SATURATION: 91 % | WEIGHT: 156 LBS | HEIGHT: 60 IN | RESPIRATION RATE: 20 BRPM | SYSTOLIC BLOOD PRESSURE: 123 MMHG | DIASTOLIC BLOOD PRESSURE: 53 MMHG

## 2019-11-15 LAB
ANION GAP SERPL CALCULATED.3IONS-SCNC: 7 MMOL/L (ref 3–14)
BUN SERPL-MCNC: 30 MG/DL (ref 7–30)
CALCIUM SERPL-MCNC: 8.9 MG/DL (ref 8.5–10.1)
CHLORIDE SERPL-SCNC: 104 MMOL/L (ref 94–109)
CO2 SERPL-SCNC: 25 MMOL/L (ref 20–32)
CREAT SERPL-MCNC: 0.86 MG/DL (ref 0.52–1.04)
ERYTHROCYTE [DISTWIDTH] IN BLOOD BY AUTOMATED COUNT: 14.9 % (ref 10–15)
GFR SERPL CREATININE-BSD FRML MDRD: 58 ML/MIN/{1.73_M2}
GLUCOSE SERPL-MCNC: 186 MG/DL (ref 70–99)
HCT VFR BLD AUTO: 32.9 % (ref 35–47)
HGB BLD-MCNC: 10.6 G/DL (ref 11.7–15.7)
MCH RBC QN AUTO: 32.7 PG (ref 26.5–33)
MCHC RBC AUTO-ENTMCNC: 32.2 G/DL (ref 31.5–36.5)
MCV RBC AUTO: 102 FL (ref 78–100)
PLATELET # BLD AUTO: 307 10E9/L (ref 150–450)
POTASSIUM SERPL-SCNC: 3.8 MMOL/L (ref 3.4–5.3)
RBC # BLD AUTO: 3.24 10E12/L (ref 3.8–5.2)
SODIUM SERPL-SCNC: 136 MMOL/L (ref 133–144)
WBC # BLD AUTO: 10.1 10E9/L (ref 4–11)

## 2019-11-15 PROCEDURE — 25000125 ZZHC RX 250: Performed by: PHYSICIAN ASSISTANT

## 2019-11-15 PROCEDURE — 85027 COMPLETE CBC AUTOMATED: CPT | Performed by: PHYSICIAN ASSISTANT

## 2019-11-15 PROCEDURE — 99238 HOSP IP/OBS DSCHRG MGMT 30/<: CPT | Performed by: INTERNAL MEDICINE

## 2019-11-15 PROCEDURE — 25000132 ZZH RX MED GY IP 250 OP 250 PS 637: Mod: GY | Performed by: PHYSICIAN ASSISTANT

## 2019-11-15 PROCEDURE — 36415 COLL VENOUS BLD VENIPUNCTURE: CPT | Performed by: PHYSICIAN ASSISTANT

## 2019-11-15 PROCEDURE — 40000275 ZZH STATISTIC RCP TIME EA 10 MIN

## 2019-11-15 PROCEDURE — 94640 AIRWAY INHALATION TREATMENT: CPT | Mod: 76

## 2019-11-15 PROCEDURE — 94640 AIRWAY INHALATION TREATMENT: CPT

## 2019-11-15 PROCEDURE — 25000131 ZZH RX MED GY IP 250 OP 636 PS 637: Mod: GY | Performed by: PHYSICIAN ASSISTANT

## 2019-11-15 PROCEDURE — 80048 BASIC METABOLIC PNL TOTAL CA: CPT | Performed by: PHYSICIAN ASSISTANT

## 2019-11-15 RX ORDER — PREDNISONE 20 MG/1
40 TABLET ORAL DAILY
Qty: 8 TABLET | Refills: 0 | Status: SHIPPED | OUTPATIENT
Start: 2019-11-16 | End: 2019-12-12

## 2019-11-15 RX ADMIN — FLECAINIDE ACETATE 75 MG: 50 TABLET ORAL at 08:20

## 2019-11-15 RX ADMIN — FUROSEMIDE 20 MG: 20 TABLET ORAL at 08:20

## 2019-11-15 RX ADMIN — IPRATROPIUM BROMIDE AND ALBUTEROL SULFATE 3 ML: .5; 3 SOLUTION RESPIRATORY (INHALATION) at 08:07

## 2019-11-15 RX ADMIN — METOPROLOL TARTRATE 75 MG: 50 TABLET, FILM COATED ORAL at 08:20

## 2019-11-15 RX ADMIN — ACETAMINOPHEN 1000 MG: 500 TABLET, FILM COATED ORAL at 08:20

## 2019-11-15 RX ADMIN — IPRATROPIUM BROMIDE AND ALBUTEROL SULFATE 3 ML: .5; 3 SOLUTION RESPIRATORY (INHALATION) at 11:26

## 2019-11-15 RX ADMIN — FLUTICASONE FUROATE AND VILANTEROL TRIFENATATE 1 PUFF: 200; 25 POWDER RESPIRATORY (INHALATION) at 08:27

## 2019-11-15 RX ADMIN — PREDNISONE 40 MG: 20 TABLET ORAL at 08:20

## 2019-11-15 ASSESSMENT — ACTIVITIES OF DAILY LIVING (ADL)
ADLS_ACUITY_SCORE: 14

## 2019-11-15 NOTE — DISCHARGE SUMMARY
[facility]  Hospitalist Discharge Summary       Date of Admission:  11/14/2019  Date of Discharge:  11/15/2019  1:55 PM  Discharging Provider: Mushtaq Yi DO      Discharge Diagnoses   1. Acute hypoxic respiratory failure secondary to COPD exacerbation   2. Chronic diastolic CHF, not in exacerbation   3. Paroxysmal atrial fibrillation   4. Hypertension   5. HLP   6. Hypothyroidism     Follow-ups Needed After Discharge   Follow-up Appointments     Follow-up and recommended labs and tests       Follow up with primary care provider, Jeffery Sherwood, within 2-4 weeks, for   hospital follow- up. No follow up labs or test are needed.  Follow up with pulmonology as planned on 11/19           Unresulted Labs Ordered in the Past 30 Days of this Admission     No orders found for last 31 day(s).        Discharge Disposition   Discharged to home  Condition at discharge: Stable    Hospital Course   Lindsey Hale is a pleasant 89-year-old female with a past medical history of atrial fibrillation, pacemaker placement, chronic Xarelto use, hypertension, hyperlipidemia, TIA, COPD, and hypothyroidism who presents to the Emergency Department with complaints of shortness of breath.  She was found to have a chronic obstructive pulmonary disease exacerbation which may also be contributed to by acute on chronic congestive heart failure.  She is admitted to the hospital for further monitoring and treatment.      1.  Acute respiratory failure secondary to chronic obstructive pulmonary disease exacerbation.  The patient presents with 2 days of worsening dyspnea as well as a nonproductive cough, leg swelling and 4-pound weight gain over the last month.  She has no wheezes on exam but diminished lung sounds bilaterally.  Chest x-ray is clear.  No evidence for CHF.  ProBNP is elevated at 1500.  Troponin negative.  EKG with AV paced rhythm.  No signs of ischemia.  Lab work with normal WBC 10.8, hemoglobin 11.1.  CMP  unremarkable.  DuoNebs x2 given along with IV Lasix in the Emergency Department, which improved her symptoms.  Still requiring supplemental O2 via NC in the emergency dept.  We will continue with scheduled DuoNeb 4 times daily and have albuterol p.r.n.  She will need a refill of her albuterol rescue inhaler at home.  She has misplaced this.  We will start prednisone 40 mg p.o. starting tomorrow.  Was given Solu-Medrol 125 mg IV in the ER.  Lasix 20 mg IV given in the ER.  We will order her Lasix tomorrow p.o., 20 mg.  Monitor intake and output.  Wean oxygen as tolerated.  Continue prior to admission Symbicort b.i.d.     On next day was able to be titrated off of oxygen.  She ambulated on room air without issue.  Discharged with 4 more days of Prednisone      2.  Chronic diastolic congestive heart failure, preserved ejection fraction.  There may be a component of acute exacerbation, although difficult to tell.  She has had increased leg swelling and an approximate 4-pound weight gain over the last month.  She is prescribed low-dose Lasix p.r.n., but states has not needed to take it in the last month.  Last echocardiogram on file from 2017 shows EF 60-65%, grade I early diastolic dysfunction.  She also has mild to moderate MR and mild mitral stenosis.  There is mild to moderate aortic regurgitation.  IV Lasix 20 mg given in the Emergency Department.  We will monitor intake and output, volume status.  Repeat echo as necessary, although patient does not have any overt signs of CHF on her imaging otherwise.  Continue prior to admission metoprolol.      3.  Atrial fibrillation.  The patient is status post pacemaker placement.  She is in AV paced rhythm currently.  Continue prior to admission metoprolol.  Continue prior to admission flecainide.  Continue chronic Xarelto, but we will reduce dose from 20 mg daily to 15 mg daily due to creatinine clearance less than 50, per pharmacy.      4.  Hypertension and hyperlipidemia.   The patient is currently moderately hypertensive.  Again, resume metoprolol with holding parameters.  Diuresis as above.  Adjust as necessary.      5.  Hypothyroidism.  Continue prior to admission Synthroid.     Consultations This Hospital Stay   SPIRITUAL HEALTH SERVICES IP CONSULT  PHYSICAL THERAPY ADULT IP CONSULT    Code Status   DNR/DNI    Time Spent on this Encounter   IMushtaq DO, personally saw the patient today and spent less than or equal to 30 minutes discharging this patient.       Mushtaq Yi DO  [facility]  ______________________________________________________________________    Physical Exam   Vital Signs: Temp: 97.9  F (36.6  C) Temp src: Oral BP: 123/53 Pulse: 63 Heart Rate: 65 Resp: 20 SpO2: 96 % O2 Device: None (Room air) Oxygen Delivery: 2 LPM  Weight: 156 lbs 0 oz  General Appearance: Resting comfortably.  NAD   Respiratory: Clear to auscultation.  No respiratory distress.  Good air movement  Cardiovascular: RRR.  No murmurs   GI: Bowel sounds present.  Non-tender  Skin: No rashes.  No cyanosis  Other: No edema.  No calf tenderness         Primary Care Physician   Jeffery Sherwood    Discharge Orders      Reason for your hospital stay    COPD exacerbation     Follow-up and recommended labs and tests     Follow up with primary care provider, Jeffery Sherwood, within 2-4 weeks, for hospital follow- up. No follow up labs or test are needed.  Follow up with pulmonology as planned on 11/19     Activity    Your activity upon discharge: activity as tolerated     Diet    Follow this diet upon discharge: Orders Placed This Encounter      Combination Diet Regular Diet Adult       Significant Results and Procedures   Most Recent 3 CBC's:  Recent Labs   Lab Test 11/15/19  0912 11/14/19 0926 05/14/19  1132   WBC 10.1 10.8 14.9*   HGB 10.6* 11.1* 11.3*   * 102* 106*    301 314     Most Recent 3 BMP's:  Recent Labs   Lab Test 11/15/19  0912 11/14/19  0926  05/09/19  1755    136 137   POTASSIUM 3.8 4.3 4.2   CHLORIDE 104 106 104   CO2 25 26 28   BUN 30 22 22   CR 0.86 0.77 0.78   ANIONGAP 7 4 5   FRANCISCO 8.9 8.7 9.0   * 122* 125*   ,   Results for orders placed or performed during the hospital encounter of 11/14/19   XR Chest 2 Views    Narrative    CHEST TWO VIEWS   11/14/2019 10:11 AM     HISTORY: Shortness of breath.    COMPARISON: Chest x-ray 5/9/2019.      Impression    IMPRESSION: PA and lateral views of the chest. Lungs are clear. Heart  is mildly enlarged and appears stable. No effusions are evident. No  pneumothorax. Implantable cardiac device left upper chest and leads  overlying the right atrium and right ventricle are unchanged in  position. Lower thoracic vertebral body compression fractures are  unchanged.    CHARLES HOLLIDAY MD       Discharge Medications   Current Discharge Medication List      START taking these medications    Details   predniSONE (DELTASONE) 20 MG tablet Take 2 tablets (40 mg) by mouth daily  Qty: 8 tablet, Refills: 0    Comments: Future refills by PCP Dr. Jeffery Sherwood with phone number [phone].  Associated Diagnoses: Chronic obstructive pulmonary disease, unspecified COPD type (H)         CONTINUE these medications which have CHANGED    Details   rivaroxaban ANTICOAGULANT (XARELTO) 15 MG TABS tablet Take 1 tablet (15 mg) by mouth daily (with dinner)  Qty: 30 tablet, Refills: 0    Comments: Future refills by PCP Dr. Jeffery Sherwood with phone number [phone].  Associated Diagnoses: Paroxysmal atrial fibrillation (H)         CONTINUE these medications which have NOT CHANGED    Details   acetaminophen (TYLENOL) 500 MG tablet Take 1,000 mg by mouth 3 times daily      budesonide-formoterol (SYMBICORT) 160-4.5 MCG/ACT inhaler Inhale 2 puffs into the lungs 2 times daily       flecainide (TAMBOCOR) 150 MG tablet Take 1/2 tablet by mouth twice daily  Qty: 90 tablet, Refills: 3    Associated Diagnoses: Atrial fibrillation (H)       Furosemide (LASIX PO) Take 20 mg by mouth daily as needed (Patient states that she is supposed to be taking medication everyday but has only been using once in a while)      levothyroxine (SYNTHROID/LEVOTHROID) 75 MCG tablet TAKE 1 TABLET(75 MCG) BY MOUTH DAILY  Qty: 90 tablet, Refills: 3    Comments: Pt due for labs  Associated Diagnoses: Hypothyroidism, unspecified type      metoprolol tartrate (LOPRESSOR) 50 MG tablet TAKE 1 AND 1/2 TABLETS BY MOUTH TWICE DAILY  Qty: 270 tablet, Refills: 2    Associated Diagnoses: Atrial fibrillation, unspecified type (H); Benign essential hypertension      polyethylene glycol (MIRALAX/GLYCOLAX) powder Take 17 g by mouth daily as needed for constipation      vitamin (B COMPLEX-C) tablet Take 1 tablet by mouth daily      VITAMIN D PO Take 1 tablet by mouth daily (OTC: Patient unsure of strength)           Allergies   Allergies   Allergen Reactions     Clindamycin Hcl Other (See Comments)     Difficulty swallowing     Ampicillin Potassium      Rash nausea and vomiting       Celebrex [Celecoxib]      rash     Ciprofloxacin      rash     Erythromycin      rash     Indocin      Ended up going to( E.R.) hospital with severe head ache     Penicillins      Rash,nausea and vomiting     Vibramycin [Doxycycline Hyclate]      Rash      Xylocaine-Epinephrine [Epinephrine-Lidocaine-Na Metabisulfite]      Xylocaine with epi caused a rapid heart beat

## 2019-11-15 NOTE — PLAN OF CARE
DATE & TIME: 11/14-11/15 0965-8394    Cognitive Concerns/ Orientation : A&Ox4, very pleasant    BEHAVIOR & AGGRESSION TOOL COLOR: Green   CIWA SCORE: N/A   ABNL VS/O2: VSS on 2L O2 as she drops to 89% on RA every so often, and is SOB with movement   MOBILITY: SBA, GB  PAIN MANAGMENT: Denies pain. Has scheduled tylenol   DIET: Regular   BOWEL/BLADDER: Continent, dribbles a little on the way to BR. Depends in place.   ABNL LAB/BG: BNP 1526  DRAIN/DEVICES: N/A  TELEMETRY RHYTHM: N/A, has pacemaker   SKIN: CDI. +2 edema present on BLE.   TESTS/PROCEDURES: N/A  D/C DAY/GOALS/PLACE: Discharge pending progress in 1-2 days.   OTHER IMPORTANT INFO: Spiritual health, and PT consulted. Pt very [place] can not hear at all without hearing aides in place. Also vision impaired, glasses at bedside.

## 2019-11-15 NOTE — PLAN OF CARE
Discharge    Patient discharged to [facility]  via [facility]  with AVS, Medications and belongings   Care plan note  Pt is A&Ox4, up IND, VSS On RA, LS clear/dim, Denies pain, Good UOP from lasix. Mild Edema to BLE. AMbulated around nurses station x1 with staff and pulse ox, sats dipped to 88% but recovered quickly even while talking. RUSTAM Simms HAs in and glasses on - went home with patient. Pt to discharge home with PO prednisone and xarelto. Pt to f/up with pulm on the 19th of Nov. Pt has Home RN ordered as well. OK to discharge home     Listed belongings gathered and returned to patient. Yes  Care Plan and Patient education resolved: Yes  Prescriptions if needed, hard copies sent with patient  NA  Home and hospital acquired medications returned to patient: NA  Medication Bin checked and emptied on discharge Yes  Follow up appointment made for patient: Yes

## 2019-11-15 NOTE — PROGRESS NOTES
"SPIRITUAL HEALTH SERVICES Progress Note  [facility]     visited per consult request.  Pt stated she was feeling much better today than yesterday. Pt and  had reflective conversation. Pt stated it's been an adjustment to move from her home of 55 years into an apartment in senior living, but \"its a nice apartment\" so that's made it easier. Pt states her nicole helps her get through hard times and that she has a good community with lots of friends of all ages to support her.   offered supportive listening and prayer.   will follow up Monday if pt is still hospitalized.    Eleazar Segundo   Intern  "

## 2019-11-15 NOTE — PLAN OF CARE
DATE & TIME: 11/14/19 3507-8084           Cognitive Concerns/ Orientation : A&Ox4 but [place] (bilateral hearing aids in); vision-impaired  BEHAVIOR & AGGRESSION TOOL COLOR: green  CIWA SCORE: NA    ABNL VS/O2: Bradycardic at times; otherwise VSS; desats to 86% RA - now 90's on 2L per NC  MOBILITY: SBA/GB  PAIN MANAGMENT: Denies  DIET: Regular  BOWEL/BLADDER: Continent  ABNL LAB/BG: BNP 1526  DRAIN/DEVICES: SL  TELEMETRY RHYTHM: NA  SKIN: Intact  TESTS/PROCEDURES: None  D/C DAY/GOALS/PLACE: Possibly 2 or more days pending progress.  OTHER IMPORTANT INFO: Patient lives independently in a senior Living Apartment.

## 2019-11-18 ENCOUNTER — TELEPHONE (OUTPATIENT)
Dept: FAMILY MEDICINE | Facility: CLINIC | Age: 84
End: 2019-11-18

## 2019-11-18 NOTE — TELEPHONE ENCOUNTER
Next 5 appointments (look out 90 days)    Dec 02, 2019  2:00 PM CST  [facility] [address] [facility] [id number] [phone]        Pt has future visit scheduled, home care orders were placed by Hospital provider     Ok to give verbal on requested orders?    Rasheeda VELIZ RN

## 2019-11-18 NOTE — TELEPHONE ENCOUNTER
"ED/Discharge Protocol    \"Hi, my name is Rasheeda Pabon RN, a registered nurse, and I am calling on behalf of Dr. Sherwood's office at [place].  I am calling to follow up and see how things are going for you after your recent visit.\"    \"I see that you were in the (ER/UC/IP) on 11/14-11/15.      How are you doing now that you are home?\" Breathing is still \"pretty tough\" - follow up with pulmonology scheduled for tomorrow. Really had time this AM. Has rescue inhaler in addition to BID Symbicort. Couldn't find rescue inhaler, but finally found it. She is going to see pt at 12:30pm today. Directions can take every 4 hours on inhaler (discussed if really struggling ok to take more frequently if needed). On O2 in hospital, weaned off, yesterday was ok. She is not with patient, going to see her today, advised if pt is really struggling, especially if no relief after rescue inhaler bring her back to ER. Also, she is getting low on Albuterol so will call pulmonology (the prescribing provider) for urgent refill. Also discussed per ER notes had CHF exacerbation, importance of taking Lasix daily as prescribed. She will check with pt too to see if she does daily weights.     Is patient experiencing symptoms that may require a hospital visit?  See above note     Discharge Instructions    \"Let's review your discharge instructions.  What is/are the follow-up recommendations?  Pt. Response: follow up with pulmonology tomorrow, PCP 1-2 weeks.     \"Were you instructed to make a follow-up appointment?\"  Pt. Response: Yes.  Has appointment been made?   No.  \"Can I help you schedule that appointment?\" Yes      \"When you see the provider, I would recommend that you bring your discharge instructions with you.    Medications    \"How many new medications are you on since your hospitalization/ED visit?\"    0-1  \"How many of your current medicines changed (dose, timing, name, etc.) while you were in the hospital/ED visit?\"   0-1  \"Do you " "have questions about your medications?\"   No  \"Were you newly diagnosed with heart failure, COPD, diabetes or did you have a heart attack?\"   No  For patients on insulin: \"Did you start on insulin in the hospital or did you have your insulin dose changed?\"   No  Post Discharge Medication Reconciliation Status: discharge medications reconciled, continue medications without change.    Was MTM referral placed (*Make sure to put transitions as reason for referral)?   Yes - \"The Medication Therapy  will call you within the next few days to schedule an appointment with an MTM pharmacist to discuss your medicines and make sure they are working as well as they possibly can for you. This visit can be done in a [facility] clinic or on the phone and is at no charge to you.\"    Call Summary    \"Do you have any questions or concerns about your condition or care plan at the moment?\"    No  Triage nurse advice given    Patient was in ER 5x in the past year (assess appropriateness of ER visits.)      \"If you have questions or things don't continue to improve, we encourage you contact us through the main clinic number,  ([phone]).  Even if the clinic is not open, triage nurses are available 24/7 to help you.     We would like you to know that our clinic has extended hours (provide information).  We also have urgent care (provide details on closest location and hours/contact info)\"      \"Thank you for your time and take care!\"    Rasheeda VELZI RN      "

## 2019-11-18 NOTE — TELEPHONE ENCOUNTER
Detailed message left for [facility] nurse giving verbal on below requested HC orders    Rasheeda VELIZ RN

## 2019-11-18 NOTE — TELEPHONE ENCOUNTER
Reason for Call:  Home Health Care    Tereza with [facility] called regarding (reason for call): calling in to get verbal orders.    2x2 weeks, 1x 2 weeks with 3PRNS    Orders are needed for this patient.     PT: eval and treat    OT:     Skilled Nursing:     Pt Provider: Kaela    Phone Number Homecare Nurse can be reached at: [phone]    Can we leave a detailed message on this number? YES    Phone number patient can be reached at: Cell number on file:    Telephone Information:   Mobile [phone]       Best Time: any    Call taken on 11/18/2019 at 12:49 PM by Darrell Nur

## 2019-11-18 NOTE — TELEPHONE ENCOUNTER
No appt yet.     ED / Discharge Outreach Protocol    Patient Contact    Attempt # 1  To daughter Oxana    Was call answered?  No.  Left message on voicemail with information to call me back.  Yady Vanessa RN- Triage [employer]        .

## 2019-11-18 NOTE — TELEPHONE ENCOUNTER
Chief Complaint: Copd Exacerbation (H), Chronic Obstructive Pulmonary Disease, Unspecified Copd Type (H),  [facility] 15-NOV-2019  1 / 1    [phone] ([place])

## 2019-11-19 ENCOUNTER — TRANSFERRED RECORDS (OUTPATIENT)
Dept: HEALTH INFORMATION MANAGEMENT | Facility: CLINIC | Age: 84
End: 2019-11-19

## 2019-12-02 ENCOUNTER — OFFICE VISIT (OUTPATIENT)
Dept: FAMILY MEDICINE | Facility: CLINIC | Age: 84
End: 2019-12-02
Payer: MEDICARE

## 2019-12-02 VITALS
HEART RATE: 65 BPM | OXYGEN SATURATION: 97 % | HEIGHT: 60 IN | DIASTOLIC BLOOD PRESSURE: 72 MMHG | TEMPERATURE: 98 F | WEIGHT: 159.1 LBS | BODY MASS INDEX: 31.24 KG/M2 | SYSTOLIC BLOOD PRESSURE: 156 MMHG

## 2019-12-02 DIAGNOSIS — R06.09 DOE (DYSPNEA ON EXERTION): Primary | ICD-10-CM

## 2019-12-02 DIAGNOSIS — J44.1 COPD EXACERBATION (H): ICD-10-CM

## 2019-12-02 PROCEDURE — 99214 OFFICE O/P EST MOD 30 MIN: CPT | Performed by: INTERNAL MEDICINE

## 2019-12-02 RX ORDER — PREDNISONE 10 MG/1
TABLET ORAL
Refills: 0 | COMMUNITY
Start: 2019-11-29 | End: 2019-12-12

## 2019-12-02 ASSESSMENT — MIFFLIN-ST. JEOR: SCORE: 1043.17

## 2019-12-02 NOTE — PROGRESS NOTES
Patient presents with her daughter for hospital follow-up.  I reviewed those notes.  The patient presented for fairly acute shortness of breath.  Chest x-ray was clear and she was felt to have a COPD exacerbation.  Her last cardiac echo was sometime ago.  She has no history of blood clots.  She was put on prednisone but that did not feel much better but she still was not back to normal.  She does not have chest pain and no cough.  No fevers or night sweats.  Her edema is much decreased.  No abdominal pain or nausea or vomiting.    She was seen by pulmonary as noted on the 19th and they gave her a prescription for prednisone.  She is taking that and that has helped significantly.  She uses her inhalers as noted.    Past Medical History:   Diagnosis Date     Abnormal echocardiogram 04/2017    mild mr, ar, mitral stenosis, nl ef, lvh.  Done for episode of vision change     Arthritis 99         Atrial fibrillation (H) 2011 and 2009    neg nuc est 2009, Dr. Vazquez, on coumadin     Chest pain 2000    neg est echo, neg ct chest abd, pelvis Gnosticism     Chronic LBP      COPD (chronic obstructive pulmonary disease) (H)     seen by pulmonary md Dr. Whitt, and given inhaler     Cysts, breast 1980s    bilat mastectomies     Dizzy 2007    ct neg, carotid us neg     Dysphagia 2005    egd nl     Elevated blood sugar      Environmental allergies      Hematuria 2004    neg cysto and us     Hemoptyses 2008    ct neg, bronch neg 2009     Hyperlipidemia      Hypertension      Hypothyroid 1995    Dr. Ortiz     Lumbar spinal stenosis 12/2018    seen on ct done for lbp     Mitral regurgitation 6/15    on echo     nausea 2006    ct abd and ;pelvis neg     Osteopenia     fu dexa better 2011     Pacemaker 2011    afib with pauses and syncope     Palpitations 2009    neg est     PMR (polymyalgia rheumatica) (H) 2004    not active in years     SOB (shortness of breath) 5/15    echo nl ef, 2+mr, cxr clear, seen by pulm and given  inhaler     Supraventricular premature beats      T12 compression fracture (H) 2018    non traumatic     TIA (transient ischaemic attack) 2009    carotids neg, mri neg     Treadmill stress test negative for angina pectoris     nuclear est     Urine incontinence     Dr. Westfall     Urosepsis 2009    hospitalized     Vision changes     eval by neuro and ophtho and no cause found     Past Surgical History:   Procedure Laterality Date     ARTHROPLASTY HIP Left 2017    Procedure: ARTHROPLASTY HIP;  LEFT HIP ARBEN ARTHROPLASTY(SORAYA);  Surgeon: Darell Nathan MD;  Location: [facility]     ARTHROPLASTY PATELLO-FEMORAL (KNEE)   and      ARTHROSCOPY KNEE  1997     BIOPSY BREAST Right 2014    Procedure: BIOPSY BREAST;  Surgeon: David Carter MD;  Location: [facility]     breast implants      4x     cataract       FOOT SURGERY       MASTECTOMY      bilat, cysts     [place] not bso  70's    not for ca     Social History     Socioeconomic History     Marital status:      Spouse name: Not on file     Number of children: 4     Years of education: Not on file     Highest education level: Not on file   Occupational History     Not on file   Social Needs     Financial resource strain: Not on file     Food insecurity:     Worry: Not on file     Inability: Not on file     Transportation needs:     Medical: Not on file     Non-medical: Not on file   Tobacco Use     Smoking status: Former Smoker     Types: Cigarettes     Last attempt to quit: 1955     Years since quittin.8     Smokeless tobacco: Never Used     Tobacco comment: quit in    had smoked about 2 cigarettes a day or more   Substance and Sexual Activity     Alcohol use: Yes     Alcohol/week: 0.0 standard drinks     Comment: occ wine     Drug use: No     Sexual activity: Not Currently   Lifestyle     Physical activity:     Days per week: Not on file     Minutes per session: Not on file     Stress: Not on file    Relationships     Social connections:     Talks on phone: Not on file     Gets together: Not on file     Attends Cheondoism service: Not on file     Active member of club or organization: Not on file     Attends meetings of clubs or organizations: Not on file     Relationship status: Not on file     Intimate partner violence:     Fear of current or ex partner: Not on file     Emotionally abused: Not on file     Physically abused: Not on file     Forced sexual activity: Not on file   Other Topics Concern     Parent/sibling w/ CABG, MI or angioplasty before 65F 55M? Not Asked      Service Not Asked     Blood Transfusions Not Asked     Caffeine Concern No     Comment: coffee: 1 cup a week.  Occ green tea     Occupational Exposure Not Asked     Hobby Hazards Not Asked     Sleep Concern No     Stress Concern No     Weight Concern No     Special Diet No     Back Care Not Asked     Exercise Yes     Comment: walking daily     Bike Helmet Not Asked     Seat Belt Yes     Self-Exams Not Asked   Social History Narrative     Not on file     Current Outpatient Medications   Medication Sig Dispense Refill     acetaminophen (TYLENOL) 500 MG tablet Take 1,000 mg by mouth 3 times daily       budesonide-formoterol (SYMBICORT) 160-4.5 MCG/ACT inhaler Inhale 2 puffs into the lungs 2 times daily        flecainide (TAMBOCOR) 150 MG tablet Take 1/2 tablet by mouth twice daily 90 tablet 3     Furosemide (LASIX PO) Take 20 mg by mouth daily as needed (Patient states that she is supposed to be taking medication everyday but has only been using once in a while)       levothyroxine (SYNTHROID/LEVOTHROID) 75 MCG tablet TAKE 1 TABLET(75 MCG) BY MOUTH DAILY 90 tablet 3     metoprolol tartrate (LOPRESSOR) 50 MG tablet TAKE 1 AND 1/2 TABLETS BY MOUTH TWICE DAILY 270 tablet 2     polyethylene glycol (MIRALAX/GLYCOLAX) powder Take 17 g by mouth daily as needed for constipation       predniSONE (DELTASONE) 10 MG tablet TK 4 TS PO D FOR 3 DAYS  THEN DECREASE BY 1 T Q 4TH DAY  0     predniSONE (DELTASONE) 20 MG tablet Take 2 tablets (40 mg) by mouth daily 8 tablet 0     rivaroxaban ANTICOAGULANT (XARELTO) 15 MG TABS tablet Take 1 tablet (15 mg) by mouth daily (with dinner) 30 tablet 0     vitamin (B COMPLEX-C) tablet Take 1 tablet by mouth daily       VITAMIN D PO Take 1 tablet by mouth daily (OTC: Patient unsure of strength)       Allergies   Allergen Reactions     Clindamycin Hcl Other (See Comments)     Difficulty swallowing     Ampicillin Potassium      Rash nausea and vomiting       Celebrex [Celecoxib]      rash     Ciprofloxacin      rash     Erythromycin      rash     Indocin      [facility]ed up [facility] with severe head ache     Penicillins      Rash,nausea and vomiting     Vibramycin [Doxycycline Hyclate]      Rash      Xylocaine-Epinephrine [Epinephrine-Lidocaine-Na Metabisulfite]      Xylocaine with epi caused a rapid heart beat     FAMILY HISTORY NOTED AND REVIEWED    REVIEW OF SYSTEMS: above    PHYSICAL EXAM    BP (!) 156/72   Pulse 65   Temp 98  F (36.7  C) (Oral)   Ht 1.524 m (5')   Wt 72.2 kg (159 lb 1.6 oz)   SpO2 97%   BMI 31.07 kg/m      Patient appears non toxic  Lungs dec flow but clear  cv reglar rate and rhythm 3/6 sm llsb, no jvp, min edema  Abdomen normal active bowel sounds,l soft non-tender    ASSESSMENT:  1. Shortness of breath, suspect copd, less likely cv, vavle issue but needs to be checked, I doubt pulmonary embolis but will consider if not resolving soon    PLAN:  Echo  Call if worsens  Follow up 10 days  Consider ct chest and atrovent then    Jeffery Sherwood M.D.

## 2019-12-02 NOTE — PATIENT INSTRUCTIONS
Call if your shortness of breath worsens    Get the echo test    See me on December 12, at 9:45    Jeffery Sherwood M.D.

## 2019-12-03 ENCOUNTER — DOCUMENTATION ONLY (OUTPATIENT)
Dept: FAMILY MEDICINE | Facility: CLINIC | Age: 84
End: 2019-12-03

## 2019-12-03 NOTE — PROGRESS NOTES
[facility] now requests orders and shares plan of care/discharge summaries for some patients through [employer].  Please REPLY TO THIS MESSAGE OR ROUTE BACK TO THE AUTHOR in order to give authorization for orders when needed.  This is considered a verbal order, you will still receive a faxed copy of orders for signature.  Thank you for your assistance in improving collaboration for our patients.    ORDER  Requesting SN visits to continue beginning week of 12/15/19  1 wk 2, 2 prn for disease process education/assessment, medication education/assessment, home safety.    Maria R Perry, RN, BSN, PHN  [facility]  [phone]  [email or web address]

## 2019-12-12 ENCOUNTER — OFFICE VISIT (OUTPATIENT)
Dept: FAMILY MEDICINE | Facility: CLINIC | Age: 84
End: 2019-12-12
Payer: MEDICARE

## 2019-12-12 VITALS
BODY MASS INDEX: 31.22 KG/M2 | WEIGHT: 159 LBS | DIASTOLIC BLOOD PRESSURE: 63 MMHG | SYSTOLIC BLOOD PRESSURE: 149 MMHG | OXYGEN SATURATION: 98 % | HEIGHT: 60 IN | HEART RATE: 62 BPM

## 2019-12-12 DIAGNOSIS — J44.1 COPD EXACERBATION (H): Primary | ICD-10-CM

## 2019-12-12 DIAGNOSIS — I48.0 PAROXYSMAL ATRIAL FIBRILLATION (H): ICD-10-CM

## 2019-12-12 DIAGNOSIS — N18.30 CKD (CHRONIC KIDNEY DISEASE) STAGE 3, GFR 30-59 ML/MIN (H): ICD-10-CM

## 2019-12-12 DIAGNOSIS — M48.061 SPINAL STENOSIS OF LUMBAR REGION WITHOUT NEUROGENIC CLAUDICATION: ICD-10-CM

## 2019-12-12 PROCEDURE — 99213 OFFICE O/P EST LOW 20 MIN: CPT | Performed by: INTERNAL MEDICINE

## 2019-12-12 RX ORDER — TRAMADOL HYDROCHLORIDE 50 MG/1
50 TABLET ORAL EVERY 6 HOURS PRN
Qty: 30 TABLET | Refills: 0 | Status: SHIPPED | OUTPATIENT
Start: 2019-12-12 | End: 2019-12-15

## 2019-12-12 ASSESSMENT — MIFFLIN-ST. JEOR: SCORE: 1042.72

## 2019-12-12 NOTE — PROGRESS NOTES
The patient presents with her daughter for follow-up to her shortness of breath.    As noted, she was hospitalized in mid November with acute shortness of breath felt likely due to a COPD exacerbation.  After that she was somewhat improved but continued to have it.  She was seen by the PA at the pulmonary office and was given prednisone which did help.  She is out of that now.    She certainly is no worse and may be a little bit better but not back to baseline.  She does not have a fever, cough or chest pain.  Her edema is not significant.  She is on Xarelto for A. fib and has no history of clots.  She is scheduled for a cardiac echo in the near future.    Patient wants a refill of her tramadol which she uses very infrequently for her back pain.    Past Medical History:   Diagnosis Date     Abnormal echocardiogram 04/2017    mild mr, ar, mitral stenosis, nl ef, lvh.  Done for episode of vision change     Arthritis 99         Atrial fibrillation (H) 2011 and 2009    neg nuc est 2009, Dr. Vazquez, on coumadin     Chest pain 2000    neg est echo, neg ct chest abd, pelvis [facility]     Chronic LBP      COPD (chronic obstructive pulmonary disease) (H)     seen by pulmonary md Dr. Whitt, and given inhaler     Cysts, breast 1980s    bilat mastectomies     Dizzy 2007    ct neg, carotid us neg     Dysphagia 2005    egd nl     Elevated blood sugar      Environmental allergies      Hematuria 2004    neg cysto and us     Hemoptyses 2008    ct neg, bronch neg 2009     Hyperlipidemia      Hypertension      Hypothyroid 1995    Dr. Ortiz     Lumbar spinal stenosis 12/2018    seen on ct done for lbp     Mitral regurgitation 6/15    on echo     nausea 2006    ct abd and ;pelvis neg     Osteopenia     fu dexa better 2011     Pacemaker 2011    afib with pauses and syncope     Palpitations 2009    neg est     PMR (polymyalgia rheumatica) (H) 2004    not active in years     SOB (shortness of breath) 5/15    echo nl ef, 2+mr,  cxr clear, seen by pulm and given inhaler     Supraventricular premature beats      T12 compression fracture (H) 2018    non traumatic     TIA (transient ischaemic attack)     carotids neg, mri neg     Treadmill stress test negative for angina pectoris     nuclear est     Urine incontinence     Dr. Westfall     Urosepsis 2009    hospitalized     Vision changes     eval by neuro and ophtho and no cause found     Past Surgical History:   Procedure Laterality Date     ARTHROPLASTY HIP Left 2017    Procedure: ARTHROPLASTY HIP;  LEFT HIP ARBEN ARTHROPLASTY(SORAYA);  Surgeon: Darell Nathan MD;  Location:  OR     ARTHROPLASTY PATELLO-FEMORAL (KNEE)   and      ARTHROSCOPY KNEE  1997     BIOPSY BREAST Right 2014    Procedure: BIOPSY BREAST;  Surgeon: David Carter MD;  Location:  [facility]     breast implants      4x     cataract       FOOT SURGERY       MASTECTOMY  1980    bilat, cysts     [place] not bso  70's    not for ca     Social History     Socioeconomic History     Marital status:      Spouse name: Not on file     Number of children: 4     Years of education: Not on file     Highest education level: Not on file   Occupational History     Not on file   Social Needs     Financial resource strain: Not on file     Food insecurity:     Worry: Not on file     Inability: Not on file     Transportation needs:     Medical: Not on file     Non-medical: Not on file   Tobacco Use     Smoking status: Former Smoker     Types: Cigarettes     Last attempt to quit: 1955     Years since quittin.9     Smokeless tobacco: Never Used     Tobacco comment: quit in    had smoked about 2 cigarettes a day or more   Substance and Sexual Activity     Alcohol use: Yes     Alcohol/week: 0.0 standard drinks     Comment: occ wine     Drug use: No     Sexual activity: Not Currently   Lifestyle     Physical activity:     Days per week: Not on file     Minutes per session: Not on file      Stress: Not on file   Relationships     Social connections:     Talks on phone: Not on file     Gets together: Not on file     Attends Orthodox service: Not on file     Active member of club or organization: Not on file     Attends meetings of clubs or organizations: Not on file     Relationship status: Not on file     Intimate partner violence:     Fear of current or ex partner: Not on file     Emotionally abused: Not on file     Physically abused: Not on file     Forced sexual activity: Not on file   Other Topics Concern     Parent/sibling w/ CABG, MI or angioplasty before 65F 55M? Not Asked      Service Not Asked     Blood Transfusions Not Asked     Caffeine Concern No     Comment: coffee: 1 cup a week.  Occ green tea     Occupational Exposure Not Asked     Hobby Hazards Not Asked     Sleep Concern No     Stress Concern No     Weight Concern No     Special Diet No     Back Care Not Asked     Exercise Yes     Comment: walking daily     Bike Helmet Not Asked     Seat Belt Yes     Self-Exams Not Asked   Social History Narrative     Not on file     Current Outpatient Medications   Medication Sig Dispense Refill     acetaminophen (TYLENOL) 500 MG tablet Take 1,000 mg by mouth 3 times daily       budesonide-formoterol (SYMBICORT) 160-4.5 MCG/ACT inhaler Inhale 2 puffs into the lungs 2 times daily        flecainide (TAMBOCOR) 150 MG tablet Take 1/2 tablet by mouth twice daily 90 tablet 3     Furosemide (LASIX PO) Take 20 mg by mouth daily as needed (Patient states that she is supposed to be taking medication everyday but has only been using once in a while)       levothyroxine (SYNTHROID/LEVOTHROID) 75 MCG tablet TAKE 1 TABLET(75 MCG) BY MOUTH DAILY 90 tablet 3     metoprolol tartrate (LOPRESSOR) 50 MG tablet TAKE 1 AND 1/2 TABLETS BY MOUTH TWICE DAILY 270 tablet 2     polyethylene glycol (MIRALAX/GLYCOLAX) powder Take 17 g by mouth daily as needed for constipation       rivaroxaban ANTICOAGULANT (XARELTO  ANTICOAGULANT) 20 MG TABS tablet Take 1 tablet (20 mg) by mouth daily (with dinner) 90 tablet 3     rivaroxaban ANTICOAGULANT (XARELTO) 15 MG TABS tablet Take 1 tablet (15 mg) by mouth daily (with dinner) 30 tablet 0     traMADol (ULTRAM) 50 MG tablet Take 1 tablet (50 mg) by mouth every 6 hours as needed for severe pain 30 tablet 0     umeclidinium (INCRUSE ELLIPTA) 62.5 MCG/INH inhaler Inhale 1 puff into the lungs daily 1 Inhaler 11     vitamin (B COMPLEX-C) tablet Take 1 tablet by mouth daily       VITAMIN D PO Take 1 tablet by mouth daily (OTC: Patient unsure of strength)       Allergies   Allergen Reactions     Clindamycin Hcl Other (See Comments)     Difficulty swallowing     Ampicillin Potassium      Rash nausea and vomiting       Celebrex [Celecoxib]      rash     Ciprofloxacin      rash     Erythromycin      rash     Indocin      [facility]ed [facility]( [facility] with [facility] ache     Penicillins      Rash,nausea and vomiting     Vibramycin [Doxycycline Hyclate]      Rash      Xylocaine-Epinephrine [Epinephrine-Lidocaine-Na Metabisulfite]      Xylocaine with epi caused a rapid heart beat     FAMILY HISTORY NOTED AND REVIEWED    REVIEW OF SYSTEMS: above    PHYSICAL EXAM    BP (!) 149/63 (BP Location: Right arm, Patient Position: Chair, Cuff Size: Adult Large)   Pulse 62   Ht 1.524 m (5')   Wt 72.1 kg (159 lb)   SpO2 98%   BMI 31.05 kg/m      Patient appears non toxic  Lungs clear  cv reglar rate and rhythm no jvp and min edema    ASSESSMENT:  1. Shortness of breath, suspect copd, doubt pulmonary embolis, other lung dz, doubt chf  2. Afib, on xarelto  3. ckd  4. Spinal stenosis    PLAN:  Echo  Add incruse ellipta  Follow up 1 month  Tramadol prn    Jeffery Sherwood M.D.

## 2019-12-12 NOTE — PATIENT INSTRUCTIONS
Add the new inhaler in addition to the others, call if your shortness of breath worsens.  Follow up with me in 30 days    Jeffery Sherwood M.D.

## 2019-12-16 ENCOUNTER — ANCILLARY PROCEDURE (OUTPATIENT)
Dept: CARDIOLOGY | Facility: CLINIC | Age: 84
End: 2019-12-16
Attending: INTERNAL MEDICINE
Payer: MEDICARE

## 2019-12-16 DIAGNOSIS — I49.5 SICK SINUS SYNDROME (H): ICD-10-CM

## 2019-12-16 DIAGNOSIS — Z95.0 CARDIAC PACEMAKER IN SITU: Primary | ICD-10-CM

## 2019-12-16 LAB
MDC_IDC_LEAD_IMPLANT_DT: NORMAL
MDC_IDC_LEAD_IMPLANT_DT: NORMAL
MDC_IDC_LEAD_LOCATION: NORMAL
MDC_IDC_LEAD_LOCATION: NORMAL
MDC_IDC_LEAD_MFG: NORMAL
MDC_IDC_LEAD_MFG: NORMAL
MDC_IDC_LEAD_MODEL: NORMAL
MDC_IDC_LEAD_MODEL: NORMAL
MDC_IDC_LEAD_POLARITY_TYPE: NORMAL
MDC_IDC_LEAD_POLARITY_TYPE: NORMAL
MDC_IDC_LEAD_SERIAL: NORMAL
MDC_IDC_LEAD_SERIAL: NORMAL
MDC_IDC_MSMT_BATTERY_REMAINING_LONGEVITY: 38 MO
MDC_IDC_MSMT_BATTERY_STATUS: NORMAL
MDC_IDC_MSMT_BATTERY_VOLTAGE: 2.83 V
MDC_IDC_MSMT_LEADCHNL_RA_IMPEDANCE_VALUE: 387.5 OHM
MDC_IDC_MSMT_LEADCHNL_RA_PACING_THRESHOLD_AMPLITUDE: 1 V
MDC_IDC_MSMT_LEADCHNL_RA_PACING_THRESHOLD_AMPLITUDE: 1 V
MDC_IDC_MSMT_LEADCHNL_RA_PACING_THRESHOLD_PULSEWIDTH: 0.5 MS
MDC_IDC_MSMT_LEADCHNL_RA_PACING_THRESHOLD_PULSEWIDTH: 0.5 MS
MDC_IDC_MSMT_LEADCHNL_RV_IMPEDANCE_VALUE: 475 OHM
MDC_IDC_MSMT_LEADCHNL_RV_PACING_THRESHOLD_AMPLITUDE: 1 V
MDC_IDC_MSMT_LEADCHNL_RV_PACING_THRESHOLD_AMPLITUDE: 1 V
MDC_IDC_MSMT_LEADCHNL_RV_PACING_THRESHOLD_PULSEWIDTH: 0.5 MS
MDC_IDC_MSMT_LEADCHNL_RV_PACING_THRESHOLD_PULSEWIDTH: 0.5 MS
MDC_IDC_PG_IMPLANT_DTM: NORMAL
MDC_IDC_PG_MFG: NORMAL
MDC_IDC_PG_MODEL: NORMAL
MDC_IDC_PG_SERIAL: NORMAL
MDC_IDC_PG_TYPE: NORMAL
MDC_IDC_SESS_CLINIC_NAME: NORMAL
MDC_IDC_SESS_DTM: NORMAL
MDC_IDC_SESS_TYPE: NORMAL
MDC_IDC_SET_BRADY_AT_MODE_SWITCH_MODE: NORMAL
MDC_IDC_SET_BRADY_AT_MODE_SWITCH_RATE: 180 {BEATS}/MIN
MDC_IDC_SET_BRADY_HYSTRATE: NORMAL
MDC_IDC_SET_BRADY_LOWRATE: 60 {BEATS}/MIN
MDC_IDC_SET_BRADY_MAX_SENSOR_RATE: 120 {BEATS}/MIN
MDC_IDC_SET_BRADY_MAX_TRACKING_RATE: 120 {BEATS}/MIN
MDC_IDC_SET_BRADY_MODE: NORMAL
MDC_IDC_SET_BRADY_NIGHT_RATE: NORMAL
MDC_IDC_SET_BRADY_PAV_DELAY_HIGH: 100 MS
MDC_IDC_SET_BRADY_PAV_DELAY_LOW: 275 MS
MDC_IDC_SET_BRADY_SAV_DELAY_HIGH: 100 MS
MDC_IDC_SET_BRADY_SAV_DELAY_LOW: 250 MS
MDC_IDC_SET_LEADCHNL_RA_PACING_AMPLITUDE: 2 V
MDC_IDC_SET_LEADCHNL_RA_PACING_ANODE_ELECTRODE_1: NORMAL
MDC_IDC_SET_LEADCHNL_RA_PACING_ANODE_LOCATION_1: NORMAL
MDC_IDC_SET_LEADCHNL_RA_PACING_CAPTURE_MODE: NORMAL
MDC_IDC_SET_LEADCHNL_RA_PACING_CATHODE_ELECTRODE_1: NORMAL
MDC_IDC_SET_LEADCHNL_RA_PACING_CATHODE_LOCATION_1: NORMAL
MDC_IDC_SET_LEADCHNL_RA_PACING_POLARITY: NORMAL
MDC_IDC_SET_LEADCHNL_RA_PACING_PULSEWIDTH: 0.5 MS
MDC_IDC_SET_LEADCHNL_RA_SENSING_ADAPTATION_MODE: NORMAL
MDC_IDC_SET_LEADCHNL_RA_SENSING_ANODE_ELECTRODE_1: NORMAL
MDC_IDC_SET_LEADCHNL_RA_SENSING_ANODE_LOCATION_1: NORMAL
MDC_IDC_SET_LEADCHNL_RA_SENSING_CATHODE_ELECTRODE_1: NORMAL
MDC_IDC_SET_LEADCHNL_RA_SENSING_CATHODE_LOCATION_1: NORMAL
MDC_IDC_SET_LEADCHNL_RA_SENSING_POLARITY: NORMAL
MDC_IDC_SET_LEADCHNL_RV_PACING_AMPLITUDE: 2 V
MDC_IDC_SET_LEADCHNL_RV_PACING_ANODE_ELECTRODE_1: NORMAL
MDC_IDC_SET_LEADCHNL_RV_PACING_ANODE_LOCATION_1: NORMAL
MDC_IDC_SET_LEADCHNL_RV_PACING_CAPTURE_MODE: NORMAL
MDC_IDC_SET_LEADCHNL_RV_PACING_CATHODE_ELECTRODE_1: NORMAL
MDC_IDC_SET_LEADCHNL_RV_PACING_CATHODE_LOCATION_1: NORMAL
MDC_IDC_SET_LEADCHNL_RV_PACING_POLARITY: NORMAL
MDC_IDC_SET_LEADCHNL_RV_PACING_PULSEWIDTH: 0.5 MS
MDC_IDC_SET_LEADCHNL_RV_SENSING_ANODE_ELECTRODE_1: NORMAL
MDC_IDC_SET_LEADCHNL_RV_SENSING_ANODE_LOCATION_1: NORMAL
MDC_IDC_SET_LEADCHNL_RV_SENSING_CATHODE_ELECTRODE_1: NORMAL
MDC_IDC_SET_LEADCHNL_RV_SENSING_CATHODE_LOCATION_1: NORMAL
MDC_IDC_SET_LEADCHNL_RV_SENSING_POLARITY: NORMAL
MDC_IDC_SET_LEADCHNL_RV_SENSING_SENSITIVITY: 2 MV
MDC_IDC_STAT_AT_MODE_SW_COUNT: 0
MDC_IDC_STAT_BRADY_RA_PERCENT_PACED: 68 %
MDC_IDC_STAT_BRADY_RV_PERCENT_PACED: 97 %

## 2019-12-16 PROCEDURE — 93280 PM DEVICE PROGR EVAL DUAL: CPT | Performed by: INTERNAL MEDICINE

## 2019-12-26 ENCOUNTER — PATIENT OUTREACH (OUTPATIENT)
Dept: CARE COORDINATION | Facility: CLINIC | Age: 84
End: 2019-12-26

## 2019-12-26 DIAGNOSIS — J44.1 COPD EXACERBATION (H): Primary | ICD-10-CM

## 2019-12-26 NOTE — PROGRESS NOTES
Clinic Care Coordination Contact    Follow Up Progress Note Home care discharge follow up call      Assessment: Patient reports she had home care services for exercises to help with the pain in shoulder and back   Patient reports in home PT did help with the pain.  Patient plans to continue to do the home PT exercises daily   CC left a message with Oxana/daughter to call CC back with any questions or concerns     Goals addressed this encounter:   Goals Addressed    None          Intervention/Education provided during outreach: Patient has COPD diagnosis Encouraged patient to monitor increased signs of SOB or collection of fluid in legs  .  Patient agrees with the plan        Plan:     Care Coordinator will await a return call from Oxana/[facility]     Shara Acharya RN Care Coordinator   [facility]   Phone: [phone]  Email :  [email or web address]

## 2019-12-30 ENCOUNTER — HOSPITAL ENCOUNTER (OUTPATIENT)
Dept: CARDIOLOGY | Facility: CLINIC | Age: 84
Discharge: HOME | End: 2019-12-30
Attending: INTERNAL MEDICINE | Admitting: INTERNAL MEDICINE
Payer: MEDICARE

## 2019-12-30 DIAGNOSIS — R06.09 DOE (DYSPNEA ON EXERTION): ICD-10-CM

## 2019-12-30 PROCEDURE — 93306 TTE W/DOPPLER COMPLETE: CPT | Mod: 26 | Performed by: INTERNAL MEDICINE

## 2019-12-30 PROCEDURE — 93306 TTE W/DOPPLER COMPLETE: CPT

## 2020-01-01 ENCOUNTER — APPOINTMENT (OUTPATIENT)
Dept: ULTRASOUND IMAGING | Facility: CLINIC | Age: 85
End: 2020-01-01
Attending: EMERGENCY MEDICINE
Payer: MEDICARE

## 2020-01-01 ENCOUNTER — HOSPITAL ENCOUNTER (EMERGENCY)
Facility: CLINIC | Age: 85
Discharge: HOME | End: 2020-12-29
Attending: EMERGENCY MEDICINE | Admitting: EMERGENCY MEDICINE
Payer: MEDICARE

## 2020-01-01 VITALS
HEIGHT: 60 IN | RESPIRATION RATE: 18 BRPM | DIASTOLIC BLOOD PRESSURE: 71 MMHG | TEMPERATURE: 98.2 F | HEART RATE: 65 BPM | BODY MASS INDEX: 31.41 KG/M2 | WEIGHT: 160 LBS | SYSTOLIC BLOOD PRESSURE: 141 MMHG | OXYGEN SATURATION: 95 %

## 2020-01-01 DIAGNOSIS — I10 BENIGN ESSENTIAL HYPERTENSION: ICD-10-CM

## 2020-01-01 DIAGNOSIS — I48.91 ATRIAL FIBRILLATION, UNSPECIFIED TYPE (H): ICD-10-CM

## 2020-01-01 DIAGNOSIS — Z79.01 LONG TERM CURRENT USE OF ANTICOAGULANT THERAPY: ICD-10-CM

## 2020-01-01 DIAGNOSIS — M79.89 LEFT LEG SWELLING: ICD-10-CM

## 2020-01-01 PROCEDURE — 93971 EXTREMITY STUDY: CPT | Mod: LT

## 2020-01-01 PROCEDURE — 99284 EMERGENCY DEPT VISIT MOD MDM: CPT | Mod: 25

## 2020-01-01 RX ORDER — METOPROLOL TARTRATE 50 MG
TABLET ORAL
Qty: 270 TABLET | Refills: 0 | Status: SHIPPED | OUTPATIENT
Start: 2020-01-01 | End: 2021-01-01

## 2020-01-01 ASSESSMENT — MIFFLIN-ST. JEOR: SCORE: 1042.26

## 2020-01-03 ENCOUNTER — TELEPHONE (OUTPATIENT)
Dept: FAMILY MEDICINE | Facility: CLINIC | Age: 85
End: 2020-01-03

## 2020-01-03 DIAGNOSIS — I05.0 MILD MITRAL STENOSIS: ICD-10-CM

## 2020-01-03 DIAGNOSIS — I35.0 MILD AORTIC STENOSIS: ICD-10-CM

## 2020-01-03 DIAGNOSIS — I51.89 DIASTOLIC DYSFUNCTION: ICD-10-CM

## 2020-01-03 NOTE — TELEPHONE ENCOUNTER
Please call to let the patient know that for the most part her echocardiogram looks very good.  There is some stiffness to the heart and a slight amount of valve leakage but nothing serious.  Please make sure that she follows up with me soon but because of his increasing shortness breath.    Thank you    Jeffery Sherwood M.D.

## 2020-01-03 NOTE — TELEPHONE ENCOUNTER
Is already scheduled for appointment on 1/21/2020.    Reached daughter, Oxana (note in demographics says to call her due to patient hard of hearing on phone)    PCP message relayed to daughter; offered to call patient also, but daughter states she will relay message in person later today.  Confirmed upcoming OV; daughter will accompany patient.    Call sooner if any changes, shortness of breath, other.    Leny Pitts RN on 1/3/2020 at 9:18 AM

## 2020-01-21 ENCOUNTER — OFFICE VISIT (OUTPATIENT)
Dept: FAMILY MEDICINE | Facility: CLINIC | Age: 85
End: 2020-01-21
Payer: MEDICARE

## 2020-01-21 VITALS
HEART RATE: 66 BPM | WEIGHT: 163 LBS | BODY MASS INDEX: 31.83 KG/M2 | SYSTOLIC BLOOD PRESSURE: 148 MMHG | DIASTOLIC BLOOD PRESSURE: 66 MMHG | OXYGEN SATURATION: 98 % | TEMPERATURE: 97.3 F

## 2020-01-21 DIAGNOSIS — N18.30 CKD (CHRONIC KIDNEY DISEASE) STAGE 3, GFR 30-59 ML/MIN (H): ICD-10-CM

## 2020-01-21 DIAGNOSIS — E11.8 TYPE 2 DIABETES MELLITUS WITH COMPLICATION, WITHOUT LONG-TERM CURRENT USE OF INSULIN (H): ICD-10-CM

## 2020-01-21 DIAGNOSIS — I48.91 ATRIAL FIBRILLATION, UNSPECIFIED TYPE (H): ICD-10-CM

## 2020-01-21 DIAGNOSIS — I49.5 SICK SINUS SYNDROME (H): ICD-10-CM

## 2020-01-21 DIAGNOSIS — I10 BENIGN ESSENTIAL HYPERTENSION: ICD-10-CM

## 2020-01-21 DIAGNOSIS — Z00.00 ROUTINE GENERAL MEDICAL EXAMINATION AT A HEALTH CARE FACILITY: Primary | ICD-10-CM

## 2020-01-21 DIAGNOSIS — J44.1 COPD EXACERBATION (H): ICD-10-CM

## 2020-01-21 DIAGNOSIS — I48.0 PAROXYSMAL ATRIAL FIBRILLATION (H): ICD-10-CM

## 2020-01-21 DIAGNOSIS — M35.3 PMR (POLYMYALGIA RHEUMATICA) (H): ICD-10-CM

## 2020-01-21 DIAGNOSIS — E78.5 HYPERLIPIDEMIA LDL GOAL <160: ICD-10-CM

## 2020-01-21 DIAGNOSIS — E03.9 HYPOTHYROIDISM, UNSPECIFIED TYPE: ICD-10-CM

## 2020-01-21 DIAGNOSIS — S22.080S COMPRESSION FRACTURE OF T12 VERTEBRA, SEQUELA: ICD-10-CM

## 2020-01-21 DIAGNOSIS — I51.89 DIASTOLIC DYSFUNCTION: ICD-10-CM

## 2020-01-21 DIAGNOSIS — M80.88XS OSTEOPOROTIC COMPRESSION FRACTURE OF SPINE, SEQUELA: ICD-10-CM

## 2020-01-21 DIAGNOSIS — J44.9 CHRONIC OBSTRUCTIVE PULMONARY DISEASE, UNSPECIFIED COPD TYPE (H): ICD-10-CM

## 2020-01-21 PROBLEM — I50.9 CONGESTIVE HEART FAILURE, UNSPECIFIED HF CHRONICITY, UNSPECIFIED HEART FAILURE TYPE (H): Status: ACTIVE | Noted: 2020-01-21

## 2020-01-21 PROBLEM — M80.88XA OSTEOPOROTIC COMPRESSION FRACTURE OF SPINE (H): Status: ACTIVE | Noted: 2018-01-01

## 2020-01-21 PROBLEM — I50.9 CONGESTIVE HEART FAILURE, UNSPECIFIED HF CHRONICITY, UNSPECIFIED HEART FAILURE TYPE (H): Status: RESOLVED | Noted: 2020-01-21 | Resolved: 2020-01-21

## 2020-01-21 PROCEDURE — G0439 PPPS, SUBSEQ VISIT: HCPCS | Performed by: INTERNAL MEDICINE

## 2020-01-21 RX ORDER — LEVOTHYROXINE SODIUM 75 UG/1
TABLET ORAL
Qty: 90 TABLET | Refills: 3 | Status: SHIPPED | OUTPATIENT
Start: 2020-01-21 | End: 2020-03-10

## 2020-01-21 RX ORDER — FLECAINIDE ACETATE 150 MG/1
TABLET ORAL
Qty: 90 TABLET | Refills: 3 | Status: SHIPPED | OUTPATIENT
Start: 2020-01-21 | End: 2020-06-30

## 2020-01-21 ASSESSMENT — ACTIVITIES OF DAILY LIVING (ADL): CURRENT_FUNCTION: TRANSPORTATION REQUIRES ASSISTANCE

## 2020-01-21 ASSESSMENT — PATIENT HEALTH QUESTIONNAIRE - PHQ9: SUM OF ALL RESPONSES TO PHQ QUESTIONS 1-9: 0

## 2020-01-21 NOTE — PROGRESS NOTES
SUBJECTIVE:   Lindsye Hale is a 89 year old female who presents for Preventive Visit.    The patient presents with her daughter.  She is doing very well at this time.  She never took the new inhaler as she was worried it might affect her heart.  She is taking the Symbicort.  She feels her breathing is much better and pretty much at baseline.    She is not having any other issues except her chronic back pain.  No headaches or jaw claudication.  No chest pain or shortness of breath or coughs.  No fevers.  No GI or  symptoms.    As noted she has a history of an osteoporotic compression fracture and will be due for Reclast this March.  She takes her other medications regularly.               Past Medical History:      Past Medical History:   Diagnosis Date     Abnormal echocardiogram 04/2017    mild mr, ar, mitral stenosis, nl ef, lvh.  Done for episode of vision change     Arthritis 99         Atrial fibrillation (H) 2011 and 2009    neg nuc est 2009, Dr. Vazquez, on coumadin     Chest pain 2000    neg est echo, neg ct chest abd, pelvis Mormon     Chronic LBP      COPD (chronic obstructive pulmonary disease) (H)     seen by pulmonary md Dr. Whitt, and given inhaler     Cysts, breast 1980s    bilat mastectomies     Diastolic dysfunction 12/2019    on echo done for sob, also mild to mod mr, nl ef, mild mitral stenosis and mild a.s     Dizzy 2007    ct neg, carotid us neg     Dysphagia 2005    egd nl     Elevated blood sugar      Environmental allergies      Hematuria 2004    neg cysto and us     Hemoptyses 2008    ct neg, bronch neg 2009     Hyperlipidemia      Hypertension      Hypothyroid 1995    Dr. Ortiz     Lumbar spinal stenosis 12/2018    seen on ct done for lbp     Mild aortic stenosis 12/2019     Mild mitral stenosis 12/2019    on echo done for sob     Mitral regurgitation 6/15, 12/19    on echo     nausea 2006    ct abd and ;pelvis neg     Osteopenia     fu dexa better 2011      Osteoporotic compression fracture of spine (H)     got iv reclast 3/6/19     Pacemaker 2011    afib with pauses and syncope     Palpitations 2009    neg est     PMR (polymyalgia rheumatica) (H)     not active in years     SOB (shortness of breath) 5/15    echo nl ef, 2+mr, cxr clear, seen by pulm and given inhaler     Supraventricular premature beats      T12 compression fracture (H) 2018    non traumatic     TIA (transient ischaemic attack) 2009    carotids neg, mri neg     Treadmill stress test negative for angina pectoris     nuclear est     Urine incontinence     Dr. Westfall     Urosepsis     hospitalized     Vision changes     eval by neuro and ophtho and no cause found             Past Surgical History:      Past Surgical History:   Procedure Laterality Date     ARTHROPLASTY HIP Left 2017    Procedure: ARTHROPLASTY HIP;  LEFT HIP ARBEN ARTHROPLASTY(SORAYA);  Surgeon: Darell Nathan MD;  Location: [facility]     ARTHROPLASTY PATELLO-FEMORAL (KNEE)   and      ARTHROSCOPY KNEE  1997     BIOPSY BREAST Right 2014    Procedure: BIOPSY BREAST;  Surgeon: David Carter MD;  Location:  [facility]     breast implants      4x     cataract  2011     FOOT SURGERY       MASTECTOMY  1980    bilat, cysts     nj not bso  70's    not for ca             Social History:     Social History     Socioeconomic History     Marital status:      Spouse name: Not on file     Number of children: 4     Years of education: Not on file     Highest education level: Not on file   Occupational History     Not on file   Social Needs     Financial resource strain: Not on file     Food insecurity:     Worry: Not on file     Inability: Not on file     Transportation needs:     Medical: Not on file     Non-medical: Not on file   Tobacco Use     Smoking status: Former Smoker     Types: Cigarettes     Last attempt to quit: 1955     Years since quittin.0     Smokeless tobacco: Never Used      Tobacco comment: quit in 1955   had smoked about 2 cigarettes a day or more   Substance and Sexual Activity     Alcohol use: Yes     Alcohol/week: 0.0 standard drinks     Comment: occ wine     Drug use: No     Sexual activity: Not Currently   Lifestyle     Physical activity:     Days per week: Not on file     Minutes per session: Not on file     Stress: Not on file   Relationships     Social connections:     Talks on phone: Not on file     Gets together: Not on file     Attends Yarsanism service: Not on file     Active member of club or organization: Not on file     Attends meetings of clubs or organizations: Not on file     Relationship status: Not on file     Intimate partner violence:     Fear of current or ex partner: Not on file     Emotionally abused: Not on file     Physically abused: Not on file     Forced sexual activity: Not on file   Other Topics Concern     Parent/sibling w/ CABG, MI or angioplasty before 65F 55M? Not Asked      Service Not Asked     Blood Transfusions Not Asked     Caffeine Concern No     Comment: coffee: 1 cup a week.  Occ green tea     Occupational Exposure Not Asked     Hobby Hazards Not Asked     Sleep Concern No     Stress Concern No     Weight Concern No     Special Diet No     Back Care Not Asked     Exercise Yes     Comment: walking daily     Bike Helmet Not Asked     Seat Belt Yes     Self-Exams Not Asked   Social History Narrative     Not on file             Family History:   reviewed         Allergies:     Allergies   Allergen Reactions     Clindamycin Hcl Other (See Comments)     Difficulty swallowing     Ampicillin Potassium      Rash nausea and vomiting       Celebrex [Celecoxib]      rash     Ciprofloxacin      rash     Erythromycin      rash     Indocin      [facility] ache     Penicillins      Rash,nausea and vomiting     Vibramycin [Doxycycline Hyclate]      Rash      Xylocaine-Epinephrine [Epinephrine-Lidocaine-Na  Metabisulfite]      Xylocaine with epi caused a rapid heart beat             Medications:     Current Outpatient Medications   Medication Sig Dispense Refill     acetaminophen (TYLENOL) 500 MG tablet Take 1,000 mg by mouth 3 times daily       budesonide-formoterol (SYMBICORT) 160-4.5 MCG/ACT inhaler Inhale 2 puffs into the lungs 2 times daily        flecainide (TAMBOCOR) 150 MG tablet Take 1/2 tablet by mouth twice daily 90 tablet 3     Furosemide (LASIX PO) Take 20 mg by mouth daily as needed (Patient states that she is supposed to be taking medication everyday but has only been using once in a while)       levothyroxine (SYNTHROID/LEVOTHROID) 75 MCG tablet TAKE 1 TABLET(75 MCG) BY MOUTH DAILY 90 tablet 3     metoprolol tartrate (LOPRESSOR) 50 MG tablet TAKE 1 AND 1/2 TABLETS BY MOUTH TWICE DAILY 270 tablet 2     polyethylene glycol (MIRALAX/GLYCOLAX) powder Take 17 g by mouth daily as needed for constipation       rivaroxaban ANTICOAGULANT (XARELTO ANTICOAGULANT) 20 MG TABS tablet Take 1 tablet (20 mg) by mouth daily (with dinner) 90 tablet 3     umeclidinium (INCRUSE ELLIPTA) 62.5 MCG/INH inhaler Inhale 1 puff into the lungs daily 1 Inhaler 11     vitamin (B COMPLEX-C) tablet Take 1 tablet by mouth daily       VITAMIN D PO Take 1 tablet by mouth daily (OTC: Patient unsure of strength)                 Review of Systems:   The 10 point Review of Systems is negative other than noted in the HPI           Physical Exam:   Blood pressure (!) 148/66, pulse 66, temperature 97.3  F (36.3  C), temperature source Oral, weight 73.9 kg (163 lb), SpO2 98 %, not currently breastfeeding.    Exam:  Constitutional: healthy appearing, alert and in no distress  Heent: Normocephalic. Head without obvious masses or lesions. PERRLDC, EOMI. Mouth exam within normal limits: tongue, mucous membranes, posterior pharynx all normal, no lesions or abnormalities seen.  Tm's and canals within normal limits bilaterally. Neck supple, no nuchal  "rigidity or masses. No supraclavicular, or cervical adenopathy. Thyroid symmetric, no masses.  Cardiovascular: Regular rate and rhythm, 2/6 sm llsb, no rub or gallops.  JVP not elevated, no edema.  Carotids within normal limits bilaterally, no bruits.  Respiratory: Normal respiratory effort.  Lungs clear, normal flow, no wheezing or crackles.  Gastrointestinal: Normal active bowel sounds.   Soft, not tender, no masses, guarding or rebound.  No hepatosplenomegaly.   Musculoskeletal: extremities normal, no gross deformities noted.  Skin: no suspicious lesions or rashes   Neurologic: Mental status within normal limits.  Speech fluent.  No gross motor abnormalities and gait intact.  Psychiatric: mentation appears normal and affect normal.         Data:   Labs sent        Assessment:   1. Normal complete physical exam  2. Copd, shortness of breath, much improved, no changes  3. diast dysfxn, vavle dz, no issues  4. Ckd, fulabs  5. Afib, on xarelto  6. Pmr, no issues  7. Comp fx, osteopor, follow up reclast in 3/20  8. Hypertension, control fine  9. Hypothyroid, follow up tsh  10. Sss, pacer  11. hcm         Plan:   Call if changes  Letter with labs  Follow up 4 months      Jeffery Sherwood M.D.                Are you in the first 12 months of your Medicare coverage?  No    Healthy Habits:    In general, how would you rate your overall health?  Very good    Frequency of exercise:  6-7 days/week    Duration of exercise:  15-30 minutes    Do you usually eat at least 4 servings of fruit and vegetables a day, include whole grains    & fiber and avoid regularly eating high fat or \"junk\" foods?  Yes    Taking medications regularly:  Yes    Barriers to taking medications:  None    Medication side effects:  None    Ability to successfully perform activities of daily living:  Transportation requires assistance    Home Safety:  No safety concerns identified    Hearing Impairment:  No hearing concerns (hearing aids)    In the past 6 " months, have you been bothered by leaking of urine? Yes    In general, how would you rate your overall mental or emotional health?  Excellent      PHQ-2 Total Score:    Additional concerns today:  No    Do you feel safe in your environment? Yes    Have you ever done Advance Care Planning? (For example, a Health Directive, POLST, or a discussion with a medical provider or your loved ones about your wishes): Yes, advance care planning is on file.      Fall risk  Fallen 2 or more times in the past year?: No  Any fall with injury in the past year?: No    Cognitive Screening   1) Repeat 3 items (Leader, Season, Table)    2) Clock draw: NORMAL  3) 3 item recall: Recalls 3 objects  Results: 3 items recalled: COGNITIVE IMPAIRMENT LESS LIKELY    Mini-CogTM Copyright MARIA T Roche. Licensed by the author for use in [facility] [employer]; reprinted with permission ([email or web address]). All rights reserved.      Do you have sleep apnea, excessive snoring or daytime drowsiness?: no    Reviewed and updated as needed this visit by clinical staff         Reviewed and updated as needed this visit by Provider        Social History     Tobacco Use     Smoking status: Former Smoker     Types: Cigarettes     Last attempt to quit: 1955     Years since quittin.0     Smokeless tobacco: Never Used     Tobacco comment: quit in    had smoked about 2 cigarettes a day or more   Substance Use Topics     Alcohol use: Yes     Alcohol/week: 0.0 standard drinks     Comment: occ wine     If you drink alcohol do you typically have >3 drinks per day or >7 drinks per week? No    No flowsheet data found.            Current providers sharing in care for this patient include:   Patient Care Team:  Jeffery Sherwood MD as PCP - General (Internal Medicine)  Jenny Carrasquillo as Home Care Nurse  Jeffery Sherwood MD as Assigned PCP  Munira Pereira [facility] as Pharmacist (Pharmacist)    The following health maintenance items are reviewed in Epic and  correct as of today:  Health Maintenance   Topic Date Due     HF ACTION PLAN  09[birth year]16/1925     MICROALBUMIN  09/16/1925     DIABETIC FOOT EXAM  09/16/1925     URINE DRUG SCREEN  09/16/1925     COPD ACTION PLAN  09/16/1925     EYE EXAM  09/16/1925     ZOSTER IMMUNIZATION (1 of 2) 09/16/1975     MEDICARE ANNUAL WELLNESS VISIT  09/16/1990     OP ANNUAL INR REFERRAL  11/14/2012     LIPID  05/23/2014     A1C  11/09/2019     PHQ-9  01/04/2020     TSH W/FREE T4 REFLEX  04/03/2020     BMP  05/15/2020     ALT  11/14/2020     CBC  11/15/2020     FALL RISK ASSESSMENT  12/02/2020     ADVANCE CARE PLANNING  12/11/2022     DTAP/TDAP/TD IMMUNIZATION (2 - Td) 05/30/2023     SPIROMETRY  Completed     INFLUENZA VACCINE  Completed     PNEUMOCOCCAL IMMUNIZATION 65+ LOW/MEDIUM RISK  Completed     IPV IMMUNIZATION  Aged Out     MENINGITIS IMMUNIZATION  Aged Out           Review of Systems      OBJECTIVE:   There were no vitals taken for this visit. Estimated body mass index is 31.05 kg/m  as calculated from the following:    Height as of 12/12/19: 1.524 m (5').    Weight as of 12/12/19: 72.1 kg (159 lb).  Physical Exam          ASSESSMENT / PLAN:       COUNSELING:  Reviewed preventive health counseling, as reflected in patient instructions       Regular exercise    Estimated body mass index is 31.05 kg/m  as calculated from the following:    Height as of 12/12/19: 1.524 m (5').    Weight as of 12/12/19: 72.1 kg (159 lb).         reports that she quit smoking about 65 years ago. Her smoking use included cigarettes. She has never used smokeless tobacco.      Appropriate preventive services were discussed with this patient, including applicable screening as appropriate for cardiovascular disease, diabetes, osteopenia/osteoporosis, and glaucoma.  As appropriate for age/gender, discussed screening for colorectal cancer, prostate cancer, breast cancer, and cervical cancer. Checklist reviewing preventive services available has been given to the  patient.    Reviewed patients plan of care and provided an AVS. The Basic Care Plan (routine screening as documented in Health Maintenance) for Lindsey meets the Care Plan requirement. This Care Plan has been established and reviewed with the daughter.    Counseling Resources:  ATP IV Guidelines  Pooled Cohorts Equation Calculator  Breast Cancer Risk Calculator  FRAX Risk Assessment  ICSI Preventive Guidelines  Dietary Guidelines for Americans, 2010  [employer]'s MyPlate  ASA Prophylaxis  Lung CA Screening    Jeffery Sherwood MD  [facility]    Identified Health Risks:

## 2020-01-22 ENCOUNTER — TELEPHONE (OUTPATIENT)
Dept: FAMILY MEDICINE | Facility: CLINIC | Age: 85
End: 2020-01-22

## 2020-01-22 RX ORDER — HEPARIN SODIUM (PORCINE) LOCK FLUSH IV SOLN 100 UNIT/ML 100 UNIT/ML
5 SOLUTION INTRAVENOUS
Status: CANCELLED | OUTPATIENT
Start: 2020-03-06

## 2020-01-22 RX ORDER — ZOLEDRONIC ACID 5 MG/100ML
5 INJECTION, SOLUTION INTRAVENOUS ONCE
Status: CANCELLED
Start: 2020-03-06

## 2020-01-22 RX ORDER — HEPARIN SODIUM,PORCINE 10 UNIT/ML
5 VIAL (ML) INTRAVENOUS
Status: CANCELLED | OUTPATIENT
Start: 2020-03-06

## 2020-01-22 NOTE — TELEPHONE ENCOUNTER
Reason for Call: Request for an order or referral:    Order or referral being requested: Infusion Therapy    Date needed: March 23 rd or after    Has the patient been seen by the PCP for this problem? YES    Additional comments: None    Phone number Patient can be reached at:  Cell number on file:    Telephone Information:   Mobile [phone]       Best Time:  Any    Can we leave a detailed message on this number?  YES    Call taken on 1/22/2020 at 2:02 PM by Karina Erickson

## 2020-01-22 NOTE — TELEPHONE ENCOUNTER
Detailed message left to notify pt, and giving her number for infusion scheduling    Rasheeda VELIZ RN

## 2020-01-22 NOTE — TELEPHONE ENCOUNTER
Per OV notes pt will be due for next Reclast March 2020     Last infusion was 3/6/2019     Order pended under admit therapy - entered date due as 3/6/2020    Rasheeda VELIZ RN

## 2020-02-24 ENCOUNTER — TRANSFERRED RECORDS (OUTPATIENT)
Dept: HEALTH INFORMATION MANAGEMENT | Facility: CLINIC | Age: 85
End: 2020-02-24

## 2020-03-08 DIAGNOSIS — E03.9 HYPOTHYROIDISM, UNSPECIFIED TYPE: ICD-10-CM

## 2020-03-10 RX ORDER — LEVOTHYROXINE SODIUM 75 UG/1
TABLET ORAL
Qty: 90 TABLET | Refills: 3 | Status: SHIPPED | OUTPATIENT
Start: 2020-03-10 | End: 2021-01-01

## 2020-03-10 NOTE — TELEPHONE ENCOUNTER
"levothyroxine (SYNTHROID/LEVOTHROID) 75 MCG tablet    Last Written Prescription Date:  01/21/2020  Last Fill Quantity: 90,  # refills: 3   Last office visit: 1/21/2020 with prescribing provider:  Kaela   Future Office Visit:   Next 5 appointments (look out 90 days)    Mar 26, 2020 11:50 AM CDT  Return Visit with [facility]  [facility] ([facility]) [facility] [place]  [address]  [place] [id number] [phone]           Requested Prescriptions   Pending Prescriptions Disp Refills     levothyroxine (SYNTHROID/LEVOTHROID) 75 MCG tablet [Pharmacy Med Name: LEVOTHYROXINE 0.075MG (75MCG) TABS] 90 tablet 3     Sig: TAKE 1 TABLET BY MOUTH EVERY DAY       Thyroid Protocol Failed - 3/8/2020  3:20 PM        Failed - Normal TSH on file in past 12 months     Recent Labs   Lab Test 04/03/18  0859   TSH 0.53              Passed - Patient is 12 years or older        Passed - Recent (12 mo) or future (30 days) visit within the authorizing provider's specialty     Patient has had an office visit with the authorizing provider or a provider within the authorizing providers department within the previous 12 mos or has a future within next 30 days. See \"Patient Info\" tab in inbasket, or \"Choose Columns\" in Meds & Orders section of the refill encounter.              Passed - Medication is active on med list        Passed - No active pregnancy on record     If patient is pregnant or has had a positive pregnancy test, please check TSH.          Passed - No positive pregnancy test in past 12 months     If patient is pregnant or has had a positive pregnancy test, please check TSH.               "

## 2020-03-24 ENCOUNTER — ANCILLARY PROCEDURE (OUTPATIENT)
Dept: CARDIOLOGY | Facility: CLINIC | Age: 85
End: 2020-03-24
Attending: INTERNAL MEDICINE
Payer: MEDICARE

## 2020-03-24 DIAGNOSIS — I49.5 SICK SINUS SYNDROME (H): ICD-10-CM

## 2020-03-24 DIAGNOSIS — Z95.0 CARDIAC PACEMAKER IN SITU: ICD-10-CM

## 2020-03-24 PROCEDURE — 93294 REM INTERROG EVL PM/LDLS PM: CPT | Performed by: INTERNAL MEDICINE

## 2020-03-24 PROCEDURE — 93296 REM INTERROG EVL PM/IDS: CPT | Performed by: INTERNAL MEDICINE

## 2020-04-02 DIAGNOSIS — I10 BENIGN ESSENTIAL HYPERTENSION: ICD-10-CM

## 2020-04-02 DIAGNOSIS — I48.91 ATRIAL FIBRILLATION, UNSPECIFIED TYPE (H): ICD-10-CM

## 2020-04-02 NOTE — TELEPHONE ENCOUNTER
Routing refill request to provider for review/approval because:  BP above goal, pt due for follow-up in May.   BP Readings from Last 6 Encounters:   01/21/20 (!) 148/66   12/12/19 (!) 149/63   12/02/19 (!) 156/72   11/15/19 123/53   05/21/19 136/60   05/14/19 130/65

## 2020-04-03 LAB
MDC_IDC_LEAD_IMPLANT_DT: NORMAL
MDC_IDC_LEAD_IMPLANT_DT: NORMAL
MDC_IDC_LEAD_LOCATION: NORMAL
MDC_IDC_LEAD_LOCATION: NORMAL
MDC_IDC_LEAD_MFG: NORMAL
MDC_IDC_LEAD_MFG: NORMAL
MDC_IDC_LEAD_MODEL: NORMAL
MDC_IDC_LEAD_MODEL: NORMAL
MDC_IDC_LEAD_POLARITY_TYPE: NORMAL
MDC_IDC_LEAD_POLARITY_TYPE: NORMAL
MDC_IDC_LEAD_SERIAL: NORMAL
MDC_IDC_LEAD_SERIAL: NORMAL
MDC_IDC_MSMT_BATTERY_DTM: NORMAL
MDC_IDC_MSMT_BATTERY_REMAINING_LONGEVITY: 40 MO
MDC_IDC_MSMT_BATTERY_REMAINING_PERCENTAGE: 35 %
MDC_IDC_MSMT_BATTERY_RRT_TRIGGER: NORMAL
MDC_IDC_MSMT_BATTERY_STATUS: NORMAL
MDC_IDC_MSMT_BATTERY_VOLTAGE: 2.83 V
MDC_IDC_MSMT_LEADCHNL_RA_IMPEDANCE_VALUE: 400 OHM
MDC_IDC_MSMT_LEADCHNL_RA_LEAD_CHANNEL_STATUS: NORMAL
MDC_IDC_MSMT_LEADCHNL_RA_PACING_THRESHOLD_AMPLITUDE: 1 V
MDC_IDC_MSMT_LEADCHNL_RA_PACING_THRESHOLD_PULSEWIDTH: 0.5 MS
MDC_IDC_MSMT_LEADCHNL_RA_SENSING_INTR_AMPL: 2.2 MV
MDC_IDC_MSMT_LEADCHNL_RV_IMPEDANCE_VALUE: 510 OHM
MDC_IDC_MSMT_LEADCHNL_RV_LEAD_CHANNEL_STATUS: NORMAL
MDC_IDC_MSMT_LEADCHNL_RV_PACING_THRESHOLD_AMPLITUDE: 1 V
MDC_IDC_MSMT_LEADCHNL_RV_PACING_THRESHOLD_PULSEWIDTH: 0.5 MS
MDC_IDC_MSMT_LEADCHNL_RV_SENSING_INTR_AMPL: 12 MV
MDC_IDC_PG_IMPLANT_DTM: NORMAL
MDC_IDC_PG_MFG: NORMAL
MDC_IDC_PG_MODEL: NORMAL
MDC_IDC_PG_SERIAL: NORMAL
MDC_IDC_PG_TYPE: NORMAL
MDC_IDC_SESS_CLINIC_NAME: NORMAL
MDC_IDC_SESS_DTM: NORMAL
MDC_IDC_SESS_REPROGRAMMED: NO
MDC_IDC_SESS_TYPE: NORMAL
MDC_IDC_SET_BRADY_AT_MODE_SWITCH_MODE: NORMAL
MDC_IDC_SET_BRADY_AT_MODE_SWITCH_RATE: 180 {BEATS}/MIN
MDC_IDC_SET_BRADY_LOWRATE: 60 {BEATS}/MIN
MDC_IDC_SET_BRADY_MAX_SENSOR_RATE: 120 {BEATS}/MIN
MDC_IDC_SET_BRADY_MAX_TRACKING_RATE: 120 {BEATS}/MIN
MDC_IDC_SET_BRADY_MODE: NORMAL
MDC_IDC_SET_BRADY_PAV_DELAY_HIGH: 100 MS
MDC_IDC_SET_BRADY_PAV_DELAY_LOW: 275 MS
MDC_IDC_SET_BRADY_SAV_DELAY_HIGH: 100 MS
MDC_IDC_SET_BRADY_SAV_DELAY_LOW: 250 MS
MDC_IDC_SET_LEADCHNL_RA_PACING_AMPLITUDE: 2 V
MDC_IDC_SET_LEADCHNL_RA_PACING_ANODE_ELECTRODE_1: NORMAL
MDC_IDC_SET_LEADCHNL_RA_PACING_ANODE_LOCATION_1: NORMAL
MDC_IDC_SET_LEADCHNL_RA_PACING_CAPTURE_MODE: NORMAL
MDC_IDC_SET_LEADCHNL_RA_PACING_CATHODE_ELECTRODE_1: NORMAL
MDC_IDC_SET_LEADCHNL_RA_PACING_CATHODE_LOCATION_1: NORMAL
MDC_IDC_SET_LEADCHNL_RA_PACING_POLARITY: NORMAL
MDC_IDC_SET_LEADCHNL_RA_PACING_PULSEWIDTH: 0.5 MS
MDC_IDC_SET_LEADCHNL_RA_SENSING_ADAPTATION_MODE: NORMAL
MDC_IDC_SET_LEADCHNL_RA_SENSING_ANODE_ELECTRODE_1: NORMAL
MDC_IDC_SET_LEADCHNL_RA_SENSING_ANODE_LOCATION_1: NORMAL
MDC_IDC_SET_LEADCHNL_RA_SENSING_CATHODE_ELECTRODE_1: NORMAL
MDC_IDC_SET_LEADCHNL_RA_SENSING_CATHODE_LOCATION_1: NORMAL
MDC_IDC_SET_LEADCHNL_RA_SENSING_POLARITY: NORMAL
MDC_IDC_SET_LEADCHNL_RA_SENSING_SENSITIVITY: 0.5 MV
MDC_IDC_SET_LEADCHNL_RV_PACING_AMPLITUDE: 2 V
MDC_IDC_SET_LEADCHNL_RV_PACING_ANODE_ELECTRODE_1: NORMAL
MDC_IDC_SET_LEADCHNL_RV_PACING_ANODE_LOCATION_1: NORMAL
MDC_IDC_SET_LEADCHNL_RV_PACING_CAPTURE_MODE: NORMAL
MDC_IDC_SET_LEADCHNL_RV_PACING_CATHODE_ELECTRODE_1: NORMAL
MDC_IDC_SET_LEADCHNL_RV_PACING_CATHODE_LOCATION_1: NORMAL
MDC_IDC_SET_LEADCHNL_RV_PACING_POLARITY: NORMAL
MDC_IDC_SET_LEADCHNL_RV_PACING_PULSEWIDTH: 0.5 MS
MDC_IDC_SET_LEADCHNL_RV_SENSING_ADAPTATION_MODE: NORMAL
MDC_IDC_SET_LEADCHNL_RV_SENSING_ANODE_ELECTRODE_1: NORMAL
MDC_IDC_SET_LEADCHNL_RV_SENSING_ANODE_LOCATION_1: NORMAL
MDC_IDC_SET_LEADCHNL_RV_SENSING_CATHODE_ELECTRODE_1: NORMAL
MDC_IDC_SET_LEADCHNL_RV_SENSING_CATHODE_LOCATION_1: NORMAL
MDC_IDC_SET_LEADCHNL_RV_SENSING_POLARITY: NORMAL
MDC_IDC_SET_LEADCHNL_RV_SENSING_SENSITIVITY: 2 MV
MDC_IDC_STAT_AT_BURDEN_PERCENT: 0 %
MDC_IDC_STAT_AT_DTM_END: NORMAL
MDC_IDC_STAT_AT_DTM_START: NORMAL
MDC_IDC_STAT_AT_MODE_SW_COUNT: 0
MDC_IDC_STAT_AT_MODE_SW_COUNT_PER_DAY: 0
MDC_IDC_STAT_AT_MODE_SW_PERCENT_TIME: 0 %
MDC_IDC_STAT_BRADY_AP_VP_PERCENT: 58 %
MDC_IDC_STAT_BRADY_AP_VS_PERCENT: 1 %
MDC_IDC_STAT_BRADY_AS_VP_PERCENT: 36 %
MDC_IDC_STAT_BRADY_AS_VS_PERCENT: 4.7 %
MDC_IDC_STAT_BRADY_DTM_END: NORMAL
MDC_IDC_STAT_BRADY_DTM_START: NORMAL
MDC_IDC_STAT_BRADY_RA_PERCENT_PACED: 58 %
MDC_IDC_STAT_BRADY_RV_PERCENT_PACED: 95 %
MDC_IDC_STAT_CRT_DTM_END: NORMAL
MDC_IDC_STAT_CRT_DTM_START: NORMAL
MDC_IDC_STAT_HEART_RATE_ATRIAL_MAX: 210 {BEATS}/MIN
MDC_IDC_STAT_HEART_RATE_ATRIAL_MEAN: 68 {BEATS}/MIN
MDC_IDC_STAT_HEART_RATE_ATRIAL_MIN: 60 {BEATS}/MIN
MDC_IDC_STAT_HEART_RATE_DTM_END: NORMAL
MDC_IDC_STAT_HEART_RATE_DTM_START: NORMAL
MDC_IDC_STAT_HEART_RATE_VENTRICULAR_MAX: 160 {BEATS}/MIN
MDC_IDC_STAT_HEART_RATE_VENTRICULAR_MEAN: 68 {BEATS}/MIN
MDC_IDC_STAT_HEART_RATE_VENTRICULAR_MIN: 50 {BEATS}/MIN

## 2020-04-03 RX ORDER — METOPROLOL TARTRATE 50 MG
TABLET ORAL
Qty: 270 TABLET | Refills: 0 | Status: SHIPPED | OUTPATIENT
Start: 2020-04-03 | End: 2020-06-30

## 2020-05-13 ENCOUNTER — VIRTUAL VISIT (OUTPATIENT)
Dept: FAMILY MEDICINE | Facility: CLINIC | Age: 85
End: 2020-05-13
Payer: MEDICARE

## 2020-05-13 DIAGNOSIS — N30.00 ACUTE CYSTITIS WITHOUT HEMATURIA: Primary | ICD-10-CM

## 2020-05-13 PROCEDURE — 99441 ZZC PHYSICIAN TELEPHONE EVALUATION 5-10 MIN: CPT | Performed by: NURSE PRACTITIONER

## 2020-05-13 RX ORDER — CEPHALEXIN 500 MG/1
500 CAPSULE ORAL 2 TIMES DAILY
Qty: 14 CAPSULE | Refills: 0 | Status: SHIPPED | OUTPATIENT
Start: 2020-05-13 | End: 2020-05-20

## 2020-05-13 NOTE — PROGRESS NOTES
"Lindsey Hale is a 89 year old female who is being evaluated via a billable telephone visit.      The patient has been notified of following:     \"This telephone visit will be conducted via a call between you and your physician/provider. We have found that certain health care needs can be provided without the need for a physical exam.  This service lets us provide the care you need with a short phone conversation.  If a prescription is necessary we can send it directly to your pharmacy.  If lab work is needed we can place an order for that and you can then stop by our lab to have the test done at a later time.    Telephone visits are billed at different rates depending on your insurance coverage. During this emergency period, for some insurers they may be billed the same as an in-person visit.  Please reach out to your insurance provider with any questions.    If during the course of the call the physician/provider feels a telephone visit is not appropriate, you will not be charged for this service.\"    Patient has given verbal consent for Telephone visit?  Yes    What phone number would you like to be contacted at? [phone]    How would you like to obtain your AVS? Mail a copy    Subjective     Lindsey Hale is a 89 year old female who presents to clinic today for the following health issues:    HPI     Chief Complaint   Patient presents with     Urinary Problem     possible UTI , getting up at night , odor and burning and low back.  Has a Hx.       UTI symptoms as above a couple weeks ago and the last few days worse   No fevers         Patient Active Problem List   Diagnosis     Urine incontinence     Transient cerebral ischemia     Arthritis     Osteopenia     Pacemaker     Hyperlipidemia LDL goal <160     Chronic low back pain     Benign essential hypertension     PMR (polymyalgia rheumatica) (H)     Advanced directives, counseling/discussion     SOB (shortness of breath)     Mitral regurgitation "     Hypothyroidism, unspecified type     Chronic obstructive pulmonary disease, unspecified COPD type (H)     Paroxysmal atrial fibrillation (H)     Abnormal echocardiogram     Physical deconditioning     S/P hip hemiarthroplasty     Closed fracture of neck of left femur with routine healing     Sick sinus syndrome (H)     Type 2 diabetes mellitus with complication, without long-term current use of insulin (H)     Bilateral leg edema     Slow transit constipation     Insomnia     T12 compression fracture (H)     Spinal stenosis of lumbar region without neurogenic claudication     Lumbar spinal stenosis     Chronic pain     Back pain     COPD exacerbation (H)     CKD (chronic kidney disease) stage 3, GFR 30-59 ml/min (H)     Diastolic dysfunction     Mild mitral stenosis     Mild aortic stenosis     Osteoporotic compression fracture of spine (H)     Past Surgical History:   Procedure Laterality Date     ARTHROPLASTY HIP Left 2017    Procedure: ARTHROPLASTY HIP;  LEFT HIP ARBEN ARTHROPLASTY(SORAYA);  Surgeon: Darell Nathan MD;  Location: [facility]     ARTHROPLASTY PATELLO-FEMORAL (KNEE)   and      ARTHROSCOPY KNEE  1997     BIOPSY BREAST Right 2014    Procedure: BIOPSY BREAST;  Surgeon: David Carter MD;  Location:  [facility]     breast implants      4x     cataract  2011     FOOT SURGERY       MASTECTOMY  1980    bilat, cysts     nj not bso  70's    not for ca       Social History     Tobacco Use     Smoking status: Former Smoker     Types: Cigarettes     Last attempt to quit: 1955     Years since quittin.3     Smokeless tobacco: Never Used     Tobacco comment: quit in    had smoked about 2 cigarettes a day or more   Substance Use Topics     Alcohol use: Yes     Alcohol/week: 0.0 standard drinks     Comment: occ wine     Family History   Problem Relation Age of Onset     C.A.D. Father      Lymphoma Father      Cancer Mother      Lymphoma Brother      Breast Cancer Sister       Osteoporosis Sister      Myocardial Infarction Sister      Unknown/Adopted Maternal Grandmother      Unknown/Adopted Maternal Grandfather      Unknown/Adopted Paternal Grandmother      Unknown/Adopted Paternal Grandfather            Reviewed and updated as needed this visit by Provider         Review of Systems   Detailed as above        Objective   Reported vitals:  There were no vitals taken for this visit.     PSYCH: Alert and oriented times 3; coherent speech, normal   rate and volume, able to articulate logical thoughts, able   to abstract reason, no tangential thoughts, no hallucinations   or delusions  Her affect is normal  RESP: No cough, no audible wheezing, able to talk in full sentences  Remainder of exam unable to be completed due to telephone visits        Assessment/Plan:  1. Acute cystitis without hematuria  - cephALEXin (KEFLEX) 500 MG capsule; Take 1 capsule (500 mg) by mouth 2 times daily for 7 days  Dispense: 14 capsule; Refill: 0  - UA with Microscopic reflex to Culture; Future      Will treat empirically.   Called [facility] and ordered UA. They will fax over for signature.       Phone call duration:  4 mins with pt and 2 minutes with RN at her place of residence     MERCEDEZ Sheridan CNP

## 2020-05-15 ENCOUNTER — RECORDS - HEALTHEAST (OUTPATIENT)
Dept: LAB | Facility: CLINIC | Age: 85
End: 2020-05-15

## 2020-05-15 LAB
ALBUMIN UR-MCNC: NEGATIVE MG/DL
APPEARANCE UR: CLEAR
BILIRUB UR QL STRIP: NEGATIVE
COLOR UR AUTO: YELLOW
GLUCOSE UR STRIP-MCNC: NEGATIVE MG/DL
HGB UR QL STRIP: NEGATIVE
KETONES UR STRIP-MCNC: NEGATIVE MG/DL
LEUKOCYTE ESTERASE UR QL STRIP: NEGATIVE
NITRATE UR QL: NEGATIVE
PH UR STRIP: 5.5 [PH] (ref 4.5–8)
SP GR UR STRIP: 1.01 (ref 1–1.03)
UROBILINOGEN UR STRIP-ACNC: NORMAL

## 2020-05-20 ENCOUNTER — VIRTUAL VISIT (OUTPATIENT)
Dept: CARDIOLOGY | Facility: CLINIC | Age: 85
End: 2020-05-20
Payer: MEDICARE

## 2020-05-20 DIAGNOSIS — I48.0 PAROXYSMAL ATRIAL FIBRILLATION (H): Primary | ICD-10-CM

## 2020-05-20 DIAGNOSIS — I49.5 SICK SINUS SYNDROME (H): ICD-10-CM

## 2020-05-20 PROCEDURE — 99441 ZZC PHYSICIAN TELEPHONE EVALUATION 5-10 MIN GOVT: CPT | Performed by: NURSE PRACTITIONER

## 2020-05-20 NOTE — PATIENT INSTRUCTIONS
Call my nurse with any questions or concerns:  [phone]  *If you have concerns after hours, please call [phone], option 2 to speak with on call Cardiologist.    He will see Dr. Vazquez next year with a twelve-lead EKG. if you have any questions or concerns please feel free to call us.  It was nice visiting with you today.    Thank you-Zaina

## 2020-05-20 NOTE — PROGRESS NOTES
"Lindsey Hale is a 89 year old female who is being evaluated via a billable telephone visit.      The patient has been notified of following:     \"This telephone visit will be conducted via a call between you and your physician/provider. We have found that certain health care needs can be provided without the need for a physical exam.  This service lets us provide the care you need with a short phone conversation.  If a prescription is necessary we can send it directly to your pharmacy.  If lab work is needed we can place an order for that and you can then stop by our lab to have the test done at a later time.    Telephone visits are billed at different rates depending on your insurance coverage. During this emergency period, for some insurers they may be billed the same as an in-person visit.  Please reach out to your insurance provider with any questions.    If during the course of the call the physician/provider feels a telephone visit is not appropriate, you will not be charged for this service.\"    Patient has given verbal consent for Telephone visit?  Yes  What phone number would you like to be contacted at? [phone]    How would you like to obtain your AVS? Mail a copy   Review Of Systems  Skin: NEGATIVE  Eyes:Ears/Nose/Throat: NEGATIVE  Respiratory: COPD, THOMPSON occasionally  Cardiovascular:NEGATIVE no issues  Gastrointestinal: NEGATIVE  Genitourinary:NEGATIVE   Musculoskeletal: chronic back  Neurologic: NEGATIVE  Psychiatric: NEGATIVE  Hematologic/Lymphatic/Immunologic: NEGATIVE  Endocrine:  NEGATIVE  Vitals: Pt reports no current BP or Pulse. Can't weigh at her residence. But averages 150lbs      Jayashree Riley LPN        EP KOJO NOTE:  This visit was completed via telephone due to COVID-19 precautions.  I had the pleasure speaking to Lindsey Hale as part of a telephone visit today.    The patient is a sweet 89-year-old female with the following chronic medical issues:  1. Paroxysmal atrial " fibrillation on flecainide therapy 75 mg bid, metoprolol 37.5 mg bid, and Xarelto  2. Sick sinus syndrome with pacemaker implantation in 2011.  [facility]  3. COPD with an acute hypoxic episode 11/2019 resulting in overnight hospital stay  4. Hypertension  5. Hypothyroidism on levothyroxine  6. Previous amiodarone use which was stopped secondary to declined PFTs.  7. Osteoporosis with hip fracture in 2017, and T12 vertebral fracture 2/2019      During her phone visit today, Lindsey states that she feels well.  She denies palpitations, chest discomfort, edema.  She does get some shortness of breath with exertion which is stable for her.  She does have a history of chronic lung disease.  She was briefly admitted November 2019 for her COPD.  She states that her pocket for her pacemaker looks good and denies redness or swelling.  She lives in a senior independent apartment.  Due to the COVID-19 pandemic she has held up in her apartment, but states that the facility brings her a meal every night.  Her daughter just lives down the road from her.  Patient reports that she is scheduled for a Mohs procedure tomorrow.  She was not instructed to hold her Xarelto, but plans on holding tonight's dose.    DIAGNOSTIC STUDIES:  Labs:    Component      Latest Ref Rng & Units 11/15/2019   Sodium      133 - 144 mmol/L 136   Potassium      3.4 - 5.3 mmol/L 3.8   Chloride      94 - 109 mmol/L 104   Carbon Dioxide      20 - 32 mmol/L 25   Anion Gap      3 - 14 mmol/L 7   Glucose      70 - 99 mg/dL 186 (H)   Urea Nitrogen      7 - 30 mg/dL 30   Creatinine      0.52 - 1.04 mg/dL 0.86   GFR Estimate      >60 mL/min/1.73:m2 58 (L)   GFR Estimate If Black      >60 mL/min/1.73:m2 67   Calcium      8.5 - 10.1 mg/dL 8.9   WBC      4.0 - 11.0 10e9/L 10.1   RBC Count      3.8 - 5.2 10e12/L 3.24 (L)   Hemoglobin      11.7 - 15.7 g/dL 10.6 (L)   Hematocrit      35.0 - 47.0 % 32.9 (L)   MCV      78 - 100 fl 102 (H)   MCH      26.5 - 33.0 pg  32.7   MCHC      31.5 - 36.5 g/dL 32.2   RDW      10.0 - 15.0 % 14.9   Platelet Count      150 - 450 10e9/L 307   N-Terminal Pro BNP Inpatient      0 - 1,800 pg/mL      [facility] (D) Remote PPM Device Check 3/2020  AP: 58%  : 95%  Mode: DDDR  Presenting Rhythm: AS/, AP/  Heart Rate: Adequate rates per histogram  Sensing: stable  Pacing Threshold: stable  Impedance: stable  Battery Status: 3.3 years  Atrial Arrhythmia: none  Ventricular Arrhythmia: none    11/2020 EKG: AV paced       Echocardiogram:  12/2019 Interpretation Summary     The left ventricle is normal in size.  The visual ejection fraction is estimated at 55-60%.  Diastolic Doppler findings (E/E' ratio and/or other parameters) suggest left  ventricular filling pressures are increased.  No regional wall motion abnormalities noted.  There is mild to moderate (1-2+) mitral regurgitation.  There is mild mitral stenosis.  There is sclerosis, calcification, and restriction of the aortic valve opening  compatible with mild aortic stenosis.  Right ventricular systolic pressure is normal.    IMPRESSION:  1. Sick sinus syndrome with paroxysmal atrial fibrillation (tachybradycardia syndrome).  Stable on flecainide, metoprolol, and Xarelto.  Patient denies any symptoms and is feeling well.  2. Mild to moderate mitral regurgitation with mild mitral stenosis.  Patient aware, and asymptomatic.  Due to her advanced age, and no symptoms an echo has not been ordered.  3. Hypothyroidism on levothyroxine  4. Most procedure tomorrow.  Recommended holding Xarelto tonight to minimize bleeding.  5. Osteoporosis      RECOMMENDATIONS:  Overall, Lindsey is pleased with how well she is feeling.  No medication changes were warranted today.  She will see  in 1 year with a twelve-lead EKG.  We will continue with device checks every 3 months.  Last twelve-lead EKG was 11/2019.  This was stable and she was AV paced.      Zaina Herman NP, APRN CNP      Telephone  visit documentation  Start: 3:15 pm  End: 3:21 pm  Time: 6 minutes

## 2020-05-20 NOTE — LETTER
5/20/2020    Jeffery Sherwood MD  [address]  [facility] [address]    RE: Lindsey Hale       Dear Colleague,    I had the pleasure of seeing Lindsey Hale in the [facility].    EP KOJO NOTE:  This visit was completed via telephone due to COVID-19 precautions.  I had the pleasure speaking to Lindsey Hale as part of a telephone visit today.    The patient is a sweet 89-year-old female with the following chronic medical issues:  1. Paroxysmal atrial fibrillation on flecainide therapy 75 mg bid, metoprolol 37.5 mg bid, and Xarelto  2. Sick sinus syndrome with pacemaker implantation in 2011.  [facility]  3. COPD with an acute hypoxic episode 11/2019 resulting in overnight hospital stay  4. Hypertension  5. Hypothyroidism on levothyroxine  6. Previous amiodarone use which was stopped secondary to declined PFTs.  7. Osteoporosis with hip fracture in 2017, and T12 vertebral fracture 2/2019      During her phone visit today, Lindsey states that she feels well.  She denies palpitations, chest discomfort, edema.  She does get some shortness of breath with exertion which is stable for her.  She does have a history of chronic lung disease.  She was briefly admitted November 2019 for her COPD.  She states that her pocket for her pacemaker looks good and denies redness or swelling.  She lives in a senior independent apartment.  Due to the COVID-19 pandemic she has held up in her apartment, but states that the facility brings her a meal every night.  Her daughter just lives down the road from her.  Patient reports that she is scheduled for a Mohs procedure tomorrow.  She was not instructed to hold her Xarelto, but plans on holding tonight's dose.    DIAGNOSTIC STUDIES:  Labs:    Component      Latest Ref Rng & Units 11/15/2019   Sodium      133 - 144 mmol/L 136   Potassium      3.4 - 5.3 mmol/L 3.8   Chloride      94 - 109 mmol/L 104   Carbon Dioxide      20 - 32 mmol/L 25    Anion Gap      3 - 14 mmol/L 7   Glucose      70 - 99 mg/dL 186 (H)   Urea Nitrogen      7 - 30 mg/dL 30   Creatinine      0.52 - 1.04 mg/dL 0.86   GFR Estimate      >60 mL/min/1.73:m2 58 (L)   GFR Estimate If Black      >60 mL/min/1.73:m2 67   Calcium      8.5 - 10.1 mg/dL 8.9   WBC      4.0 - 11.0 10e9/L 10.1   RBC Count      3.8 - 5.2 10e12/L 3.24 (L)   Hemoglobin      11.7 - 15.7 g/dL 10.6 (L)   Hematocrit      35.0 - 47.0 % 32.9 (L)   MCV      78 - 100 fl 102 (H)   MCH      26.5 - 33.0 pg 32.7   MCHC      31.5 - 36.5 g/dL 32.2   RDW      10.0 - 15.0 % 14.9   Platelet Count      150 - 450 10e9/L 307   N-Terminal Pro BNP Inpatient      0 - 1,800 pg/mL      [facility] (D) Remote PPM Device Check 3/2020  AP: 58%  : 95%  Mode: DDDR  Presenting Rhythm: AS/, AP/  Heart Rate: Adequate rates per histogram  Sensing: stable  Pacing Threshold: stable  Impedance: stable  Battery Status: 3.3 years  Atrial Arrhythmia: none  Ventricular Arrhythmia: none    11/2020 EKG: AV paced       Echocardiogram:  12/2019 Interpretation Summary     The left ventricle is normal in size.  The visual ejection fraction is estimated at 55-60%.  Diastolic Doppler findings (E/E' ratio and/or other parameters) suggest left  ventricular filling pressures are increased.  No regional wall motion abnormalities noted.  There is mild to moderate (1-2+) mitral regurgitation.  There is mild mitral stenosis.  There is sclerosis, calcification, and restriction of the aortic valve opening  compatible with mild aortic stenosis.  Right ventricular systolic pressure is normal.    IMPRESSION:  1. Sick sinus syndrome with paroxysmal atrial fibrillation (tachybradycardia syndrome).  Stable on flecainide, metoprolol, and Xarelto.  Patient denies any symptoms and is feeling well.  2. Mild to moderate mitral regurgitation with mild mitral stenosis.  Patient aware, and asymptomatic.  Due to her advanced age, and no symptoms an echo has not been  ordered.  3. Hypothyroidism on levothyroxine  4. Most procedure tomorrow.  Recommended holding Xarelto tonight to minimize bleeding.  5. Osteoporosis      RECOMMENDATIONS:  Overall, Lindsey is pleased with how well she is feeling.  No medication changes were warranted today.  She will see  in 1 year with a twelve-lead EKG.  We will continue with device checks every 3 months.  Last twelve-lead EKG was 11/2019.  This was stable and she was AV paced.      Thank you for allowing me to participate in the care of your patient.    Sincerely,     Zaina Herman NP, APRN CNP     [facility]

## 2020-05-27 ENCOUNTER — TELEPHONE (OUTPATIENT)
Dept: ONCOLOGY | Facility: CLINIC | Age: 85
End: 2020-05-27

## 2020-05-27 NOTE — TELEPHONE ENCOUNTER
"Patient is currently scheduled for an appointment at [facility] in [place].  Called patient to review current visitor restrictions and complete COVID-19 Patient Infection/Travel Screening Tool.     Due to the recent public health concerns around COVID-19 and in an effort to keep our patients and staff safe and healthy, we are implementing a screening process for the patients that come to our clinic.      I am going to ask you a few questions, please answer yes or no.  Your honesty about any symptoms is critical, as it keeps patients and staff healthy.      Do you have a:  Fever (or reported chills)?  No  New cough (started within the past 14 days)?  No  New shortness of breath (started within the past 14 days)?  No  Rash?  No    In the last month, have you been in contact with someone who was confirmed or suspected to have Coronavirus/COVID-19?  No    Have you traveled internationally in the last month?  No  If so, where?       I also wanted to let you know that to protect our patients from the flu and other common illnesses, [facility] enforce visitor restrictions year round, but due to the community spread of COVID-19 in [place], we are taking additional precautionary steps to ensure the health of our patients.  At this time, NO visitors are allowed on our hospital and clinic campuses.     Patient PASSED the screening assessment.    Patient instructed to come to the clinic as planned for their scheduled appointment and to call the clinic if any symptoms develop prior to their appointment.    \"Per CDC Guidelines, we are asking all patients that are coming into the building to wear a cloth covering that covers your mouth and nose.  You will be screened again at the entrance to the clinic for any Covid 19 symptoms. If you screen positive to any Covid 19 symptoms during our screening process you will be provided a surgical mask to wear during your time in the " "building.\"    \"COVID-19 is contagious and can be dangerous for our patients and staff.  Please send us a [employer] message or call our clinic before coming in if you feel any of the following symptoms: fever, cough, congestion, runny nose, sore throat, muscle aches and pains, or shortness of breath.  If you are already at our clinic, it is very important that you be honest about any symptoms you are experiencing to ensure your safety and that of other patients and staff who treat you.  If you do have symptoms, we will have a nurse and/or provider asses you to determine next steps.\"    Flower Bedolla MA on 5/27/2020 at 3:17 PM    "

## 2020-05-28 ENCOUNTER — INFUSION THERAPY VISIT (OUTPATIENT)
Dept: INFUSION THERAPY | Facility: CLINIC | Age: 85
End: 2020-05-28
Attending: INTERNAL MEDICINE
Payer: MEDICARE

## 2020-05-28 ENCOUNTER — HOSPITAL ENCOUNTER (OUTPATIENT)
Facility: CLINIC | Age: 85
Setting detail: SPECIMEN
Discharge: HOME | End: 2020-05-28
Attending: INTERNAL MEDICINE | Admitting: INTERNAL MEDICINE
Payer: MEDICARE

## 2020-05-28 VITALS — BODY MASS INDEX: 32.61 KG/M2 | WEIGHT: 167 LBS

## 2020-05-28 DIAGNOSIS — M85.859 OSTEOPENIA OF HIP, UNSPECIFIED LATERALITY: ICD-10-CM

## 2020-05-28 DIAGNOSIS — S22.080S COMPRESSION FRACTURE OF T12 VERTEBRA, SEQUELA: Primary | ICD-10-CM

## 2020-05-28 DIAGNOSIS — M85.859 OSTEOPENIA OF HIP, UNSPECIFIED LATERALITY: Primary | ICD-10-CM

## 2020-05-28 LAB
CALCIUM SERPL-MCNC: 9 MG/DL (ref 8.5–10.1)
CREAT SERPL-MCNC: 1.18 MG/DL (ref 0.52–1.04)
GFR SERPL CREATININE-BSD FRML MDRD: 39 ML/MIN/{1.73_M2}

## 2020-05-28 PROCEDURE — 82565 ASSAY OF CREATININE: CPT | Performed by: INTERNAL MEDICINE

## 2020-05-28 PROCEDURE — 82310 ASSAY OF CALCIUM: CPT | Performed by: INTERNAL MEDICINE

## 2020-05-28 PROCEDURE — 36415 COLL VENOUS BLD VENIPUNCTURE: CPT

## 2020-05-28 RX ORDER — HEPARIN SODIUM (PORCINE) LOCK FLUSH IV SOLN 100 UNIT/ML 100 UNIT/ML
5 SOLUTION INTRAVENOUS
Status: CANCELLED | OUTPATIENT
Start: 2020-05-28

## 2020-05-28 RX ORDER — HEPARIN SODIUM,PORCINE 10 UNIT/ML
5 VIAL (ML) INTRAVENOUS
Status: CANCELLED | OUTPATIENT
Start: 2020-05-28

## 2020-05-28 RX ORDER — ZOLEDRONIC ACID 5 MG/100ML
5 INJECTION, SOLUTION INTRAVENOUS ONCE
Status: CANCELLED
Start: 2020-05-28

## 2020-05-28 NOTE — PROGRESS NOTES
Pt does not quality for Reclast today. Her Cr 1.18 and Creatinine clearance <35. Avril, pharmacist called Dr. Sherwood's office and left a message. Did not hear from the office until 3:30pm and pt did not want to stay longer. Called Dr. Sherwood's office again and updated the situation.

## 2020-05-29 ENCOUNTER — TELEPHONE (OUTPATIENT)
Dept: INFUSION THERAPY | Facility: CLINIC | Age: 85
End: 2020-05-29

## 2020-05-29 NOTE — TELEPHONE ENCOUNTER
Dr. Sherwood's nurse calling back regarding patient's reclast infusion.  Reclast was held yesterday due to her creatinine level.  Dr. Sherwood is out of the office until Monday, and on call provider would like this reviewed by him.  Their office will get back to us next week with the plan.

## 2020-06-03 ENCOUNTER — TELEPHONE (OUTPATIENT)
Dept: FAMILY MEDICINE | Facility: CLINIC | Age: 85
End: 2020-06-03

## 2020-06-03 DIAGNOSIS — R82.90 ABNORMAL URINALYSIS: Primary | ICD-10-CM

## 2020-06-03 DIAGNOSIS — N30.00 ACUTE CYSTITIS WITHOUT HEMATURIA: ICD-10-CM

## 2020-06-03 DIAGNOSIS — N18.30 CKD (CHRONIC KIDNEY DISEASE) STAGE 3, GFR 30-59 ML/MIN (H): ICD-10-CM

## 2020-06-03 DIAGNOSIS — R82.90 ABNORMAL URINALYSIS: ICD-10-CM

## 2020-06-03 DIAGNOSIS — M35.3 PMR (POLYMYALGIA RHEUMATICA) (H): ICD-10-CM

## 2020-06-03 DIAGNOSIS — E11.8 TYPE 2 DIABETES MELLITUS WITH COMPLICATION, WITHOUT LONG-TERM CURRENT USE OF INSULIN (H): ICD-10-CM

## 2020-06-03 DIAGNOSIS — Z00.00 ROUTINE GENERAL MEDICAL EXAMINATION AT A HEALTH CARE FACILITY: ICD-10-CM

## 2020-06-03 DIAGNOSIS — E03.9 HYPOTHYROIDISM, UNSPECIFIED TYPE: ICD-10-CM

## 2020-06-03 LAB
ALBUMIN UR-MCNC: NEGATIVE MG/DL
ANION GAP SERPL CALCULATED.3IONS-SCNC: 8 MMOL/L (ref 3–14)
APPEARANCE UR: CLEAR
BACTERIA #/AREA URNS HPF: ABNORMAL /HPF
BILIRUB UR QL STRIP: NEGATIVE
BUN SERPL-MCNC: 16 MG/DL (ref 7–30)
CALCIUM SERPL-MCNC: 8.6 MG/DL (ref 8.5–10.1)
CHLORIDE SERPL-SCNC: 105 MMOL/L (ref 94–109)
CO2 SERPL-SCNC: 23 MMOL/L (ref 20–32)
COLOR UR AUTO: YELLOW
CREAT SERPL-MCNC: 0.84 MG/DL (ref 0.52–1.04)
ERYTHROCYTE [DISTWIDTH] IN BLOOD BY AUTOMATED COUNT: 14 % (ref 10–15)
GFR SERPL CREATININE-BSD FRML MDRD: 59 ML/MIN/{1.73_M2}
GLUCOSE SERPL-MCNC: 122 MG/DL (ref 70–99)
GLUCOSE UR STRIP-MCNC: NEGATIVE MG/DL
HBA1C MFR BLD: 5.9 % (ref 0–5.6)
HCT VFR BLD AUTO: 32.4 % (ref 35–47)
HGB BLD-MCNC: 10.5 G/DL (ref 11.7–15.7)
HGB UR QL STRIP: NEGATIVE
KETONES UR STRIP-MCNC: NEGATIVE MG/DL
LEUKOCYTE ESTERASE UR QL STRIP: ABNORMAL
MCH RBC QN AUTO: 35.1 PG (ref 26.5–33)
MCHC RBC AUTO-ENTMCNC: 32.4 G/DL (ref 31.5–36.5)
MCV RBC AUTO: 108 FL (ref 78–100)
NITRATE UR QL: NEGATIVE
NON-SQ EPI CELLS #/AREA URNS LPF: ABNORMAL /LPF
PH UR STRIP: 6 PH (ref 5–7)
PLATELET # BLD AUTO: 343 10E9/L (ref 150–450)
POTASSIUM SERPL-SCNC: 4.2 MMOL/L (ref 3.4–5.3)
RBC # BLD AUTO: 2.99 10E12/L (ref 3.8–5.2)
RBC #/AREA URNS AUTO: ABNORMAL /HPF
SODIUM SERPL-SCNC: 136 MMOL/L (ref 133–144)
SOURCE: ABNORMAL
SP GR UR STRIP: 1.02 (ref 1–1.03)
T4 FREE SERPL-MCNC: 1.57 NG/DL (ref 0.76–1.46)
TSH SERPL DL<=0.005 MIU/L-ACNC: 0.15 MU/L (ref 0.4–4)
UROBILINOGEN UR STRIP-ACNC: 1 EU/DL (ref 0.2–1)
WBC # BLD AUTO: 8.6 10E9/L (ref 4–11)
WBC #/AREA URNS AUTO: ABNORMAL /HPF

## 2020-06-03 PROCEDURE — 81001 URINALYSIS AUTO W/SCOPE: CPT | Performed by: NURSE PRACTITIONER

## 2020-06-03 PROCEDURE — 85027 COMPLETE CBC AUTOMATED: CPT | Performed by: INTERNAL MEDICINE

## 2020-06-03 PROCEDURE — 84439 ASSAY OF FREE THYROXINE: CPT | Performed by: INTERNAL MEDICINE

## 2020-06-03 PROCEDURE — 83036 HEMOGLOBIN GLYCOSYLATED A1C: CPT | Performed by: INTERNAL MEDICINE

## 2020-06-03 PROCEDURE — 87086 URINE CULTURE/COLONY COUNT: CPT | Performed by: INTERNAL MEDICINE

## 2020-06-03 PROCEDURE — 84443 ASSAY THYROID STIM HORMONE: CPT | Performed by: INTERNAL MEDICINE

## 2020-06-03 PROCEDURE — 80048 BASIC METABOLIC PNL TOTAL CA: CPT | Performed by: INTERNAL MEDICINE

## 2020-06-03 PROCEDURE — 36415 COLL VENOUS BLD VENIPUNCTURE: CPT | Performed by: INTERNAL MEDICINE

## 2020-06-03 NOTE — LETTER
June 4, 2020      Lindsey Hale  [address]   [facility] [address]        Dear ,    We are writing to inform you of your test results.    It appears that urine culture did not grow any pathogenic organism and no antibiotics would be warranted at this time                                                                   Resulted Orders   UA with Microscopic reflex to Culture   Result Value Ref Range    Color Urine Yellow     Appearance Urine Clear     Glucose Urine Negative NEG^Negative mg/dL    Bilirubin Urine Negative NEG^Negative    Ketones Urine Negative NEG^Negative mg/dL    Specific Gravity Urine 1.020 1.003 - 1.035    pH Urine 6.0 5.0 - 7.0 pH    Protein Albumin Urine Negative NEG^Negative mg/dL    Urobilinogen Urine 1.0 0.2 - 1.0 EU/dL    Nitrite Urine Negative NEG^Negative    Blood Urine Negative NEG^Negative    Leukocyte Esterase Urine Small (A) NEG^Negative    Source Midstream Urine     WBC Urine 5-10 (A) OTO5^0 - 5 /HPF    RBC Urine O - 2 OTO2^O - 2 /HPF    Squamous Epithelial /LPF Urine Few FEW^Few /LPF    Bacteria Urine Few (A) NEG^Negative /HPF   Urine Culture Aerobic Bacterial   Result Value Ref Range    Specimen Description Midstream Urine     Culture Micro <10,000 colonies/mL  mixed urogenital los          If you have any questions or concerns, please call the clinic at the number listed above.       Sincerely,        Darell Barragan MD/TRI YEBOAH

## 2020-06-03 NOTE — TELEPHONE ENCOUNTER
"Spoke with Oxana - daughter of patient     UA was ordered at virtual visit with Dakota on 5/13/2020 - she treated patient for a UTI. Oxana is unsure why it didn't get checked that day, but she assumes lab just checked it because it was an order in patient's chart - denies any current UTI symptoms     Pat was supposed to have a Reclast infusion last week but they needed to re-do the creatinine because clearance was too low for infusion.     Now they are anxiously awaiting the labs Dr. Sherwood ordered (in process) as patient is in pain and waiting for the \"Green light\" for the infusion     Oxana and her  are leaving town Fri AM through Tuesday and hoping to have pt get the reclast done tomorrow     She is requesting call back to her (Oxana) once lab results are back     Rasheeda VELIZ RN    "

## 2020-06-03 NOTE — TELEPHONE ENCOUNTER
It appears that urinalysis is abnormal.  Can we see why this was ordered?  Is she experiencing symptoms of urinary tract infection?  Can we add on a urine culture?    Darell Barragan MD, MD

## 2020-06-03 NOTE — TELEPHONE ENCOUNTER
Oxana notified     Forwarding encounter to infusion scheduling as FYI - patient is okay to proceed with Gerard VELIZ RN

## 2020-06-04 ENCOUNTER — TELEPHONE (OUTPATIENT)
Dept: FAMILY MEDICINE | Facility: CLINIC | Age: 85
End: 2020-06-04

## 2020-06-04 DIAGNOSIS — E03.9 HYPOTHYROIDISM, UNSPECIFIED TYPE: ICD-10-CM

## 2020-06-04 DIAGNOSIS — E03.9 HYPOTHYROIDISM, UNSPECIFIED TYPE: Primary | ICD-10-CM

## 2020-06-04 LAB
BACTERIA SPEC CULT: NORMAL
SPECIMEN SOURCE: NORMAL

## 2020-06-04 RX ORDER — LEVOTHYROXINE SODIUM 50 UG/1
50 TABLET ORAL DAILY
Qty: 60 TABLET | Refills: 4 | Status: SHIPPED | OUTPATIENT
Start: 2020-06-04 | End: 2021-01-01

## 2020-06-04 RX ORDER — LEVOTHYROXINE SODIUM 50 UG/1
TABLET ORAL
Start: 2020-06-04

## 2020-06-04 NOTE — TELEPHONE ENCOUNTER
Pharmacy requesting 90 day   Denied  90 day not appropriate - new dose and due for followup in 2 months  Cele WASHINGTON RN

## 2020-06-04 NOTE — LETTER
[address]  [place] [address]  [phone]                                                                                               Date: 6/4/2020    Lindsey Savannah Hale                                                                               [address]   [place] [address]              Dear Ms. Lindsey Hale:    Enclosed are your recent lab results.      After reviewing the thyroid test results, Dr Sherwood wants to reduce your Levothyroxine dose from 75 mcg daily to 50 mcg daily.  A prescription of the new dose tablets has been sent your pharmacy.     Other labs are stable.  The hemoglobin is low, but it has been low previously.     You are scheduled for a follow up office visit with Dr Sherwood August 6 at 10:30 am.     Please call with any questions.        Sincerely,    Jeffery Sherwood MD/Dinora MINAYA RN,BSN

## 2020-06-04 NOTE — TELEPHONE ENCOUNTER
Please call patient re labs.  Need to reduce her synthroid dose to 50mcg    Other labs stable as noted.  Hemoglobin low but has been.  I want to see her for office visit in about 2 months and repeat labs then, call if problems    Jeffery Sherwood M.D.

## 2020-06-04 NOTE — TELEPHONE ENCOUNTER
Patient's daughter, Oxana, returned call.     Placed call.  Information given to Oxana from PCP.  States understood and agreed with advise.  Will share with patient.     Asked that letter and printed lab results be mailed to Oxana, who will share with patient (due to patient's poor eyesight).     Done.      Dinora Christiansen RN

## 2020-06-05 ENCOUNTER — DOCUMENTATION ONLY (OUTPATIENT)
Dept: OTHER | Facility: CLINIC | Age: 85
End: 2020-06-05

## 2020-06-07 ENCOUNTER — TELEPHONE (OUTPATIENT)
Dept: INFUSION THERAPY | Facility: CLINIC | Age: 85
End: 2020-06-07

## 2020-06-07 NOTE — TELEPHONE ENCOUNTER
"Patient is currently scheduled for an appointment at [facility] in [place].  Called patient to review current visitor restrictions and complete COVID-19 Patient Infection/Travel Screening Tool.     Due to the recent public health concerns around COVID-19 and in an effort to keep our patients and staff safe and healthy, we are implementing a screening process for the patients that come to our clinic.      I am going to ask you a few questions, please answer yes or no.  Your honesty about any symptoms is critical, as it keeps patients and staff healthy.      Do you have a:  Fever (or reported chills)?  No  New cough (started within the past 14 days)?  No  New shortness of breath (started within the past 14 days)?  No  Rash?  No    In the last month, have you been in contact with someone who was confirmed or suspected to have Coronavirus/COVID-19?  No    Have you traveled internationally in the last month?  No  If so, where?  N/A     I also wanted to let you know that to protect our patients from the flu and other common illnesses, [facility] enforce visitor restrictions year round, but due to the community spread of COVID-19 in [place], we are taking additional precautionary steps to ensure the health of our patients.  At this time, NO visitors are allowed on our hospital and clinic campuses.     Patient PASSED the screening assessment.    Patient instructed to come to the clinic as planned for their scheduled appointment and to call the clinic if any symptoms develop prior to their appointment.    \"Per CDC Guidelines, we are asking all patients that are coming into the building to wear a cloth covering that covers your mouth and nose.  You will be screened again at the entrance to the clinic for any Covid 19 symptoms. If you screen positive to any Covid 19 symptoms during our screening process you will be provided a surgical mask to wear during your time in the " "building.\"    \"COVID-19 is contagious and can be dangerous for our patients and staff.  Please send us a [employer] message or call our clinic before coming in if you feel any of the following symptoms: fever, cough, congestion, runny nose, sore throat, muscle aches and pains, or shortness of breath.  If you are already at our clinic, it is very important that you be honest about any symptoms you are experiencing to ensure your safety and that of other patients and staff who treat you.  If you do have symptoms, we will have a nurse and/or provider asses you to determine next steps.\"    Roya Vines on 6/7/2020 at 1:30 PM    "

## 2020-06-08 ENCOUNTER — INFUSION THERAPY VISIT (OUTPATIENT)
Dept: INFUSION THERAPY | Facility: CLINIC | Age: 85
End: 2020-06-08
Attending: INTERNAL MEDICINE
Payer: MEDICARE

## 2020-06-08 VITALS
DIASTOLIC BLOOD PRESSURE: 61 MMHG | RESPIRATION RATE: 18 BRPM | HEART RATE: 63 BPM | BODY MASS INDEX: 32.26 KG/M2 | OXYGEN SATURATION: 95 % | SYSTOLIC BLOOD PRESSURE: 148 MMHG | TEMPERATURE: 98.1 F | WEIGHT: 165.2 LBS

## 2020-06-08 DIAGNOSIS — S22.080S COMPRESSION FRACTURE OF T12 VERTEBRA, SEQUELA: Primary | ICD-10-CM

## 2020-06-08 DIAGNOSIS — M85.859 OSTEOPENIA OF HIP, UNSPECIFIED LATERALITY: ICD-10-CM

## 2020-06-08 PROCEDURE — 96365 THER/PROPH/DIAG IV INF INIT: CPT

## 2020-06-08 PROCEDURE — 25800030 ZZH RX IP 258 OP 636: Performed by: INTERNAL MEDICINE

## 2020-06-08 PROCEDURE — 25000128 H RX IP 250 OP 636: Performed by: INTERNAL MEDICINE

## 2020-06-08 RX ORDER — HEPARIN SODIUM (PORCINE) LOCK FLUSH IV SOLN 100 UNIT/ML 100 UNIT/ML
5 SOLUTION INTRAVENOUS
Status: CANCELLED | OUTPATIENT
Start: 2020-06-08

## 2020-06-08 RX ORDER — ZOLEDRONIC ACID 5 MG/100ML
5 INJECTION, SOLUTION INTRAVENOUS ONCE
Status: COMPLETED | OUTPATIENT
Start: 2020-06-08 | End: 2020-06-08

## 2020-06-08 RX ORDER — HEPARIN SODIUM,PORCINE 10 UNIT/ML
5 VIAL (ML) INTRAVENOUS
Status: CANCELLED | OUTPATIENT
Start: 2020-06-08

## 2020-06-08 RX ORDER — ZOLEDRONIC ACID 5 MG/100ML
5 INJECTION, SOLUTION INTRAVENOUS ONCE
Status: CANCELLED
Start: 2020-06-08

## 2020-06-08 RX ADMIN — SODIUM CHLORIDE 250 ML: 9 INJECTION, SOLUTION INTRAVENOUS at 15:01

## 2020-06-08 RX ADMIN — ZOLEDRONIC ACID 5 MG: 0.05 INJECTION, SOLUTION INTRAVENOUS at 15:01

## 2020-06-08 ASSESSMENT — PAIN SCALES - GENERAL: PAINLEVEL: NO PAIN (0)

## 2020-06-08 NOTE — PROGRESS NOTES
Infusion Nursing Note:  Lindsey Hale presents today for Reclast    Patient seen by provider today: No   present during visit today: Not Applicable.    Note: N/A.    Intravenous Access:  Peripheral IV placed.    Treatment Conditions:  Lab Results   Component Value Date     06/03/2020                   Lab Results   Component Value Date    POTASSIUM 4.2 06/03/2020           No results found for: MAG         Lab Results   Component Value Date    CR 0.84 06/03/2020                   Lab Results   Component Value Date    FRANCISCO 8.6 06/03/2020                Lab Results   Component Value Date    BILITOTAL 1.0 11/14/2019           Lab Results   Component Value Date    ALBUMIN 3.4 11/14/2019                    Lab Results   Component Value Date    ALT 19 11/14/2019           Lab Results   Component Value Date    AST 16 11/14/2019       Results reviewed, labs MET treatment parameters, ok to proceed with treatment.      Post Infusion Assessment:  Patient tolerated infusion without incident.  Blood return noted pre and post infusion.  No evidence of extravasations.  Access discontinued per protocol.       Discharge Plan:   Discharge instructions reviewed with: Patient.  Patient and/or family verbalized understanding of discharge instructions and all questions answered.  Copy of AVS reviewed with patient and/or family.    Patient discharged in stable condition accompanied by: self.  Departure Mode: Ambulatory.    Priscilla Canchola RN

## 2020-06-27 DIAGNOSIS — I48.91 ATRIAL FIBRILLATION, UNSPECIFIED TYPE (H): ICD-10-CM

## 2020-06-27 DIAGNOSIS — I10 BENIGN ESSENTIAL HYPERTENSION: ICD-10-CM

## 2020-06-29 ENCOUNTER — ANCILLARY PROCEDURE (OUTPATIENT)
Dept: CARDIOLOGY | Facility: CLINIC | Age: 85
End: 2020-06-29
Attending: INTERNAL MEDICINE
Payer: MEDICARE

## 2020-06-29 DIAGNOSIS — I49.5 SICK SINUS SYNDROME (H): Primary | ICD-10-CM

## 2020-06-29 DIAGNOSIS — Z95.0 CARDIAC PACEMAKER IN SITU: ICD-10-CM

## 2020-06-29 PROCEDURE — 93294 REM INTERROG EVL PM/LDLS PM: CPT | Performed by: INTERNAL MEDICINE

## 2020-06-29 PROCEDURE — 93296 REM INTERROG EVL PM/IDS: CPT | Performed by: INTERNAL MEDICINE

## 2020-06-30 DIAGNOSIS — I48.0 PAROXYSMAL ATRIAL FIBRILLATION (H): ICD-10-CM

## 2020-06-30 RX ORDER — FLECAINIDE ACETATE 150 MG/1
TABLET ORAL
Qty: 90 TABLET | Refills: 2 | Status: SHIPPED | OUTPATIENT
Start: 2020-06-30 | End: 2021-01-01

## 2020-06-30 RX ORDER — METOPROLOL TARTRATE 50 MG
TABLET ORAL
Qty: 270 TABLET | Refills: 0 | Status: SHIPPED | OUTPATIENT
Start: 2020-06-30 | End: 2020-10-01

## 2020-06-30 NOTE — TELEPHONE ENCOUNTER
Medication Refilled: flecainide    Last office visit: 2020  Last Labs/EK2002  Next office visit: 2021 - reordered for refill - pharmacy states they did not receive prior sent script. 2020  Pharmacy sent to: Delmy Sims RN

## 2020-06-30 NOTE — TELEPHONE ENCOUNTER
Patient has appointment with provider in 1 month, can recheck BP then.  Approved per [facility] CPA.

## 2020-07-02 LAB
MDC_IDC_EPISODE_DTM: NORMAL
MDC_IDC_EPISODE_DURATION: 104 S
MDC_IDC_EPISODE_DURATION: 12 S
MDC_IDC_EPISODE_DURATION: 1448 S
MDC_IDC_EPISODE_DURATION: 150 S
MDC_IDC_EPISODE_DURATION: 376 S
MDC_IDC_EPISODE_DURATION: 4 S
MDC_IDC_EPISODE_DURATION: 4060 S
MDC_IDC_EPISODE_DURATION: 46 S
MDC_IDC_EPISODE_DURATION: 48 S
MDC_IDC_EPISODE_DURATION: 482 S
MDC_IDC_EPISODE_DURATION: 50 S
MDC_IDC_EPISODE_DURATION: 60 S
MDC_IDC_EPISODE_DURATION: 72 S
MDC_IDC_EPISODE_DURATION: 80 S
MDC_IDC_EPISODE_DURATION: 96 S
MDC_IDC_EPISODE_DURATION: 98 S
MDC_IDC_EPISODE_DURATION: 98 S
MDC_IDC_EPISODE_ID: NORMAL
MDC_IDC_EPISODE_TYPE: NORMAL
MDC_IDC_LEAD_IMPLANT_DT: NORMAL
MDC_IDC_LEAD_IMPLANT_DT: NORMAL
MDC_IDC_LEAD_LOCATION: NORMAL
MDC_IDC_LEAD_LOCATION: NORMAL
MDC_IDC_LEAD_MFG: NORMAL
MDC_IDC_LEAD_MFG: NORMAL
MDC_IDC_LEAD_MODEL: NORMAL
MDC_IDC_LEAD_MODEL: NORMAL
MDC_IDC_LEAD_POLARITY_TYPE: NORMAL
MDC_IDC_LEAD_POLARITY_TYPE: NORMAL
MDC_IDC_LEAD_SERIAL: NORMAL
MDC_IDC_LEAD_SERIAL: NORMAL
MDC_IDC_MSMT_BATTERY_DTM: NORMAL
MDC_IDC_MSMT_BATTERY_REMAINING_LONGEVITY: 28 MO
MDC_IDC_MSMT_BATTERY_REMAINING_PERCENTAGE: 25 %
MDC_IDC_MSMT_BATTERY_RRT_TRIGGER: NORMAL
MDC_IDC_MSMT_BATTERY_STATUS: NORMAL
MDC_IDC_MSMT_BATTERY_VOLTAGE: 2.8 V
MDC_IDC_MSMT_LEADCHNL_RA_IMPEDANCE_VALUE: 400 OHM
MDC_IDC_MSMT_LEADCHNL_RA_LEAD_CHANNEL_STATUS: NORMAL
MDC_IDC_MSMT_LEADCHNL_RA_PACING_THRESHOLD_AMPLITUDE: 1 V
MDC_IDC_MSMT_LEADCHNL_RA_PACING_THRESHOLD_PULSEWIDTH: 0.5 MS
MDC_IDC_MSMT_LEADCHNL_RA_SENSING_INTR_AMPL: 2.1 MV
MDC_IDC_MSMT_LEADCHNL_RV_IMPEDANCE_VALUE: 530 OHM
MDC_IDC_MSMT_LEADCHNL_RV_LEAD_CHANNEL_STATUS: NORMAL
MDC_IDC_MSMT_LEADCHNL_RV_PACING_THRESHOLD_AMPLITUDE: 1 V
MDC_IDC_MSMT_LEADCHNL_RV_PACING_THRESHOLD_PULSEWIDTH: 0.5 MS
MDC_IDC_MSMT_LEADCHNL_RV_SENSING_INTR_AMPL: 12 MV
MDC_IDC_PG_IMPLANT_DTM: NORMAL
MDC_IDC_PG_MFG: NORMAL
MDC_IDC_PG_MODEL: NORMAL
MDC_IDC_PG_SERIAL: NORMAL
MDC_IDC_PG_TYPE: NORMAL
MDC_IDC_SESS_CLINIC_NAME: NORMAL
MDC_IDC_SESS_DTM: NORMAL
MDC_IDC_SESS_REPROGRAMMED: NO
MDC_IDC_SESS_TYPE: NORMAL
MDC_IDC_SET_BRADY_AT_MODE_SWITCH_MODE: NORMAL
MDC_IDC_SET_BRADY_AT_MODE_SWITCH_RATE: 180 {BEATS}/MIN
MDC_IDC_SET_BRADY_LOWRATE: 60 {BEATS}/MIN
MDC_IDC_SET_BRADY_MAX_SENSOR_RATE: 120 {BEATS}/MIN
MDC_IDC_SET_BRADY_MAX_TRACKING_RATE: 120 {BEATS}/MIN
MDC_IDC_SET_BRADY_MODE: NORMAL
MDC_IDC_SET_BRADY_PAV_DELAY_HIGH: 100 MS
MDC_IDC_SET_BRADY_PAV_DELAY_LOW: 275 MS
MDC_IDC_SET_BRADY_SAV_DELAY_HIGH: 100 MS
MDC_IDC_SET_BRADY_SAV_DELAY_LOW: 250 MS
MDC_IDC_SET_LEADCHNL_RA_PACING_AMPLITUDE: 2 V
MDC_IDC_SET_LEADCHNL_RA_PACING_ANODE_ELECTRODE_1: NORMAL
MDC_IDC_SET_LEADCHNL_RA_PACING_ANODE_LOCATION_1: NORMAL
MDC_IDC_SET_LEADCHNL_RA_PACING_CAPTURE_MODE: NORMAL
MDC_IDC_SET_LEADCHNL_RA_PACING_CATHODE_ELECTRODE_1: NORMAL
MDC_IDC_SET_LEADCHNL_RA_PACING_CATHODE_LOCATION_1: NORMAL
MDC_IDC_SET_LEADCHNL_RA_PACING_POLARITY: NORMAL
MDC_IDC_SET_LEADCHNL_RA_PACING_PULSEWIDTH: 0.5 MS
MDC_IDC_SET_LEADCHNL_RA_SENSING_ADAPTATION_MODE: NORMAL
MDC_IDC_SET_LEADCHNL_RA_SENSING_ANODE_ELECTRODE_1: NORMAL
MDC_IDC_SET_LEADCHNL_RA_SENSING_ANODE_LOCATION_1: NORMAL
MDC_IDC_SET_LEADCHNL_RA_SENSING_CATHODE_ELECTRODE_1: NORMAL
MDC_IDC_SET_LEADCHNL_RA_SENSING_CATHODE_LOCATION_1: NORMAL
MDC_IDC_SET_LEADCHNL_RA_SENSING_POLARITY: NORMAL
MDC_IDC_SET_LEADCHNL_RA_SENSING_SENSITIVITY: 0.5 MV
MDC_IDC_SET_LEADCHNL_RV_PACING_AMPLITUDE: 2 V
MDC_IDC_SET_LEADCHNL_RV_PACING_ANODE_ELECTRODE_1: NORMAL
MDC_IDC_SET_LEADCHNL_RV_PACING_ANODE_LOCATION_1: NORMAL
MDC_IDC_SET_LEADCHNL_RV_PACING_CAPTURE_MODE: NORMAL
MDC_IDC_SET_LEADCHNL_RV_PACING_CATHODE_ELECTRODE_1: NORMAL
MDC_IDC_SET_LEADCHNL_RV_PACING_CATHODE_LOCATION_1: NORMAL
MDC_IDC_SET_LEADCHNL_RV_PACING_POLARITY: NORMAL
MDC_IDC_SET_LEADCHNL_RV_PACING_PULSEWIDTH: 0.5 MS
MDC_IDC_SET_LEADCHNL_RV_SENSING_ADAPTATION_MODE: NORMAL
MDC_IDC_SET_LEADCHNL_RV_SENSING_ANODE_ELECTRODE_1: NORMAL
MDC_IDC_SET_LEADCHNL_RV_SENSING_ANODE_LOCATION_1: NORMAL
MDC_IDC_SET_LEADCHNL_RV_SENSING_CATHODE_ELECTRODE_1: NORMAL
MDC_IDC_SET_LEADCHNL_RV_SENSING_CATHODE_LOCATION_1: NORMAL
MDC_IDC_SET_LEADCHNL_RV_SENSING_POLARITY: NORMAL
MDC_IDC_SET_LEADCHNL_RV_SENSING_SENSITIVITY: 2 MV
MDC_IDC_STAT_AT_BURDEN_PERCENT: 1 %
MDC_IDC_STAT_AT_DTM_END: NORMAL
MDC_IDC_STAT_AT_DTM_START: NORMAL
MDC_IDC_STAT_AT_MODE_SW_COUNT: 17
MDC_IDC_STAT_AT_MODE_SW_COUNT_PER_DAY: 0
MDC_IDC_STAT_AT_MODE_SW_MAX_DURATION: 4060 S
MDC_IDC_STAT_AT_MODE_SW_PERCENT_TIME: 1 %
MDC_IDC_STAT_BRADY_AP_VP_PERCENT: 77 %
MDC_IDC_STAT_BRADY_AP_VS_PERCENT: 1 %
MDC_IDC_STAT_BRADY_AS_VP_PERCENT: 23 %
MDC_IDC_STAT_BRADY_AS_VS_PERCENT: 1 %
MDC_IDC_STAT_BRADY_DTM_END: NORMAL
MDC_IDC_STAT_BRADY_DTM_START: NORMAL
MDC_IDC_STAT_BRADY_RA_PERCENT_PACED: 76 %
MDC_IDC_STAT_BRADY_RV_PERCENT_PACED: 99 %
MDC_IDC_STAT_CRT_DTM_END: NORMAL
MDC_IDC_STAT_CRT_DTM_START: NORMAL
MDC_IDC_STAT_HEART_RATE_ATRIAL_MAX: 320 {BEATS}/MIN
MDC_IDC_STAT_HEART_RATE_ATRIAL_MEAN: 68 {BEATS}/MIN
MDC_IDC_STAT_HEART_RATE_ATRIAL_MIN: 40 {BEATS}/MIN
MDC_IDC_STAT_HEART_RATE_DTM_END: NORMAL
MDC_IDC_STAT_HEART_RATE_DTM_START: NORMAL
MDC_IDC_STAT_HEART_RATE_VENTRICULAR_MAX: 160 {BEATS}/MIN
MDC_IDC_STAT_HEART_RATE_VENTRICULAR_MEAN: 67 {BEATS}/MIN
MDC_IDC_STAT_HEART_RATE_VENTRICULAR_MIN: 40 {BEATS}/MIN

## 2020-09-14 ENCOUNTER — TELEPHONE (OUTPATIENT)
Dept: FAMILY MEDICINE | Facility: CLINIC | Age: 85
End: 2020-09-14

## 2020-09-14 DIAGNOSIS — R35.0 URINARY FREQUENCY: Primary | ICD-10-CM

## 2020-09-14 NOTE — TELEPHONE ENCOUNTER
Called pt to get more information (symptoms)- no answer. Mailbox full, unable to leave VM  Will varinder as recall    Christofer COX RN

## 2020-09-14 NOTE — TELEPHONE ENCOUNTER
Reason for call:  Patient reporting a symptom    Symptom or request: bladder infection    Duration (how long have symptoms been present): 3 days    Have you been treated for this before? Yes    Additional comments: requesting antibiotics sent to       [id number] - [place], [place] - [address] AT [facility] [address]        Phone Number patient can be reached at:  Home number on file [phone] (home)    Best Time:  any    Can we leave a detailed message on this number:  YES    Call taken on 9/14/2020 at 11:44 AM by Jennifer Pacheco

## 2020-09-15 RX ORDER — CEPHALEXIN 500 MG/1
500 CAPSULE ORAL 3 TIMES DAILY
Qty: 15 CAPSULE | Refills: 0 | Status: SHIPPED | OUTPATIENT
Start: 2020-09-15 | End: 2021-01-01

## 2020-09-15 NOTE — TELEPHONE ENCOUNTER
Discussed with patient     Last UTI was May - 5/13/2020 was last treatment, abx from [facility]    <6 mos ago  Given Keflex 500mg BID x7 days at that time    Rasheeda VELIZ RN

## 2020-09-15 NOTE — TELEPHONE ENCOUNTER
1. When was last uti, if not for over 6 months then can treat without sample.  If less than 6 months should get sample    Jeffery Sherwood M.D.

## 2020-09-15 NOTE — TELEPHONE ENCOUNTER
Spoke with Pt:     She is VERY appreciative. She is agreeable to plan and dtr will  RX. She will call back with worsening symptoms or if not gone, soon.     Janay CAMACHO RN

## 2020-09-15 NOTE — TELEPHONE ENCOUNTER
"Pt called back    She has had UTI symptoms for 3 days   Urinary frequency  Odor  And burning with urination    Drinks cranberry juice every day  Has been trying to flush it out with a lot of water  Denies fever  Denies flank pain    She is in \"partial lock down\" at her living facility  She is wondering if someone could drop of a UA for her.  Advised [employer] and [place] but she doesn't want to drive around  She does not have access to submit an eVisit    Please advise    Thank you,  Christofer COX RN  "

## 2020-09-29 DIAGNOSIS — I48.91 ATRIAL FIBRILLATION, UNSPECIFIED TYPE (H): ICD-10-CM

## 2020-09-29 DIAGNOSIS — I10 BENIGN ESSENTIAL HYPERTENSION: ICD-10-CM

## 2020-10-01 RX ORDER — METOPROLOL TARTRATE 50 MG
TABLET ORAL
Qty: 270 TABLET | Refills: 0 | Status: SHIPPED | OUTPATIENT
Start: 2020-10-01 | End: 2020-01-01

## 2020-10-01 NOTE — TELEPHONE ENCOUNTER
Routing refill request to provider for review/approval because:  Labs out of range:  BP.  Due for apt- added in pharm comments.  Please authorize if appropriate.  Thanks,  Alem Moody RN          '

## 2020-10-06 ENCOUNTER — ANCILLARY PROCEDURE (OUTPATIENT)
Dept: CARDIOLOGY | Facility: CLINIC | Age: 85
End: 2020-10-06
Attending: INTERNAL MEDICINE
Payer: MEDICARE

## 2020-10-06 DIAGNOSIS — I49.5 SICK SINUS SYNDROME (H): ICD-10-CM

## 2020-10-06 PROCEDURE — 93296 REM INTERROG EVL PM/IDS: CPT | Performed by: INTERNAL MEDICINE

## 2020-10-06 PROCEDURE — 93294 REM INTERROG EVL PM/LDLS PM: CPT | Performed by: INTERNAL MEDICINE

## 2020-10-12 NOTE — TELEPHONE ENCOUNTER
PCP,    Pt was treated in September with Keflex for a UTI  She states her symptoms did resolve but now she is having frequency and odor again. No pain with urination.  She would like another abx without coming into the clinic.  Per your message below <6 months since previous UTI, you'd like a sample. Pended UA/UC. She would have someone bring her a sterile cup and they would drop off for pt.    Pended    Thank you,  Christofer COX RN

## 2020-10-12 NOTE — TELEPHONE ENCOUNTER
Reason for call:  Patient reporting a symptom    Symptom or request: urinary frequency,odor , no pain or burning with urination    Duration (how long have symptoms been present): since Saturday     Have you been treated for this before? Yes  Patient was treated in September, symptoms did resolve     Additional comments: Patient does not want to come in to clinic     Phone Number patient can be reached at:  Home number on file [phone] (home)    Best Time:  any    Can we leave a detailed message on this number:  YES    Call taken on 10/12/2020 at 10:33 AM by Yuli Carrillo

## 2020-10-13 DIAGNOSIS — R35.0 URINARY FREQUENCY: ICD-10-CM

## 2020-10-13 LAB
ALBUMIN UR-MCNC: NEGATIVE MG/DL
APPEARANCE UR: CLEAR
BILIRUB UR QL STRIP: NEGATIVE
COLOR UR AUTO: YELLOW
GLUCOSE UR STRIP-MCNC: NEGATIVE MG/DL
HGB UR QL STRIP: NEGATIVE
KETONES UR STRIP-MCNC: NEGATIVE MG/DL
LEUKOCYTE ESTERASE UR QL STRIP: ABNORMAL
NITRATE UR QL: NEGATIVE
NON-SQ EPI CELLS #/AREA URNS LPF: ABNORMAL /LPF
PH UR STRIP: 6 PH (ref 5–7)
RBC #/AREA URNS AUTO: ABNORMAL /HPF
SOURCE: ABNORMAL
SP GR UR STRIP: 1.02 (ref 1–1.03)
UROBILINOGEN UR STRIP-ACNC: 1 EU/DL (ref 0.2–1)
WBC #/AREA URNS AUTO: ABNORMAL /HPF

## 2020-10-13 PROCEDURE — 87086 URINE CULTURE/COLONY COUNT: CPT | Performed by: INTERNAL MEDICINE

## 2020-10-13 PROCEDURE — 81001 URINALYSIS AUTO W/SCOPE: CPT | Performed by: INTERNAL MEDICINE

## 2020-10-14 ENCOUNTER — TELEPHONE (OUTPATIENT)
Dept: FAMILY MEDICINE | Facility: CLINIC | Age: 85
End: 2020-10-14

## 2020-10-14 LAB
BACTERIA SPEC CULT: NORMAL
MDC_IDC_LEAD_IMPLANT_DT: NORMAL
MDC_IDC_LEAD_IMPLANT_DT: NORMAL
MDC_IDC_LEAD_LOCATION: NORMAL
MDC_IDC_LEAD_LOCATION: NORMAL
MDC_IDC_LEAD_MFG: NORMAL
MDC_IDC_LEAD_MFG: NORMAL
MDC_IDC_LEAD_MODEL: NORMAL
MDC_IDC_LEAD_MODEL: NORMAL
MDC_IDC_LEAD_POLARITY_TYPE: NORMAL
MDC_IDC_LEAD_POLARITY_TYPE: NORMAL
MDC_IDC_LEAD_SERIAL: NORMAL
MDC_IDC_LEAD_SERIAL: NORMAL
MDC_IDC_MSMT_BATTERY_DTM: NORMAL
MDC_IDC_MSMT_BATTERY_REMAINING_LONGEVITY: 19 MO
MDC_IDC_MSMT_BATTERY_REMAINING_PERCENTAGE: 19 %
MDC_IDC_MSMT_BATTERY_RRT_TRIGGER: NORMAL
MDC_IDC_MSMT_BATTERY_STATUS: NORMAL
MDC_IDC_MSMT_BATTERY_VOLTAGE: 2.77 V
MDC_IDC_MSMT_LEADCHNL_RA_IMPEDANCE_VALUE: 340 OHM
MDC_IDC_MSMT_LEADCHNL_RA_LEAD_CHANNEL_STATUS: NORMAL
MDC_IDC_MSMT_LEADCHNL_RA_PACING_THRESHOLD_AMPLITUDE: 0.88 V
MDC_IDC_MSMT_LEADCHNL_RA_PACING_THRESHOLD_PULSEWIDTH: 0.5 MS
MDC_IDC_MSMT_LEADCHNL_RA_SENSING_INTR_AMPL: 1.4 MV
MDC_IDC_MSMT_LEADCHNL_RV_IMPEDANCE_VALUE: 440 OHM
MDC_IDC_MSMT_LEADCHNL_RV_LEAD_CHANNEL_STATUS: NORMAL
MDC_IDC_MSMT_LEADCHNL_RV_PACING_THRESHOLD_AMPLITUDE: 1 V
MDC_IDC_MSMT_LEADCHNL_RV_PACING_THRESHOLD_PULSEWIDTH: 0.5 MS
MDC_IDC_MSMT_LEADCHNL_RV_SENSING_INTR_AMPL: 12 MV
MDC_IDC_PG_IMPLANT_DTM: NORMAL
MDC_IDC_PG_MFG: NORMAL
MDC_IDC_PG_MODEL: NORMAL
MDC_IDC_PG_SERIAL: NORMAL
MDC_IDC_PG_TYPE: NORMAL
MDC_IDC_SESS_CLINIC_NAME: NORMAL
MDC_IDC_SESS_DTM: NORMAL
MDC_IDC_SESS_REPROGRAMMED: NO
MDC_IDC_SESS_TYPE: NORMAL
MDC_IDC_SET_BRADY_AT_MODE_SWITCH_MODE: NORMAL
MDC_IDC_SET_BRADY_AT_MODE_SWITCH_RATE: 180 {BEATS}/MIN
MDC_IDC_SET_BRADY_LOWRATE: 60 {BEATS}/MIN
MDC_IDC_SET_BRADY_MAX_SENSOR_RATE: 120 {BEATS}/MIN
MDC_IDC_SET_BRADY_MAX_TRACKING_RATE: 120 {BEATS}/MIN
MDC_IDC_SET_BRADY_MODE: NORMAL
MDC_IDC_SET_BRADY_PAV_DELAY_HIGH: 100 MS
MDC_IDC_SET_BRADY_PAV_DELAY_LOW: 275 MS
MDC_IDC_SET_BRADY_SAV_DELAY_HIGH: 100 MS
MDC_IDC_SET_BRADY_SAV_DELAY_LOW: 250 MS
MDC_IDC_SET_LEADCHNL_RA_PACING_AMPLITUDE: 1.88
MDC_IDC_SET_LEADCHNL_RA_PACING_ANODE_ELECTRODE_1: NORMAL
MDC_IDC_SET_LEADCHNL_RA_PACING_ANODE_LOCATION_1: NORMAL
MDC_IDC_SET_LEADCHNL_RA_PACING_CAPTURE_MODE: NORMAL
MDC_IDC_SET_LEADCHNL_RA_PACING_CATHODE_ELECTRODE_1: NORMAL
MDC_IDC_SET_LEADCHNL_RA_PACING_CATHODE_LOCATION_1: NORMAL
MDC_IDC_SET_LEADCHNL_RA_PACING_POLARITY: NORMAL
MDC_IDC_SET_LEADCHNL_RA_PACING_PULSEWIDTH: 0.5 MS
MDC_IDC_SET_LEADCHNL_RA_SENSING_ADAPTATION_MODE: NORMAL
MDC_IDC_SET_LEADCHNL_RA_SENSING_ANODE_ELECTRODE_1: NORMAL
MDC_IDC_SET_LEADCHNL_RA_SENSING_ANODE_LOCATION_1: NORMAL
MDC_IDC_SET_LEADCHNL_RA_SENSING_CATHODE_ELECTRODE_1: NORMAL
MDC_IDC_SET_LEADCHNL_RA_SENSING_CATHODE_LOCATION_1: NORMAL
MDC_IDC_SET_LEADCHNL_RA_SENSING_POLARITY: NORMAL
MDC_IDC_SET_LEADCHNL_RA_SENSING_SENSITIVITY: 0.5 MV
MDC_IDC_SET_LEADCHNL_RV_PACING_AMPLITUDE: 2 V
MDC_IDC_SET_LEADCHNL_RV_PACING_ANODE_ELECTRODE_1: NORMAL
MDC_IDC_SET_LEADCHNL_RV_PACING_ANODE_LOCATION_1: NORMAL
MDC_IDC_SET_LEADCHNL_RV_PACING_CAPTURE_MODE: NORMAL
MDC_IDC_SET_LEADCHNL_RV_PACING_CATHODE_ELECTRODE_1: NORMAL
MDC_IDC_SET_LEADCHNL_RV_PACING_CATHODE_LOCATION_1: NORMAL
MDC_IDC_SET_LEADCHNL_RV_PACING_POLARITY: NORMAL
MDC_IDC_SET_LEADCHNL_RV_PACING_PULSEWIDTH: 0.5 MS
MDC_IDC_SET_LEADCHNL_RV_SENSING_ADAPTATION_MODE: NORMAL
MDC_IDC_SET_LEADCHNL_RV_SENSING_ANODE_ELECTRODE_1: NORMAL
MDC_IDC_SET_LEADCHNL_RV_SENSING_ANODE_LOCATION_1: NORMAL
MDC_IDC_SET_LEADCHNL_RV_SENSING_CATHODE_ELECTRODE_1: NORMAL
MDC_IDC_SET_LEADCHNL_RV_SENSING_CATHODE_LOCATION_1: NORMAL
MDC_IDC_SET_LEADCHNL_RV_SENSING_POLARITY: NORMAL
MDC_IDC_SET_LEADCHNL_RV_SENSING_SENSITIVITY: 2 MV
MDC_IDC_STAT_AT_BURDEN_PERCENT: 1 %
MDC_IDC_STAT_AT_DTM_END: NORMAL
MDC_IDC_STAT_AT_DTM_START: NORMAL
MDC_IDC_STAT_AT_MODE_SW_COUNT: 0
MDC_IDC_STAT_AT_MODE_SW_COUNT_PER_DAY: 0
MDC_IDC_STAT_AT_MODE_SW_PERCENT_TIME: 0 %
MDC_IDC_STAT_BRADY_AP_VP_PERCENT: 91 %
MDC_IDC_STAT_BRADY_AP_VS_PERCENT: 1 %
MDC_IDC_STAT_BRADY_AS_VP_PERCENT: 9.4 %
MDC_IDC_STAT_BRADY_AS_VS_PERCENT: 1 %
MDC_IDC_STAT_BRADY_DTM_END: NORMAL
MDC_IDC_STAT_BRADY_DTM_START: NORMAL
MDC_IDC_STAT_BRADY_RA_PERCENT_PACED: 90 %
MDC_IDC_STAT_BRADY_RV_PERCENT_PACED: 99 %
MDC_IDC_STAT_CRT_DTM_END: NORMAL
MDC_IDC_STAT_CRT_DTM_START: NORMAL
MDC_IDC_STAT_HEART_RATE_ATRIAL_MAX: 320 {BEATS}/MIN
MDC_IDC_STAT_HEART_RATE_ATRIAL_MEAN: 67 {BEATS}/MIN
MDC_IDC_STAT_HEART_RATE_ATRIAL_MIN: 60 {BEATS}/MIN
MDC_IDC_STAT_HEART_RATE_DTM_END: NORMAL
MDC_IDC_STAT_HEART_RATE_DTM_START: NORMAL
MDC_IDC_STAT_HEART_RATE_VENTRICULAR_MAX: 140 {BEATS}/MIN
MDC_IDC_STAT_HEART_RATE_VENTRICULAR_MEAN: 66 {BEATS}/MIN
MDC_IDC_STAT_HEART_RATE_VENTRICULAR_MIN: 40 {BEATS}/MIN
SPECIMEN SOURCE: NORMAL

## 2020-10-14 NOTE — TELEPHONE ENCOUNTER
Last treated with:  Keflex 9/15/2020 - 500mg three times daily #15 with 0 refills (5 day course) - pt does not believe it ever fully treated her symptoms     Rasheeda VELIZ RN

## 2020-10-14 NOTE — TELEPHONE ENCOUNTER
TO PCP:     Called patient back     Symptoms:     Frequency   If bending over or any activity with stress gets incontinence   Strong odor   No fever   No blood in urine   She thought it looked cloudy   Back pain but always has it     Never felt she was totally through with infection last course of abx     She does not want to come in for an office visit - she said it takes a lot of effort for her and it's difficult for her to find a ride, asking if a virtual visit would be okay?     *also note no OVs available the rest of this week with any provider here     Please advise  Rasheeda VELIZ RN

## 2020-10-14 NOTE — TELEPHONE ENCOUNTER
The problem is she has had 2 negative uc so no indication to treat, especially given recent ab use.  We might be missing something more serious that is doing this so she really needs an in person eval to figure it out.  I would suggest team visit asap.    Jeffery Sherwood M.D.

## 2020-10-15 NOTE — TELEPHONE ENCOUNTER
Called and discussed below response from PCP with patient - she will ask her daughter to call and schedule pt with another provider/team provider here ERENDIRA VELIZ RN

## 2020-11-13 DIAGNOSIS — I48.0 PAROXYSMAL ATRIAL FIBRILLATION (H): ICD-10-CM

## 2020-11-13 DIAGNOSIS — I48.91 ATRIAL FIBRILLATION, UNSPECIFIED TYPE (H): ICD-10-CM

## 2020-11-14 NOTE — TELEPHONE ENCOUNTER
XARELTO 20 MG TABLETS    Summary: Take 1 tablet (20 mg) by mouth daily (with dinner), Disp-90 tablet, R-3, E-Prescribe   Dose, Route, Frequency: 20 mg, Oral, DAILY WITH SUPPER  Start: 1/21/2020  Ord/Sold: 1/21/2020

## 2020-12-29 NOTE — ED PROVIDER NOTES
History   Chief Complaint:  Leg Pain       HPI history supplemented by phone call to patient's daughter, as well as electronic chart review.  Lindsey Hale is a 89 year old female with history of COPD, atrial fibrillation, hypertension, and hyperlipidemia who presents with leg pain and swelling. The patient states she has had left lower leg swelling for the past four days. She says this morning her ankle was more swollen, but elevating the leg did help. She says the leg has also felt hot. She is still taking Xarelto and says she has not missed any doses. She took Tramadol this morning for pain. She denies taking any water pills.  She also denies shortness of breath, new cough, fever, or noted injuries to the leg.  Her daughter's primary concern in bringing the patient to the emergency department is to evaluate for a blood clot in her left leg.      Review of Systems   All other systems reviewed and are negative.      Allergies:  Clindamycin HCl  Indocin  Xylocaine-Epinephrine  Ampicillin Potassium  Celebrex  Ciprofloxacin  Erythromycin  Penicillins  Vibramycin      Medications:  Symbicort inhaler  Levothyroxine  Metoprolol tartrate  Xarelto  Incruse ellipta  Tambocor      Past Medical History:    Abnormal echocardiogram  Arthritis  Atrial fibrillation  Chronic LBP  COPD  Cysts, breast  Diastolic dysfunction  Hemoptyses  Hyperlipidemia  Hypertension  Hypothyroid  Lumbar spinal stenosis  Mild aortic stenosis  Mild mitral stenosis  Mitral regurgitation  Osteopenia  Pacemaker  Palpitations  Polymyalgia rheumatica  Supraventricular premature beats  T12 compression fracture  TIA  Urosepsis  Chronic kidney disease stage 3  Chronic pain  Insomnia       Past Surgical History:    Arthroplasty hip  Arthroplasty patello-femoral (knee), right  Biopsy breast  Breast implants  Cataract  Foot surgery  Mastectomy, bilateral, cysts  STEFAN       Family History:    Coronary artery disease  Lymphoma  Breast cancer      Social  History:  The patient lives in the [facility] in [place]. The patient recently began using a walker.    Physical Exam     Patient Vitals for the past 24 hrs:   BP Temp Temp src Pulse Resp SpO2 Height Weight   12/29/20 1555 (!) 150/62 98.2  F (36.8  C) Oral 104 18 95 % 1.524 m (5') 72.6 kg (160 lb)       Physical Exam  General: Nontoxic appearing woman sitting upright in room 28  HENT: mucous membranes moist  CV: rate as above, regular rhythm, somewhat thick legs bilaterally but no lower extremity pitting edema nor appreciable asymmetry to legsg, no JVD, palpable symmetric foot pulses, compartments soft in both legs  Resp: normal effort, speaks in full phrases, no stridor, no cough observed  GI: abdomen soft and nontender, no guarding  MSK: no bony tenderness to lower extremities, good flexion and extension of L hip, knee, and ankle  Skin: appropriately warm and dry, no erythema, no ecchymosis  Neuro: alert, clear speech, oriented, normal sensation throughout both legs  Psych: cooperative, pleasant    Emergency Department Course     Imaging:  US Lower Extremity Venous Duplex Left  No deep venous thrombosis in the left lower extremity.    MARYCRUZ PARADA MD    Emergency Department Course:    Reviewed:  1559: I reviewed nursing notes, vitals and past medical history    Assessments:  1615: I performed an exam of the patient as noted above.    Disposition:  1717: The patient was discharged to home.     Impression & Plan   Covid-19  Lindsey Hale was evaluated during a global COVID-19 pandemic, which necessitated consideration that the patient might be at risk for infection with the SARS-CoV-2 virus that causes COVID-19.   Applicable protocols for evaluation were followed during the patient's care.   COVID-19 was considered as part of the patient's evaluation. The plan for testing is:  a test was obtained at a previous visit and reviewed & considered today.    Medical Decision Making:  The etiology of her left leg  symptoms is unconfirmed despite testing as above.  No skin findings or other features to suggest cellulitis, abscess, nor septic joint.  No cyanosis, pallor, or asymmetric pulses to support acute vascular insufficiency.  Ultrasound was performed to rule out DVT, which was the patient and daughter's primary concern.  No trauma or focal tenderness to support bony pathology so x-rays were deferred.  She is ambulatory.  She declined any analgesics.  Recommend that she continue her anticoagulation and follow-up soon through primary care, returning here in the unexpected event of sudden worsening.  She was discharged.        Diagnosis:    ICD-10-CM    1. Left leg swelling  M79.89    2. Long term current use of anticoagulant therapy  Z79.01        Scribe Disclosure:  I, Chelly Guzman, am serving as a scribe at 4:00 PM on 12/29/2020 to document services personally performed by Louie Presley MD based on my observations and the provider's statements to me.     This note was completed in part using Dragon voice recognition software. Although reviewed after completion, some word and grammatical errors may occur.       Louie Presley MD  12/29/20 9358

## 2020-12-29 NOTE — ED AVS SNAPSHOT
[facility]  [address] [address]  Phone: [phone]  Fax: [phone]                                    Lindsey Hale   MRN: [id number]    Department: [facility]   Date of Visit: 12/29/2020           After Visit Summary Signature Page    I have received my discharge instructions, and my questions have been answered. I have discussed any challenges I see with this plan with the nurse or doctor.    ..........................................................................................................................................  Patient/Patient Representative Signature      ..........................................................................................................................................  Patient Representative Print Name and Relationship to Patient    ..................................................               ................................................  Date                                   Time    ..........................................................................................................................................  Reviewed by Signature/Title    ...................................................              ..............................................  Date                                               Time          22EPIC Rev 08/18

## 2020-12-30 NOTE — TELEPHONE ENCOUNTER
Last OV 1/21/2020 for annual exam, follow up 4 mos     BP Readings from Last 3 Encounters:   12/29/20 (!) 141/71   06/08/20 (!) 148/61   01/21/20 (!) 148/66     Pt due for appointment with PCP and will be due for annual exam in [place]    Called patient - she will have her daughter call to schedule her an appointment as her daughter manages rides     Routing to PCP as last 3/3 BPs high     Rasheeda VELIZ RN

## 2021-01-01 ENCOUNTER — DOCUMENTATION ONLY (OUTPATIENT)
Dept: CARDIOLOGY | Facility: CLINIC | Age: 86
End: 2021-01-01

## 2021-01-01 ENCOUNTER — MEDICAL CORRESPONDENCE (OUTPATIENT)
Dept: HEALTH INFORMATION MANAGEMENT | Facility: CLINIC | Age: 86
End: 2021-01-01
Payer: MEDICARE

## 2021-01-01 ENCOUNTER — APPOINTMENT (OUTPATIENT)
Dept: CARDIOLOGY | Facility: CLINIC | Age: 86
DRG: 291 | End: 2021-01-01
Attending: INTERNAL MEDICINE
Payer: MEDICARE

## 2021-01-01 ENCOUNTER — TRANSFERRED RECORDS (OUTPATIENT)
Dept: HEALTH INFORMATION MANAGEMENT | Facility: CLINIC | Age: 86
End: 2021-01-01

## 2021-01-01 ENCOUNTER — HOSPITAL ENCOUNTER (OUTPATIENT)
Facility: CLINIC | Age: 86
Setting detail: SPECIMEN
Discharge: HOME | End: 2021-06-03
Attending: INTERNAL MEDICINE | Admitting: INTERNAL MEDICINE
Payer: MEDICARE

## 2021-01-01 ENCOUNTER — TELEPHONE (OUTPATIENT)
Dept: CARDIOLOGY | Facility: CLINIC | Age: 86
End: 2021-01-01

## 2021-01-01 ENCOUNTER — TELEPHONE (OUTPATIENT)
Dept: FAMILY MEDICINE | Facility: CLINIC | Age: 86
End: 2021-01-01

## 2021-01-01 ENCOUNTER — APPOINTMENT (OUTPATIENT)
Dept: OCCUPATIONAL THERAPY | Facility: CLINIC | Age: 86
DRG: 291 | End: 2021-01-01
Attending: INTERNAL MEDICINE
Payer: MEDICARE

## 2021-01-01 ENCOUNTER — ANCILLARY PROCEDURE (OUTPATIENT)
Dept: CARDIOLOGY | Facility: CLINIC | Age: 86
End: 2021-01-01
Attending: INTERNAL MEDICINE
Payer: MEDICARE

## 2021-01-01 ENCOUNTER — OFFICE VISIT (OUTPATIENT)
Dept: CARDIOLOGY | Facility: CLINIC | Age: 86
End: 2021-01-01
Payer: MEDICARE

## 2021-01-01 ENCOUNTER — OFFICE VISIT (OUTPATIENT)
Dept: FAMILY MEDICINE | Facility: CLINIC | Age: 86
End: 2021-01-01
Payer: MEDICARE

## 2021-01-01 ENCOUNTER — HOSPITAL ENCOUNTER (OUTPATIENT)
Facility: CLINIC | Age: 86
Discharge: HOME | End: 2021-06-23
Attending: INTERNAL MEDICINE | Admitting: NURSE PRACTITIONER
Payer: MEDICARE

## 2021-01-01 ENCOUNTER — APPOINTMENT (OUTPATIENT)
Dept: ULTRASOUND IMAGING | Facility: CLINIC | Age: 86
End: 2021-01-01
Attending: EMERGENCY MEDICINE
Payer: MEDICARE

## 2021-01-01 ENCOUNTER — INFUSION THERAPY VISIT (OUTPATIENT)
Dept: INFUSION THERAPY | Facility: CLINIC | Age: 86
End: 2021-01-01
Attending: INTERNAL MEDICINE
Payer: MEDICARE

## 2021-01-01 ENCOUNTER — APPOINTMENT (OUTPATIENT)
Dept: GENERAL RADIOLOGY | Facility: CLINIC | Age: 86
End: 2021-01-01
Attending: NURSE PRACTITIONER
Payer: MEDICARE

## 2021-01-01 ENCOUNTER — HOSPITAL ENCOUNTER (INPATIENT)
Facility: CLINIC | Age: 86
LOS: 2 days | Discharge: HOME HEALTH | DRG: 291 | End: 2021-06-14
Attending: EMERGENCY MEDICINE | Admitting: INTERNAL MEDICINE
Payer: MEDICARE

## 2021-01-01 ENCOUNTER — PATIENT OUTREACH (OUTPATIENT)
Dept: CARE COORDINATION | Facility: CLINIC | Age: 86
End: 2021-01-01

## 2021-01-01 ENCOUNTER — MEDICAL CORRESPONDENCE (OUTPATIENT)
Dept: HEALTH INFORMATION MANAGEMENT | Facility: CLINIC | Age: 86
End: 2021-01-01

## 2021-01-01 ENCOUNTER — APPOINTMENT (OUTPATIENT)
Dept: GENERAL RADIOLOGY | Facility: CLINIC | Age: 86
DRG: 291 | End: 2021-01-01
Attending: EMERGENCY MEDICINE
Payer: MEDICARE

## 2021-01-01 ENCOUNTER — HOSPITAL ENCOUNTER (EMERGENCY)
Facility: CLINIC | Age: 86
Discharge: HOME | End: 2021-04-07
Attending: EMERGENCY MEDICINE | Admitting: EMERGENCY MEDICINE
Payer: MEDICARE

## 2021-01-01 ENCOUNTER — OFFICE VISIT (OUTPATIENT)
Dept: CARDIOLOGY | Facility: CLINIC | Age: 86
End: 2021-01-01
Attending: NURSE PRACTITIONER
Payer: MEDICARE

## 2021-01-01 ENCOUNTER — APPOINTMENT (OUTPATIENT)
Dept: GENERAL RADIOLOGY | Facility: CLINIC | Age: 86
End: 2021-01-01
Attending: EMERGENCY MEDICINE
Payer: MEDICARE

## 2021-01-01 VITALS
HEIGHT: 60 IN | HEART RATE: 91 BPM | TEMPERATURE: 97.3 F | BODY MASS INDEX: 29.45 KG/M2 | RESPIRATION RATE: 20 BRPM | DIASTOLIC BLOOD PRESSURE: 46 MMHG | SYSTOLIC BLOOD PRESSURE: 153 MMHG | WEIGHT: 150 LBS | OXYGEN SATURATION: 93 %

## 2021-01-01 VITALS
TEMPERATURE: 96.9 F | SYSTOLIC BLOOD PRESSURE: 146 MMHG | HEART RATE: 60 BPM | OXYGEN SATURATION: 95 % | BODY MASS INDEX: 32.3 KG/M2 | RESPIRATION RATE: 16 BRPM | WEIGHT: 165.4 LBS | DIASTOLIC BLOOD PRESSURE: 55 MMHG

## 2021-01-01 VITALS
DIASTOLIC BLOOD PRESSURE: 71 MMHG | OXYGEN SATURATION: 97 % | RESPIRATION RATE: 20 BRPM | SYSTOLIC BLOOD PRESSURE: 114 MMHG | HEART RATE: 60 BPM | TEMPERATURE: 98.5 F

## 2021-01-01 VITALS
RESPIRATION RATE: 18 BRPM | HEIGHT: 60 IN | SYSTOLIC BLOOD PRESSURE: 131 MMHG | HEART RATE: 60 BPM | BODY MASS INDEX: 31.38 KG/M2 | TEMPERATURE: 97 F | WEIGHT: 159.83 LBS | OXYGEN SATURATION: 97 % | DIASTOLIC BLOOD PRESSURE: 40 MMHG

## 2021-01-01 VITALS
BODY MASS INDEX: 32.79 KG/M2 | WEIGHT: 167 LBS | HEIGHT: 60 IN | HEART RATE: 69 BPM | DIASTOLIC BLOOD PRESSURE: 70 MMHG | SYSTOLIC BLOOD PRESSURE: 134 MMHG

## 2021-01-01 VITALS
WEIGHT: 150 LBS | OXYGEN SATURATION: 96 % | HEART RATE: 60 BPM | BODY MASS INDEX: 29.45 KG/M2 | SYSTOLIC BLOOD PRESSURE: 164 MMHG | HEIGHT: 60 IN | DIASTOLIC BLOOD PRESSURE: 73 MMHG

## 2021-01-01 VITALS
OXYGEN SATURATION: 100 % | HEIGHT: 60 IN | DIASTOLIC BLOOD PRESSURE: 71 MMHG | WEIGHT: 164.5 LBS | HEART RATE: 60 BPM | BODY MASS INDEX: 32.3 KG/M2 | SYSTOLIC BLOOD PRESSURE: 138 MMHG

## 2021-01-01 VITALS
HEART RATE: 60 BPM | OXYGEN SATURATION: 96 % | DIASTOLIC BLOOD PRESSURE: 70 MMHG | SYSTOLIC BLOOD PRESSURE: 138 MMHG | BODY MASS INDEX: 32.79 KG/M2 | WEIGHT: 167 LBS | HEIGHT: 60 IN

## 2021-01-01 VITALS
HEART RATE: 59 BPM | DIASTOLIC BLOOD PRESSURE: 71 MMHG | OXYGEN SATURATION: 97 % | BODY MASS INDEX: 32.42 KG/M2 | WEIGHT: 166 LBS | SYSTOLIC BLOOD PRESSURE: 137 MMHG | TEMPERATURE: 98.2 F

## 2021-01-01 VITALS
WEIGHT: 160.5 LBS | HEIGHT: 60 IN | BODY MASS INDEX: 31.51 KG/M2 | OXYGEN SATURATION: 99 % | HEART RATE: 60 BPM | DIASTOLIC BLOOD PRESSURE: 72 MMHG | SYSTOLIC BLOOD PRESSURE: 121 MMHG

## 2021-01-01 DIAGNOSIS — R77.8 ABNORMAL SPEP: Primary | ICD-10-CM

## 2021-01-01 DIAGNOSIS — N30.00 ACUTE CYSTITIS WITHOUT HEMATURIA: Primary | ICD-10-CM

## 2021-01-01 DIAGNOSIS — S22.080S COMPRESSION FRACTURE OF T12 VERTEBRA, SEQUELA: Primary | ICD-10-CM

## 2021-01-01 DIAGNOSIS — I51.89 DIASTOLIC DYSFUNCTION: Primary | ICD-10-CM

## 2021-01-01 DIAGNOSIS — I49.5 SICK SINUS SYNDROME (H): ICD-10-CM

## 2021-01-01 DIAGNOSIS — I51.89 DIASTOLIC DYSFUNCTION: ICD-10-CM

## 2021-01-01 DIAGNOSIS — R06.09 DOE (DYSPNEA ON EXERTION): ICD-10-CM

## 2021-01-01 DIAGNOSIS — I48.91 ATRIAL FIBRILLATION, UNSPECIFIED TYPE (H): ICD-10-CM

## 2021-01-01 DIAGNOSIS — E11.8 TYPE 2 DIABETES MELLITUS WITH COMPLICATION, WITHOUT LONG-TERM CURRENT USE OF INSULIN (H): ICD-10-CM

## 2021-01-01 DIAGNOSIS — I48.0 PAROXYSMAL ATRIAL FIBRILLATION (H): ICD-10-CM

## 2021-01-01 DIAGNOSIS — I50.31 ACUTE HEART FAILURE WITH PRESERVED EJECTION FRACTION (HFPEF) (H): Primary | ICD-10-CM

## 2021-01-01 DIAGNOSIS — D64.9 LOW HEMOGLOBIN: ICD-10-CM

## 2021-01-01 DIAGNOSIS — R71.8 ELEVATED MCV: ICD-10-CM

## 2021-01-01 DIAGNOSIS — E03.9 HYPOTHYROIDISM, UNSPECIFIED TYPE: ICD-10-CM

## 2021-01-01 DIAGNOSIS — M79.662 PAIN OF LEFT LOWER LEG: Primary | ICD-10-CM

## 2021-01-01 DIAGNOSIS — R30.0 DYSURIA: Primary | ICD-10-CM

## 2021-01-01 DIAGNOSIS — I49.5 SICK SINUS SYNDROME (H): Primary | ICD-10-CM

## 2021-01-01 DIAGNOSIS — I50.30 (HFPEF) HEART FAILURE WITH PRESERVED EJECTION FRACTION (H): Primary | ICD-10-CM

## 2021-01-01 DIAGNOSIS — R30.0 DYSURIA: ICD-10-CM

## 2021-01-01 DIAGNOSIS — J44.1 COPD EXACERBATION (H): ICD-10-CM

## 2021-01-01 DIAGNOSIS — I50.31 ACUTE HEART FAILURE WITH PRESERVED EJECTION FRACTION (HFPEF) (H): ICD-10-CM

## 2021-01-01 DIAGNOSIS — I48.0 PAROXYSMAL ATRIAL FIBRILLATION (H): Primary | ICD-10-CM

## 2021-01-01 DIAGNOSIS — Z95.0 CARDIAC PACEMAKER IN SITU: ICD-10-CM

## 2021-01-01 DIAGNOSIS — R77.8 ABNORMAL SPEP: ICD-10-CM

## 2021-01-01 DIAGNOSIS — Z13.220 SCREENING FOR HYPERLIPIDEMIA: Primary | ICD-10-CM

## 2021-01-01 DIAGNOSIS — R09.02 HYPOXIA: ICD-10-CM

## 2021-01-01 DIAGNOSIS — M79.662 PAIN OF LEFT LOWER LEG: ICD-10-CM

## 2021-01-01 DIAGNOSIS — Z09 HOSPITAL DISCHARGE FOLLOW-UP: ICD-10-CM

## 2021-01-01 DIAGNOSIS — E03.9 HYPOTHYROIDISM, UNSPECIFIED TYPE: Primary | ICD-10-CM

## 2021-01-01 DIAGNOSIS — I50.30 HEART FAILURE WITH PRESERVED EJECTION FRACTION, NYHA CLASS I (H): ICD-10-CM

## 2021-01-01 DIAGNOSIS — I10 BENIGN ESSENTIAL HYPERTENSION: ICD-10-CM

## 2021-01-01 DIAGNOSIS — R79.89 ELEVATED BRAIN NATRIURETIC PEPTIDE (BNP) LEVEL: ICD-10-CM

## 2021-01-01 DIAGNOSIS — M85.859 OSTEOPENIA OF HIP, UNSPECIFIED LATERALITY: ICD-10-CM

## 2021-01-01 DIAGNOSIS — N18.30 STAGE 3 CHRONIC KIDNEY DISEASE, UNSPECIFIED WHETHER STAGE 3A OR 3B CKD (H): ICD-10-CM

## 2021-01-01 DIAGNOSIS — M35.3 PMR (POLYMYALGIA RHEUMATICA) (H): ICD-10-CM

## 2021-01-01 DIAGNOSIS — S80.12XA CONTUSION OF LEFT LOWER EXTREMITY, INITIAL ENCOUNTER: ICD-10-CM

## 2021-01-01 DIAGNOSIS — R35.0 URINARY FREQUENCY: ICD-10-CM

## 2021-01-01 DIAGNOSIS — M48.061 SPINAL STENOSIS OF LUMBAR REGION WITHOUT NEUROGENIC CLAUDICATION: ICD-10-CM

## 2021-01-01 LAB
ALBUMIN SERPL ELPH-MCNC: 4.1 G/DL (ref 3.7–5.1)
ALBUMIN SERPL-MCNC: 3 G/DL (ref 3.4–5)
ALBUMIN UR-MCNC: 100 MG/DL
ALBUMIN UR-MCNC: NEGATIVE MG/DL
ALP SERPL-CCNC: 52 U/L (ref 40–150)
ALPHA1 GLOB SERPL ELPH-MCNC: 0.3 G/DL (ref 0.2–0.4)
ALPHA2 GLOB SERPL ELPH-MCNC: 0.9 G/DL (ref 0.5–0.9)
ALT SERPL W P-5'-P-CCNC: 21 U/L (ref 0–50)
AMORPH CRY #/AREA URNS HPF: ABNORMAL /HPF
ANION GAP SERPL CALCULATED.3IONS-SCNC: 1 MMOL/L (ref 3–14)
ANION GAP SERPL CALCULATED.3IONS-SCNC: 1 MMOL/L (ref 3–14)
ANION GAP SERPL CALCULATED.3IONS-SCNC: 2 MMOL/L (ref 3–14)
ANION GAP SERPL CALCULATED.3IONS-SCNC: 2 MMOL/L (ref 3–14)
ANION GAP SERPL CALCULATED.3IONS-SCNC: 3 MMOL/L (ref 3–14)
ANION GAP SERPL CALCULATED.3IONS-SCNC: 7 MMOL/L (ref 3–14)
APPEARANCE UR: ABNORMAL
APPEARANCE UR: CLEAR
AST SERPL W P-5'-P-CCNC: 20 U/L (ref 0–45)
B-GLOBULIN SERPL ELPH-MCNC: 0.7 G/DL (ref 0.6–1)
BACTERIA #/AREA URNS HPF: ABNORMAL /HPF
BACTERIA #/AREA URNS HPF: ABNORMAL /HPF
BACTERIA SPEC CULT: ABNORMAL
BASE DEFICIT BLDV-SCNC: 0.4 MMOL/L
BASOPHILS # BLD AUTO: 0 10E9/L (ref 0–0.2)
BASOPHILS # BLD AUTO: 0 10E9/L (ref 0–0.2)
BASOPHILS NFR BLD AUTO: 0.3 %
BASOPHILS NFR BLD AUTO: 0.4 %
BILIRUB SERPL-MCNC: 0.9 MG/DL (ref 0.2–1.3)
BILIRUB UR QL STRIP: NEGATIVE
BILIRUB UR QL STRIP: NEGATIVE
BUN SERPL-MCNC: 20 MG/DL (ref 7–30)
BUN SERPL-MCNC: 24 MG/DL (ref 7–30)
BUN SERPL-MCNC: 26 MG/DL (ref 7–30)
BUN SERPL-MCNC: 37 MG/DL (ref 7–30)
BUN SERPL-MCNC: 37 MG/DL (ref 7–30)
BUN SERPL-MCNC: 42 MG/DL (ref 7–30)
BUN SERPL-MCNC: 45 MG/DL (ref 7–30)
BUN SERPL-MCNC: 50 MG/DL (ref 7–30)
CALCIUM SERPL-MCNC: 8.1 MG/DL (ref 8.5–10.1)
CALCIUM SERPL-MCNC: 8.1 MG/DL (ref 8.5–10.1)
CALCIUM SERPL-MCNC: 8.3 MG/DL (ref 8.5–10.1)
CALCIUM SERPL-MCNC: 8.5 MG/DL (ref 8.5–10.1)
CALCIUM SERPL-MCNC: 8.7 MG/DL (ref 8.5–10.1)
CALCIUM SERPL-MCNC: 8.9 MG/DL (ref 8.5–10.1)
CALCIUM SERPL-MCNC: 9.1 MG/DL (ref 8.5–10.1)
CHLORIDE SERPL-SCNC: 106 MMOL/L (ref 94–109)
CHLORIDE SERPL-SCNC: 107 MMOL/L (ref 94–109)
CHLORIDE SERPL-SCNC: 109 MMOL/L (ref 94–109)
CHLORIDE SERPL-SCNC: 111 MMOL/L (ref 94–109)
CO2 SERPL-SCNC: 23 MMOL/L (ref 20–32)
CO2 SERPL-SCNC: 24 MMOL/L (ref 20–32)
CO2 SERPL-SCNC: 27 MMOL/L (ref 20–32)
CO2 SERPL-SCNC: 28 MMOL/L (ref 20–32)
CO2 SERPL-SCNC: 28 MMOL/L (ref 20–32)
CO2 SERPL-SCNC: 30 MMOL/L (ref 20–32)
COLOR UR AUTO: YELLOW
COLOR UR AUTO: YELLOW
CREAT SERPL-MCNC: 1 MG/DL (ref 0.52–1.04)
CREAT SERPL-MCNC: 1.17 MG/DL (ref 0.52–1.04)
CREAT SERPL-MCNC: 1.24 MG/DL (ref 0.52–1.04)
CREAT SERPL-MCNC: 1.25 MG/DL (ref 0.52–1.04)
CREAT SERPL-MCNC: 1.35 MG/DL (ref 0.52–1.04)
CREAT SERPL-MCNC: 1.45 MG/DL (ref 0.52–1.04)
CREAT SERPL-MCNC: 1.6 MG/DL (ref 0.52–1.04)
CREAT SERPL-MCNC: 1.69 MG/DL (ref 0.52–1.04)
CREAT SERPL-MCNC: 1.92 MG/DL (ref 0.52–1.04)
DIFFERENTIAL METHOD BLD: ABNORMAL
DIFFERENTIAL METHOD BLD: ABNORMAL
DIFFERENTIAL METHOD BLD: NORMAL
EOSINOPHIL # BLD AUTO: 0 10E9/L (ref 0–0.7)
EOSINOPHIL # BLD AUTO: 0.2 10E9/L (ref 0–0.7)
EOSINOPHIL # BLD AUTO: 0.2 10E9/L (ref 0–0.7)
EOSINOPHIL NFR BLD AUTO: 0.4 %
EOSINOPHIL NFR BLD AUTO: 2 %
EOSINOPHIL NFR BLD AUTO: 2.2 %
ERYTHROCYTE [DISTWIDTH] IN BLOOD BY AUTOMATED COUNT: 14.5 % (ref 10–15)
ERYTHROCYTE [DISTWIDTH] IN BLOOD BY AUTOMATED COUNT: 14.6 % (ref 10–15)
ERYTHROCYTE [DISTWIDTH] IN BLOOD BY AUTOMATED COUNT: 14.9 % (ref 10–15)
ERYTHROCYTE [DISTWIDTH] IN BLOOD BY AUTOMATED COUNT: 15.3 % (ref 10–15)
ERYTHROCYTE [DISTWIDTH] IN BLOOD BY AUTOMATED COUNT: 15.6 % (ref 10–15)
FERRITIN SERPL-MCNC: 64 NG/ML (ref 8–252)
FOLATE SERPL-MCNC: 50.4 NG/ML
GAMMA GLOB SERPL ELPH-MCNC: 2.2 G/DL (ref 0.7–1.6)
GFR SERPL CREATININE-BSD FRML MDRD: 22 ML/MIN/{1.73_M2}
GFR SERPL CREATININE-BSD FRML MDRD: 25 ML/MIN/{1.73_M2}
GFR SERPL CREATININE-BSD FRML MDRD: 27 ML/MIN/{1.73_M2}
GFR SERPL CREATININE-BSD FRML MDRD: 30 ML/MIN/{1.73_M2}
GFR SERPL CREATININE-BSD FRML MDRD: 33 ML/MIN/{1.73_M2}
GFR SERPL CREATININE-BSD FRML MDRD: 36 ML/MIN/{1.73_M2}
GFR SERPL CREATININE-BSD FRML MDRD: 37 ML/MIN/{1.73_M2}
GFR SERPL CREATININE-BSD FRML MDRD: 39 ML/MIN/{1.73_M2}
GFR SERPL CREATININE-BSD FRML MDRD: 48 ML/MIN/{1.73_M2}
GLUCOSE SERPL-MCNC: 112 MG/DL (ref 70–99)
GLUCOSE SERPL-MCNC: 127 MG/DL (ref 70–99)
GLUCOSE SERPL-MCNC: 133 MG/DL (ref 70–99)
GLUCOSE SERPL-MCNC: 140 MG/DL (ref 70–99)
GLUCOSE SERPL-MCNC: 160 MG/DL (ref 70–99)
GLUCOSE SERPL-MCNC: 189 MG/DL (ref 70–99)
GLUCOSE UR STRIP-MCNC: NEGATIVE MG/DL
GLUCOSE UR STRIP-MCNC: NEGATIVE MG/DL
HBA1C MFR BLD: 6 % (ref 0–5.6)
HCO3 BLDV-SCNC: 25 MMOL/L (ref 21–28)
HCT VFR BLD AUTO: 27.4 % (ref 35–47)
HCT VFR BLD AUTO: 28.2 % (ref 35–47)
HCT VFR BLD AUTO: 29.1 % (ref 35–47)
HCT VFR BLD AUTO: 29.7 % (ref 35–47)
HCT VFR BLD AUTO: 30.2 % (ref 35–47)
HGB BLD-MCNC: 8.9 G/DL (ref 11.7–15.7)
HGB BLD-MCNC: 9.1 G/DL (ref 11.7–15.7)
HGB BLD-MCNC: 9.2 G/DL (ref 11.7–15.7)
HGB BLD-MCNC: 9.7 G/DL (ref 11.7–15.7)
HGB BLD-MCNC: 9.7 G/DL (ref 11.7–15.7)
HGB UR QL STRIP: NEGATIVE
HGB UR QL STRIP: NEGATIVE
IGA SERPL-MCNC: 103 MG/DL (ref 84–499)
IGG SERPL-MCNC: 2906 MG/DL (ref 610–1616)
IGM SERPL-MCNC: 21 MG/DL (ref 35–242)
IMM GRANULOCYTES # BLD: 0.1 10E9/L (ref 0–0.4)
IMM GRANULOCYTES NFR BLD: 0.7 %
INTERPRETATION ECG - MUSE: NORMAL
KETONES UR STRIP-MCNC: NEGATIVE MG/DL
KETONES UR STRIP-MCNC: NEGATIVE MG/DL
LABORATORY COMMENT REPORT: NORMAL
LDH SERPL L TO P-CCNC: 197 U/L (ref 81–234)
LEUKOCYTE ESTERASE UR QL STRIP: ABNORMAL
LEUKOCYTE ESTERASE UR QL STRIP: NEGATIVE
LYMPHOCYTES # BLD AUTO: 1 10E9/L (ref 0.8–5.3)
LYMPHOCYTES # BLD AUTO: 1.2 10E9/L (ref 0.8–5.3)
LYMPHOCYTES # BLD AUTO: 1.3 10E9/L (ref 0.8–5.3)
LYMPHOCYTES NFR BLD AUTO: 11.1 %
LYMPHOCYTES NFR BLD AUTO: 15 %
LYMPHOCYTES NFR BLD AUTO: 17.4 %
Lab: ABNORMAL
Lab: ABNORMAL
M PROTEIN SERPL ELPH-MCNC: 1.9 G/DL
MAGNESIUM SERPL-MCNC: 2.7 MG/DL (ref 1.6–2.3)
MCH RBC QN AUTO: 34.5 PG (ref 26.5–33)
MCH RBC QN AUTO: 34.7 PG (ref 26.5–33)
MCH RBC QN AUTO: 34.8 PG (ref 26.5–33)
MCH RBC QN AUTO: 35 PG (ref 26.5–33)
MCH RBC QN AUTO: 35.8 PG (ref 26.5–33)
MCHC RBC AUTO-ENTMCNC: 31.6 G/DL (ref 31.5–36.5)
MCHC RBC AUTO-ENTMCNC: 32.1 G/DL (ref 31.5–36.5)
MCHC RBC AUTO-ENTMCNC: 32.3 G/DL (ref 31.5–36.5)
MCHC RBC AUTO-ENTMCNC: 32.5 G/DL (ref 31.5–36.5)
MCHC RBC AUTO-ENTMCNC: 32.7 G/DL (ref 31.5–36.5)
MCV RBC AUTO: 107 FL (ref 78–100)
MCV RBC AUTO: 107 FL (ref 78–100)
MCV RBC AUTO: 109 FL (ref 78–100)
MCV RBC AUTO: 110 FL (ref 78–100)
MCV RBC AUTO: 110 FL (ref 78–100)
MDC_IDC_EPISODE_DTM: NORMAL
MDC_IDC_EPISODE_DURATION: 10 S
MDC_IDC_EPISODE_DURATION: 16 S
MDC_IDC_EPISODE_DURATION: 238 S
MDC_IDC_EPISODE_DURATION: 34 S
MDC_IDC_EPISODE_DURATION: 38 S
MDC_IDC_EPISODE_DURATION: 4 S
MDC_IDC_EPISODE_DURATION: 6 S
MDC_IDC_EPISODE_DURATION: 76 S
MDC_IDC_EPISODE_ID: NORMAL
MDC_IDC_EPISODE_TYPE: NORMAL
MDC_IDC_LEAD_IMPLANT_DT: NORMAL
MDC_IDC_LEAD_LOCATION: NORMAL
MDC_IDC_LEAD_MFG: NORMAL
MDC_IDC_LEAD_MODEL: NORMAL
MDC_IDC_LEAD_POLARITY_TYPE: NORMAL
MDC_IDC_LEAD_SERIAL: NORMAL
MDC_IDC_MSMT_BATTERY_DTM: NORMAL
MDC_IDC_MSMT_BATTERY_REMAINING_LONGEVITY: 1 MO
MDC_IDC_MSMT_BATTERY_REMAINING_LONGEVITY: 16 MO
MDC_IDC_MSMT_BATTERY_REMAINING_LONGEVITY: 5 MO
MDC_IDC_MSMT_BATTERY_REMAINING_LONGEVITY: 9 MO
MDC_IDC_MSMT_BATTERY_REMAINING_PERCENTAGE: 0.5 %
MDC_IDC_MSMT_BATTERY_REMAINING_PERCENTAGE: 15 %
MDC_IDC_MSMT_BATTERY_REMAINING_PERCENTAGE: 8 %
MDC_IDC_MSMT_BATTERY_RRT_TRIGGER: NORMAL
MDC_IDC_MSMT_BATTERY_STATUS: NORMAL
MDC_IDC_MSMT_BATTERY_VOLTAGE: 2.62 V
MDC_IDC_MSMT_BATTERY_VOLTAGE: 2.66 V
MDC_IDC_MSMT_BATTERY_VOLTAGE: 2.69 V
MDC_IDC_MSMT_BATTERY_VOLTAGE: 2.74 V
MDC_IDC_MSMT_LEADCHNL_RA_IMPEDANCE_VALUE: 340 OHM
MDC_IDC_MSMT_LEADCHNL_RA_IMPEDANCE_VALUE: 340 OHM
MDC_IDC_MSMT_LEADCHNL_RA_IMPEDANCE_VALUE: 375 OHM
MDC_IDC_MSMT_LEADCHNL_RA_IMPEDANCE_VALUE: 390 OHM
MDC_IDC_MSMT_LEADCHNL_RA_LEAD_CHANNEL_STATUS: NORMAL
MDC_IDC_MSMT_LEADCHNL_RA_PACING_THRESHOLD_AMPLITUDE: 0.88 V
MDC_IDC_MSMT_LEADCHNL_RA_PACING_THRESHOLD_AMPLITUDE: 1 V
MDC_IDC_MSMT_LEADCHNL_RA_PACING_THRESHOLD_AMPLITUDE: 1 V
MDC_IDC_MSMT_LEADCHNL_RA_PACING_THRESHOLD_AMPLITUDE: 1.12 V
MDC_IDC_MSMT_LEADCHNL_RA_PACING_THRESHOLD_AMPLITUDE: 1.12 V
MDC_IDC_MSMT_LEADCHNL_RA_PACING_THRESHOLD_PULSEWIDTH: 0.5 MS
MDC_IDC_MSMT_LEADCHNL_RA_SENSING_INTR_AMPL: 1.2 MV
MDC_IDC_MSMT_LEADCHNL_RA_SENSING_INTR_AMPL: 1.5 MV
MDC_IDC_MSMT_LEADCHNL_RA_SENSING_INTR_AMPL: 2 MV
MDC_IDC_MSMT_LEADCHNL_RA_SENSING_INTR_AMPL: 3.5 MV
MDC_IDC_MSMT_LEADCHNL_RV_IMPEDANCE_VALUE: 450 OHM
MDC_IDC_MSMT_LEADCHNL_RV_IMPEDANCE_VALUE: 512.5 OHM
MDC_IDC_MSMT_LEADCHNL_RV_LEAD_CHANNEL_STATUS: NORMAL
MDC_IDC_MSMT_LEADCHNL_RV_PACING_THRESHOLD_AMPLITUDE: 1 V
MDC_IDC_MSMT_LEADCHNL_RV_PACING_THRESHOLD_PULSEWIDTH: 0.5 MS
MDC_IDC_MSMT_LEADCHNL_RV_SENSING_INTR_AMPL: 12 MV
MDC_IDC_PG_IMPLANT_DTM: NORMAL
MDC_IDC_PG_MFG: NORMAL
MDC_IDC_PG_MODEL: NORMAL
MDC_IDC_PG_SERIAL: NORMAL
MDC_IDC_PG_TYPE: NORMAL
MDC_IDC_SESS_CLINIC_NAME: NORMAL
MDC_IDC_SESS_DTM: NORMAL
MDC_IDC_SESS_REPROGRAMMED: NO
MDC_IDC_SESS_TYPE: NORMAL
MDC_IDC_SET_BRADY_AT_MODE_SWITCH_MODE: NORMAL
MDC_IDC_SET_BRADY_AT_MODE_SWITCH_RATE: 180 {BEATS}/MIN
MDC_IDC_SET_BRADY_HYSTRATE: NORMAL
MDC_IDC_SET_BRADY_LOWRATE: 60 {BEATS}/MIN
MDC_IDC_SET_BRADY_MAX_SENSOR_RATE: 120 {BEATS}/MIN
MDC_IDC_SET_BRADY_MAX_TRACKING_RATE: 120 {BEATS}/MIN
MDC_IDC_SET_BRADY_MODE: NORMAL
MDC_IDC_SET_BRADY_NIGHT_RATE: NORMAL
MDC_IDC_SET_BRADY_PAV_DELAY_HIGH: 100 MS
MDC_IDC_SET_BRADY_PAV_DELAY_LOW: 275 MS
MDC_IDC_SET_BRADY_SAV_DELAY_HIGH: 100 MS
MDC_IDC_SET_BRADY_SAV_DELAY_LOW: 250 MS
MDC_IDC_SET_LEADCHNL_RA_PACING_AMPLITUDE: 1.88
MDC_IDC_SET_LEADCHNL_RA_PACING_AMPLITUDE: 2 V
MDC_IDC_SET_LEADCHNL_RA_PACING_AMPLITUDE: 2.12
MDC_IDC_SET_LEADCHNL_RA_PACING_AMPLITUDE: 2.12
MDC_IDC_SET_LEADCHNL_RA_PACING_ANODE_ELECTRODE_1: NORMAL
MDC_IDC_SET_LEADCHNL_RA_PACING_ANODE_LOCATION_1: NORMAL
MDC_IDC_SET_LEADCHNL_RA_PACING_CAPTURE_MODE: NORMAL
MDC_IDC_SET_LEADCHNL_RA_PACING_CATHODE_ELECTRODE_1: NORMAL
MDC_IDC_SET_LEADCHNL_RA_PACING_CATHODE_LOCATION_1: NORMAL
MDC_IDC_SET_LEADCHNL_RA_PACING_POLARITY: NORMAL
MDC_IDC_SET_LEADCHNL_RA_PACING_PULSEWIDTH: 0.5 MS
MDC_IDC_SET_LEADCHNL_RA_SENSING_ADAPTATION_MODE: NORMAL
MDC_IDC_SET_LEADCHNL_RA_SENSING_ANODE_ELECTRODE_1: NORMAL
MDC_IDC_SET_LEADCHNL_RA_SENSING_ANODE_LOCATION_1: NORMAL
MDC_IDC_SET_LEADCHNL_RA_SENSING_CATHODE_ELECTRODE_1: NORMAL
MDC_IDC_SET_LEADCHNL_RA_SENSING_CATHODE_LOCATION_1: NORMAL
MDC_IDC_SET_LEADCHNL_RA_SENSING_POLARITY: NORMAL
MDC_IDC_SET_LEADCHNL_RA_SENSING_SENSITIVITY: 0.5 MV
MDC_IDC_SET_LEADCHNL_RV_PACING_AMPLITUDE: 2 V
MDC_IDC_SET_LEADCHNL_RV_PACING_ANODE_ELECTRODE_1: NORMAL
MDC_IDC_SET_LEADCHNL_RV_PACING_ANODE_LOCATION_1: NORMAL
MDC_IDC_SET_LEADCHNL_RV_PACING_CAPTURE_MODE: NORMAL
MDC_IDC_SET_LEADCHNL_RV_PACING_CATHODE_ELECTRODE_1: NORMAL
MDC_IDC_SET_LEADCHNL_RV_PACING_CATHODE_LOCATION_1: NORMAL
MDC_IDC_SET_LEADCHNL_RV_PACING_POLARITY: NORMAL
MDC_IDC_SET_LEADCHNL_RV_PACING_PULSEWIDTH: 0.5 MS
MDC_IDC_SET_LEADCHNL_RV_SENSING_ADAPTATION_MODE: NORMAL
MDC_IDC_SET_LEADCHNL_RV_SENSING_ANODE_ELECTRODE_1: NORMAL
MDC_IDC_SET_LEADCHNL_RV_SENSING_ANODE_LOCATION_1: NORMAL
MDC_IDC_SET_LEADCHNL_RV_SENSING_CATHODE_ELECTRODE_1: NORMAL
MDC_IDC_SET_LEADCHNL_RV_SENSING_CATHODE_LOCATION_1: NORMAL
MDC_IDC_SET_LEADCHNL_RV_SENSING_POLARITY: NORMAL
MDC_IDC_SET_LEADCHNL_RV_SENSING_SENSITIVITY: 2 MV
MDC_IDC_STAT_AT_BURDEN_PERCENT: 0 %
MDC_IDC_STAT_AT_BURDEN_PERCENT: 0 %
MDC_IDC_STAT_AT_BURDEN_PERCENT: 1 %
MDC_IDC_STAT_AT_DTM_END: NORMAL
MDC_IDC_STAT_AT_DTM_START: NORMAL
MDC_IDC_STAT_AT_MODE_SW_COUNT: 0
MDC_IDC_STAT_AT_MODE_SW_COUNT: 0
MDC_IDC_STAT_AT_MODE_SW_COUNT: 10
MDC_IDC_STAT_AT_MODE_SW_COUNT: 21
MDC_IDC_STAT_AT_MODE_SW_COUNT_PER_DAY: 0
MDC_IDC_STAT_AT_MODE_SW_MAX_DURATION: 238 S
MDC_IDC_STAT_AT_MODE_SW_PERCENT_TIME: 0 %
MDC_IDC_STAT_AT_MODE_SW_PERCENT_TIME: 0 %
MDC_IDC_STAT_AT_MODE_SW_PERCENT_TIME: 1 %
MDC_IDC_STAT_BRADY_AP_VP_PERCENT: 84 %
MDC_IDC_STAT_BRADY_AP_VP_PERCENT: 92 %
MDC_IDC_STAT_BRADY_AP_VP_PERCENT: 92 %
MDC_IDC_STAT_BRADY_AP_VS_PERCENT: 1 %
MDC_IDC_STAT_BRADY_AS_VP_PERCENT: 16 %
MDC_IDC_STAT_BRADY_AS_VP_PERCENT: 8.3 %
MDC_IDC_STAT_BRADY_AS_VP_PERCENT: 8.3 %
MDC_IDC_STAT_BRADY_AS_VS_PERCENT: 1 %
MDC_IDC_STAT_BRADY_DTM_END: NORMAL
MDC_IDC_STAT_BRADY_DTM_START: NORMAL
MDC_IDC_STAT_BRADY_RA_PERCENT_PACED: 83 %
MDC_IDC_STAT_BRADY_RA_PERCENT_PACED: 91 %
MDC_IDC_STAT_BRADY_RA_PERCENT_PACED: 91 %
MDC_IDC_STAT_BRADY_RA_PERCENT_PACED: 97 %
MDC_IDC_STAT_BRADY_RV_PERCENT_PACED: 99 %
MDC_IDC_STAT_BRADY_RV_PERCENT_PACED: 99.99 %
MDC_IDC_STAT_CRT_DTM_END: NORMAL
MDC_IDC_STAT_CRT_DTM_START: NORMAL
MDC_IDC_STAT_HEART_RATE_ATRIAL_MAX: 320 {BEATS}/MIN
MDC_IDC_STAT_HEART_RATE_ATRIAL_MEAN: 66 {BEATS}/MIN
MDC_IDC_STAT_HEART_RATE_ATRIAL_MEAN: 67 {BEATS}/MIN
MDC_IDC_STAT_HEART_RATE_ATRIAL_MEAN: 67 {BEATS}/MIN
MDC_IDC_STAT_HEART_RATE_ATRIAL_MIN: 50 {BEATS}/MIN
MDC_IDC_STAT_HEART_RATE_ATRIAL_MIN: 60 {BEATS}/MIN
MDC_IDC_STAT_HEART_RATE_ATRIAL_MIN: 60 {BEATS}/MIN
MDC_IDC_STAT_HEART_RATE_DTM_END: NORMAL
MDC_IDC_STAT_HEART_RATE_DTM_START: NORMAL
MDC_IDC_STAT_HEART_RATE_VENTRICULAR_MAX: 160 {BEATS}/MIN
MDC_IDC_STAT_HEART_RATE_VENTRICULAR_MEAN: 66 {BEATS}/MIN
MDC_IDC_STAT_HEART_RATE_VENTRICULAR_MEAN: 66 {BEATS}/MIN
MDC_IDC_STAT_HEART_RATE_VENTRICULAR_MEAN: 67 {BEATS}/MIN
MDC_IDC_STAT_HEART_RATE_VENTRICULAR_MIN: 40 {BEATS}/MIN
MDC_IDC_STAT_HEART_RATE_VENTRICULAR_MIN: 40 {BEATS}/MIN
MDC_IDC_STAT_HEART_RATE_VENTRICULAR_MIN: 50 {BEATS}/MIN
MONOCYTES # BLD AUTO: 0.5 10E9/L (ref 0–1.3)
MONOCYTES # BLD AUTO: 0.6 10E9/L (ref 0–1.3)
MONOCYTES # BLD AUTO: 0.8 10E9/L (ref 0–1.3)
MONOCYTES NFR BLD AUTO: 7 %
MONOCYTES NFR BLD AUTO: 8 %
MONOCYTES NFR BLD AUTO: 8.5 %
NEUTROPHILS # BLD AUTO: 5.5 10E9/L (ref 1.6–8.3)
NEUTROPHILS # BLD AUTO: 5.9 10E9/L (ref 1.6–8.3)
NEUTROPHILS # BLD AUTO: 7.3 10E9/L (ref 1.6–8.3)
NEUTROPHILS NFR BLD AUTO: 72 %
NEUTROPHILS NFR BLD AUTO: 76 %
NEUTROPHILS NFR BLD AUTO: 79 %
NITRATE UR QL: POSITIVE
NITRATE UR QL: POSITIVE
NRBC # BLD AUTO: 0 10*3/UL
NRBC BLD AUTO-RTO: 0 /100
NT-PROBNP SERPL-MCNC: 6367 PG/ML (ref 0–1800)
O2/TOTAL GAS SETTING VFR VENT: NORMAL %
OXYHGB MFR BLDV: 46 %
PCO2 BLDV: 44 MM HG (ref 40–50)
PH BLDV: 7.37 PH (ref 7.32–7.43)
PH UR STRIP: 6.5 PH (ref 5–7)
PH UR STRIP: >9 PH (ref 5–7)
PLATELET # BLD AUTO: 273 10E9/L (ref 150–450)
PLATELET # BLD AUTO: 288 10E9/L (ref 150–450)
PLATELET # BLD AUTO: 303 10E9/L (ref 150–450)
PLATELET # BLD AUTO: 309 10E9/L (ref 150–450)
PLATELET # BLD AUTO: 353 10E9/L (ref 150–450)
PO2 BLDV: 27 MM HG (ref 25–47)
POTASSIUM SERPL-SCNC: 3.5 MMOL/L (ref 3.4–5.3)
POTASSIUM SERPL-SCNC: 3.8 MMOL/L (ref 3.4–5.3)
POTASSIUM SERPL-SCNC: 3.9 MMOL/L (ref 3.4–5.3)
POTASSIUM SERPL-SCNC: 4.2 MMOL/L (ref 3.4–5.3)
POTASSIUM SERPL-SCNC: 4.3 MMOL/L (ref 3.4–5.3)
POTASSIUM SERPL-SCNC: 4.4 MMOL/L (ref 3.4–5.3)
PROT PATTERN SERPL ELPH-IMP: ABNORMAL
PROT PATTERN SERPL IFE-IMP: ABNORMAL
PROT SERPL-MCNC: 8.8 G/DL (ref 6.8–8.8)
RBC # BLD AUTO: 2.56 10E12/L (ref 3.8–5.2)
RBC # BLD AUTO: 2.64 10E12/L (ref 3.8–5.2)
RBC # BLD AUTO: 2.65 10E12/L (ref 3.8–5.2)
RBC # BLD AUTO: 2.71 10E12/L (ref 3.8–5.2)
RBC # BLD AUTO: 2.77 10E12/L (ref 3.8–5.2)
RBC #/AREA URNS AUTO: ABNORMAL /HPF
RBC #/AREA URNS AUTO: ABNORMAL /HPF
SARS-COV-2 RNA RESP QL NAA+PROBE: NEGATIVE
SODIUM SERPL-SCNC: 135 MMOL/L (ref 133–144)
SODIUM SERPL-SCNC: 136 MMOL/L (ref 133–144)
SODIUM SERPL-SCNC: 137 MMOL/L (ref 133–144)
SODIUM SERPL-SCNC: 137 MMOL/L (ref 133–144)
SODIUM SERPL-SCNC: 138 MMOL/L (ref 133–144)
SODIUM SERPL-SCNC: 138 MMOL/L (ref 133–144)
SODIUM SERPL-SCNC: 140 MMOL/L (ref 133–144)
SOURCE: ABNORMAL
SOURCE: ABNORMAL
SP GR UR STRIP: 1.01 (ref 1–1.03)
SP GR UR STRIP: 1.02 (ref 1–1.03)
SPECIMEN SOURCE: ABNORMAL
SPECIMEN SOURCE: ABNORMAL
SPECIMEN SOURCE: NORMAL
T4 FREE SERPL-MCNC: 0.96 NG/DL (ref 0.76–1.46)
T4 FREE SERPL-MCNC: 1.01 NG/DL (ref 0.76–1.46)
T4 FREE SERPL-MCNC: 1.76 NG/DL (ref 0.76–1.46)
TRI-PHOS CRY #/AREA URNS HPF: ABNORMAL /HPF
TROPONIN I SERPL-MCNC: <0.015 UG/L (ref 0–0.04)
TSH SERPL DL<=0.005 MIU/L-ACNC: 0.11 MU/L (ref 0.4–4)
TSH SERPL DL<=0.005 MIU/L-ACNC: 10.45 MU/L (ref 0.4–4)
TSH SERPL DL<=0.005 MIU/L-ACNC: 10.6 MU/L (ref 0.4–4)
UROBILINOGEN UR STRIP-ACNC: 0.2 EU/DL (ref 0.2–1)
UROBILINOGEN UR STRIP-ACNC: 0.2 EU/DL (ref 0.2–1)
VIT B12 SERPL-MCNC: 264 PG/ML (ref 193–986)
WBC # BLD AUTO: 11 10E9/L (ref 4–11)
WBC # BLD AUTO: 7.7 10E9/L (ref 4–11)
WBC # BLD AUTO: 7.8 10E9/L (ref 4–11)
WBC # BLD AUTO: 8.7 10E9/L (ref 4–11)
WBC # BLD AUTO: 9.2 10E9/L (ref 4–11)
WBC #/AREA URNS AUTO: ABNORMAL /HPF
WBC #/AREA URNS AUTO: ABNORMAL /HPF

## 2021-01-01 PROCEDURE — 99233 SBSQ HOSP IP/OBS HIGH 50: CPT | Performed by: HOSPITALIST

## 2021-01-01 PROCEDURE — 81001 URINALYSIS AUTO W/SCOPE: CPT | Performed by: INTERNAL MEDICINE

## 2021-01-01 PROCEDURE — 36415 COLL VENOUS BLD VENIPUNCTURE: CPT | Performed by: INTERNAL MEDICINE

## 2021-01-01 PROCEDURE — 87088 URINE BACTERIA CULTURE: CPT | Performed by: INTERNAL MEDICINE

## 2021-01-01 PROCEDURE — 82565 ASSAY OF CREATININE: CPT | Mod: 91 | Performed by: INTERNAL MEDICINE

## 2021-01-01 PROCEDURE — 83036 HEMOGLOBIN GLYCOSYLATED A1C: CPT | Performed by: INTERNAL MEDICINE

## 2021-01-01 PROCEDURE — 80048 BASIC METABOLIC PNL TOTAL CA: CPT | Performed by: NURSE PRACTITIONER

## 2021-01-01 PROCEDURE — 82607 VITAMIN B-12: CPT | Performed by: INTERNAL MEDICINE

## 2021-01-01 PROCEDURE — 80048 BASIC METABOLIC PNL TOTAL CA: CPT | Performed by: INTERNAL MEDICINE

## 2021-01-01 PROCEDURE — 96375 TX/PRO/DX INJ NEW DRUG ADDON: CPT

## 2021-01-01 PROCEDURE — 85025 COMPLETE CBC W/AUTO DIFF WBC: CPT | Performed by: INTERNAL MEDICINE

## 2021-01-01 PROCEDURE — 94640 AIRWAY INHALATION TREATMENT: CPT

## 2021-01-01 PROCEDURE — 85027 COMPLETE CBC AUTOMATED: CPT | Performed by: HOSPITALIST

## 2021-01-01 PROCEDURE — 250N000011 HC RX IP 250 OP 636: Performed by: INTERNAL MEDICINE

## 2021-01-01 PROCEDURE — 85027 COMPLETE CBC AUTOMATED: CPT | Performed by: INTERNAL MEDICINE

## 2021-01-01 PROCEDURE — 96365 THER/PROPH/DIAG IV INF INIT: CPT

## 2021-01-01 PROCEDURE — 82746 ASSAY OF FOLIC ACID SERUM: CPT | Performed by: INTERNAL MEDICINE

## 2021-01-01 PROCEDURE — 93280 PM DEVICE PROGR EVAL DUAL: CPT | Performed by: INTERNAL MEDICINE

## 2021-01-01 PROCEDURE — 84443 ASSAY THYROID STIM HORMONE: CPT | Performed by: INTERNAL MEDICINE

## 2021-01-01 PROCEDURE — 250N000013 HC RX MED GY IP 250 OP 250 PS 637: Performed by: INTERNAL MEDICINE

## 2021-01-01 PROCEDURE — 96376 TX/PRO/DX INJ SAME DRUG ADON: CPT

## 2021-01-01 PROCEDURE — 84439 ASSAY OF FREE THYROXINE: CPT | Performed by: INTERNAL MEDICINE

## 2021-01-01 PROCEDURE — 94640 AIRWAY INHALATION TREATMENT: CPT | Mod: 76

## 2021-01-01 PROCEDURE — 36415 COLL VENOUS BLD VENIPUNCTURE: CPT | Performed by: HOSPITALIST

## 2021-01-01 PROCEDURE — 99215 OFFICE O/P EST HI 40 MIN: CPT | Mod: 25 | Performed by: PHYSICIAN ASSISTANT

## 2021-01-01 PROCEDURE — 80048 BASIC METABOLIC PNL TOTAL CA: CPT | Performed by: PHYSICIAN ASSISTANT

## 2021-01-01 PROCEDURE — 93296 REM INTERROG EVL PM/IDS: CPT | Performed by: INTERNAL MEDICINE

## 2021-01-01 PROCEDURE — 71046 X-RAY EXAM CHEST 2 VIEWS: CPT

## 2021-01-01 PROCEDURE — 258N000003 HC RX IP 258 OP 636: Performed by: PHYSICIAN ASSISTANT

## 2021-01-01 PROCEDURE — 84295 ASSAY OF SERUM SODIUM: CPT | Performed by: INTERNAL MEDICINE

## 2021-01-01 PROCEDURE — 99214 OFFICE O/P EST MOD 30 MIN: CPT | Performed by: INTERNAL MEDICINE

## 2021-01-01 PROCEDURE — 250N000011 HC RX IP 250 OP 636: Performed by: PHYSICIAN ASSISTANT

## 2021-01-01 PROCEDURE — 96374 THER/PROPH/DIAG INJ IV PUSH: CPT

## 2021-01-01 PROCEDURE — 73590 X-RAY EXAM OF LOWER LEG: CPT | Mod: LT

## 2021-01-01 PROCEDURE — 86334 IMMUNOFIX E-PHORESIS SERUM: CPT | Performed by: PATHOLOGY

## 2021-01-01 PROCEDURE — 99285 EMERGENCY DEPT VISIT HI MDM: CPT | Mod: 25

## 2021-01-01 PROCEDURE — 99223 1ST HOSP IP/OBS HIGH 75: CPT | Mod: AI | Performed by: INTERNAL MEDICINE

## 2021-01-01 PROCEDURE — 84484 ASSAY OF TROPONIN QUANT: CPT | Performed by: EMERGENCY MEDICINE

## 2021-01-01 PROCEDURE — 87186 SC STD MICRODIL/AGAR DIL: CPT | Performed by: INTERNAL MEDICINE

## 2021-01-01 PROCEDURE — 999N000157 HC STATISTIC RCP TIME EA 10 MIN

## 2021-01-01 PROCEDURE — 84520 ASSAY OF UREA NITROGEN: CPT | Performed by: INTERNAL MEDICINE

## 2021-01-01 PROCEDURE — 87086 URINE CULTURE/COLONY COUNT: CPT | Performed by: INTERNAL MEDICINE

## 2021-01-01 PROCEDURE — 87635 SARS-COV-2 COVID-19 AMP PRB: CPT | Performed by: EMERGENCY MEDICINE

## 2021-01-01 PROCEDURE — 80048 BASIC METABOLIC PNL TOTAL CA: CPT | Performed by: HOSPITALIST

## 2021-01-01 PROCEDURE — 84132 ASSAY OF SERUM POTASSIUM: CPT | Performed by: INTERNAL MEDICINE

## 2021-01-01 PROCEDURE — 83880 ASSAY OF NATRIURETIC PEPTIDE: CPT | Performed by: EMERGENCY MEDICINE

## 2021-01-01 PROCEDURE — 250N000011 HC RX IP 250 OP 636: Performed by: HOSPITALIST

## 2021-01-01 PROCEDURE — 36415 COLL VENOUS BLD VENIPUNCTURE: CPT | Performed by: NURSE PRACTITIONER

## 2021-01-01 PROCEDURE — 93971 EXTREMITY STUDY: CPT | Mod: LT

## 2021-01-01 PROCEDURE — 120N000001 HC R&B MED SURG/OB

## 2021-01-01 PROCEDURE — 97535 SELF CARE MNGMENT TRAINING: CPT | Mod: GO

## 2021-01-01 PROCEDURE — 82784 ASSAY IGA/IGD/IGG/IGM EACH: CPT | Performed by: INTERNAL MEDICINE

## 2021-01-01 PROCEDURE — 93306 TTE W/DOPPLER COMPLETE: CPT

## 2021-01-01 PROCEDURE — 99N1036 PR STATISTIC TOTAL PROTEIN: Performed by: INTERNAL MEDICINE

## 2021-01-01 PROCEDURE — 85025 COMPLETE CBC W/AUTO DIFF WBC: CPT | Performed by: EMERGENCY MEDICINE

## 2021-01-01 PROCEDURE — 97530 THERAPEUTIC ACTIVITIES: CPT | Mod: GO

## 2021-01-01 PROCEDURE — 93306 TTE W/DOPPLER COMPLETE: CPT | Mod: 26 | Performed by: INTERNAL MEDICINE

## 2021-01-01 PROCEDURE — 84165 PROTEIN E-PHORESIS SERUM: CPT | Performed by: PATHOLOGY

## 2021-01-01 PROCEDURE — 250N000009 HC RX 250: Performed by: INTERNAL MEDICINE

## 2021-01-01 PROCEDURE — 99284 EMERGENCY DEPT VISIT MOD MDM: CPT | Mod: 25

## 2021-01-01 PROCEDURE — C9803 HOPD COVID-19 SPEC COLLECT: HCPCS

## 2021-01-01 PROCEDURE — 99495 TRANSJ CARE MGMT MOD F2F 14D: CPT | Performed by: NURSE PRACTITIONER

## 2021-01-01 PROCEDURE — 250N000011 HC RX IP 250 OP 636: Performed by: EMERGENCY MEDICINE

## 2021-01-01 PROCEDURE — 250N000009 HC RX 250: Performed by: EMERGENCY MEDICINE

## 2021-01-01 PROCEDURE — 97165 OT EVAL LOW COMPLEX 30 MIN: CPT | Mod: GO

## 2021-01-01 PROCEDURE — 93000 ELECTROCARDIOGRAM COMPLETE: CPT | Performed by: INTERNAL MEDICINE

## 2021-01-01 PROCEDURE — 83735 ASSAY OF MAGNESIUM: CPT | Performed by: INTERNAL MEDICINE

## 2021-01-01 PROCEDURE — 93294 REM INTERROG EVL PM/LDLS PM: CPT | Performed by: INTERNAL MEDICINE

## 2021-01-01 PROCEDURE — 99214 OFFICE O/P EST MOD 30 MIN: CPT | Performed by: NURSE PRACTITIONER

## 2021-01-01 PROCEDURE — 250N000013 HC RX MED GY IP 250 OP 250 PS 637: Performed by: HOSPITALIST

## 2021-01-01 PROCEDURE — 83615 LACTATE (LD) (LDH) ENZYME: CPT | Performed by: INTERNAL MEDICINE

## 2021-01-01 PROCEDURE — 999N000087 HC STATISTIC IV OP-OVERFLOW

## 2021-01-01 PROCEDURE — 82565 ASSAY OF CREATININE: CPT | Performed by: INTERNAL MEDICINE

## 2021-01-01 PROCEDURE — 80053 COMPREHEN METABOLIC PANEL: CPT | Performed by: EMERGENCY MEDICINE

## 2021-01-01 PROCEDURE — 99239 HOSP IP/OBS DSCHRG MGMT >30: CPT | Performed by: HOSPITALIST

## 2021-01-01 PROCEDURE — 36415 COLL VENOUS BLD VENIPUNCTURE: CPT | Performed by: PHYSICIAN ASSISTANT

## 2021-01-01 PROCEDURE — 82310 ASSAY OF CALCIUM: CPT | Performed by: INTERNAL MEDICINE

## 2021-01-01 PROCEDURE — 96366 THER/PROPH/DIAG IV INF ADDON: CPT

## 2021-01-01 PROCEDURE — 82728 ASSAY OF FERRITIN: CPT | Performed by: INTERNAL MEDICINE

## 2021-01-01 PROCEDURE — 82805 BLOOD GASES W/O2 SATURATION: CPT | Performed by: EMERGENCY MEDICINE

## 2021-01-01 PROCEDURE — 93005 ELECTROCARDIOGRAM TRACING: CPT

## 2021-01-01 RX ORDER — IPRATROPIUM BROMIDE AND ALBUTEROL SULFATE 2.5; .5 MG/3ML; MG/3ML
3 SOLUTION RESPIRATORY (INHALATION) ONCE
Status: COMPLETED | OUTPATIENT
Start: 2021-01-01 | End: 2021-01-01

## 2021-01-01 RX ORDER — PREDNISONE 10 MG/1
TABLET ORAL
Qty: 15 TABLET | Refills: 0 | Status: SHIPPED | OUTPATIENT
Start: 2021-01-01 | End: 2021-01-01

## 2021-01-01 RX ORDER — TRAMADOL HYDROCHLORIDE 50 MG/1
50 TABLET ORAL DAILY PRN
Status: DISCONTINUED | OUTPATIENT
Start: 2021-01-01 | End: 2021-01-01 | Stop reason: HOSPADM

## 2021-01-01 RX ORDER — ZOLEDRONIC ACID 5 MG/100ML
5 INJECTION, SOLUTION INTRAVENOUS ONCE
Status: CANCELLED
Start: 2021-01-01 | End: 2021-01-01

## 2021-01-01 RX ORDER — AMOXICILLIN 250 MG
1 CAPSULE ORAL 2 TIMES DAILY PRN
Status: DISCONTINUED | OUTPATIENT
Start: 2021-01-01 | End: 2021-01-01 | Stop reason: HOSPADM

## 2021-01-01 RX ORDER — LEVOTHYROXINE SODIUM 75 UG/1
75 TABLET ORAL
Status: DISCONTINUED | OUTPATIENT
Start: 2021-01-01 | End: 2021-01-01

## 2021-01-01 RX ORDER — MAGNESIUM SULFATE HEPTAHYDRATE 40 MG/ML
2 INJECTION, SOLUTION INTRAVENOUS ONCE
Status: COMPLETED | OUTPATIENT
Start: 2021-01-01 | End: 2021-01-01

## 2021-01-01 RX ORDER — ZOLEDRONIC ACID 5 MG/100ML
5 INJECTION, SOLUTION INTRAVENOUS ONCE
Status: COMPLETED | OUTPATIENT
Start: 2021-01-01 | End: 2021-01-01

## 2021-01-01 RX ORDER — METHYLPREDNISOLONE SODIUM SUCCINATE 125 MG/2ML
125 INJECTION, POWDER, LYOPHILIZED, FOR SOLUTION INTRAMUSCULAR; INTRAVENOUS ONCE
Status: COMPLETED | OUTPATIENT
Start: 2021-01-01 | End: 2021-01-01

## 2021-01-01 RX ORDER — LEVOTHYROXINE SODIUM 50 UG/1
50 TABLET ORAL DAILY
Qty: 60 TABLET | Refills: 0 | Status: SHIPPED | OUTPATIENT
Start: 2021-01-01 | End: 2021-01-01

## 2021-01-01 RX ORDER — TRAMADOL HYDROCHLORIDE 50 MG/1
TABLET ORAL
Qty: 20 TABLET | Refills: 0 | Status: SHIPPED | OUTPATIENT
Start: 2021-01-01

## 2021-01-01 RX ORDER — LEVOTHYROXINE SODIUM 75 UG/1
75 TABLET ORAL
Status: DISCONTINUED | OUTPATIENT
Start: 2021-01-01 | End: 2021-01-01 | Stop reason: HOSPADM

## 2021-01-01 RX ORDER — NALOXONE HYDROCHLORIDE 0.4 MG/ML
0.4 INJECTION, SOLUTION INTRAMUSCULAR; INTRAVENOUS; SUBCUTANEOUS
Status: DISCONTINUED | OUTPATIENT
Start: 2021-01-01 | End: 2021-01-01 | Stop reason: HOSPADM

## 2021-01-01 RX ORDER — METOPROLOL TARTRATE 50 MG
TABLET ORAL
Qty: 270 TABLET | Refills: 3 | Status: SHIPPED | OUTPATIENT
Start: 2021-01-01

## 2021-01-01 RX ORDER — HEPARIN SODIUM (PORCINE) LOCK FLUSH IV SOLN 100 UNIT/ML 100 UNIT/ML
5 SOLUTION INTRAVENOUS
Status: CANCELLED | OUTPATIENT
Start: 2021-01-01

## 2021-01-01 RX ORDER — ONDANSETRON 2 MG/ML
4 INJECTION INTRAMUSCULAR; INTRAVENOUS EVERY 6 HOURS PRN
Status: DISCONTINUED | OUTPATIENT
Start: 2021-01-01 | End: 2021-01-01 | Stop reason: HOSPADM

## 2021-01-01 RX ORDER — BISACODYL 10 MG
10 SUPPOSITORY, RECTAL RECTAL DAILY PRN
Status: DISCONTINUED | OUTPATIENT
Start: 2021-01-01 | End: 2021-01-01 | Stop reason: HOSPADM

## 2021-01-01 RX ORDER — LEVOTHYROXINE SODIUM 50 UG/1
50 TABLET ORAL DAILY
Qty: 90 TABLET | Refills: 3 | Status: SHIPPED | OUTPATIENT
Start: 2021-01-01 | End: 2021-01-01 | Stop reason: DRUGHIGH

## 2021-01-01 RX ORDER — HEPARIN SODIUM (PORCINE) LOCK FLUSH IV SOLN 100 UNIT/ML 100 UNIT/ML
5 SOLUTION INTRAVENOUS
Status: DISCONTINUED | OUTPATIENT
Start: 2021-01-01 | End: 2021-01-01 | Stop reason: HOSPADM

## 2021-01-01 RX ORDER — FLECAINIDE ACETATE 150 MG/1
TABLET ORAL
Qty: 90 TABLET | Refills: 0 | Status: SHIPPED | OUTPATIENT
Start: 2021-01-01 | End: 2021-01-01

## 2021-01-01 RX ORDER — AMOXICILLIN 250 MG
2 CAPSULE ORAL 2 TIMES DAILY PRN
Status: DISCONTINUED | OUTPATIENT
Start: 2021-01-01 | End: 2021-01-01 | Stop reason: HOSPADM

## 2021-01-01 RX ORDER — ALBUTEROL SULFATE 90 UG/1
2 AEROSOL, METERED RESPIRATORY (INHALATION) EVERY 6 HOURS
Start: 2021-01-01

## 2021-01-01 RX ORDER — HEPARIN SODIUM,PORCINE 10 UNIT/ML
5 VIAL (ML) INTRAVENOUS
Status: CANCELLED | OUTPATIENT
Start: 2021-01-01

## 2021-01-01 RX ORDER — FUROSEMIDE 20 MG
20 TABLET ORAL DAILY
Qty: 90 TABLET | Refills: 1 | Status: SHIPPED | OUTPATIENT
Start: 2021-01-01 | End: 2021-01-01 | Stop reason: ALTCHOICE

## 2021-01-01 RX ORDER — TORSEMIDE 10 MG/1
TABLET ORAL
Qty: 60 TABLET | Refills: 3 | Status: SHIPPED | OUTPATIENT
Start: 2021-01-01

## 2021-01-01 RX ORDER — HEPARIN SODIUM,PORCINE 10 UNIT/ML
5 VIAL (ML) INTRAVENOUS
Status: DISCONTINUED | OUTPATIENT
Start: 2021-01-01 | End: 2021-01-01 | Stop reason: HOSPADM

## 2021-01-01 RX ORDER — POTASSIUM CHLORIDE 750 MG/1
10 TABLET, EXTENDED RELEASE ORAL ONCE
Status: COMPLETED | OUTPATIENT
Start: 2021-01-01 | End: 2021-01-01

## 2021-01-01 RX ORDER — NALOXONE HYDROCHLORIDE 0.4 MG/ML
0.2 INJECTION, SOLUTION INTRAMUSCULAR; INTRAVENOUS; SUBCUTANEOUS
Status: DISCONTINUED | OUTPATIENT
Start: 2021-01-01 | End: 2021-01-01 | Stop reason: HOSPADM

## 2021-01-01 RX ORDER — SULFAMETHOXAZOLE/TRIMETHOPRIM 800-160 MG
1 TABLET ORAL 2 TIMES DAILY
Qty: 6 TABLET | Refills: 0 | Status: SHIPPED | OUTPATIENT
Start: 2021-01-01 | End: 2021-01-01

## 2021-01-01 RX ORDER — LEVOTHYROXINE SODIUM 50 UG/1
50 TABLET ORAL
Status: DISCONTINUED | OUTPATIENT
Start: 2021-01-01 | End: 2021-01-01

## 2021-01-01 RX ORDER — FUROSEMIDE 10 MG/ML
40 INJECTION INTRAMUSCULAR; INTRAVENOUS ONCE
Status: COMPLETED | OUTPATIENT
Start: 2021-01-01 | End: 2021-01-01

## 2021-01-01 RX ORDER — SULFAMETHOXAZOLE AND TRIMETHOPRIM 400; 80 MG/1; MG/1
1 TABLET ORAL 2 TIMES DAILY
Qty: 14 TABLET | Refills: 0 | Status: SHIPPED | OUTPATIENT
Start: 2021-01-01

## 2021-01-01 RX ORDER — FUROSEMIDE 10 MG/ML
20 INJECTION INTRAMUSCULAR; INTRAVENOUS
Status: COMPLETED | OUTPATIENT
Start: 2021-01-01 | End: 2021-01-01

## 2021-01-01 RX ORDER — ACETAMINOPHEN 500 MG
1000 TABLET ORAL 3 TIMES DAILY
Status: DISCONTINUED | OUTPATIENT
Start: 2021-01-01 | End: 2021-01-01 | Stop reason: HOSPADM

## 2021-01-01 RX ORDER — RIVAROXABAN 20 MG/1
TABLET, FILM COATED ORAL
Qty: 90 TABLET | Refills: 3 | OUTPATIENT
Start: 2021-01-01

## 2021-01-01 RX ORDER — PROCHLORPERAZINE 25 MG
12.5 SUPPOSITORY, RECTAL RECTAL EVERY 12 HOURS PRN
Status: DISCONTINUED | OUTPATIENT
Start: 2021-01-01 | End: 2021-01-01 | Stop reason: HOSPADM

## 2021-01-01 RX ORDER — FLECAINIDE ACETATE 150 MG/1
TABLET ORAL
Qty: 90 TABLET | Refills: 3 | Status: SHIPPED | OUTPATIENT
Start: 2021-01-01

## 2021-01-01 RX ORDER — PROCHLORPERAZINE MALEATE 5 MG
5 TABLET ORAL EVERY 6 HOURS PRN
Status: DISCONTINUED | OUTPATIENT
Start: 2021-01-01 | End: 2021-01-01 | Stop reason: HOSPADM

## 2021-01-01 RX ORDER — ONDANSETRON 4 MG/1
4 TABLET, ORALLY DISINTEGRATING ORAL EVERY 6 HOURS PRN
Status: DISCONTINUED | OUTPATIENT
Start: 2021-01-01 | End: 2021-01-01 | Stop reason: HOSPADM

## 2021-01-01 RX ORDER — FUROSEMIDE 20 MG
20 TABLET ORAL DAILY
Start: 2021-01-01 | End: 2021-01-01

## 2021-01-01 RX ORDER — LEVOTHYROXINE SODIUM 75 UG/1
75 TABLET ORAL DAILY
Qty: 90 TABLET | Refills: 3 | Status: SHIPPED | OUTPATIENT
Start: 2021-01-01

## 2021-01-01 RX ORDER — METOPROLOL TARTRATE 50 MG
TABLET ORAL
Qty: 270 TABLET | Refills: 0 | Status: SHIPPED | OUTPATIENT
Start: 2021-01-01 | End: 2021-01-01

## 2021-01-01 RX ORDER — IPRATROPIUM BROMIDE AND ALBUTEROL SULFATE 2.5; .5 MG/3ML; MG/3ML
3 SOLUTION RESPIRATORY (INHALATION)
Status: DISCONTINUED | OUTPATIENT
Start: 2021-01-01 | End: 2021-01-01 | Stop reason: HOSPADM

## 2021-01-01 RX ORDER — ALBUTEROL SULFATE 0.83 MG/ML
3 SOLUTION RESPIRATORY (INHALATION)
Status: DISCONTINUED | OUTPATIENT
Start: 2021-01-01 | End: 2021-01-01 | Stop reason: HOSPADM

## 2021-01-01 RX ADMIN — METOPROLOL TARTRATE 75 MG: 50 TABLET, FILM COATED ORAL at 21:05

## 2021-01-01 RX ADMIN — LEVOTHYROXINE SODIUM 75 MCG: 75 TABLET ORAL at 06:30

## 2021-01-01 RX ADMIN — ACETAMINOPHEN 1000 MG: 500 TABLET, FILM COATED ORAL at 17:21

## 2021-01-01 RX ADMIN — POTASSIUM CHLORIDE 10 MEQ: 750 TABLET, EXTENDED RELEASE ORAL at 16:01

## 2021-01-01 RX ADMIN — IPRATROPIUM BROMIDE AND ALBUTEROL SULFATE 3 ML: .5; 3 SOLUTION RESPIRATORY (INHALATION) at 19:56

## 2021-01-01 RX ADMIN — ACETAMINOPHEN 1000 MG: 500 TABLET, FILM COATED ORAL at 21:04

## 2021-01-01 RX ADMIN — FLUTICASONE FUROATE AND VILANTEROL TRIFENATATE 1 PUFF: 200; 25 POWDER RESPIRATORY (INHALATION) at 08:59

## 2021-01-01 RX ADMIN — ACETAMINOPHEN 1000 MG: 500 TABLET, FILM COATED ORAL at 08:25

## 2021-01-01 RX ADMIN — ACETAMINOPHEN 1000 MG: 500 TABLET, FILM COATED ORAL at 08:59

## 2021-01-01 RX ADMIN — IPRATROPIUM BROMIDE AND ALBUTEROL SULFATE 3 ML: .5; 3 SOLUTION RESPIRATORY (INHALATION) at 15:50

## 2021-01-01 RX ADMIN — FLECAINIDE ACETATE 75 MG: 50 TABLET ORAL at 21:05

## 2021-01-01 RX ADMIN — METOPROLOL TARTRATE 75 MG: 50 TABLET, FILM COATED ORAL at 00:30

## 2021-01-01 RX ADMIN — RIVAROXABAN 15 MG: 15 TABLET, FILM COATED ORAL at 00:30

## 2021-01-01 RX ADMIN — FLECAINIDE ACETATE 75 MG: 50 TABLET ORAL at 00:29

## 2021-01-01 RX ADMIN — IPRATROPIUM BROMIDE AND ALBUTEROL SULFATE 3 ML: .5; 3 SOLUTION RESPIRATORY (INHALATION) at 23:05

## 2021-01-01 RX ADMIN — FUROSEMIDE 40 MG: 10 INJECTION, SOLUTION INTRAVENOUS at 21:02

## 2021-01-01 RX ADMIN — FLUTICASONE FUROATE AND VILANTEROL TRIFENATATE 1 PUFF: 200; 25 POWDER RESPIRATORY (INHALATION) at 08:27

## 2021-01-01 RX ADMIN — FUROSEMIDE 20 MG: 10 INJECTION, SOLUTION INTRAVENOUS at 17:21

## 2021-01-01 RX ADMIN — RIVAROXABAN 15 MG: 15 TABLET, FILM COATED ORAL at 17:22

## 2021-01-01 RX ADMIN — FUROSEMIDE 40 MG: 10 INJECTION, SOLUTION INTRAVENOUS at 11:00

## 2021-01-01 RX ADMIN — IPRATROPIUM BROMIDE AND ALBUTEROL SULFATE 3 ML: .5; 3 SOLUTION RESPIRATORY (INHALATION) at 19:46

## 2021-01-01 RX ADMIN — IPRATROPIUM BROMIDE AND ALBUTEROL SULFATE 3 ML: .5; 3 SOLUTION RESPIRATORY (INHALATION) at 08:09

## 2021-01-01 RX ADMIN — IPRATROPIUM BROMIDE AND ALBUTEROL SULFATE 3 ML: .5; 3 SOLUTION RESPIRATORY (INHALATION) at 08:32

## 2021-01-01 RX ADMIN — IPRATROPIUM BROMIDE AND ALBUTEROL SULFATE 3 ML: .5; 3 SOLUTION RESPIRATORY (INHALATION) at 12:18

## 2021-01-01 RX ADMIN — LEVOTHYROXINE SODIUM 75 MCG: 75 TABLET ORAL at 06:50

## 2021-01-01 RX ADMIN — FLECAINIDE ACETATE 75 MG: 50 TABLET ORAL at 08:59

## 2021-01-01 RX ADMIN — IPRATROPIUM BROMIDE AND ALBUTEROL SULFATE 3 ML: .5; 3 SOLUTION RESPIRATORY (INHALATION) at 18:54

## 2021-01-01 RX ADMIN — FUROSEMIDE 20 MG: 10 INJECTION, SOLUTION INTRAVENOUS at 11:22

## 2021-01-01 RX ADMIN — FLECAINIDE ACETATE 75 MG: 50 TABLET ORAL at 08:25

## 2021-01-01 RX ADMIN — METOPROLOL TARTRATE 75 MG: 50 TABLET, FILM COATED ORAL at 08:26

## 2021-01-01 RX ADMIN — ZOLEDRONIC ACID 5 MG: 0.05 INJECTION, SOLUTION INTRAVENOUS at 13:29

## 2021-01-01 RX ADMIN — METOPROLOL TARTRATE 75 MG: 50 TABLET, FILM COATED ORAL at 08:59

## 2021-01-01 RX ADMIN — FUROSEMIDE 20 MG/HR: 10 INJECTION, SOLUTION INTRAMUSCULAR; INTRAVENOUS at 11:43

## 2021-01-01 RX ADMIN — MAGNESIUM SULFATE HEPTAHYDRATE 2 G: 40 INJECTION, SOLUTION INTRAVENOUS at 18:48

## 2021-01-01 RX ADMIN — IPRATROPIUM BROMIDE AND ALBUTEROL SULFATE 3 ML: .5; 3 SOLUTION RESPIRATORY (INHALATION) at 11:48

## 2021-01-01 RX ADMIN — ACETAMINOPHEN 1000 MG: 500 TABLET, FILM COATED ORAL at 00:30

## 2021-01-01 RX ADMIN — METHYLPREDNISOLONE SODIUM SUCCINATE 125 MG: 125 INJECTION, POWDER, FOR SOLUTION INTRAMUSCULAR; INTRAVENOUS at 18:49

## 2021-01-01 RX ADMIN — IPRATROPIUM BROMIDE AND ALBUTEROL SULFATE 3 ML: .5; 3 SOLUTION RESPIRATORY (INHALATION) at 18:47

## 2021-01-01 ASSESSMENT — MIFFLIN-ST. JEOR
SCORE: 996.9
SCORE: 996.9
SCORE: 1062.67
SCORE: 1074.01
SCORE: 1060.4
SCORE: 1044.52
SCORE: 1041.5
SCORE: 1074.01

## 2021-01-01 ASSESSMENT — ACTIVITIES OF DAILY LIVING (ADL)
ADLS_ACUITY_SCORE: 16
ADLS_ACUITY_SCORE: 14
ADLS_ACUITY_SCORE: 16
ADLS_ACUITY_SCORE: 14
ADLS_ACUITY_SCORE: 16

## 2021-01-01 ASSESSMENT — ENCOUNTER SYMPTOMS
SHORTNESS OF BREATH: 0
COUGH: 0
VOMITING: 0
HEADACHES: 0
FEVER: 0
ABDOMINAL PAIN: 0
SHORTNESS OF BREATH: 1

## 2021-01-01 ASSESSMENT — PAIN SCALES - GENERAL: PAINLEVEL: NO PAIN (0)

## 2021-02-04 NOTE — TELEPHONE ENCOUNTER
Reason for Call:  Other prescription    Detailed comments: reclast infusion- Pt is due in May but trying to get this started early    Phone Number Patient's daughter Oxana can be reached at: [phone]  *K4A verified    Best Time: any    Can we leave a detailed message on this number? YES    Call taken on 2/4/2021 at 2:05 PM by Leah Pat

## 2021-02-05 NOTE — TELEPHONE ENCOUNTER
PCP,    Pt understands she's not due for reclast until may but would like to get a head start on scheduling.   Pended infusion in Admit/Therapy plan    Please review and authorize if appropriate.  Please route back once signed.    Thank you,   Christofer COX RN

## 2021-03-23 NOTE — TELEPHONE ENCOUNTER
LOV 1- [facility], 5- [facility] acute    No future OV scheduled    SIG/Pharm note given    RT Ramona (R)

## 2021-04-07 NOTE — ED PROVIDER NOTES
History   Chief Complaint:  Leg Pain    The history is provided by the patient.      Lindsey Hale is a 89 year old female on Xarelto with history of atrial fibrillation, osteopenia, bilateral lower extremity edema, hypertension, hyperlipidemia, and type 2 diabetes mellitus who presents with left leg pain. She reports 1 month of pain in her left leg. She has had no known trauma. She endorsed bilateral leg pain in triage, but she denies right leg pain now. She has no foot pain. She has been taking per Xarelto as prescribed. The daughter notes the patient was prescribed compression stockings in the past, but they were very painful for the patient making compliance difficult. She denies other concerns including chest pain, shortness of breath, abdominal pain, vomiting, or headache.     Review of Systems   Respiratory: Negative for shortness of breath.    Cardiovascular: Positive for leg swelling (chronic ). Negative for chest pain.   Gastrointestinal: Negative for abdominal pain and vomiting.   Musculoskeletal:        Left leg pain    Neurological: Negative for headaches.   All other systems reviewed and are negative.    Allergies:  Clindamycin Hcl  Indocin  Xylocaine-Epinephrine   Ampicillin Potassium  Celebrex   Ciprofloxacin  Erythromycin  Penicillins  Vibramycin    Medications:  Symbicort  Tambocor  Lasix  Synthroid  Lopressor  Xarelto    Past Medical History:    Arthritis  Atrial fibrillation   Breast cysts  Bilateral lower extremity edema   Chronic LBP  COPD  Chronic Kidney Disease   diastolic dysfunction  dysphagia    Hyperlipidemia  Hypertension  Hypothyroid  Lumbar spinal stenosis  Mild aortic stenosis  Mitral regurgitation  osteopenia  Osteoporotic compression fracture of spine  Polymyalgia rheumatica   TIA  tyep 2 diabetes mellitus   Urosepsis   Vision changes      Past Surgical History:    Arthroplasty hip left  arthroplasty patello femoral   Arthroscopy knee  Biopsy breast  Breast implant  x4  Cataract  Foot surgery   Mastectomy  STEFAN    Family History:    Father: CAD, lymphoma  Mother: cancer   Sister: breast cancer, osteopenia     Social History:  The patient presents to the ER with daughter.   Patient lives alone.     Physical Exam     Patient Vitals for the past 24 hrs:   BP Temp Temp src Pulse Resp SpO2 Height Weight   04/07/21 1314 (!) 153/46 97.3  F (36.3  C) Temporal 91 20 93 % 1.524 m (5') 68 kg (150 lb)       Physical Exam    General:  Sitting on bed with daughter at bedside, comfortable appearing.   HENT:  No obvious trauma to head  Right Ear:  External ear normal.   Left Ear:  External ear normal.   Nose:  Nose normal.   Eyes:  Conjunctivae and EOM are normal.  Neck: Normal range of motion. Neck supple. No tracheal deviation present.   Pulm/Chest: No respiratory distress  M/S: Mild tenderness to the left lower extremity over the proximal third or fibula. No erythema or warmth. Small contusion present. No crepitous. DP is 2+. No tenderness to foot, ankle, or left hip.  No pain with internal or external rotation of the left hip.  Neuro: Alert. GCS 15.  Skin: Skin is warm and dry. No rash noted. Not diaphoretic.   Psych: Normal mood and affect. Behavior is normal.     Emergency Department Course     Imaging:  XR Tibia & Fibula Left 2 Views  IMPRESSION: No fracture identified.  Reading per radiology    US Lower Extremity Venous Duplex Left  IMPRESSION:   No evidence of deep venous thrombosis in the left lower extremity  Reading per radiology    Emergency Department Course:    Reviewed:  I reviewed nursing notes, vitals and past medical history    Assessments:  1539 I obtained history and examined the patient as noted above.   1641 I rechecked the patient and explained findings.     Disposition:  The patient was discharged to home.       Impression & Plan     Medical Decision Making:  Lindsey Hale is a very pleasant 89 year old female presents for evaluation on non traumatic left leg  pain.  Signs and symptoms are consistent with a contusion.  A broad differential was considered including sprain, strain, fracture, tendon rupture, DVT, nerve impingement/compromise, referred pain.  Ultrasound showed no evidence of DVT.  There is no evidence of proximal fibular fracture or any lesion to the bone.  Her compartments are soft.  There is no erythema or warmth to suggest cellulitis.  There is a small contusion seen superficial and I suspect this is etiology of her discomfort and/or deeper contusion.  Supportive outpatient management is indicated.  Rest, ice, and elevation treatment was discussed with the patient. I also recommended compression stockings with lighter compression. Close follow-up with patient's primary care physician per discharge precautions. Contusion discharge instructions given for home.    The treatment plan was discussed with the patient and they expressed understanding of this plan and consented to the plan.  In addition, the patient will return to the emergency department if their symptoms persist, worsen, if new symptoms arise or if there is any concern as other pathology may be present that is not evident at this time. They also understand the importance of close follow up in the clinic and if unable to do so will return to the emergency department for a reevaluation. All questions were answered.    Diagnosis:    ICD-10-CM    1. Pain of left lower leg  M79.662    2. Contusion of left lower extremity, initial encounter  S80.12XA        Scribe Disclosure:  I, Vanessa Nance, am serving as a scribe at 3:39 PM on 4/7/2021 to document services personally performed by Billy Mathew DO based on my observations and the provider's statements to me.        Billy Mathew DO  04/07/21 8761

## 2021-04-07 NOTE — ED TRIAGE NOTES
Pt reports 1 month of bilat leg pain. Pt states that the leg pain is now primarily in the L lateral leg.

## 2021-04-15 NOTE — PROGRESS NOTES
The patient presents with her daughter for a few issues.    The patient has been in the ER twice as noted and reviewed for left lower leg pain.  There is been no trauma or injury.  The pain seems to come and go.  There is no significant pattern, it can be in bed but she also can happen when she is ambulatory.  She is not having radiating low back pain.  The right side is fine.  The leg is not been swollen.  It can be tender.  She does not have fevers.  She has been seen in the ER twice and had 2 - ultrasounds and one negative x-ray for this.    Additionally, she is having significant shortness of breath with exertion.  She does have COPD and was hospitalized for it in 2019.  She takes her Symbicort regularly.  She tried Incruse Ellipta in the past but this really did not help.  She uses albuterol but just once a day and it does help.    She has prior polymyalgia rheumatica which has not been active in quite some time.  No headaches.  No fevers or night sweats.    She has diabetes for which she is not on medication.  She has chronic low back pain and is using tramadol once a day or less without any signs of abuse.  She has sick sinus syndrome and has a pacemaker as well as atrial fibrillation and CKD.  She has hypothyroidism.    She denies chest pain.  No coughs or fevers.  No GI symptoms.    Past Medical History:   Diagnosis Date     Abnormal echocardiogram 04/2017    mild mr, ar, mitral stenosis, nl ef, lvh.  Done for episode of vision change     Arthritis 99         Atrial fibrillation (H) 2011 and 2009    neg nuc est 2009, Dr. Vazquez, on coumadin     Chest pain 2000    neg est echo, neg ct chest abd, pelvis Hoahaoism     Chronic LBP      COPD (chronic obstructive pulmonary disease) (H)     seen by pulmonary md Dr. Whitt, and given inhaler     Cysts, breast 1980s    bilat mastectomies     Diastolic dysfunction 12/2019    on echo done for sob, also mild to mod mr, nl ef, mild mitral stenosis and mild a.s      Dizzy 2007    ct neg, carotid us neg     Dysphagia 2005    egd nl     Elevated blood sugar      Environmental allergies      Hematuria 2004    neg cysto and us     Hemoptyses 2008    ct neg, bronch neg 2009     Hyperlipidemia      Hypertension      Hypothyroid 1995    Dr. Ortiz     Lumbar spinal stenosis 12/2018    seen on ct done for lbp     Mild aortic stenosis 12/2019     Mild mitral stenosis 12/2019    on echo done for sob     Mitral regurgitation 6/15, 12/19    on echo     nausea 2006    ct abd and ;pelvis neg     Osteopenia     fu dexa better 2011     Osteoporotic compression fracture of spine (H) 2018    got iv reclast 3/6/19     Pacemaker 2011    afib with pauses and syncope     Palpitations 2009    neg est     PMR (polymyalgia rheumatica) (H) 2004    not active in years     SOB (shortness of breath) 5/15    echo nl ef, 2+mr, cxr clear, seen by pulm and given inhaler     Supraventricular premature beats      T12 compression fracture (H) 12/2018    non traumatic     TIA (transient ischaemic attack) 2009    carotids neg, mri neg     Treadmill stress test negative for angina pectoris 2011    nuclear est     Urine incontinence     Dr. Westfall     Urosepsis 2009    hospitalized     Vision changes 2011    eval by neuro and ophtho and no cause found     Past Surgical History:   Procedure Laterality Date     ARTHROPLASTY HIP Left 12/5/2017    Procedure: ARTHROPLASTY HIP;  LEFT HIP ARBEN ARTHROPLASTY(SORAYA);  Surgeon: Darell Nathan MD;  Location:  OR     ARTHROPLASTY PATELLO-FEMORAL (KNEE)  1998 and 2002     ARTHROSCOPY KNEE  1997     BIOPSY BREAST Right 9/23/2014    Procedure: BIOPSY BREAST;  Surgeon: David Carter MD;  [facility]     breast implants      4x     cataract  2011     FOOT SURGERY  2003     MASTECTOMY  1980    bilat, cysts     nj not bso  70's    not for ca     Social History     Socioeconomic History     Marital status:      Spouse name: Not on file     Number of  children: 4     Years of education: Not on file     Highest education level: Not on file   Occupational History     Not on file   Social Needs     Financial resource strain: Not on file     Food insecurity     Worry: Not on file     Inability: Not on file     Transportation needs     Medical: Not on file     Non-medical: Not on file   Tobacco Use     Smoking status: Former Smoker     Types: Cigarettes     Quit date: 1955     Years since quittin.2     Smokeless tobacco: Never Used     Tobacco comment: quit in    had smoked about 2 cigarettes a day or more   Substance and Sexual Activity     Alcohol use: Yes     Alcohol/week: 0.0 standard drinks     Comment: occ wine     Drug use: No     Sexual activity: Not Currently   Lifestyle     Physical activity     Days per week: Not on file     Minutes per session: Not on file     Stress: Not on file   Relationships     Social connections     Talks on phone: Not on file     Gets together: Not on file     Attends [employer] service: Not on file     Active member of club or organization: Not on file     Attends meetings of clubs or organizations: Not on file     Relationship status: Not on file     Intimate partner violence     Fear of current or ex partner: Not on file     Emotionally abused: Not on file     Physically abused: Not on file     Forced sexual activity: Not on file   Other Topics Concern     Parent/sibling w/ CABG, MI or angioplasty before 65F 55M? Not Asked      Service Not Asked     Blood Transfusions Not Asked     Caffeine Concern No     Comment: coffee: 1 cup a week.  Occ green tea     Occupational Exposure Not Asked     Hobby Hazards Not Asked     Sleep Concern No     Stress Concern No     Weight Concern No     Special Diet No     Back Care Not Asked     Exercise Yes     Comment: walking daily     Bike Helmet Not Asked     Seat Belt Yes     Self-Exams Not Asked   Social History Narrative     Not on file     Current Outpatient Medications    Medication Sig Dispense Refill     acetaminophen (TYLENOL) 500 MG tablet Take 1,000 mg by mouth 3 times daily       albuterol (PROAIR HFA/PROVENTIL HFA/VENTOLIN HFA) 108 (90 Base) MCG/ACT inhaler Inhale 2 puffs into the lungs every 6 hours       budesonide-formoterol (SYMBICORT) 160-4.5 MCG/ACT inhaler Inhale 2 puffs into the lungs 2 times daily        flecainide (TAMBOCOR) 150 MG tablet Take 1/2 tablet by mouth twice daily. Please call [phone] for an appointment. 90 tablet 0     Furosemide (LASIX PO) Take 20 mg by mouth daily as needed (Patient states that she is supposed to be taking medication everyday but has only been using once in a while)       levothyroxine (SYNTHROID/LEVOTHROID) 50 MCG tablet Take 1 tablet (50 mcg) by mouth daily - due for fasting annual exam 60 tablet 0     metoprolol tartrate (LOPRESSOR) 50 MG tablet TAKE 1 AND 1/2 TABLETS BY MOUTH TWICE DAILY 270 tablet 0     polyethylene glycol (MIRALAX/GLYCOLAX) powder Take 17 g by mouth daily as needed for constipation       predniSONE (DELTASONE) 10 MG tablet Take 2 pills daily for 5 days then one daily 15 tablet 0     rivaroxaban ANTICOAGULANT (XARELTO ANTICOAGULANT) 20 MG TABS tablet Take 1 tablet (20 mg) by mouth daily (with dinner) 90 tablet 3     traMADol (ULTRAM) 50 MG tablet Take one daily prn 20 tablet 0     vitamin (B COMPLEX-C) tablet Take 1 tablet by mouth daily       VITAMIN D PO Take 1 tablet by mouth daily (OTC: Patient unsure of strength)       Allergies   Allergen Reactions     Clindamycin Hcl Other (See Comments)     Difficulty swallowing     Indocin Headache     Ended [facility] with [facility] head ache     Xylocaine-Epinephrine [Epinephrine-Lidocaine-Na Metabisulfite]      Xylocaine with epi caused a rapid heart beat     Ampicillin Potassium Nausea and Vomiting and Rash     Celebrex [Celecoxib] Rash     Ciprofloxacin Rash     Erythromycin Rash     Penicillins Nausea and Vomiting and Rash     Vibramycin  [Doxycycline Hyclate] Rash     FAMILY HISTORY NOTED AND REVIEWED    REVIEW OF SYSTEMS: above    PHYSICAL EXAM    BP (!) 164/73 (BP Location: Right arm, Patient Position: Chair, Cuff Size: Adult Large)   Pulse 60   Ht 1.524 m (5')   Wt 68 kg (150 lb)   SpO2 96%   Breastfeeding No   BMI 29.29 kg/m      Patient appears non toxic  Lungs dec flow but clear  cv reglar rate and rhythm 2/6 sm, no jvp or edema  Abdomen normal active bowel sounds, soft non-tender  Legs bilat larger but not edematous, some bruising, not red, warm and no masses or signs of clot, normal cap refill and warmth.  She has small over over left mid shin that is tender, but it is not red or warm and there is no masses or fluctuance.    ASSESSMENT:  1. Left leg pain of unclear cause, doubt dvt, vascular, tumor, fx, doubt radicular or knee related  2. Shortness of breath, suspect copd, doubt cv, pneumonia, pulmonary embolis or tumor  3. Pmr, not active  4. Sss with pacer  5. Afib, on noac  6. Diabetes, follow up labs  7. Ckd, follow up labs  8. Hypothyroidism, follow up labs  9. Low back pain    PLAN:  Prednisone 20mg for 5 days then 10mg  Change albuterol to tid  Follow up 3 weeks  Labs today  Refilled tramadol    Jeffery Sherwood M.D.

## 2021-04-15 NOTE — PATIENT INSTRUCTIONS
Start the prednisone as use as directed    Change the albuterol to three times daily    See me back in 3 weeks    Jeffery Sherwood M.D.

## 2021-04-19 NOTE — TELEPHONE ENCOUNTER
Please call the patient regarding her labs.  For the most part they are fine.  Her TSH or thyroid test is just a bit off and I will repeated at her next office visit.  Her hemoglobin is slightly lower as well and again I will repeat this at her next office visit.    Thank you      Jeffery Sherwood M.D.

## 2021-04-27 NOTE — TELEPHONE ENCOUNTER
LOV 4- PG    Next 5 appointments (look out 90 days)    May 06, 2021  1:00 PM  Office Visit with Jeffery Sherwood MD  [facility] [address] [facility] [address]  [phone]          Keren Bobby RT (R)

## 2021-04-28 NOTE — TELEPHONE ENCOUNTER
"Failed protocol.  please route to  team if patient needs an appointment     Catherine CHEUNGRN BSN  [facility] [place]  [phone]      Requested Prescriptions   Pending Prescriptions Disp Refills     levothyroxine (SYNTHROID/LEVOTHROID) 50 MCG tablet 90 tablet 3     Sig: Take 1 tablet (50 mcg) by mouth daily       Thyroid Protocol Failed - 4/27/2021 11:35 AM        Failed - Normal TSH on file in past 12 months     Recent Labs   Lab Test 04/15/21  1440   TSH 10.60*              Passed - Patient is 12 years or older        Passed - Recent (12 mo) or future (30 days) visit within the authorizing provider's specialty     Patient has had an office visit with the authorizing provider or a provider within the authorizing providers department within the previous 12 mos or has a future within next 30 days. See \"Patient Info\" tab in inbasket, or \"Choose Columns\" in Meds & Orders section of the refill encounter.              Passed - Medication is active on med list        Passed - No active pregnancy on record     If patient is pregnant or has had a positive pregnancy test, please check TSH.          Passed - No positive pregnancy test in past 12 months     If patient is pregnant or has had a positive pregnancy test, please check TSH.               "

## 2021-05-06 NOTE — PROGRESS NOTES
The patient presents with her daughter for follow-up to last office visit at which time she noted left leg pain as well as shortness of breath.  She had been to the ER twice and an x-ray was unremarkable.  There was no trauma.  At that point in my evaluation it was unclear as to the cause.  She was having shortness of breath so she was put on prednisone for the COPD and to see if this might help.  With this her leg pain is significantly better and she is very happy.  Its not completely gone but much improved.    As noted, she was also having shortness of breath.  She has a history of COPD.  She is on Symbicort and was using albuterol just once a day.  At that time I added a steroid and asked her to take albuterol 3 times daily and with this her breathing is also significantly better.    The patient had labs done during the last office visit as noted and reviewed showing a significant anemia with an elevated MCV as well as an elevated TSH.  Her hemoglobin A1c was stable at 6.0.She had been to the ER twice and an x-ray was unremarkable.  There was no trauma.  At that point in my evaluation it was unclear as to the cause.  She was having shortness of breath so she was put on prednisone for the COPD and to see if this might help.  With this her leg pain is significantly better and she is very happy.  Its not completely gone but much improved.    As noted, she was also having shortness of breath.  She has a history of COPD.  She is on Symbicort and was using albuterol just once a day.  At that time I added a steroid and asked her to take albuterol 3 times daily and with this her breathing is also significantly better.    The patient had labs done during the last office visit as noted and reviewed showing a significant anemia with an elevated MCV as well as an elevated TSH.  Her hemoglobin A1c was stable at 6.0.    At this point time she is feels like she is doing quite well.  No headaches.  No chest pain or shortness of  breath.  No GI symptoms.    Past Medical History:   Diagnosis Date     Abnormal echocardiogram 04/2017    mild mr, ar, mitral stenosis, nl ef, lvh.  Done for episode of vision change     Arthritis 99         Atrial fibrillation (H) 2011 and 2009    neg nuc est 2009, Dr. Vazquez, on coumadin     Chest pain 2000    neg est echo, neg ct chest abd, pelvis Hindu     Chronic LBP      COPD (chronic obstructive pulmonary disease) (H)     seen by pulmonary md Dr. Whitt, and given inhaler     Cysts, breast 1980s    bilat mastectomies     Diastolic dysfunction 12/2019    on echo done for sob, also mild to mod mr, nl ef, mild mitral stenosis and mild a.s     Dizzy 2007    ct neg, carotid us neg     Dysphagia 2005    egd nl     Elevated blood sugar      Environmental allergies      Hematuria 2004    neg cysto and us     Hemoptyses 2008    ct neg, bronch neg 2009     Hyperlipidemia      Hypertension      Hypothyroid 1995    Dr. Ortiz     Lumbar spinal stenosis 12/2018    seen on ct done for lbp     Mild aortic stenosis 12/2019     Mild mitral stenosis 12/2019    on echo done for sob     Mitral regurgitation 6/15, 12/19    on echo     nausea 2006    ct abd and ;pelvis neg     Osteopenia     fu dexa better 2011     Osteoporotic compression fracture of spine (H) 2018    got iv reclast 3/6/19     Pacemaker 2011    afib with pauses and syncope     Palpitations 2009    neg est     PMR (polymyalgia rheumatica) (H) 2004    not active in years     SOB (shortness of breath) 5/15    echo nl ef, 2+mr, cxr clear, seen by pulm and given inhaler     Supraventricular premature beats      T12 compression fracture (H) 12/2018    non traumatic     TIA (transient ischaemic attack) 2009    carotids neg, mri neg     Treadmill stress test negative for angina pectoris 2011    nuclear est     Urine incontinence     Dr. Westfall     Urosepsis 2009    hospitalized     Vision changes 2011    eval by neuro and ophtho and no cause found     Past  Surgical History:   Procedure Laterality Date     ARTHROPLASTY HIP Left 2017    Procedure: ARTHROPLASTY HIP;  LEFT HIP ARBEN ARTHROPLASTY(SORAYA);  Surgeon: Darell Nathan MD;  Location: [facility]     ARTHROPLASTY PATELLO-FEMORAL (KNEE)   and      ARTHROSCOPY KNEE  1997     BIOPSY BREAST Right 2014    Procedure: BIOPSY BREAST;  Surgeon: David Carter MD;  Location:  [facility]     breast implants      4x     cataract  2011     FOOT SURGERY       MASTECTOMY      bilat, cysts     [place] not bso  70's    not for ca     Social History     Socioeconomic History     Marital status:      Spouse name: Not on file     Number of children: 4     Years of education: Not on file     Highest education level: Not on file   Occupational History     Not on file   Social Needs     Financial resource strain: Not on file     Food insecurity     Worry: Not on file     Inability: Not on file     Transportation needs     Medical: Not on file     Non-medical: Not on file   Tobacco Use     Smoking status: Former Smoker     Types: Cigarettes     Quit date: 1955     Years since quittin.3     Smokeless tobacco: Never Used     Tobacco comment: quit in    had smoked about 2 cigarettes a day or more   Substance and Sexual Activity     Alcohol use: Yes     Alcohol/week: 0.0 standard drinks     Comment: occ wine     Drug use: No     Sexual activity: Not Currently   Lifestyle     Physical activity     Days per week: Not on file     Minutes per session: Not on file     Stress: Not on file   Relationships     Social connections     Talks on phone: Not on file     Gets together: Not on file     Attends [employer] service: Not on file     Active member of club or organization: Not on file     Attends meetings of clubs or organizations: Not on file     Relationship status: Not on file     Intimate partner violence     Fear of current or ex partner: Not on file     Emotionally abused: Not on file     Physically  abused: Not on file     Forced sexual activity: Not on file   Other Topics Concern     Parent/sibling w/ CABG, MI or angioplasty before 65F 55M? Not Asked      Service Not Asked     Blood Transfusions Not Asked     Caffeine Concern No     Comment: coffee: 1 cup a week.  Occ green tea     Occupational Exposure Not Asked     Hobby Hazards Not Asked     Sleep Concern No     Stress Concern No     Weight Concern No     Special Diet No     Back Care Not Asked     Exercise Yes     Comment: walking daily     Bike Helmet Not Asked     Seat Belt Yes     Self-Exams Not Asked   Social History Narrative     Not on file     Current Outpatient Medications   Medication Sig Dispense Refill     acetaminophen (TYLENOL) 500 MG tablet Take 1,000 mg by mouth 3 times daily       albuterol (PROAIR HFA/PROVENTIL HFA/VENTOLIN HFA) 108 (90 Base) MCG/ACT inhaler Inhale 2 puffs into the lungs every 6 hours       budesonide-formoterol (SYMBICORT) 160-4.5 MCG/ACT inhaler Inhale 2 puffs into the lungs 2 times daily        flecainide (TAMBOCOR) 150 MG tablet Take 1/2 tablet by mouth twice daily. Please call [phone] for an appointment. 90 tablet 0     Furosemide (LASIX PO) Take 20 mg by mouth daily as needed (Patient states that she is supposed to be taking medication everyday but has only been using once in a while)       levothyroxine (SYNTHROID/LEVOTHROID) 50 MCG tablet Take 1 tablet (50 mcg) by mouth daily 90 tablet 3     metoprolol tartrate (LOPRESSOR) 50 MG tablet TAKE 1 AND 1/2 TABLETS BY MOUTH TWICE DAILY 270 tablet 0     polyethylene glycol (MIRALAX/GLYCOLAX) powder Take 17 g by mouth daily as needed for constipation       rivaroxaban ANTICOAGULANT (XARELTO ANTICOAGULANT) 20 MG TABS tablet Take 1 tablet (20 mg) by mouth daily (with dinner) 90 tablet 3     traMADol (ULTRAM) 50 MG tablet Take one daily prn 20 tablet 0     vitamin (B COMPLEX-C) tablet Take 1 tablet by mouth daily       VITAMIN D PO Take 1 tablet by mouth daily  (OTC: Patient unsure of strength)       Allergies   Allergen Reactions     Clindamycin Hcl Other (See Comments)     Difficulty swallowing     Indocin Headache     Ended up going to( [facility] with severe head ache     Xylocaine-Epinephrine [Epinephrine-Lidocaine-Na Metabisulfite]      Xylocaine with epi caused a rapid heart beat     Ampicillin Potassium Nausea and Vomiting and Rash     Celebrex [Celecoxib] Rash     Ciprofloxacin Rash     Erythromycin Rash     Penicillins Nausea and Vomiting and Rash     Vibramycin [Doxycycline Hyclate] Rash     FAMILY HISTORY NOTED AND REVIEWED    REVIEW OF SYSTEMS: above    PHYSICAL EXAM    /70 (BP Location: Right arm, Patient Position: Chair, Cuff Size: Adult Large)   Pulse 60   Ht 1.524 m (5')   Wt 75.8 kg (167 lb)   SpO2 96%   Breastfeeding No   BMI 32.61 kg/m      Patient appears non toxic  Mouth - tongue midline and within normal limits, mucous membranes and posterior pharynx within normal limits, no lesions seen.  Neck - no masses, lesions or tenderness  Nodes - no supraclavicular, cervical or axially adenopathy .  Lungs - clear, normal flow  Cardiovascular - regular rate and rhythm, 2/6 sm across chest, no rub or gallop, no jvp or edema, carotids within normal limits, no bruits.  Abdomen - normal active bowel sounds, soft, non tender, no masses, guarding or rebound, no hepatosplenomegaly      Labs sent    ASSESSMENT:  1. Left leg pain, now mostly resolved, neg xray and us, follow, call if worsens  2. Shortness of breath, due to copd, improved  3. Anemia with elevated mcv, needs eval  4. Hypothyroidism  5. ckd and diabetes     PLAN:  Labs now  Call if changes    Jeffery Sherwood M.D.  Discussed with patient and daughter      Jeffery Sherwood M.D.

## 2021-05-09 NOTE — TELEPHONE ENCOUNTER
Please call patient re labs    1. Need to raise synthroid dose to 75mcg and follow up 2 months for this    2. Need to check more labs re her anemia.  Her b12 and folic acid are normal but slightly abnormal protein.  Not worrisome but to be safe please have her some in for non fasting labs this week.    Thanks    Jeffery Sherwood M.D.

## 2021-05-10 NOTE — TELEPHONE ENCOUNTER
Placed call.  Information given to patient from provider.  States understood and agreed with advise.    Patient asked that we also call patient's daughter, Oxana, with all information.   Called and spoke with Oxana and shared all information from Dr Sherwood.   Oxana will  new RX Levothyroxine 75 mcg tabs.   Non-fasting lab appointment scheduled for Thur this week to coordinate with appointment [facility].       Dinora Christiansen RN

## 2021-05-13 NOTE — LETTER
5/13/2021    Jeffery Sherwood MD  [address]  [facility] [address]    RE: Lindsey Savannah Hale       Dear Colleague,    I had the pleasure of seeing Lindsey Savannah Hale in the [facility].    HPI and Plan:   See dictation [id number]    Orders Placed This Encounter   Procedures     Follow-Up with Cardiac Advanced Practice Provider     EKG 12-lead complete w/read - Clinics (performed today)       No orders of the defined types were placed in this encounter.      There are no discontinued medications.      Encounter Diagnosis   Name Primary?     Paroxysmal atrial fibrillation (H) Yes       CURRENT MEDICATIONS:  Current Outpatient Medications   Medication Sig Dispense Refill     acetaminophen (TYLENOL) 500 MG tablet Take 1,000 mg by mouth 3 times daily       albuterol (PROAIR HFA/PROVENTIL HFA/VENTOLIN HFA) 108 (90 Base) MCG/ACT inhaler Inhale 2 puffs into the lungs every 6 hours       budesonide-formoterol (SYMBICORT) 160-4.5 MCG/ACT inhaler Inhale 2 puffs into the lungs 2 times daily        flecainide (TAMBOCOR) 150 MG tablet Take 1/2 tablet by mouth twice daily. Please call [phone] for an appointment. 90 tablet 0     Furosemide (LASIX PO) Take 20 mg by mouth daily as needed (Patient states that she is supposed to be taking medication everyday but has only been using once in a while)       levothyroxine (SYNTHROID/LEVOTHROID) 75 MCG tablet Take 1 tablet (75 mcg) by mouth daily 90 tablet 3     metoprolol tartrate (LOPRESSOR) 50 MG tablet TAKE 1 AND 1/2 TABLETS BY MOUTH TWICE DAILY 270 tablet 0     polyethylene glycol (MIRALAX/GLYCOLAX) powder Take 17 g by mouth daily as needed for constipation       rivaroxaban ANTICOAGULANT (XARELTO ANTICOAGULANT) 20 MG TABS tablet Take 1 tablet (20 mg) by mouth daily (with dinner) 90 tablet 3     traMADol (ULTRAM) 50 MG tablet Take one daily prn 20 tablet 0     vitamin (B COMPLEX-C) tablet Take 1 tablet by mouth daily        VITAMIN D PO Take 1 tablet by mouth daily (OTC: Patient unsure of strength)         ALLERGIES     Allergies   Allergen Reactions     Clindamycin Hcl Other (See Comments)     Difficulty swallowing     Indocin Headache     Ended [facility]( [facility] with [facility] ache     Xylocaine-Epinephrine [Epinephrine-Lidocaine-Na Metabisulfite]      Xylocaine with epi caused a rapid heart beat     Ampicillin Potassium Nausea and Vomiting and Rash     Celebrex [Celecoxib] Rash     Ciprofloxacin Rash     Erythromycin Rash     Penicillins Nausea and Vomiting and Rash     Vibramycin [Doxycycline Hyclate] Rash       PAST MEDICAL HISTORY:  Past Medical History:   Diagnosis Date     Abnormal echocardiogram 04/2017    mild mr, ar, mitral stenosis, nl ef, lvh.  Done for episode of vision change     Arthritis 99         Atrial fibrillation (H) 2011 and 2009    neg nuc est 2009, Dr. Vazquez, on coumadin     Chest pain 2000    neg est echo, neg ct chest abd, pelvis Yarsanism     Chronic LBP      COPD (chronic obstructive pulmonary disease) (H)     seen by pulmonary md Dr. Whitt, and given inhaler     Cysts, breast 1980s    bilat mastectomies     Diastolic dysfunction 12/2019    on echo done for sob, also mild to mod mr, nl ef, mild mitral stenosis and mild a.s     Dizzy 2007    ct neg, carotid us neg     Dysphagia 2005    egd nl     Elevated blood sugar      Environmental allergies      Hematuria 2004    neg cysto and us     Hemoptyses 2008    ct neg, bronch neg 2009     Hyperlipidemia      Hypertension      Hypothyroid 1995    Dr. Ortiz     Lumbar spinal stenosis 12/2018    seen on ct done for lbp     Mild aortic stenosis 12/2019     Mild mitral stenosis 12/2019    on echo done for sob     Mitral regurgitation 6/15, 12/19    on echo     nausea 2006    ct abd and ;pelvis neg     Osteopenia     fu dexa better 2011     Osteoporotic compression fracture of spine (H) 2018    got iv reclast 3/6/19     Pacemaker 2011     afib with pauses and syncope     Palpitations 2009    neg est     PMR (polymyalgia rheumatica) (H) 2004    not active in years     SOB (shortness of breath) 5/15    echo nl ef, 2+mr, cxr clear, seen by pulm and given inhaler     Supraventricular premature beats      T12 compression fracture (H) 12/2018    non traumatic     TIA (transient ischaemic attack) 2009    carotids neg, mri neg     Treadmill stress test negative for angina pectoris 2011    nuclear est     Urine incontinence     Dr. Westfall     Urosepsis 2009    hospitalized     Vision changes 2011    eval by neuro and ophtho and no cause found       PAST SURGICAL HISTORY:  Past Surgical History:   Procedure Laterality Date     ARTHROPLASTY HIP Left 12/5/2017    Procedure: ARTHROPLASTY HIP;  LEFT HIP ARBEN ARTHROPLASTY(SORAYA);  Surgeon: Darell Nathan MD;  Location: [facility]     ARTHROPLASTY PATELLO-FEMORAL (KNEE)  1998 and 2002     ARTHROSCOPY KNEE  1997     BIOPSY BREAST Right 9/23/2014    Procedure: BIOPSY BREAST;  Surgeon: David Carter MD;  Location: [facility]     breast implants      4x     cataract  2011     FOOT SURGERY  2003     MASTECTOMY  1980    bilat, cysts     nj not bso  70's    not for ca       FAMILY HISTORY:  Family History   Problem Relation Age of Onset     C.A.D. Father      Lymphoma Father      Cancer Mother      Lymphoma Brother      Breast Cancer Sister      Osteoporosis Sister      Myocardial Infarction Sister      Unknown/Adopted Maternal Grandmother      Unknown/Adopted Maternal Grandfather      Unknown/Adopted Paternal Grandmother      Unknown/Adopted Paternal Grandfather        SOCIAL HISTORY:  Social History     Socioeconomic History     Marital status:      Spouse name: None     Number of children: 4     Years of education: None     Highest education level: None   Occupational History     None   Social Needs     Financial resource strain: None     Food insecurity     Worry: None     Inability: None     Transportation  needs     Medical: None     Non-medical: None   Tobacco Use     Smoking status: Former Smoker     Types: Cigarettes     Quit date: 1955     Years since quittin.3     Smokeless tobacco: Never Used     Tobacco comment: quit in    had smoked about 2 cigarettes a day or more   Substance and Sexual Activity     Alcohol use: Yes     Alcohol/week: 0.0 standard drinks     Comment: occ wine     Drug use: No     Sexual activity: Not Currently   Lifestyle     Physical activity     Days per week: None     Minutes per session: None     Stress: None   Relationships     Social connections     Talks on phone: None     Gets together: None     Attends Church service: None     Active member of club or organization: None     Attends meetings of clubs or organizations: None     Relationship status: None     Intimate partner violence     Fear of current or ex partner: None     Emotionally abused: None     Physically abused: None     Forced sexual activity: None   Other Topics Concern     Parent/sibling w/ CABG, MI or angioplasty before 65F 55M? Not Asked      Service Not Asked     Blood Transfusions Not Asked     Caffeine Concern No     Comment: coffee: 1 cup a week.  Occ green tea     Occupational Exposure Not Asked     Hobby Hazards Not Asked     Sleep Concern No     Stress Concern No     Weight Concern No     Special Diet No     Back Care Not Asked     Exercise Yes     Comment: walking daily     Bike Helmet Not Asked     Seat Belt Yes     Self-Exams Not Asked   Social History Narrative     None       Review of Systems:  Skin:  Negative       Eyes:  Positive for glasses    ENT:  not assessed nasal congestion allergies  Respiratory:  Negative dyspnea on exertion stairs   Cardiovascular:  Negative edema;Positive for    Gastroenterology: Negative      Genitourinary:  Negative      Musculoskeletal:  Positive for arthritis    Neurologic:  Negative      Psychiatric:  Negative      Heme/Lymph/Imm:  Positive for  allergies    Endocrine:  Positive for thyroid disorder      Thank you for allowing me to participate in the care of your patient.      Sincerely,     Efra Vazquez MD     [facility]    cc:   Zaina Herman, APRN CNP  [address]  [place] [address]

## 2021-05-13 NOTE — PROGRESS NOTES
HPI and Plan:   See dictation [id number]    Orders Placed This Encounter   Procedures     Follow-Up with Cardiac Advanced Practice Provider     EKG 12-lead complete w/read - Clinics (performed today)       No orders of the defined types were placed in this encounter.      There are no discontinued medications.      Encounter Diagnosis   Name Primary?     Paroxysmal atrial fibrillation (H) Yes       CURRENT MEDICATIONS:  Current Outpatient Medications   Medication Sig Dispense Refill     acetaminophen (TYLENOL) 500 MG tablet Take 1,000 mg by mouth 3 times daily       albuterol (PROAIR HFA/PROVENTIL HFA/VENTOLIN HFA) 108 (90 Base) MCG/ACT inhaler Inhale 2 puffs into the lungs every 6 hours       budesonide-formoterol (SYMBICORT) 160-4.5 MCG/ACT inhaler Inhale 2 puffs into the lungs 2 times daily        flecainide (TAMBOCOR) 150 MG tablet Take 1/2 tablet by mouth twice daily. Please call [phone] for an appointment. 90 tablet 0     Furosemide (LASIX PO) Take 20 mg by mouth daily as needed (Patient states that she is supposed to be taking medication everyday but has only been using once in a while)       levothyroxine (SYNTHROID/LEVOTHROID) 75 MCG tablet Take 1 tablet (75 mcg) by mouth daily 90 tablet 3     metoprolol tartrate (LOPRESSOR) 50 MG tablet TAKE 1 AND 1/2 TABLETS BY MOUTH TWICE DAILY 270 tablet 0     polyethylene glycol (MIRALAX/GLYCOLAX) powder Take 17 g by mouth daily as needed for constipation       rivaroxaban ANTICOAGULANT (XARELTO ANTICOAGULANT) 20 MG TABS tablet Take 1 tablet (20 mg) by mouth daily (with dinner) 90 tablet 3     traMADol (ULTRAM) 50 MG tablet Take one daily prn 20 tablet 0     vitamin (B COMPLEX-C) tablet Take 1 tablet by mouth daily       VITAMIN D PO Take 1 tablet by mouth daily (OTC: Patient unsure of strength)         ALLERGIES     Allergies   Allergen Reactions     Clindamycin Hcl Other (See Comments)     Difficulty swallowing     Indocin Headache     Ended up going to( [facility] with severe head ache     Xylocaine-Epinephrine [Epinephrine-Lidocaine-Na Metabisulfite]      Xylocaine with epi caused a rapid heart beat     Ampicillin Potassium Nausea and Vomiting and Rash     Celebrex [Celecoxib] Rash     Ciprofloxacin Rash     Erythromycin Rash     Penicillins Nausea and Vomiting and Rash     Vibramycin [Doxycycline Hyclate] Rash       PAST MEDICAL HISTORY:  Past Medical History:   Diagnosis Date     Abnormal echocardiogram 04/2017    mild mr, ar, mitral stenosis, nl ef, lvh.  Done for episode of vision change     Arthritis 99         Atrial fibrillation (H) 2011 and 2009    neg nuc est 2009, Dr. Vazquez, on coumadin     Chest pain 2000    neg est echo, neg ct chest abd, pelvis Worship     Chronic LBP      COPD (chronic obstructive pulmonary disease) (H)     seen by pulmonary md Dr. Whitt, and given inhaler     Cysts, breast 1980s    bilat mastectomies     Diastolic dysfunction 12/2019    on echo done for sob, also mild to mod mr, nl ef, mild mitral stenosis and mild a.s     Dizzy 2007    ct neg, carotid us neg     Dysphagia 2005    egd nl     Elevated blood sugar      Environmental allergies      Hematuria 2004    neg cysto and us     Hemoptyses 2008    ct neg, bronch neg 2009     Hyperlipidemia      Hypertension      Hypothyroid 1995    Dr. Ortiz     Lumbar spinal stenosis 12/2018    seen on ct done for lbp     Mild aortic stenosis 12/2019     Mild mitral stenosis 12/2019    on echo done for sob     Mitral regurgitation 6/15, 12/19    on echo     nausea 2006    ct abd and ;pelvis neg     Osteopenia     fu dexa better 2011     Osteoporotic compression fracture of spine (H) 2018    got iv reclast 3/6/19     Pacemaker 2011    afib with pauses and syncope     Palpitations 2009    neg est     PMR (polymyalgia rheumatica) (H) 2004    not active in years     SOB (shortness of breath) 5/15    echo nl ef, 2+mr, cxr clear, seen by pulm and given inhaler     Supraventricular  premature beats      T12 compression fracture (H) 2018    non traumatic     TIA (transient ischaemic attack)     carotids neg, mri neg     Treadmill stress test negative for angina pectoris     nuclear est     Urine incontinence     Dr. Westfall     Urosepsis     hospitalized     Vision changes 2011    eval by neuro and ophtho and no cause found       PAST SURGICAL HISTORY:  Past Surgical History:   Procedure Laterality Date     ARTHROPLASTY HIP Left 2017    Procedure: ARTHROPLASTY HIP;  LEFT HIP ARBEN ARTHROPLASTY(SORAYA);  Surgeon: Darell Nathan MD;  Location: [facility]     ARTHROPLASTY PATELLO-FEMORAL (KNEE)   and      ARTHROSCOPY KNEE  1997     BIOPSY BREAST Right 2014    Procedure: BIOPSY BREAST;  Surgeon: David Carter MD;  Location: [facility]     breast implants      4x     cataract  2011     FOOT SURGERY       MASTECTOMY      bilat, cysts     nj not bso  70's    not for ca       FAMILY HISTORY:  Family History   Problem Relation Age of Onset     C.A.D. Father      Lymphoma Father      Cancer Mother      Lymphoma Brother      Breast Cancer Sister      Osteoporosis Sister      Myocardial Infarction Sister      Unknown/Adopted Maternal Grandmother      Unknown/Adopted Maternal Grandfather      Unknown/Adopted Paternal Grandmother      Unknown/Adopted Paternal Grandfather        SOCIAL HISTORY:  Social History     Socioeconomic History     Marital status:      Spouse name: None     Number of children: 4     Years of education: None     Highest education level: None   Occupational History     None   Social Needs     Financial resource strain: None     Food insecurity     Worry: None     Inability: None     Transportation needs     Medical: None     Non-medical: None   Tobacco Use     Smoking status: Former Smoker     Types: Cigarettes     Quit date: 1955     Years since quittin.3     Smokeless tobacco: Never Used     Tobacco comment: quit in    had  smoked about 2 cigarettes a day or more   Substance and Sexual Activity     Alcohol use: Yes     Alcohol/week: 0.0 standard drinks     Comment: occ wine     Drug use: No     Sexual activity: Not Currently   Lifestyle     Physical activity     Days per week: None     Minutes per session: None     Stress: None   Relationships     Social connections     Talks on phone: None     Gets together: None     Attends Bahai service: None     Active member of club or organization: None     Attends meetings of clubs or organizations: None     Relationship status: None     Intimate partner violence     Fear of current or ex partner: None     Emotionally abused: None     Physically abused: None     Forced sexual activity: None   Other Topics Concern     Parent/sibling w/ CABG, MI or angioplasty before 65F 55M? Not Asked      Service Not Asked     Blood Transfusions Not Asked     Caffeine Concern No     Comment: coffee: 1 cup a week.  Occ green tea     Occupational Exposure Not Asked     Hobby Hazards Not Asked     Sleep Concern No     Stress Concern No     Weight Concern No     Special Diet No     Back Care Not Asked     Exercise Yes     Comment: walking daily     Bike Helmet Not Asked     Seat Belt Yes     Self-Exams Not Asked   Social History Narrative     None       Review of Systems:  Skin:  Negative       Eyes:  Positive for glasses    ENT:  not assessed nasal congestion allergies  Respiratory:  Negative dyspnea on exertion stairs   Cardiovascular:  Negative edema;Positive for    Gastroenterology: Negative      Genitourinary:  Negative      Musculoskeletal:  Positive for arthritis    Neurologic:  Negative      Psychiatric:  Negative      Heme/Lymph/Imm:  Positive for allergies    Endocrine:  Positive for thyroid disorder

## 2021-05-13 NOTE — PROGRESS NOTES
Service Date: 05/13/2021    HISTORY OF PRESENT ILLNESS:    I had the pleasure of seeing Ms. Lindsey Hale, a delightful 89-year-old female with the following cardiac/medical issues.    A.  Sick sinus syndrome with pacemaker implantation 2011 (SJM).  B.  Paroxysmal atrial fibrillation.  Treated with flecainide, metoprolol and rivaroxaban.  C.  Normal LV function.  D.  Chronic lung disease.  Previous use of amiodarone which was stopped because of decline in PFTs.  E.  Osteoporosis.    F.  Macrocytic anemia.      Isabel came to the clinic today accompanied by her daughter.  As of 2019 she has resided at [facility].  She is feeling fairly well overall, though today is not such a good day because she is a bit short of breath.    She noted a skin lesion on her left chest recently.  She is seeing her dermatologist in 10 days.  She does not have chest pain.  She has not had syncope or near syncope.      PHYSICAL EXAMINATION:    VITAL SIGNS:  Blood pressure 134/70.  Heart rate 69 and regular.  Weight 76 kilos.  Height 152 cm.  GENERAL:  She is a pleasant elderly woman in no distress.  NECK:  Supple, with 1+ carotid pulses.    LUNGS:  With mildly decreased breath sounds, but no crackles or wheezes.  HEART:  Regular rhythm.  There is a 1/6 systolic ejection murmur at the base.  SKIN:  There is a circular raised & ulcerated lesion on the left upper chest, near the medial edge of her permanent pacemaker.  There is a crest at the bottom of the crater.  This may represent a basal cell carcinoma.  The patient tells me that there has been no drainage.  Room air saturation was 97% today.      DIAGNOSTIC STUDIES:    - Recent labs, sodium 136, potassium 4.4, creatinine 1.0.  TSH 10.4, hematocrit 29.7%.  - Device interrogation from earlier in May showed battery life of approximately 9 months.  Ventricular pacing more than 99% with atrial pacing of 91%.      IMPRESSION:       1. Sick sinus syndrome.  Her pacemaker is  approaching KRISTA.  I discussed the replacement procedure today.  Most likely it will happen early next year.       2. Paroxysmal atrial fibrillation.  Zero atrial fibrillation burden on recent interrogation.  She has done remarkably well on anticoagulation.  Continue rivaroxaban, albeit decrease to 15 mg daily.  She takes flecainide and metoprolol as well.  No need for changes.       3. Dyspnea on exertion.  Her room air saturation was 96-97%.  Lungs were clear today.  She was normotensive and I do not have objective findings to explain her symptoms.  She does have known chronic lung disease.      4. Hypertension.  She was normotensive today.       5. Skin lesion.  It is very important to see the dermatologist as scheduled because the lesion is very close to her permanent pacemaker.  If it is a basal cell that requires excision, caution is needed to avoid encroaching the device pocket.  The dermatologist may need to communicate with us.  On the other hand, if it only needs (superficial) freezing there would be little concern about the device pocket.    RECOMMENDATIONS:       1. See dermatologist.       2.  Decrease Xarelto to 15 mg daily.       3. Continue followup in the Device Clinic.       4. Continue current medications.       5. See Zaina in 1 year with EKG.    It was my pleasure being involved in Lindsey's care.        Efra Vazquez MD, FACC      cc:    Jeffery Sherwood  [facility]  [address]  [place], [place], [address]      D: 2021   T: 2021   MT: mary ann    Name:     LINDSEY PRIEST  MRN:      [id number]        Account:      [id number]   :      1925           Service Date: 2021       Document: [id number]

## 2021-05-17 NOTE — PROGRESS NOTES
I saw her last Thursday.  Her GFR is borderline for the 20 mg Xarelto dose.    Let's decrease Xarelto to 15 mg daily.    Because this is an expensive medication, she can lower the dose after she finishes her current supply of 20s.    Please speak to her daughter about this.    TED Taveras

## 2021-05-17 NOTE — PROGRESS NOTES
Called and left message for patient's daughter, Oxana, notifying her of Dr. Vazquez' recommendation to adjust patient's Xarelto dose.  Requested call back to clinic to review recommendation in detail and determine when a new prescription will be needed.  Device clinic phone number provided.      DANIELA Tellez

## 2021-05-18 NOTE — PROGRESS NOTES
Received return voicemail from patient's daughter Oxana stating that DANIELA Huerta's voicemail regarding xarelto dosing was received. She states that Lindsey has about 60 days of her current xarelto prescription left. She will finish her current supply and then switch to xarelto 15mg daily.   Sent an update prescription to her preferred pharmacy.  Spoke to Oxana and informed her that an updated prescription was sent.

## 2021-05-19 NOTE — TELEPHONE ENCOUNTER
Called patient:     States yes she is having UTI symptoms     malodorous urine and increased frequency/pressure   No burning or fever   Normally has back pain so unsure if any flank pain   No hematuria     Requesting abx treatment     Pharmacy: Delmy VELIZ RN

## 2021-05-19 NOTE — TELEPHONE ENCOUNTER
Left message asking Oxana to call back to notify per below yes okay to wait as scheduled per PCP     Rasheeda VELIZ RN

## 2021-05-19 NOTE — TELEPHONE ENCOUNTER
Please call patient re urine.  They is some bacteria in it, but if not having symptoms does not need treatment.  Is she symptomatic?    Jeffery Sherwood M.D.

## 2021-05-19 NOTE — TELEPHONE ENCOUNTER
PADMINI     Discussed with patient     No allergies to sulfa and is not on warfarin, is on xarelto    Will plan to  abx      Rasheeda VELIZ RN

## 2021-05-19 NOTE — TELEPHONE ENCOUNTER
Call back from patients daughter. Relayed messages below. She states she can help  abx.     Routing to provider again to advise on abx.

## 2021-05-19 NOTE — TELEPHONE ENCOUNTER
Pt daughter called and said the Hematology clinic they were referred to cannot see her mother until Sept. 9th and they are wondering if this is ok or if they should be seen else where.

## 2021-06-03 NOTE — PROGRESS NOTES
Infusion Nursing Note:  Lindsey Hale presents today for Reclast.    Patient seen by provider today: No   present during visit today: Not Applicable.    Note: N/A.    Intravenous Access:  Peripheral IV placed.    Treatment Conditions:  Lab Results   Component Value Date     04/15/2021                   Lab Results   Component Value Date    POTASSIUM 4.4 04/15/2021           No results found for: MAG         Lab Results   Component Value Date    CR 1.00 04/15/2021                   Lab Results   Component Value Date    FRANCISCO 8.5 04/15/2021                Lab Results   Component Value Date    BILITOTAL 1.0 11/14/2019           Lab Results   Component Value Date    ALBUMIN 3.4 11/14/2019                    Lab Results   Component Value Date    ALT 19 11/14/2019           Lab Results   Component Value Date    AST 16 11/14/2019       Results reviewed, labs MET treatment parameters, ok to proceed with treatment.      Post Infusion Assessment:  Patient tolerated infusion without incident.  Blood return noted pre and post infusion.  Site patent and intact, free from redness, edema or discomfort.  No evidence of extravasations.  Access discontinued per protocol.       Discharge Plan:   Patient declined prescription refills.  Discharge instructions reviewed with: Patient.  Patient verbalized understanding of discharge instructions and all questions answered.  Copy of AVS reviewed with patient and/or family.  Patient will return as needed for next appointment.  Patient discharged in stable condition accompanied by: self.  Departure Mode: Ambulatory.      Bebe Allen RN

## 2021-06-10 NOTE — TELEPHONE ENCOUNTER
Received VM from Radha SUAREZ at [facility]. Pt has a Mohs procedure scheduled on her left medial chest, and they have questions about the PPM.     Called back to [facility] at [phone]. I spoke with Mitzy, a Clinical Team Member with Dr. Doyle (sp?). Mityz said that pt had a note on her business card from Dr. Vazquez to contact the pacemaker clinic. I said I do not see any notes in the chart asking to be contacted, so I don't know what this would be about.     Mitzy said pt has a Mohs procedure this afternoon on her left medial superior chest. She said they use a different kind of cautery which shouldn't affect the pacemaker. She said she will speak to pt when she comes in later today and call us back if pt had specific questions or concerns. I gave Mitzy the direct number for the device clinic.

## 2021-06-12 PROBLEM — R09.02 HYPOXIA: Status: ACTIVE | Noted: 2021-01-01

## 2021-06-12 PROBLEM — R79.89 ELEVATED BRAIN NATRIURETIC PEPTIDE (BNP) LEVEL: Status: ACTIVE | Noted: 2021-01-01

## 2021-06-12 NOTE — ED PROVIDER NOTES
History   Chief Complaint:  Shortness of Breath       The history is provided by a relative (son).      Lindsey Hale is a 89 year old female with a pacemaker and history of COPD and hyperlipidemia who presents with shortness of breath. The patient's son reports a nurse from her assisted living home called because she had difficulty breathing multiple times today. She was given a rescue inhaler but saw no improvement. The patient denies chest pain, fever, and cough.     Review of Systems   Constitutional: Negative for fever.   Respiratory: Positive for shortness of breath. Negative for cough.    Cardiovascular: Negative for chest pain.   All other systems reviewed and are negative.    Allergies:  Clindamycin  Indocin  Xylocaine-Epinephrine   Ampicillin Potassium  Celebrex   Ciprofloxacin  Erythromycin  Penicillins  Vibramycin    Medications:  albuterol inhaler  budesonide-formoterol inhaler  flecainide  Furosemide  levothyroxine   metoprolol tartrate  rivaroxaban   sulfamethoxazole-trimethoprim  tramadol    Past Medical History:    Abnormal echocardiogram  Arthritis   Atrial fibrillation   Chronic LBP  COPD  Cysts, breast   Diastolic dysfunction  Elevated blood sugar  Dysphagia  Hyperlipidemia   Hypothyroid  Lumbar spinal stenosis  Mitral regurgitation   Osteopenia  Pacemake  PMR  TIA  T12 Fracture  Urosepsis   CKD  Insomnia  Bilateral leg edema   Transient cerebral ischemia      Past Surgical History:    Arthroplasty hip  Arthroplasty knee  Biopsy brest  Cataract  Foot surgery  Mastectomy  Breast implants     Family History:    CAD  Lymphoma  Cancer  Osteoporosis  Myocardial infarction    Social History:  Patient lives in assisted living home.  Patient presents with son.     Physical Exam     Patient Vitals for the past 24 hrs:   BP Temp Temp src Pulse Resp SpO2 Weight   06/12/21 2000 (!) 148/76 -- -- 60 30 100 % --   06/12/21 1930 (!) 140/71 -- -- 60 30 100 % --   06/12/21 1836 -- -- -- -- -- 92 % --   06/12/21  1835 -- -- -- -- -- (!) 85 % --   06/12/21 1834 135/61 -- -- 60 -- (!) 89 % --   06/12/21 1829 (!) 160/44 98  F (36.7  C) Oral 60 28 94 % 72.6 kg (160 lb)       Physical Exam  Eyes:               Sclera white; Pupils are equal and round  ENT:                External ears and nares normal  CV:                  Rate as above with regular rhythm   Resp:               Audible expiratory wheezing                          Speaking in phrases  GI:                   Abdomen is soft, non-tender, non-distended                          No rebound tenderness or peritoneal features  MS:                  Moves all extremities  Skin:                Warm and dry  Neuro:             Speech is normal and fluent. No apparent deficit.    Emergency Department Course   ECG  ECG taken at 1839, ECG read at 1841  AV dual-paced rhythm with prolonged AV conduction  Abnormal ECG   No significant change as compared to prior, dated 5/13/21.  Rate 60 bpm. IN interval 278 ms. QRS duration 252 ms. QT/QTc 590/590 ms. P-R-T axes 81 76 63.     Imaging:  XR Chest 2 views:   AP and lateral views of the chest were obtained. Left  chest wall dual lead AICD leads are in stable position. Stable mild  enlargement of the cardiac silhouette. Bibasilar pulmonary opacities,  left more than right, likely atelectasis. No significant pleural  effusion or pneumothorax. Diffuse osteopenia with multilevel  degenerative changes of the spine.  As per radiology.      Laboratory:  CBC: WBC 9.2, HGB 9.2 (L),    CMP: Anion 1 (L), Glucose 160 (H), Creatinine 1.25 (H), GFR 36 (L), Albumin 3.0 (L), o/w WNL   Troponin I (1851): <0.015  BNP: 6367 (H)  Blood gas venous and oxyhgb: WNL  Symptomatic SARS-CoV-2 COVID-19 Virus (Coronavirus) by PCR: Negative    Procedures      Emergency Department Course:    Reviewed:  I reviewed nursing notes, vitals and past medical history    Assessments:  1831 I obtained history and examined the patient as noted above.   2056 I rechecked  the patient who agrees to admission.    Consults:   2109 I consulted Dr. Qureshi, hospitalist, regarding the patient's history and presentation.     Interventions:  1847 Ipratropium - albuterol 3ml Nebulization  1848 Magnesium sulfate 2g IV  1849 Methylprednisolone sodium succinate 125 mg IV  1854 Ipratropium - albuterol 3ml Nebulization       Disposition:  The patient was admitted to the hospital under the care of Dr. Qureshi.       Impression & Plan     Medical Decision Making:  Lindsey Hale is here for evaluation of shortness of breath. She has audible wheezing and a history of COPD. Medicine and steriods were given with impovment in her symptoms. Blood work shows no signs of sepsis or CO2 retention. X-ray showns no evidence of pneumonia. Patient tolerated walking normally and was conversant during it as well with no hypoxia. However, she then mentions laying flat she gets more short of breath. When she laid flat her stats dropped to 84 percent and immediatly normalized as soon as she sat back up. With her elevated BNP, this is suggestive of a pulmonary edema. Lasix was given and admission arranged.         Covid-19  Lindsey Hale was evaluated during a global COVID-19 pandemic, which necessitated consideration that the patient might be at risk for infection with the SARS-CoV-2 virus that causes COVID-19.   Applicable protocols for evaluation were followed during the patient's care.   COVID-19 was considered as part of the patient's evaluation. The plan for testing is:  a test was obtained during this visit.    Diagnosis:    ICD-10-CM    1. Hypoxia while supine  R09.02    2. Elevated brain natriuretic peptide (BNP) level  R79.89        Scribe Disclosure:  I, Shaniqua Langston, am serving as a scribe at 6:31 PM on 6/12/2021 to document services personally performed by Patricia Monterroso, based on my observations and the provider's statements to me.        Patricia Monterroso MD  06/12/21 0440

## 2021-06-12 NOTE — ED TRIAGE NOTES
Pt presents from assisted living facility where staff reported pt having increasing SOB throughout the day. Son was called who brought pt to the ED. Pt has COPD and was given her rescue inhalers by staff without relief. Pt is tachypnic, having occasional grunts with speaking. o2 94% on RA. Staff otherwise denies recent illness, cough, weight gain, fevers

## 2021-06-13 NOTE — PROGRESS NOTES
RECEIVING UNIT ED HANDOFF REVIEW    ED Nurse Handoff Report was reviewed by: Lisa Agarwal RN on June 12, 2021 at 9:48 PM

## 2021-06-13 NOTE — PROGRESS NOTES
Admitted to 88 at 10:40pm from ED. A&O, [place] [facility] [place]. Pleasant and cooperative. A1 to BSC. Slightly hypertensive. 3L NC, other VSS. 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 Skin otherwise dry, flaky, scattered bruising. Plan pending.

## 2021-06-13 NOTE — H&P
Admitted: 06/12/2021    PRIMARY CARE PHYSICIAN:  Dr. Jeffery Sherwood.    CODE STATUS:  DNR/DNI, discussed with the patient and her son.    CHIEF COMPLAINT:  Shortness of breath.    HISTORY OF PRESENT ILLNESS:  Ms. Hale is a 89-year-old female, with a past medical history significant for COPD, chronic kidney disease, hypothyroidism, and pacer implantation, who presents to the Emergency Department with shortness of breath for at least 1 week.  History is obtained through discussion with the ED physician as well as the patient.  Her son is at bedside and does help provide some history.  The patient states that she started having shortness of breath roughly 1 week ago and notes that whenever she would go for a walk, as she had to go to many appointments last week, she could only walk about 10 or so feet before she would get short of breath and have to stop and wait before she could start moving again.  She has also noted some shortness of breath at home, and this does seem all progressive compared to where it had been before.  She also notes to me that she went to see her primary care physician about 2-3 weeks ago, and he noted that she had gained about 17 pounds, which was a shock to her.  She has not noted any specific worsening of her lower extremity edema.  She has Lasix ordered p.r.n. and states she probably should be taking this more often, but does not like to take it because she ends up in the bathroom all day.  She sleeps flat at night on 2 pillows, and this has not changed.  She denies any fevers or chills.  She does not have a cough, nor any nausea or chest pain.  Eating and drinking are going okay.  She has been diligent about taking her medications.  Her primary care physician recently decreased the dose of Synthroid.    In the Emergency Department, the patient was given some DuoNebs and actually had improvement in her symptoms.  However, the patient noted that when she was lying flat, she actually had more  shortness of breath, and so when she lied flat, her oxygenation went down into the mid 80s.  She had a chest x-ray, which was fairly unremarkable.  A BNP did come back elevated at 6300, much higher than it previous had been in the range of 3817-3322.  She has been given a dose of 40 mg of IV Lasix and is currently on oxygen.    PAST MEDICAL HISTORY:  1.  COPD.  2.  Dyslipidemia.  3.  Chronic kidney disease stage III, with a baseline creatinine of roughly 1.1-1.2.  4.  Polymyalgia rheumatica.  5.  Hypothyroidism.  6.  Pacer implantation for sick sinus syndrome.  7.  Dysphagia.  8.  Chronic atrial fibrillation.  9.  Osteopenia.  10.  T12 compression fracture.  11.  Hypertension.  12.  Diabetes type 2, appears to be diet controlled.  13.  Insomnia.    MEDICATIONS:    Prior to Admission medications    Medication Sig Last Dose Taking? Auth Provider   acetaminophen (TYLENOL) 500 MG tablet Take 1,000 mg by mouth 3 times daily prn at prn Yes Unknown, Entered By History   albuterol (PROAIR HFA/PROVENTIL HFA/VENTOLIN HFA) 108 (90 Base) MCG/ACT inhaler Inhale 2 puffs into the lungs every 6 hours prn at prn Yes Jeffery Sherwood MD   Ascorbic Acid (VITAMIN C PO) Take 1 tablet by mouth daily 6/12/2021 at AM Yes Unknown, Entered By History   budesonide-formoterol (SYMBICORT) 160-4.5 MCG/ACT inhaler Inhale 2 puffs into the lungs 2 times daily  6/12/2021 at AM Yes Reported, Patient   flecainide (TAMBOCOR) 150 MG tablet Take 1/2 tablet by mouth twice daily. Please call [phone] for an appointment. 6/12/2021 at AM Yes Zaina Herman APRN CNP   levothyroxine (SYNTHROID/LEVOTHROID) 75 MCG tablet Take 1 tablet (75 mcg) by mouth daily 6/12/2021 at 1500 Yes Jeffery Sherwood MD   metoprolol tartrate (LOPRESSOR) 50 MG tablet TAKE 1 AND 1/2 TABLETS BY MOUTH TWICE DAILY 6/12/2021 at AM Yes Jeffery Sherwood MD   rivaroxaban ANTICOAGULANT (XARELTO ANTICOAGULANT) 15 MG TABS tablet Take 1 tablet (15 mg) by mouth daily (with  dinner) 6/11/2021 at 1700 Yes Efra Vazquez MD   traMADol (ULTRAM) 50 MG tablet Take one daily prn Past Week at Unknown time Yes Jeffery Sherwood MD   vitamin (B COMPLEX-C) tablet Take 1 tablet by mouth daily 6/12/2021 at AM Yes Unknown, Entered By History   VITAMIN D PO Take 1 tablet by mouth daily (OTC: Patient unsure of strength) 6/12/2021 at AM Yes Unknown, Entered By History   VITAMIN E PO Take 1 capsule by mouth daily 6/12/2021 at AM Yes Unknown, Entered By History   Furosemide (LASIX PO) Take 20 mg by mouth daily as needed (Patient states that she is supposed to be taking medication everyday but has only been using once in a while) Unknown at Unknown time   Reported, Patient   polyethylene glycol (MIRALAX/GLYCOLAX) powder Take 17 g by mouth daily as needed for constipation prn at prn   Unknown, Entered By History         SOCIAL HISTORY:  The patient quit any smoking in 1955 and drinks about 1 glass of wine per week.    FAMILY HISTORY:  Father had lymphoma and heart disease, mother had cancer, brother had lymphoma, and sister had breast cancer.    ALLERGIES:       Allergies   Allergen Reactions     Clindamycin Hcl Other (See Comments)     Difficulty swallowing     Indocin Headache     Ended up going to( [facility] hospital with severe head ache     Xylocaine-Epinephrine [Epinephrine-Lidocaine-Na Metabisulfite]      Xylocaine with epi caused a rapid heart beat     Ampicillin Potassium Nausea and Vomiting and Rash     Celebrex [Celecoxib] Rash     Ciprofloxacin Rash     Erythromycin Rash     Penicillins Nausea and Vomiting and Rash     Vibramycin [Doxycycline Hyclate] Rash         REVIEW OF SYSTEMS:  A complete review of systems reviewed and negative save for the pertinent positives, which are recorded in the HPI.    PHYSICAL EXAMINATION:  VITAL SIGNS:  Show blood pressure of 148/76, heart rate 60, respirations 30, satting 100% on nasal cannula oxygen, temperature 98.0 degrees  Fahrenheit.  GENERAL:  The patient is lying in bed and resting comfortably.  HEENT:  Pupils equal, round, reactive to light.  Extraocular muscle function intact.  No scleral icterus.  Oropharynx is clear.  NECK:  No lymphadenopathy or thyromegaly.  CARDIOVASCULAR:  Heart is regular rate and rhythm, without any appreciable murmur, rub, or gallop.  PULMONARY:  She has some mild crackles, most predominantly at the left base.  No significant wheezing.  GASTROINTESTINAL:  Positive bowel sounds, Soft, nontender, and nondistended.  SKIN:  No new rashes or lesions.  LYMPHATICS:  Nonpitting peripheral edema of the bilateral lower extremities, which is at baseline per the patient.  PSYCHIATRIC:  Alert and oriented x3.  Normal affect.  NEUROLOGIC:  Cranial nerves II through XII are grossly intact.  No focal deficits.    LABORATORY DATA:  WBC count 9.2, hemoglobin 9.2, platelets of 288.  Sodium 138, potassium 4.3, chloride 109, CO2 of 28, BUN 24, creatinine 1.25.  Unremarkable LFTs.  Troponin is undetectable.  BNP is 6367.  COVID screen is negative.    IMAGING DATA:  Chest x-ray shows bibasilar pulmonary opacities, left more than right, likely atelectasis.  No significant pleural effusion or pneumothorax.    EKG is AV paced.    IMPRESSION AND PLAN:  Ms. Hale is a 89-year-old female with a past medical history significant for chronic obstructive pulmonary disease, chronic kidney disease, hypothyroidism, pacer implantation, and atrial fibrillation, who presents to the Emergency Department with shortness of breath.  1.  Progressive shortness of breath:  Etiology is potentially multifactorial, with evidence of some mild COPD, but more likely seems to be potentially heart failure.  The patient has a much more elevated BNP now, which could be partly due to age, but also with the shortness of breath and symptoms while lying flat and an apparently 17 pound weight gain, all goes along with heart failure.  She has been given a dose of IV  Lasix, and so I will plan to repeat a BMP in the morning to assess her renal function before dosing her additionally as she is essentially Lasix naive given that she rarely takes Lasix at home.  I will obtain an echocardiogram in the morning.  I am going to schedule DuoNebs and have p.r.n. albuterol nebs available, but I am not going to give any steroids as she is not having any significant wheezing at this point, and I think heart failure is the more significant component at this point.  2.  COPD:  Possibly with mild exacerbation as discussed above.  We will continue with her PTA regimen as well as scheduled DuoNebs and p.r.n. albuterol nebs.  3.  Hypertension:  Continue with metoprolol.  4.  Atrial fibrillation:  Continue with metoprolol, flecainide, and Xarelto.  5.  Hypothyroidism:  Continue with levothyroxine and follow up with PCP for thyroid check given recent dose adjustment.  6.  Sick sinus syndrome:  Status post pacemaker implantation.  7.  Chronic kidney disease stage III:  Creatinine appears to be either at or slightly above her baseline of roughly 1.1.  We will repeat a BMP in the morning to assess trend with Lasix.  8.  Chronic anemia:  Hemoglobin appears to be at baseline of roughly 9-10.  No evidence of bleeding.  Will monitor.  9.  DVT prophylaxis:  She is anticoagulated with Xarelto.  10.  Disposition:  I expect at least 2 nights in the hospital for additional diuresis and heart failure workup, expecting her to discharge possibly by Monday.    Wilfrido Qureshi DO        D: 2021   T: 2021   MT: sophy    Name:     CASIMIRO PRIEST  MRN:      [id number]        Account:     [id number]   :      1925           Admitted:    2021       Document: [id number]

## 2021-06-13 NOTE — PROGRESS NOTES
[facility]    Hospitalist Progress Note      Assessment & Plan   Lindsey Hale is a 89 year old female was admitted on 6/12/2021 with shortness of breath.    Progressive shortness of breath  Acute on chronic CHF  History of COPD not in acute exacerbation  History progressive dyspnea over the last 1 week; 17 pound weight increase, increased LE edema; hx furosemide on a as needed basis she does not like to take due to increased urination.  No chest pain, cough, dyspnea.,  In ED, troponins negative.  EKG without acute changes.  Bibasilar opacities felt atelectasis.  BNP 6.3K.  CR 1.25, TSH 0.11, free T4 1.76, notation that PCP recently decreased dose.  -Given one-time dose of furosemide 40 mg IV daily on admission, will continue 20 mg twice daily, reassess in a.m.  BMP in AM.   -Echo.   -Continue PTA inhalers however not felt to be acute COPD exacerbation.    Hypertension:  Continue with metoprolol.     Atrial fibrillation: Rate controlled, continue with metoprolol, flecainide, and Xarelto.      Hypothyroidism:   TFTs consistent with mild hyperthyroidism, history that PCP recently decreased dose of levothyroxine, continue current dose, recheck at PCP.    .   Sick sinus syndrome:  Status post pacemaker implantation.     Chronic kidney disease stage III:  Creatinine appears to be either at or slightly above her baseline of roughly 1.1  -repeat a BMP in a.m. on furosemide.  -Avoid nephrotoxin    Chronic anemia:  Hemoglobin appears to be at baseline of roughly 9-10.  No evidence of bleeding.    DVT Prophylaxis: on xarelto   Code Status: No CPR- Do NOT Intubate  Expected discharge:1-2 days pending clinical improvement, diuresis.    Carolann Light MD  Text Page (7am - 6pm, M-F)    Interval History   Although ports breathing improved with one-time dose of furosemide, still notes dyspnea, not at baseline.  No chest pain, palpitations.    SH: No tobacco.  [place]ives in [place]       ROS: Complete ROS  negative except as above.     -Data reviewed today: I reviewed all new labs and imaging results over the last 24 hours..    Physical Exam   Temp: 95.9  F (35.5  C) Temp src: Oral BP: (!) 149/63 Pulse: 62   Resp: 16 SpO2: 96 % O2 Device: Nasal cannula Oxygen Delivery: 2 LPM  Vitals:    06/12/21 1829 06/13/21 0303   Weight: 72.6 kg (160 lb) 75 kg (165 lb 4.8 oz)     Vital Signs with Ranges  Temp:  [95.9  F (35.5  C)-98  F (36.7  C)] 95.9  F (35.5  C)  Pulse:  [60-62] 62  Resp:  [16-30] 16  BP: (135-167)/(44-81) 149/63  SpO2:  [85 %-100 %] 96 %  I/O last 3 completed shifts:  In: 760 [P.O.:760]  Out: -     General/Constitutional:   NAD, alert, calm, cooperative    HEENT/Head Exam:  atraumatic  Eyes:  PERRL, no conjunctivits  Mouth/Oral Pharynx:  Buccal mucosa WNL  Chest/Respiratory:  Air exchange bilateral lung fields; no rales or wheeze. Respiration mildly labored on O2 per NC.  Cardiovascular:  no murmur appreciated.  LE edema 1/4  Gastrointestinal/Abdomen:  soft, nontender, no rebound, guarding or other peritoneal signs.  Musculoskeletal:  extremities warm, dry, noncyanotic; no acitve synovitis.  Neurologic: Motor and sensory testing intact, nonfocal psychiatric:  Mental status:  Alert, oriented, affect calm  Skin:  No rash noted on gross exam    Medications     - MEDICATION INSTRUCTIONS -       - MEDICATION INSTRUCTIONS -       - MEDICATION INSTRUCTIONS -       ACE/ARB/ARNI NOT PRESCRIBED         acetaminophen  1,000 mg Oral TID     flecainide  75 mg Oral Q12H SAUL     fluticasone-vilanterol  1 puff Inhalation Daily     furosemide  20 mg Intravenous BID     ipratropium - albuterol 0.5 mg/2.5 mg/3 mL  3 mL Nebulization Q4H While awake     [START ON 6/14/2021] levothyroxine  50 mcg Oral QAM AC     metoprolol tartrate  75 mg Oral BID     rivaroxaban ANTICOAGULANT  15 mg Oral Daily with supper     sodium chloride (PF)  3 mL Intracatheter Q8H       Data   Recent Labs   Lab 06/13/21  0735 06/12/21  1851   WBC 8.7 9.2   HGB  8.9* 9.2*   * 110*    288    138   POTASSIUM 3.9 4.3   CHLORIDE 107 109   CO2 23 28   BUN 26 24   CR 1.24* 1.25*   ANIONGAP 7 1*   FRANCISCO 8.1* 8.1*   * 160*   ALBUMIN  --  3.0*   PROTTOTAL  --  8.8   BILITOTAL  --  0.9   ALKPHOS  --  52   ALT  --  21   AST  --  20   TROPI  --  <0.015

## 2021-06-13 NOTE — PHARMACY-ADMISSION MEDICATION HISTORY
Pharmacy Medication History  Admission medication history interview status for the 6/12/2021  admission is complete. See EPIC admission navigator for prior to admission medications     Location of Interview: Patient room  Medication history sources: Patient, Surescripts and chart review    Significant changes made to the medication list:  Removed:  Bactrim-filled 5/19/21 for a 3 day supply    Added:   Vitamin C  Vitamin E     Additional medication history information:   I gathered background from sure scripts. I then went into the patient's room to interview her. She was with her son who helped with the medication list. All changes are listed above. The patient seemed unfamiliar with Furosemide. However,  I did not remove Furosemide from her med list as it is listed on her 5/13/2021 clinic note from Dr. Efra Vazquez MD. The patient denies any other changes to her medication list, outside of the changes already mentioned.     Medication reconciliation completed by provider prior to medication history? No    Time spent in this activity: 35 minutes    Prior to Admission medications    Medication Sig Last Dose Taking? Auth Provider   acetaminophen (TYLENOL) 500 MG tablet Take 1,000 mg by mouth 3 times daily prn at prn Yes Unknown, Entered By History   albuterol (PROAIR HFA/PROVENTIL HFA/VENTOLIN HFA) 108 (90 Base) MCG/ACT inhaler Inhale 2 puffs into the lungs every 6 hours prn at prn Yes Jeffery Sherwood MD   Ascorbic Acid (VITAMIN C PO) Take 1 tablet by mouth daily 6/12/2021 at AM Yes Unknown, Entered By History   budesonide-formoterol (SYMBICORT) 160-4.5 MCG/ACT inhaler Inhale 2 puffs into the lungs 2 times daily  6/12/2021 at AM Yes Reported, Patient   flecainide (TAMBOCOR) 150 MG tablet Take 1/2 tablet by mouth twice daily. Please call [phone] for an appointment. 6/12/2021 at AM Yes Zaina Herman APRN CNP   levothyroxine (SYNTHROID/LEVOTHROID) 75 MCG tablet Take 1 tablet (75 mcg) by mouth  daily 6/12/2021 at 1500 Yes Jeffery Sherwood MD   metoprolol tartrate (LOPRESSOR) 50 MG tablet TAKE 1 AND 1/2 TABLETS BY MOUTH TWICE DAILY 6/12/2021 at AM Yes Jeffery Sherwood MD   rivaroxaban ANTICOAGULANT (XARELTO ANTICOAGULANT) 15 MG TABS tablet Take 1 tablet (15 mg) by mouth daily (with dinner) 6/11/2021 at 1700 Yes Efra Vazquez MD   traMADol (ULTRAM) 50 MG tablet Take one daily prn Past Week at Unknown time Yes Jeffery Sherwood MD   vitamin (B COMPLEX-C) tablet Take 1 tablet by mouth daily 6/12/2021 at AM Yes Unknown, Entered By History   VITAMIN D PO Take 1 tablet by mouth daily (OTC: Patient unsure of strength) 6/12/2021 at AM Yes Unknown, Entered By History   VITAMIN E PO Take 1 capsule by mouth daily 6/12/2021 at AM Yes Unknown, Entered By History   Furosemide (LASIX PO) Take 20 mg by mouth daily as needed (Patient states that she is supposed to be taking medication everyday but has only been using once in a while) Unknown at Unknown time  Reported, Patient   polyethylene glycol (MIRALAX/GLYCOLAX) powder Take 17 g by mouth daily as needed for constipation prn at prn  Unknown, Entered By History       The information provided in this note is only as accurate as the sources available at the time of update(s)

## 2021-06-13 NOTE — PROGRESS NOTES
06/13/21 1410   Quick Adds   Type of Visit Initial Occupational Therapy Evaluation   Living Environment   People in home alone   Current Living Arrangements independent living facility   Home Accessibility no concerns   Transportation Anticipated family or friend will provide   Living Environment Comments Indep living with no concerns for mobility in home.    Self-Care   Usual Activity Tolerance good   Current Activity Tolerance fair   Regular Exercise No   Equipment Currently Used at Home walker, rolling;shower chair;grab bar, tub/shower;grab bar, toilet   Activity/Exercise/Self-Care Comment Pt reports indep with dressing, laundry and functional mobility with 4WW. Meals and cleaning provided by facility. Daughter assist with managing appointments and driving pt. Manages own medication. 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    Disability/Function   Hearing Difficulty or Deaf yes   Wear Glasses or Blind yes   Fall history within last six months no   General Information   Onset of Illness/Injury or Date of Surgery 06/12/21   Referring Physician Wilfrido Qureshi DO   Patient/Family Therapy Goal Statement (OT) Get better   Additional Occupational Profile Info/Pertinent History of Current Problem Lindsey Hale is a 89 year old female was admitted on 6/12/2021 with shortness of breath. Acute on chronic CHF.   Existing Precautions/Restrictions oxygen therapy device and L/min;fall   Cognitive Status Examination   Orientation Status orientation to person, place and time   Affect/Mental Status (Cognitive) WFL   Follows Commands follows two-step commands;over 90% accuracy   Cognitive Status Comments Very cognitively intact considering age   Visual Perception   Visual Impairment/Limitations legally blind  (macular degeneration)   Sensory   Sensory Quick Adds No deficits were identified   Pain Assessment   Patient Currently in Pain No   Range of Motion  Comprehensive   General Range of Motion upper extremity range of motion deficits identified   Comment, General Range of Motion Full ROM into shld flex   Strength Comprehensive (MMT)   General Manual Muscle Testing (MMT) Assessment upper extremity strength deficits identified   Comment, General Manual Muscle Testing (MMT) Assessment Generalizes weakness throughout 4/5   Coordination   Upper Extremity Coordination No deficits were identified   Coordination Comments Appears to have coordination deficits but this is d/t eye sight no motor control.    Bed Mobility   Bed Mobility supine-sit   Supine-Sit [place] (Bed Mobility) supervision   Assistive Device (Bed Mobility) bed rails   Transfers   Transfers sit-stand transfer;toilet transfer   Transfer Comments Completes mobility with FWW and SBA   Toilet Transfer   Assistive Device (Toilet Transfer) grab bars/safety frame   Activities of Daily Living   BADL Assessment/Intervention lower body dressing;grooming;toileting   Lower Body Dressing Assessment/Training   [place] Level (Lower Body Dressing) supervision;set up   Grooming Assessment/Training   [place] Level (Grooming) supervision   Position (Grooming) sink side   Toileting   [place] Level (Toileting) supervision   Assistive Devices (Toileting) grab bar, toilet   Clinical Impression   Criteria for Skilled Therapeutic Interventions Met (OT) yes;meets criteria;skilled treatment is necessary   OT Diagnosis Impaired ADLs and functional mobility   OT Problem List-Impairments impacting ADL problems related to;activity tolerance impaired;mobility;strength   Assessment of Occupational Performance 1-3 Performance Deficits   Identified Performance Deficits functional mobility   Planned Therapy Interventions (OT) ADL retraining;strengthening;transfer training;home program guidelines;progressive activity/exercise   Clinical Decision Making Complexity (OT) low complexity   Therapy Frequency (OT) Daily   Predicted  Duration of Therapy 2   Risk & Benefits of therapy have been explained evaluation/treatment results reviewed;care plan/treatment goals reviewed   OT Discharge Planning    OT Discharge Recommendation (DC Rec) home with home care occupational therapy   OT Rationale for DC Rec Pt is slightly below baseline for functional mobility and self-cares. Pt with new O2 needs and get SOB during activity. Pt has assist for meals and cleaning at home. Daughter assists with driving and appointment management. Would benefit from skilled home OT to increased activity tolerance and to reinforce CHF management.    Total Evaluation Time (Minutes)   Total Evaluation Time (Minutes) 10

## 2021-06-13 NOTE — PLAN OF CARE
DATE & TIME: 6/12/21 2300-0730   DIAGNOSIS: COPD exacerbation   Cognitive Concerns/ Orientation : A & O x 4   BEHAVIOR & AGGRESSION TOOL COLOR: Green  ABNL VS/O2: VSS on 3L NC ex HTN  MOBILITY: A x 1 GB  PAIN MANAGMENT: Denies  DIET: Low Na, no caffeine  BOWEL/BLADDER: Continent of B & B   ABNL LAB/BG: Hgb 9.2, Cr 1.25  DRAIN/DEVICES: R PIV SL  SKIN: Scattered bruising, back of R thigh pink/red  TESTS/PROCEDURES: Heart failure workup  D/C DATE: Per MD note, possibly Monday  OTHER IMPORTANT INFO: THOMPSON, denies SOB. Contacted respiratory therapy, they will come up to give neb after their AM huddle. CORE clinic eval ordered.

## 2021-06-13 NOTE — PLAN OF CARE
Shift Note: VSS, no fevers, no c/o pain. Denies SOB, on 2L O2. MOHS procedure Thursday to (L)upper chest, dressing changed today, no drainage. Up w-SBA, possible discharge to home tomorrow.

## 2021-06-13 NOTE — ED NOTES
Pt ambulated with walker in hallway. Pt sats remained around 95% on RA. Breathing nonlabored. Pt states she feels better. Pt speaking in full sentences while ambulating.

## 2021-06-13 NOTE — UTILIZATION REVIEW
Admission Status; Secondary Review Determination       Under the authority of the Utilization Management Committee, the utilization review process indicated a secondary review on the above patient. The review outcome is based on review of the medical records, discussions with staff, and applying clinical experience noted on the date of the review.     (x) Inpatient Status Appropriate - This patient's medical care is consistent with medical management for inpatient care and reasonable inpatient medical practice.     RATIONALE FOR DETERMINATION   89-year-old female with a past medical history significant for chronic obstructive pulmonary disease, chronic kidney disease, hypothyroidism, pacer implantation, and atrial fibrillation, who presents to the Emergency Department with shortness of breath.  Concern for heart failure and COPD exacerbation receiving diuresis, also received steroids IV and frequent nebulizer  This is a conditional review for a Medicare patient, at the time of this review patient is anticipated to require at least 1 more night of hospital care; however if plan changes and discharges before 2 midnights please send for post discharge review.    This document was produced using voice recognition software       The information on this document is developed by the utilization review team in order for the business office to ensure compliance. This only denotes the appropriateness of proper admission status and does not reflect the quality of care rendered.   The definitions of Inpatient Status and Observation Status used in making the determination above are those provided in the CMS Coverage Manual, Chapter 1 and Chapter 6, section 70.4.   Sincerely,   ANGEL ASHFORD MD   System Medical Director   Utilization Management   [facility].

## 2021-06-13 NOTE — ED NOTES
[facility]  ED Nurse Handoff Report    ED Chief complaint: Shortness of Breath      ED Diagnosis:   Final diagnoses:   Hypoxia while supine   Elevated brain natriuretic peptide (BNP) level       Code Status: see prior    Allergies:   Allergies   Allergen Reactions     Clindamycin Hcl Other (See Comments)     Difficulty swallowing     Indocin Headache     Ended up going to( E.R) hospital with severe head ache     Xylocaine-Epinephrine [Epinephrine-Lidocaine-Na Metabisulfite]      Xylocaine with epi caused a rapid heart beat     Ampicillin Potassium Nausea and Vomiting and Rash     Celebrex [Celecoxib] Rash     Ciprofloxacin Rash     Erythromycin Rash     Penicillins Nausea and Vomiting and Rash     Vibramycin [Doxycycline Hyclate] Rash       Patient Story: Hx of COPD. Lives in assisted living and has been experiencing increased SOB. Upon arrival pt was audible wheezing with labored shallow breaths. Sats would drop to 85% on RA. She received 3 dounebs, IV solumedrol and IV mag. Her breathing improved and she was walking independently and maintaining her sat; however, when she lays flat her sats will drop. Elevated BNP and See CXR. Otherwise her ; VSS.  Focused Assessment:  Pt Aox4. Difficulty hearing. Breathing non labored at rest. Sats 96% on 2L [place]. VSS.   Labs Ordered and Resulted from Time of ED Arrival Up to the Time of Departure from the ED   COMPREHENSIVE METABOLIC PANEL - Abnormal; Notable for the following components:       Result Value    Anion Gap 1 (*)     Glucose 160 (*)     Creatinine 1.25 (*)     GFR Estimate 36 (*)     GFR Estimate If Black 42 (*)     Calcium 8.1 (*)     Albumin 3.0 (*)     All other components within normal limits   CBC WITH PLATELETS DIFFERENTIAL - Abnormal; Notable for the following components:    RBC Count 2.65 (*)     Hemoglobin 9.2 (*)     Hematocrit 29.1 (*)      (*)     MCH 34.7 (*)     All other components within normal limits   NT PROBNP INPATIENT -  Abnormal; Notable for the following components:    N-Terminal Pro BNP Inpatient 6,367 (*)     All other components within normal limits   TROPONIN I   BLOOD GAS VENOUS AND OXYHGB   SARS-COV-2 (COVID-19) VIRUS RT-PCR   PERIPHERAL IV CATHETER         Treatments and/or interventions provided: See mar  Patient's response to treatments and/or interventions:  Improved breathing.    To be done/followed up on inpatient unit:  na    Does this patient have any cognitive concerns?:na    Activity level - Baseline/Home:  Walker  Activity Level - Current:   Walker    Patient's Preferred language: English   Needed?: No    Isolation: None  Infection: Not Applicable  Patient tested for COVID 19 prior to admission: YES  Bariatric?: No    Vital Signs:   Vitals:    06/12/21 1835 06/12/21 1836 06/12/21 1930 06/12/21 2000   BP:   (!) 140/71 (!) 148/76   Pulse:   60 60   Resp:   30 30   Temp:       TempSrc:       SpO2: (!) 85% 92% 100% 100%   Weight:           Cardiac Rhythm:     Was the PSS-3 completed:   Yes  What interventions are required if any?               Family Comments: Son at bedside  OBS brochure/video discussed/provided to patient/family: N/A              Name of person given brochure if not patient: na              Relationship to patient: na    For the majority of the shift this patient's behavior was Green.   Behavioral interventions performed were na.    ED NURSE PHONE NUMBER: [id number]

## 2021-06-14 NOTE — PLAN OF CARE
Patient is pleasant/cooperative.  A/O x4. VSS on RA. O2 stats dipped to high 80's when sleeping. 1L NC placed. Denies SOB/chest pain.  Denies N/V. Patient had MOHS procedure on Thursday to L-upper chest, dressing is CDI. Up with SBA to bathroom. Independently changes positions. Low fat/NA diet; no caffeine. R-PIV-SL. Possible discharge today (6/14).

## 2021-06-14 NOTE — PROGRESS NOTES
Discharge Note    Patient discharged to Assisted Living via private vehicle  accompanied by son.  IV discontinued: Yes  Prescriptions N/A.   Belongings reviewed and sent with patient.   Home medications returned to patient: NA  Equipment sent with: patient.   patient verbalizes understanding of discharge instructions. AVS given to patient.

## 2021-06-14 NOTE — DISCHARGE INSTRUCTIONS
A referral has been submitted for homecare physical therapy and occupational therapy with [facility]   Phone# [phone] Homecare is estimated out for 1.5-2 weeks.  [facility] has been given information to call [facility] at [phone] to schedule the assessment for homecare. If you want a referral submitted to another Homecare agency please call the Care Transitions RN [facility] at [phone] within 24 hours of discharge to discuss further.    Please note appointments for Lindsey are scheduled for both her Primary Care office (repeat labs in 3-5 days) and Cardiology.  Please call the numbers listed for these appointments if you have further questions or need to cancel/reschedule if needed.

## 2021-06-14 NOTE — PROGRESS NOTES
Patient seen and examined    - Feels much better and wants to go home; oxygen weaned down to room air  - intake/output not accurately measured  - noted slight worsening of renal function ; cr 1.25---1.35, BUN 50; likely some component of over diuresis with IV Lasix ; suggested to resume PTA PO Lasix daily (had not been compliant at home ); will need BMP monitoring and follow-up   -also arranged for outpatient cardiology follow-up    Discharge home today with home care PT/OT    Please see discharge summary for details

## 2021-06-14 NOTE — DISCHARGE SUMMARY
[facility]  Discharge Summary        Lindsey Hale MRN# [id number]   YOB: 1925 Age: 89 year old     Date of Admission:  6/12/2021  Date of Discharge:  6/14/2021  Admitting Physician:  Wilfriod Qureshi DO  Discharge Physician: Hardeep Baldwin MD  Discharging Service: Hospitalist     Primary Provider: Jeffery Sherwood  Primary Care Physician Phone Number: [phone]         Discharge Diagnoses/Problem Oriented Hospital Course (Providers):    Lindsey Hale was admitted on 6/12/2021 by Wilfirdo Qureshi DO and I would refer you to their history and physical.  The following problems were addressed during her hospitalization:    Lindsey Hale is a 89 year old female was admitted on 6/12/2021 with shortness of breath in the setting of noncompliance with her diuretics.     Progressive shortness of breath  Acute on chronic CHF  History of COPD not in acute exacerbation  Medication noncompliance  - History progressive dyspnea over the last 1 week; 17 pound weight increase, increased LE edema; hx furosemide on a as needed basis she does not like to take due to increased urination.  No chest pain, cough, dyspnea.,  In ED, troponins negative.  EKG without acute changes.  Bibasilar opacities felt atelectasis.  BNP 6.3K.  CR 1.25, TSH 0.11, free T4 1.76, notation that PCP recently decreased dose.  - got one dose of 40 mg IV lasix and then two further doses of 20 mg IV  - ECHO 6/13 noted with similar findings compared to 12/2019; EF 55-60%, no pericardial effusion  -clinically better; Oxygen weaned down to room air; intake/output not accurately measured  - suggested to resume PTA PO Lasix daily (had not been compliant at home ); will need BMP monitoring and follow-up   - also arranged for outpatient cardiology follow-up  -Continue PTA inhalers however not felt to be acute COPD exacerbation.     Hypertension:  Continue with metoprolol.     Atrial fibrillation: Rate controlled, continue with  metoprolol, flecainide, and Xarelto.    Hypothyroidism:   TFTs consistent with mild hyperthyroidism, history that PCP recently decreased dose of levothyroxine, continue current dose, recheck at PCP.    .  Sick sinus syndrome:  Status post pacemaker implantation.     Likely acute on Chronic kidney disease stage III:    - Creatinine baseline of roughly 1.1  - noted slight worsening of renal function ; cr 1.25---1.35, BUN 50; likely some component of over diuresis with IV Lasix ; suggested to resume PTA PO Lasix daily (had not been compliant at home ); will need BMP monitoring and follow-up      Chronic anemia:  Hemoglobin appears to be at baseline of roughly 9-10.  No evidence of bleeding.    Code Status: No CPR- Do NOT Intubate      Evaluated by therapy and recommended home cares; SW assisted with home care PT/OT        Brief Hospital Stay Summary Sent Home With Patient in AVS:               Pending Results:        Unresulted Labs Ordered in the Past 30 Days of this Admission     No orders found from 5/13/2021 to 6/13/2021.            Discharge Instructions and Follow-Up:      Follow-up Appointments     Follow-up and recommended labs and tests       Follow up with primary care provider, Jeffery Sherwood, within 7 days for   hospital follow- up.  The following labs/tests are recommended: repeat BMP   in 3-5 days.      Follow up with cardiology as arranged.                 Discharge Disposition:      Discharged to home        Discharge Medications:        Current Discharge Medication List      CONTINUE these medications which have CHANGED    Details   furosemide (LASIX) 20 MG tablet Take 1 tablet (20 mg) by mouth daily (Patient states that she is supposed to be taking medication everyday but has only been using once in a while)    Associated Diagnoses: Diastolic dysfunction         CONTINUE these medications which have NOT CHANGED    Details   acetaminophen (TYLENOL) 500 MG tablet Take 1,000 mg by mouth 3 times daily       albuterol (PROAIR HFA/PROVENTIL HFA/VENTOLIN HFA) 108 (90 Base) MCG/ACT inhaler Inhale 2 puffs into the lungs every 6 hours  Qty:      Comments: Pharmacy may dispense brand covered by insurance (Proair, or proventil or ventolin or generic albuterol inhaler)  Associated Diagnoses: COPD exacerbation (H)      Ascorbic Acid (VITAMIN C PO) Take 1 tablet by mouth daily      budesonide-formoterol (SYMBICORT) 160-4.5 MCG/ACT inhaler Inhale 2 puffs into the lungs 2 times daily       flecainide (TAMBOCOR) 150 MG tablet Take 1/2 tablet by mouth twice daily. Please call [phone] for an appointment.  Qty: 90 tablet, Refills: 0    Associated Diagnoses: Paroxysmal atrial fibrillation (H)      levothyroxine (SYNTHROID/LEVOTHROID) 75 MCG tablet Take 1 tablet (75 mcg) by mouth daily  Qty: 90 tablet, Refills: 3    Associated Diagnoses: Hypothyroidism, unspecified type      metoprolol tartrate (LOPRESSOR) 50 MG tablet TAKE 1 AND 1/2 TABLETS BY MOUTH TWICE DAILY  Qty: 270 tablet, Refills: 0    Associated Diagnoses: Atrial fibrillation, unspecified type (H); Benign essential hypertension      rivaroxaban ANTICOAGULANT (XARELTO ANTICOAGULANT) 15 MG TABS tablet Take 1 tablet (15 mg) by mouth daily (with dinner)  Qty: 90 tablet, Refills: 3    Associated Diagnoses: Paroxysmal atrial fibrillation (H); Atrial fibrillation, unspecified type (H)      traMADol (ULTRAM) 50 MG tablet Take one daily prn  Qty: 20 tablet, Refills: 0    Associated Diagnoses: Spinal stenosis of lumbar region without neurogenic claudication      vitamin (B COMPLEX-C) tablet Take 1 tablet by mouth daily      VITAMIN D PO Take 1 tablet by mouth daily (OTC: Patient unsure of strength)      VITAMIN E PO Take 1 capsule by mouth daily      polyethylene glycol (MIRALAX/GLYCOLAX) powder Take 17 g by mouth daily as needed for constipation               Allergies:         Allergies   Allergen Reactions     Clindamycin Hcl Other (See Comments)     Difficulty swallowing      Indocin Headache     Ended up going to( E.) hospital with severe head ache     Xylocaine-Epinephrine [Epinephrine-Lidocaine-Na Metabisulfite]      Xylocaine with epi caused a rapid heart beat     Ampicillin Potassium Nausea and Vomiting and Rash     Celebrex [Celecoxib] Rash     Ciprofloxacin Rash     Erythromycin Rash     Penicillins Nausea and Vomiting and Rash     Vibramycin [Doxycycline Hyclate] Rash           Consultations This Hospital Stay:      No consultations were requested during this admission        Condition and Physical on Discharge:      Discharge condition: Stable   Vitals: Blood pressure (!) 146/55, pulse 60, temperature 96.9  F (36.1  C), temperature source Oral, resp. rate 16, weight 75 kg (165 lb 6.4 oz), SpO2 95 %, not currently breastfeeding.     Constitutional: Alert, awake and orienetd X 3; lying comfortably in bed in no apparent distress   HEENT: Pupils equal and reactive to light and accomodation, EOMI intact; neck supple no raised JVD or rigidity    Oral cavity: Moist mucosa   Cardiovascular: Normal s1 s2, regular rate and rhythm, no murmur   Lungs: B/l clear to auscultation, no wheezes or crepitations   Abdomen: Soft, nt, nd, no guarding, rigidity or rebound; BS +   LE : B/l edema +   Musculoskeletal: Power 5/5 in all extremities   Neuro: No focal neurological deficits noted, CN II to XII grossly intact   Psychiatry: normal mood and affect             Discharge Time:      Greater than 30 minutes.        Image Results From This Hospital Stay (For Non-EPIC Providers):        Results for orders placed or performed during the hospital encounter of 06/12/21   XR Chest 2 Views    Narrative    XR CHEST TWO VIEWS   6/12/2021 7:25 PM     HISTORY: Wheezing, chronic obstructive pulmonary disease (COPD)  history.    COMPARISON: Chest x-ray on 11/14/2019      Impression    IMPRESSION: AP and lateral views of the chest were obtained. Left  chest wall dual lead AICD leads are in stable position.  Stable mild  enlargement of the cardiac silhouette. Bibasilar pulmonary opacities,  left more than right, likely atelectasis. No significant pleural  effusion or pneumothorax. Diffuse osteopenia with multilevel  degenerative changes of the spine.    FARIHA PARMAR MD   Echocardiogram Complete    Narrative    [id number]  [id number]  [id number]  [id number]^SHIRLEY^JASON     [facility]  Echocardiography Laboratory  [address] [place] [address]     Name: CASIMIRO PRIEST  MRN: [id number]  : 1925  Study Date: 2021 10:38 AM  Age: 89 yrs  Gender: Female  Patient [facility]  Reason For Study: Heart Failure  Ordering Physician: JASON WATSON  Referring Physician: Jeffery Sherwood  Performed By: Naila Arroyo RDCS     BSA: 1.7 m2  Height: 62 in  Weight: 160 lb  BP: 140/47 mmHg  ______________________________________________________________________________  ______________________________________________________________________________  Interpretation Summary     1. The left ventricle is normal in size. There is normal left ventricular wall  thickness. Left ventricular systolic function is normal. The visual ejection  fraction is estimated at 55-60%. Diastolic Doppler findings (E/E' ratio and/or  other parameters) suggest left ventricular filling pressures are increased.  Septal wall motion abnormality may reflect pacemaker activation.  2. The right ventricle is normal size. There is a catheter/pacemaker lead seen  in the right ventricle. The right ventricular systolic function is normal.  3. There is mild biatrial enlargement. There is no color Doppler evidence of  an atrial shunt.  4. Mild to moderate mitral regurgitation. Mild mitral stenosis.  5. There is moderate trileaflet aortic sclerosis. No aortic regurgitation is  present. Transaortic gradients are mildly increased consistent with aortic  sclerosis.  6. No pericardial effusion.  7. In comparison to the previous report dated  12/30/2019, the findings are  similar.  ______________________________________________________________________________  Left Ventricle  The left ventricle is normal in size. There is normal left ventricular wall  thickness. Left ventricular systolic function is normal. The visual ejection  fraction is estimated at 55-60%. Diastolic Doppler findings (E/E' ratio and/or  other parameters) suggest left ventricular filling pressures are increased.  Septal wall motion abnormality may reflect pacemaker activation.     Right Ventricle  The right ventricle is normal size. There is a catheter/pacemaker lead seen in  the right ventricle. The right ventricular systolic function is normal.     Atria  There is mild biatrial enlargement. There is no color Doppler evidence of an  atrial shunt.     Mitral Valve  The mitral valve leaflets are mildly thickened. There is mild mitral annular  calcification. There is mild to moderate (1-2+) mitral regurgitation. There is  mild mitral stenosis. The mean mitral valve gradient is 3.0 mmHg. The peak  mitral valve gradient is 9.5 mmHg.     Tricuspid Valve  There is mild (1+) tricuspid regurgitation. The right ventricular systolic  pressure is approximated at 30.3 mmHg plus the right atrial pressure.     Aortic Valve  There is moderate trileaflet aortic sclerosis. No aortic regurgitation is  present. Transaortic gradients are mildly increased consistent with aortic  sclerosis.     Pulmonic Valve  There is trace pulmonic valvular regurgitation. There is no pulmonic valvular  stenosis.     Vessels  The aortic root is normal size. Normal size ascending aorta. The inferior vena  cava is normal.     Pericardium  There is no pericardial effusion.     Rhythm  The rhythm was paced.  ______________________________________________________________________________  MMode/2D Measurements & Calculations     IVSd: 0.90 cm  LVIDd: 4.3 cm  LVIDs: 2.5 cm  LVPWd: 0.98 cm  FS: 42.0 %  LV mass(C)d: 129.0 grams  LV  mass(C)dI: 74.2 grams/m2  Ao root diam: 2.6 cm  LA dimension: 4.4 cm  asc Aorta Diam: 3.0 cm  LA/Ao: 1.7  LA Volume (BP): 40.6 ml  LA Volume Index (BP): 23.3 ml/m2  RWT: 0.46     Doppler Measurements & Calculations  MV E max golden: 135.0 cm/sec  MV A max golden: 64.9 cm/sec  MV E/A: 2.1  MV max P.5 mmHg  MV mean PG: 3.0 mmHg  MV V2 VTI: 38.8 cm  MV dec time: 0.24 sec  TR max golden: 275.4 cm/sec  TR max P.3 mmHg  E/E' av.1  Lateral E/e': 26.1  Medial E/e': 38.1     ______________________________________________________________________________  Report approved by: Jorge Aguirre 2021 12:50 PM                 Most Recent Lab Results In EPIC (For Non-EPIC Providers):    Most Recent 3 CBC's:  Recent Labs   Lab Test 21  0738 21  0735 21  1851   WBC 11.0 8.7 9.2   HGB 9.1* 8.9* 9.2*   * 107* 110*    273 288      Most Recent 3 BMP's:  Recent Labs   Lab Test 21  0649 21  0735 21  1851    137 138   POTASSIUM 4.3 3.9 4.3   CHLORIDE 106 107 109   CO2 27 23 28   BUN 50* 26 24   CR 1.35* 1.24* 1.25*   ANIONGAP 2* 7 1*   FRANCISCO 8.3* 8.1* 8.1*   * 189* 160*     Most Recent 3 Troponin's:  Recent Labs   Lab Test 21  1851 19  0926 02/15/19  0738   TROPI <0.015 <0.015 <0.015     Most Recent 3 INR's:  Recent Labs   Lab Test 19  1010 17  1146 14   INR 1.86* 1.24* 3.0*     Most Recent 2 LFT's:  Recent Labs   Lab Test 21  1851 19  0926   AST 20 16   ALT 21 19   ALKPHOS 52 56   BILITOTAL 0.9 1.0     Most Recent Cholesterol Panel:  Recent Labs   Lab Test 13  0924   CHOL 222*   *   HDL 48*   TRIG 157*     Most Recent 6 Bacteria Isolates From Any Culture (See EPIC Reports for Culture Details):  Recent Labs   Lab Test 21  0937 10/13/20  1013 20  1546 19  1824 19  1512 08/15/18  0814   CULT >100,000 colonies/mL  Citrobacter freundii complex  *  <10,000 colonies/mL  mixed urogenital los    <10,000 colonies/mL  mixed urogenital los  Susceptibility testing not routinely done   <10,000 colonies/mL  mixed urogenital los   <10,000 colonies/mL  mixed urogenital los  Susceptibility testing not routinely done   No growth  No growth <10,000 colonies/mL  urogenital los    Susceptibility testing not routinely done     Most Recent TSH, T4 and HgbA1c:   Recent Labs   Lab Test 06/13/21  0735 04/15/21  1440 04/15/21  1440   TSH 0.11*   < > 10.60*   T4 1.76*   < > 0.96   A1C  --   --  6.0*    < > = values in this interval not displayed.

## 2021-06-14 NOTE — CONSULTS
[facility] CORE Clinic    Received CORE Clinic Consult from Dr. Qureshi.    Reviewed Ms. Hale's chart and note they are admitted for shortness of breath and volume overload, concern for heart failure.     Echocardiogram shows LVEF 55-60%. This is their first admission in the past year for concerns of heart failure.     Given above information, Ms. Hale meets criteria for general cardiology follow-up. She has previously seen Zaina Herman CNP and Dr. Vazquez in our office.   I have placed orders for this and inpatient [facility] can arrange the visit, alternatively, our scheduling team can reach out to them after discharge.     Please call with questions.    Lexie Craig RN BSN  CORE Clinic Care Coordinator [place]  [phone]

## 2021-06-14 NOTE — PLAN OF CARE
Occupational Therapy Discharge Summary    Reason for therapy discharge:    Discharged to home with home therapy.    Progress towards therapy goal(s). See goals on Care Plan in [facility] electronic health record for goal details.  Goals not met.  Barriers to achieving goals:   discharge from facility.    Therapy recommendation(s):    Continued therapy is recommended.  Rationale/Recommendations:  Increase ADL independence and safety.

## 2021-06-14 NOTE — CONSULTS
Care Management Discharge Note    Discharge Date: 06/14/21       Discharge Disposition: Home Care referral submitted to [facility] for PT/OT    Discharge Services: None    Discharge DME: None    Discharge Transportation: family or friend will provide    Private pay costs discussed: Not applicable    PAS Confirmation Code: n/a    Patient/family educated on Medicare website which has current facility and service quality ratings: yes    Education Provided on the Discharge Plan:  yes  Persons Notified of Discharge Plans: patient and voicemail left with daughter Oxana per patient request at phone#[phone] regarding follow up recommendations PCP/[facility] and homecare referral to [facility] for PT/OT.  Patient/Family in Agreement with the Plan: yes    Handoff Referral Completed: Yes    Additional Information:  Writer met with the patient at the bedside. Patient is A+Ox4 and cleared for discharge to home with PCP/[facility] and Homecare follow up.  Writer introduced role and scope in safe discharge planning.      Patient informs this writer that she resides independently at the [facility] and patient does not receive any services.    Patient aware that homecare has been recommended, she is aware of homebound status but defers homecare to daughter Oxana who is currently on her way to the [facility] from her cabin. Writer confirmed patients address, PCP and daughter Oxana as primary contact for scheduling.     Writer informed Oxana that we will send a referral to [facility] for homecare and that some homecare agencies are 1.5-2 weeks out. Patient aware that this writer left a message for the patients daughter with the above information to [facility] email to intake.    Appointments were scheduled with PCP and [facility] in AVS.  Patient identified no further needs at this time.  Bedside updated that AVS is completed with appointment and homecare details.     Addendum: 6/14/21 13:19  Post discharge follow up.  Per Homecare Liaison with  [facility] start of care 06/17/21 writer entered start of care in AVS. [facility] to call patient's daughter regarding assessment/appointment.      Jewell Aguilera RN

## 2021-06-15 NOTE — TELEPHONE ENCOUNTER
Hypoxia, (Hfpef) Heart Failure With Preserved Ejection Fraction (H),MON 14-JUN-2021,ed/ip  2 / 1    [phone] (home)

## 2021-06-15 NOTE — PROGRESS NOTES
Clinic Care Coordination Contact  [facility]/Voicemail       Clinical Data: Care Coordinator Outreach  Outreach attempted x 1.  Per demo instructions RN CC called and left message on patient's daughter Oxana's voicemail (consent to communicate on file) with call back information and requested return call.    Plan: RN Care Coordinator will try to reach patient again in 1-2 business days.    Luz Maria Ortiz RN Care Coordinator  [facility]  Email: [email or web address]  Phone: [phone]

## 2021-06-15 NOTE — LETTER
[facility] CARE COORDINATION  [address]  [facility] [address]  June 16, 2021    Lindsey Hale  [address]   [place] [address]      Dear Lindsey,    I am a clinic care coordinator who works with Jeffery Sherwood MD at [facility]. I wanted to introduce myself and provide you with my contact information for you to be able to call me with any questions or concerns. Below is a description of clinic care coordination and how I can further assist you.      The clinic care coordination team is made up of a registered nurse,  and community health worker who understand the health care system. The goal of clinic care coordination is to help you manage your health and improve access to the health care system in the most efficient manner. The team can assist you in meeting your health care goals by providing education, coordinating services, strengthening the communication among your providers and supporting you with any resource needs.    Please feel free to contact Delores Bernal, the Community Health Worker at [phone] with any questions or concerns. We are focused on providing you with the highest-quality healthcare experience possible and that all starts with you.     Sincerely,     Liz Moss, RN Care Coordinator     [facility] Ambulatory Care Management  [facility] Family and   [email or web address]    Office: [phone]

## 2021-06-16 NOTE — PATIENT INSTRUCTIONS
Follow up with Dr Sherwood in about 2 months for a check in regarding your breathing and also your thyroid

## 2021-06-16 NOTE — TELEPHONE ENCOUNTER
FYI-  Pt called requesting status on PT orders. Verbal approval given. Pt has OV scheduled today for hospital follow up.      Triage called daughter to get update of symptoms given OT report of worsening cough. Daughter reports she talked to pt last night and seem to be doing better. Had a productive cough and getting some Phlegm out. Unknown new symptoms.  Pt has OV scheduled today at 11:30.

## 2021-06-16 NOTE — LETTER
[facility]  [address]  [address]  [place]  [address]  Tel: [phone]    June 17, 2021    Lindsey MARGARET Hale  [address]   [facility] [address]        Dear Ms. Hale,    Lindsey, your blood work is stable. Your kidney function is just slightly worse than previous. This can be monitored by Dr Sherwood when you see him next     If you have any further questions or problems, please contact our office.      Sincerely,    MERCEDEZ Oseguera CNP/SML          Enclosure: Lab Results  Results for orders placed or performed in visit on 06/16/21   Basic metabolic panel  (Ca, Cl, CO2, Creat, Gluc, K, Na, BUN)     Status: Abnormal   Result Value Ref Range    Sodium 137 133 - 144 mmol/L    Potassium 4.3 3.4 - 5.3 mmol/L    Chloride 111 (H) 94 - 109 mmol/L    Carbon Dioxide 24 20 - 32 mmol/L    Anion Gap 2 (L) 3 - 14 mmol/L    Glucose 133 (H) 70 - 99 mg/dL    Urea Nitrogen 42 (H) 7 - 30 mg/dL    Creatinine 1.45 (H) 0.52 - 1.04 mg/dL    GFR Estimate 30 (L) >60 mL/min/[1.73_m2]    GFR Estimate If Black 35 (L) >60 mL/min/[1.73_m2]    Calcium 8.7 8.5 - 10.1 mg/dL

## 2021-06-16 NOTE — PROGRESS NOTES
Assessment & Plan   Problem List Items Addressed This Visit     Diastolic dysfunction    Relevant Medications    furosemide (LASIX) 20 MG tablet    Hypothyroidism, unspecified type      Other Visit Diagnoses     Screening for hyperlipidemia    -  Primary    Hospital discharge follow-up        Relevant Orders    Basic metabolic panel  (Ca, Cl, CO2, Creat, Gluc, K, Na, BUN) (Completed)         Hospital discharge on 6/14/21for progressive SOB and CHF. Taking Lasix as prescribed at this time. Feeling much better today. She should continue all current meds.Will recheck BMP today. Will have pt follow-up with PCP in 2 months. She will need recheck of thyroid labs at that time as she just had a dose change in the hospital       Recomended to change MOHS dressing daily. Wound looks great today.     Patient Instructions   Follow up with Dr Sherwood in about 2 months for a check in regarding your breathing and also your thyroid           No follow-ups on file.    MERCEDEZ Troy CNP  [facility] BRICE Portillo is a 89 year old who presents for the following health issues     HPI       Hospital Follow-up Visit:    Hospital/Nursing Home/IP Rehab Facility: [facility]  Date of Admission: 06/12/0  Date of Discharge: 06/14/2021  Reason(s) for Admission: Progressive shortness of breath  Acute on chronic CHF  History of COPD not in acute exacerbation  Medication noncompliance      Was your hospitalization related to COVID-19? No   Problems taking medications regularly:  None  Medication changes since discharge: None  Problems adhering to non-medication therapy:  None    Summary of hospitalization:  [facility] discharge summary reviewed  Diagnostic Tests/Treatments reviewed.  Follow up needed: LORIE  Other Healthcare Providers Involved in Patient s Care:         Specialist appointment - cardiology  Update since discharge: improved.       Post Discharge Medication  "Reconciliation: discharge medications reconciled, continue medications without change.  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  Physical Exam  Constitutional:       Appearance: Normal appearance.   Cardiovascular:      Rate and Rhythm: Normal rate and regular rhythm.      Heart sounds: Murmur present.   Pulmonary:      Effort: Pulmonary effort is normal.      Breath sounds: Normal breath sounds.      Comments: Productive cough.  Skin:     General: Skin is warm and dry.      Findings: Bruising present.      Comments: BLE noted with multiple areas of ecchymosis.  L anterior chest with open wound from MOHS on 6/9/21. Edges of wound well approximated. Wound bed is pink and without slough. No erythema noted.                  I saw this patient in collaboration with Hayley Chapman NP Student      I was present with the APRN/PA student who participated in the service and in the documentation of the services provided. I have verified the history and personally performed the physical exam and medical decision making, as documented by the student and edited by " me.     Dakota Chairez, APRN, CNP    Hayley Chapman NP Student

## 2021-06-16 NOTE — PROGRESS NOTES
Clinic Care Coordination Contact  [facility]/Voicemail    Referral Source: IP Handoff  Clinical Data: Care Coordinator Outreach    Outreach attempted x 2.  Left message on patient's daughter's voicemail with call back information and requested return call.    Plan: Care Coordinator will send care coordination introduction letter with care coordinator contact information and explanation of care coordination services via mail. Care Coordinator will do no further outreaches at this time.    Liz Moss, RN Care Coordinator     [facility]  [facility]  [email or web address]    Office: [phone]

## 2021-06-17 NOTE — TELEPHONE ENCOUNTER
Reason for Call:  Home Health Care    Zaina with [facility] called regarding (reason for call):     Orders are needed for this patient.     Skilled Nursing: 2x3 weeks, 1x5 weeks, 4 prn    Wound Care: wound from dermatology to clean with wound  or normal saline, cover with Adaptic, and cover with border dressing    Phone Number Homecare Nurse can be reached at: [phone]    Can we leave a detailed message on this number? YES    Phone number patient can be reached at: [phone]  *Home care is leaving today to go on vacation and is requesting orders before she leaves    Best Time: any    Call taken on 6/17/2021 at 3:27 PM by Leah Pat

## 2021-06-17 NOTE — TELEPHONE ENCOUNTER
Reason for Call:  Home Health Care    Kiara with [facility] called regarding (reason for call):     Orders are needed for this patient.     OT: evaluation for week of 06.21.21 due to inability to see Pt sooner    Phone Number Homecare Nurse can be reached at: [phone]    Can we leave a detailed message on this number? YES    Phone number patient can be reached at: [phone]    Best Time: any    Call taken on 6/17/2021 at 11:46 AM by Leah Pat

## 2021-06-18 NOTE — PROGRESS NOTES
Clinic Care Coordination Contact    [facility] printed letter per care coordination worklow.

## 2021-06-18 NOTE — TELEPHONE ENCOUNTER
Daughter Oxana called     Pt recently hospitalized, was told needs to see hematology and PCP post discharge, unable to see hematology until Sept     asking if PCP can work her in next week for OV? Due to complexity of care pt requests PCP but also asking for OV instead of virtual appt     Has cardiology visit at 11am Tuesday 6/22, asking if any way they can see PCP around that time? If not they can be flexible     Call back [phone], detailed message is okay    Rasheeda VELIZ RN

## 2021-06-21 NOTE — TELEPHONE ENCOUNTER
Dr. Sherwood,    You do not have any openings this week; in-person or virtual. Do you want to work her in somewhere in your schedule?    Thanks!

## 2021-06-21 NOTE — TELEPHONE ENCOUNTER
Reason for Call:  Home Health Care    Kiara with [facility] called regarding (reason for call):     Orders are needed for this patient.     OT: 1 visit this week and 2 next week for home safety and strenghtening    Phone Number Homecare Nurse can be reached at: [phone]    Can we leave a detailed message on this number? YES    Phone number patient can be reached at: [phone]    Best Time: Any     Call taken on 6/21/2021 at 12:45 PM by Angelika Middleton

## 2021-06-21 NOTE — TELEPHONE ENCOUNTER
TC: Can you please call patient to schedule.     Rigoberto Mcfadden RN  [facility]

## 2021-06-22 NOTE — PATIENT INSTRUCTIONS
Thank you for visiting with me today.    We discussed: we'll have you come in tomorrow to get an IV lasix infusion to get rid of more fluid.  Please arrive at 10:00 am.  This will give you take a jump start on getting rid of fluid.      Follow up: will be in about 1 week with Zaina or myself.        Please call my nurse, Oxana, at [phone] with any questions or concerns.    Scheduling phone number: [phone]  Reminder: Please bring in all current medications, over the counter supplements and vitamin bottles to your next appointment.

## 2021-06-22 NOTE — PROGRESS NOTES
Service Date: 2021    CLINIC VISIT     PRIMARY CARDIOLOGIST:  Efra Vazquez MD    REASON FOR VISIT:  Hospital followup, heart failure with preserved EF.    HISTORY OF PRESENT ILLNESS:  Ms. Hale is a delightful, 89-year-old woman with a past medical history significant for the followin.  Sick sinus syndrome with a pacemaker implanted in , near [facility], but with about 1 year battery life left.  2.  Paroxysmal atrial fibrillation, treated with flecainide, metoprolol and anticoagulation.  3.  Chronic lung disease.  Was on amio, but this was stopped due to the decline in PFTs.  4.  Osteoporosis.  5.  Macrocytic anemia, now with referral to Heme.   6.  Normal EF.    Isabel was recently hospitalized from - with progressive shortness of breath.  She was found to have bibasilar opacities on her chest x-ray that were more likely atelectasis, but had an terminal NT-proBNP of 6300 and noted that she had a 17 pound weight gain, as she had not been taking her Lasix due to the frequent urination.  She was diuresed and followed in the hospital, but with no change in her weight, really.  Symptomatically, she had improved, and her creatinine bumped, so this was stopped. Her [id number] Lasix was restarted.    She comes in today for followup of these events.  She tells me she still feels pretty poorly, but fortunately today is a good day. Most of the days before now were poor, and she is short of breath just walking back and forth to the bathroom.  Yesterday was a terrible day, and she felt even more short of breath than normal.  She is taking her Lasix every day, and she is urinating frequently.  She has not skipped doses.  Primarily, her shortness of breath is on exertion and less so with rest.  She endorses some peripheral edema, right greater than left, and this is long standing as she has a right knee replacement.  She just got a scale in the mail today; this is a talking scale.  She otherwise has not  been able to weigh herself at home.  She has not had syncope or presyncope.  She has not been lightheaded.  She did undergo recently a Mohs procedure for a lesion on her left chest that was over her pacemaker.  This seems to be healing appropriately.    SOCIAL HISTORY:  She lives at [facility].  Before COVID, she swam several times a week and did water aerobics.  She has not been able to get back to any of that.  She eats 2 meals a day.  It is a breakfast bar and a bottle of cranberry juice in the morning, and then she will eat dinner from the dining room.  She does not add salt to her food.  She has been trying to eat low sodium for years and avoid high-salt choices from the dining room, although she does not really have an option in that.  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  She has 4 children; one has passed.    PHYSICAL EXAMINATION:    GENERAL:  An elderly woman in no acute distress, appears younger than her stated age.  HEENT:  Normocephalic and atraumatic.  HEART:  Regular with a 1/6 systolic murmur heard best at the right sternal border.  LUNGS:  Markedly diminished bilaterally, but I do not appreciate wheezes, rales or rhonchi.  EXTREMITIES:  With trace peripheral edema.  I do not appreciate JVP at 90 degrees.  The patient is unable to climb the exam table.    Echocardiogram was reviewed today from the hospital that showed an EF of 55%-60% with diastolic dysfunction and elevated filling pressures, mild to moderate mitral regurgitation, mild mitral stenosis, moderate aortic sclerosis, but no aortic regurg or stenosis.    Device check was done today and reviewed showing 99% V paced, a few brief episodes of mode switch that lasted only 4-6 seconds and a battery life of 5.2 months.    Chest x-ray, which was reviewed personally, showed an enlarged cardiac silhouette, bibasilar pulmonary opacities.  No effusion or  pneumothorax.    ASSESSMENT AND PLAN:    1.  Acute on chronic heart failure with preserved EF with the cause of exacerbation being the patient intermittently did not take her Lasix.  There is no other real cause of why she does not continue to diurese, though, other than I suspect she is so hypervolemic that she is not absorbing her medications.  We talked about adjusting medications and/or doing Infusion Clinic, and today we elected to go bring her in for Infusion Clinic tomorrow.  I will give her a 40 mg IV Lasix push followed by a 20 mg drip for 4 hours.  I realize her creatinine is up, but I think this may also be due to congestion, and the patient is generally miserable. Regardless of her creatinine, I think she needs assistance with diuresis.  We will also get a chest x-ray tomorrow.  She would like to use a PureWick catheter so she does not have to get up and down to the bathroom so much. She will be scheduled at 10:00 and get her normal labs.  In followup of this, she will also be seen in about a week with a BMP prior to that.  It is possible she may need to be switched to Bumex or torsemide, but we will see how things go tomorrow.  2.  Hypertension, well controlled for her age today at 138/71.  We will continue current medicines.  3.  Permanent pacemaker in place, nearing KRISTA, but the device is working normally.  We will continue with serial checks as prescribed.  Not contributing to today's problem.    4.  Paroxysmal atrial fibrillation.  Very brief mode switches noted, but on flec and metoprolol as well as anticoagulation with a 15 mg dose of Xarelto.  5.  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  I wonder if this is contributing to her shortness-of-breath.  If we cannot improve her THOMPSON with diuresis, we can pursue pulmonary w/u as well.    Thank you for allowing me to participate in this delightful patient's care.      More than 50 minutes was spent on this patient, including counseling and  coordination of care, review of hospitalization, echocardiogram, pacemaker and labs.  As the patient is close to hospitalization, we will do outpatient infusion instead given the high-risk status.    Marcie An PA-C        D: 2021   T: 2021   MT: leon    Name:     CASIMIRO PRIEST  MRN:      [id number]        Account:      [id number]   :      1925           Service Date: 2021       Document: [id number]

## 2021-06-22 NOTE — PROGRESS NOTES
[id number]      HPI and Plan:   See dictation    Orders this Visit:  Orders Placed This Encounter   Procedures     Basic metabolic panel     Basic metabolic panel     Follow-Up with Cardiology Diuretic Infusion Care     Follow-Up with Cardiac Advanced Practice Provider     Follow-Up with [facility] visit     No orders of the defined types were placed in this encounter.    There are no discontinued medications.      Encounter Diagnoses   Name Primary?     (HFpEF) heart failure with preserved ejection fraction (H)      Acute heart failure with preserved ejection fraction (HFpEF) (H) Yes       CURRENT MEDICATIONS:  Current Outpatient Medications   Medication Sig Dispense Refill     acetaminophen (TYLENOL) 500 MG tablet Take 1,000 mg by mouth 3 times daily       albuterol (PROAIR HFA/PROVENTIL HFA/VENTOLIN HFA) 108 (90 Base) MCG/ACT inhaler Inhale 2 puffs into the lungs every 6 hours       Ascorbic Acid (VITAMIN C PO) Take 1 tablet by mouth daily       budesonide-formoterol (SYMBICORT) 160-4.5 MCG/ACT inhaler Inhale 2 puffs into the lungs 2 times daily        flecainide (TAMBOCOR) 150 MG tablet Take 1/2 tablet by mouth twice daily. Please call [phone] for an appointment. 90 tablet 0     furosemide (LASIX) 20 MG tablet Take 1 tablet (20 mg) by mouth daily (Patient states that she is supposed to be taking medication everyday but has only been using once in a while) 90 tablet 1     levothyroxine (SYNTHROID/LEVOTHROID) 75 MCG tablet Take 1 tablet (75 mcg) by mouth daily 90 tablet 3     metoprolol tartrate (LOPRESSOR) 50 MG tablet TAKE 1 AND 1/2 TABLETS BY MOUTH TWICE DAILY 270 tablet 0     polyethylene glycol (MIRALAX/GLYCOLAX) powder Take 17 g by mouth daily as needed for constipation       rivaroxaban ANTICOAGULANT (XARELTO ANTICOAGULANT) 15 MG TABS tablet Take 1 tablet (15 mg) by mouth daily (with dinner) 90 tablet 3     traMADol (ULTRAM) 50 MG tablet Take one daily prn 20 tablet 0     vitamin (B COMPLEX-C)  tablet Take 1 tablet by mouth daily       VITAMIN D PO Take 1 tablet by mouth daily (OTC: Patient unsure of strength)       VITAMIN E PO Take 1 capsule by mouth daily         ALLERGIES     Allergies   Allergen Reactions     Clindamycin Hcl Other (See Comments)     Difficulty swallowing     Indocin Headache     Ended up going to( [facility] hospital with severe head ache     Xylocaine-Epinephrine [Epinephrine-Lidocaine-Na Metabisulfite]      Xylocaine with epi caused a rapid heart beat     Ampicillin Potassium Nausea and Vomiting and Rash     Celebrex [Celecoxib] Rash     Ciprofloxacin Rash     Erythromycin Rash     Penicillins Nausea and Vomiting and Rash     Vibramycin [Doxycycline Hyclate] Rash       PAST MEDICAL HISTORY:  Past Medical History:   Diagnosis Date     Abnormal echocardiogram 04/2017    mild mr, ar, mitral stenosis, nl ef, lvh.  Done for episode of vision change     Arthritis 99         Atrial fibrillation (H) 2011 and 2009    neg nuc est 2009, Dr. Vazquez, on coumadin     Chest pain 2000    neg est echo, neg ct chest abd, pelvis [facility]     Chronic LBP      COPD (chronic obstructive pulmonary disease) (H)     seen by pulmonary md Dr. Whitt, and given inhaler     Cysts, breast 1980s    bilat mastectomies     Diastolic dysfunction 12/2019    on echo done for sob, also mild to mod mr, nl ef, mild mitral stenosis and mild a.s     Dizzy 2007    ct neg, carotid us neg     Dysphagia 2005    egd nl     Elevated blood sugar      Environmental allergies      Hematuria 2004    neg cysto and us     Hemoptyses 2008    ct neg, bronch neg 2009     Hyperlipidemia      Hypertension      Hypothyroid 1995    Dr. Ortiz     Lumbar spinal stenosis 12/2018    seen on ct done for lbp     Mild aortic stenosis 12/2019     Mild mitral stenosis 12/2019    on echo done for sob     Mitral regurgitation 6/15, 12/19    on echo     nausea 2006    ct abd and ;pelvis neg     Osteopenia     fu dexa better 2011     Osteoporotic  compression fracture of spine (H) 2018    got iv reclast 3/6/19     Pacemaker 2011    afib with pauses and syncope     Palpitations 2009    neg est     PMR (polymyalgia rheumatica) (H) 2004    not active in years     SOB (shortness of breath) 5/15    echo nl ef, 2+mr, cxr clear, seen by pulm and given inhaler     Supraventricular premature beats      T12 compression fracture (H) 12/2018    non traumatic     TIA (transient ischaemic attack) 2009    carotids neg, mri neg     Treadmill stress test negative for angina pectoris 2011    nuclear est     Urine incontinence     Dr. Westfall     Urosepsis 2009    hospitalized     Vision changes 2011    eval by neuro and ophtho and no cause found       PAST SURGICAL HISTORY:  Past Surgical History:   Procedure Laterality Date     ARTHROPLASTY HIP Left 12/5/2017    Procedure: ARTHROPLASTY HIP;  LEFT HIP ARBEN ARTHROPLASTY(SORAYA);  Surgeon: Darell Nathan MD;  Location: [facility] OR     ARTHROPLASTY PATELLO-FEMORAL (KNEE)  1998 and 2002     ARTHROSCOPY KNEE  1997     BIOPSY BREAST Right 9/23/2014    Procedure: BIOPSY BREAST;  Surgeon: David Carter MD;  Location:  [facility]     breast implants      4x     cataract  2011     FOOT SURGERY  2003     MASTECTOMY  1980    bilat, cysts     nj not bso  70's    not for ca       FAMILY HISTORY:  Family History   Problem Relation Age of Onset     C.A.D. Father      Lymphoma Father      Cancer Mother      Lymphoma Brother      Breast Cancer Sister      Osteoporosis Sister      Myocardial Infarction Sister      Unknown/Adopted Maternal Grandmother      Unknown/Adopted Maternal Grandfather      Unknown/Adopted Paternal Grandmother      Unknown/Adopted Paternal Grandfather        SOCIAL HISTORY:  Social History     Socioeconomic History     Marital status:      Spouse name: Not on file     Number of children: 4     Years of education: Not on file     Highest education level: Not on file   Occupational History     Not on file   Social  Needs     Financial resource strain: Not on file     Food insecurity     Worry: Not on file     Inability: Not on file     Transportation needs     Medical: Not on file     Non-medical: Not on file   Tobacco Use     Smoking status: Former Smoker     Types: Cigarettes     Quit date: 1955     Years since quittin.4     Smokeless tobacco: Never Used     Tobacco comment: quit in    had smoked about 2 cigarettes a day or more   Substance and Sexual Activity     Alcohol use: Yes     Alcohol/week: 0.0 standard drinks     Comment: occ wine     Drug use: No     Sexual activity: Not Currently   Lifestyle     Physical activity     Days per week: Not on file     Minutes per session: Not on file     Stress: Not on file   Relationships     Social connections     Talks on phone: Not on file     Gets together: Not on file     Attends [employer] service: Not on file     Active member of club or organization: Not on file     Attends meetings of clubs or organizations: Not on file     Relationship status: Not on file     Intimate partner violence     Fear of current or ex partner: Not on file     Emotionally abused: Not on file     Physically abused: Not on file     Forced sexual activity: Not on file   Other Topics Concern     Parent/sibling w/ CABG, MI or angioplasty before 65F 55M? Not Asked      Service Not Asked     Blood Transfusions Not Asked     Caffeine Concern No     Comment: coffee: 1 cup a week.  Occ green tea     Occupational Exposure Not Asked     Hobby Hazards Not Asked     Sleep Concern No     Stress Concern No     Weight Concern No     Special Diet No     Back Care Not Asked     Exercise Yes     Comment: walking daily     Bike Helmet Not Asked     Seat Belt Yes     Self-Exams Not Asked   Social History Narrative     Not on file       Review of Systems:  Skin:  Positive for bruising   Eyes:  Positive for glasses;cataracts  ENT:  not assessed    Respiratory:  Negative dyspnea on  exertion  Cardiovascular:    edema;Positive for  Gastroenterology: Negative    Genitourinary:  Negative    Musculoskeletal:  Positive for arthritis  Neurologic:  Negative    Psychiatric:  Negative    Heme/Lymph/Imm:  Positive for allergies  Endocrine:  Positive for thyroid disorder    Physical Exam:  Vitals: /71   Pulse 60   Ht 1.524 m (5')   Wt 74.6 kg (164 lb 8 oz)   SpO2 100%   BMI 32.13 kg/m     Please refer to dictation for physical exam    Recent Lab Results: all reviewed today  CBC RESULTS:  Lab Results   Component Value Date    WBC 11.0 06/14/2021    RBC 2.64 (L) 06/14/2021    HGB 9.1 (L) 06/14/2021    HCT 28.2 (L) 06/14/2021     (H) 06/14/2021    MCH 34.5 (H) 06/14/2021    MCHC 32.3 06/14/2021    RDW 14.6 06/14/2021     06/14/2021       BMP RESULTS:  Lab Results   Component Value Date     06/16/2021    POTASSIUM 4.3 06/16/2021    CHLORIDE 111 (H) 06/16/2021    CO2 24 06/16/2021    ANIONGAP 2 (L) 06/16/2021     (H) 06/16/2021    BUN 42 (H) 06/16/2021    CR 1.45 (H) 06/16/2021    GFRESTIMATED 30 (L) 06/16/2021    GFRESTBLACK 35 (L) 06/16/2021    FRANCISCO 8.7 06/16/2021        INR RESULTS:  Lab Results   Component Value Date    INR 1.86 (H) 02/14/2019    INR 1.24 (H) 12/04/2017           CC  Zaina Herman, APRN CNP  [address]  [place] [address]

## 2021-06-22 NOTE — LETTER
6/22/2021    Jeffery Sherwood MD  [address]  [facility] [address]    RE: Lindsey Hale       Dear Colleague,    I had the pleasure of seeing Lindsey Hale in the [facility].    [id number]      HPI and Plan:   See dictation    Orders this Visit:  Orders Placed This Encounter   Procedures     Basic metabolic panel     Basic metabolic panel     Follow-Up with Cardiology Diuretic Infusion Care     Follow-Up with Cardiac Advanced Practice Provider     Follow-Up with [facility] visit     No orders of the defined types were placed in this encounter.    There are no discontinued medications.      Encounter Diagnoses   Name Primary?     (HFpEF) heart failure with preserved ejection fraction (H)      Acute heart failure with preserved ejection fraction (HFpEF) (H) Yes       CURRENT MEDICATIONS:  Current Outpatient Medications   Medication Sig Dispense Refill     acetaminophen (TYLENOL) 500 MG tablet Take 1,000 mg by mouth 3 times daily       albuterol (PROAIR HFA/PROVENTIL HFA/VENTOLIN HFA) 108 (90 Base) MCG/ACT inhaler Inhale 2 puffs into the lungs every 6 hours       Ascorbic Acid (VITAMIN C PO) Take 1 tablet by mouth daily       budesonide-formoterol (SYMBICORT) 160-4.5 MCG/ACT inhaler Inhale 2 puffs into the lungs 2 times daily        flecainide (TAMBOCOR) 150 MG tablet Take 1/2 tablet by mouth twice daily. Please call [phone] for an appointment. 90 tablet 0     furosemide (LASIX) 20 MG tablet Take 1 tablet (20 mg) by mouth daily (Patient states that she is supposed to be taking medication everyday but has only been using once in a while) 90 tablet 1     levothyroxine (SYNTHROID/LEVOTHROID) 75 MCG tablet Take 1 tablet (75 mcg) by mouth daily 90 tablet 3     metoprolol tartrate (LOPRESSOR) 50 MG tablet TAKE 1 AND 1/2 TABLETS BY MOUTH TWICE DAILY 270 tablet 0     polyethylene glycol (MIRALAX/GLYCOLAX) powder Take 17 g by mouth daily as needed for  constipation       rivaroxaban ANTICOAGULANT (XARELTO ANTICOAGULANT) 15 MG TABS tablet Take 1 tablet (15 mg) by mouth daily (with dinner) 90 tablet 3     traMADol (ULTRAM) 50 MG tablet Take one daily prn 20 tablet 0     vitamin (B COMPLEX-C) tablet Take 1 tablet by mouth daily       VITAMIN D PO Take 1 tablet by mouth daily (OTC: Patient unsure of strength)       VITAMIN E PO Take 1 capsule by mouth daily         ALLERGIES     Allergies   Allergen Reactions     Clindamycin Hcl Other (See Comments)     Difficulty swallowing     Indocin Headache     [facility] with [facility] ache     Xylocaine-Epinephrine [Epinephrine-Lidocaine-Na Metabisulfite]      Xylocaine with epi caused a rapid heart beat     Ampicillin Potassium Nausea and Vomiting and Rash     Celebrex [Celecoxib] Rash     Ciprofloxacin Rash     Erythromycin Rash     Penicillins Nausea and Vomiting and Rash     Vibramycin [Doxycycline Hyclate] Rash       PAST MEDICAL HISTORY:  Past Medical History:   Diagnosis Date     Abnormal echocardiogram 04/2017    mild mr, ar, mitral stenosis, nl ef, lvh.  Done for episode of vision change     Arthritis 99         Atrial fibrillation (H) 2011 and 2009    neg nuc est 2009, Dr. Vazquez, on coumadin     Chest pain 2000    neg est echo, neg ct chest abd, pelvis Amish     Chronic LBP      COPD (chronic obstructive pulmonary disease) (H)     seen by pulmonary md Dr. Whitt, and given inhaler     Cysts, breast 1980s    bilat mastectomies     Diastolic dysfunction 12/2019    on echo done for sob, also mild to mod mr, nl ef, mild mitral stenosis and mild a.s     Dizzy 2007    ct neg, carotid us neg     Dysphagia 2005    egd nl     Elevated blood sugar      Environmental allergies      Hematuria 2004    neg cysto and us     Hemoptyses 2008    ct neg, bronch neg 2009     Hyperlipidemia      Hypertension      Hypothyroid 1995    Dr. Ortiz     Lumbar spinal stenosis 12/2018    seen on ct done for  lbp     Mild aortic stenosis 12/2019     Mild mitral stenosis 12/2019    on echo done for sob     Mitral regurgitation 6/15, 12/19    on echo     nausea 2006    ct abd and ;pelvis neg     Osteopenia     fu dexa better 2011     Osteoporotic compression fracture of spine (H) 2018    got iv reclast 3/6/19     Pacemaker 2011    afib with pauses and syncope     Palpitations 2009    neg est     PMR (polymyalgia rheumatica) (H) 2004    not active in years     SOB (shortness of breath) 5/15    echo nl ef, 2+mr, cxr clear, seen by pulm and given inhaler     Supraventricular premature beats      T12 compression fracture (H) 12/2018    non traumatic     TIA (transient ischaemic attack) 2009    carotids neg, mri neg     Treadmill stress test negative for angina pectoris 2011    nuclear est     Urine incontinence     Dr. Westfall     Urosepsis 2009    hospitalized     Vision changes 2011    eval by neuro and ophtho and no cause found       PAST SURGICAL HISTORY:  Past Surgical History:   Procedure Laterality Date     ARTHROPLASTY HIP Left 12/5/2017    Procedure: ARTHROPLASTY HIP;  LEFT HIP ARBEN ARTHROPLASTY(SORAYA);  Surgeon: Darell Nathan MD;  Location: [facility]     ARTHROPLASTY PATELLO-FEMORAL (KNEE)  1998 and 2002     ARTHROSCOPY KNEE  1997     BIOPSY BREAST Right 9/23/2014    Procedure: BIOPSY BREAST;  Surgeon: David Carter MD;  Location: [facility]     breast implants      4x     cataract  2011     FOOT SURGERY  2003     MASTECTOMY  1980    bilat, cysts     nj not bso  70's    not for ca       FAMILY HISTORY:  Family History   Problem Relation Age of Onset     C.A.D. Father      Lymphoma Father      Cancer Mother      Lymphoma Brother      Breast Cancer Sister      Osteoporosis Sister      Myocardial Infarction Sister      Unknown/Adopted Maternal Grandmother      Unknown/Adopted Maternal Grandfather      Unknown/Adopted Paternal Grandmother      Unknown/Adopted Paternal Grandfather        SOCIAL HISTORY:  Social  History     Socioeconomic History     Marital status:      Spouse name: Not on file     Number of children: 4     Years of education: Not on file     Highest education level: Not on file   Occupational History     Not on file   Social Needs     Financial resource strain: Not on file     Food insecurity     Worry: Not on file     Inability: Not on file     Transportation needs     Medical: Not on file     Non-medical: Not on file   Tobacco Use     Smoking status: Former Smoker     Types: Cigarettes     Quit date: 1955     Years since quittin.4     Smokeless tobacco: Never Used     Tobacco comment: quit in    had smoked about 2 cigarettes a day or more   Substance and Sexual Activity     Alcohol use: Yes     Alcohol/week: 0.0 standard drinks     Comment: occ wine     Drug use: No     Sexual activity: Not Currently   Lifestyle     Physical activity     Days per week: Not on file     Minutes per session: Not on file     Stress: Not on file   Relationships     Social connections     Talks on phone: Not on file     Gets together: Not on file     Attends Mosque service: Not on file     Active member of club or organization: Not on file     Attends meetings of clubs or organizations: Not on file     Relationship status: Not on file     Intimate partner violence     Fear of current or ex partner: Not on file     Emotionally abused: Not on file     Physically abused: Not on file     Forced sexual activity: Not on file   Other Topics Concern     Parent/sibling w/ CABG, MI or angioplasty before 65F 55M? Not Asked      Service Not Asked     Blood Transfusions Not Asked     Caffeine Concern No     Comment: coffee: 1 cup a week.  Occ green tea     Occupational Exposure Not Asked     Hobby Hazards Not Asked     Sleep Concern No     Stress Concern No     Weight Concern No     Special Diet No     Back Care Not Asked     Exercise Yes     Comment: walking daily     Bike Helmet Not Asked     Seat Belt Yes      Self-Exams Not Asked   Social History Narrative     Not on file       Review of Systems:  Skin:  Positive for bruising   Eyes:  Positive for glasses;cataracts  ENT:  not assessed    Respiratory:  Negative dyspnea on exertion  Cardiovascular:    edema;Positive for  Gastroenterology: Negative    Genitourinary:  Negative    Musculoskeletal:  Positive for arthritis  Neurologic:  Negative    Psychiatric:  Negative    Heme/Lymph/Imm:  Positive for allergies  Endocrine:  Positive for thyroid disorder    Physical Exam:  Vitals: /71   Pulse 60   Ht 1.524 m (5')   Wt 74.6 kg (164 lb 8 oz)   SpO2 100%   BMI 32.13 kg/m     Please refer to dictation for physical exam    Recent Lab Results: all reviewed today  CBC RESULTS:  Lab Results   Component Value Date    WBC 11.0 06/14/2021    RBC 2.64 (L) 06/14/2021    HGB 9.1 (L) 06/14/2021    HCT 28.2 (L) 06/14/2021     (H) 06/14/2021    MCH 34.5 (H) 06/14/2021    MCHC 32.3 06/14/2021    RDW 14.6 06/14/2021     06/14/2021       BMP RESULTS:  Lab Results   Component Value Date     06/16/2021    POTASSIUM 4.3 06/16/2021    CHLORIDE 111 (H) 06/16/2021    CO2 24 06/16/2021    ANIONGAP 2 (L) 06/16/2021     (H) 06/16/2021    BUN 42 (H) 06/16/2021    CR 1.45 (H) 06/16/2021    GFRESTIMATED 30 (L) 06/16/2021    GFRESTBLACK 35 (L) 06/16/2021    FRANCISCO 8.7 06/16/2021        INR RESULTS:  Lab Results   Component Value Date    INR 1.86 (H) 02/14/2019    INR 1.24 (H) 12/04/2017       Thank you for allowing me to participate in the care of your patient.      Sincerely,     TIM Rosario [facility]    cc:   Zaina Herman, APRN CNP  [address]  [place] [address]

## 2021-06-23 NOTE — PROGRESS NOTES
1530 Report received from DANIELA Carrasquillo.     Lasix gtt cont to infuse w/o difficulty. Pt sitting in cardiac chair. No complaints.    Repeat K+ level - 3.5, 10 meq KCL given per protocol.    1600  Post weight - 72.5 kg (74.39 kg prior to lasix gtt infusion).    King Carvalho CNP/PA was here to see pt earlier.  Plan of care discussed. AVS reviewed with pt.  All questions are answered.     Total intake: 200 ml  Total Urine output: 500 ml + incontinence.    Chest x ray obtained as ordered.    1645  Pt discharged per w/c to private vehicle. All personal belongings sent with pt.       Discharge Education:  Discharge instructions reviewed: Yes  Additional education/resources provided: NA  Patient/patient representative verbalizes understanding: Yes  Patient discharging on new medications: No  Medication education completed: N/A    Discharge Plans:   Discharge location: home  Discharge ride contacted: Yes  Approximate discharge time: 1645    Discharge Criteria:  Discharge criteria met and vital signs stable: Yes    Patient Belongs:  Patient belongings returned to patient: Yes    Nicholas Corona RN

## 2021-06-23 NOTE — PROGRESS NOTES
Care Suites Admission Nursing Note    Reason for admission: Lasix Infusion  CS arrival time: 1020    Accompanied by: self  Name/phone of DC : Oxana Schwartz [phone]    Education/questions answered: Procedure explained. All questions & concerns addressed.  Plan: Home with Oxana post infusion.    A/O. Denies pain. Infusion process explained. All questions & concerns addressed.  Pt resting on cart, denies additional needs at this time, call light within reach. No family present.    Pre weight - 74.3 kg. Pre labs drawn.    1100 Lasix 40 mg bolus given.  1107 Report received from Neida Childers RN.  1120 K+ level 4.2. Mg level 2.7. Sympler, KOJO called. Okay to start the Lasix drip.  1135 Kingpollo Carvalho, KOJO at bedside to speak with pt.  1215 Report given to Neida Childers RN.

## 2021-06-23 NOTE — DISCHARGE INSTRUCTIONS
1. Resume all medications   2. [facility] Nurse will call you tomorrow to review your weight and how you feel.   3. See Zaina Herman 7/1

## 2021-06-23 NOTE — PROGRESS NOTES
CORE DIURETIC INFUSION VISIT  Lindsey Hale  : 1925    Date: 2021    PRIMARY CARDIOLOGIST:  Efra Vazquez MD     REASON FOR VISIT:  Hospital followup, heart failure with preserved EF.     HISTORY OF PRESENT ILLNESS:  Ms. Hale is a delightful, 89-year-old woman with a past medical history significant for the followin.  Sick sinus syndrome with a pacemaker implanted in , near [facility], but with about 1 year battery life left.  2.  Paroxysmal atrial fibrillation, treated with flecainide, metoprolol and anticoagulation.  3.  Chronic lung disease.  Was on amio, but this was stopped due to the decline in PFTs.  4.  Osteoporosis.  5.  Macrocytic anemia, now with referral to Heme.   6.  Normal EF.     Isabel was recently hospitalized from - with progressive shortness of breath.  She was found to have bibasilar opacities on her chest x-ray that were more likely atelectasis, but had an terminal NT-proBNP of 6300 and noted that she had a 17 pound weight gain, as she had not been taking her Lasix due to the frequent urination.  She was diuresed and followed in the hospital, but with no change in her weight, really.  Symptomatically, she had improved, and her creatinine bumped, so this was stopped. Her kykyr-wq-ipzybotzy Lasix was restarted.    21 seen by Marcie DAN, she continued to be short of breath with exertion.   Most of the days before now were poor, and she is short of breath just walking back and forth to the bathroom.  Marcie ordered diuretic infusion clinic. 40mg bolus of furosemdie and 20mg /hour.     Device check was done  and reviewed showing 99% V paced, a few brief episodes of mode switch that lasted only 4-6 seconds and a battery life of 5.2 months.      Chest x-ray, showed an enlarged cardiac silhouette, bibasilar pulmonary opacities.  No effusion or pneumothorax.    Creat. 1.69 today up from 1.45    Today she tells me she is taking her Lasix every day, and she is  urinating frequently.  She has not skipped doses.  Primarily, her shortness of breath is on exertion and not at rest.    She endorses some peripheral edema, right greater than left, and this is long standing as she has a right knee replacement.  She just got a talking scale, and her home weight is 161.4 pounds.     She denies orthopnea, PND, syncope or presyncope.  She has not been lightheaded.  She did undergo recently a Mohs procedure for a lesion on her left chest that was over Pacemaker.     CXR today pending     Physical Exam:  Vitals: /55 (BP Location: Right arm)   Pulse 61   Temp 97  F (36.1  C) (Oral)   Resp 16   Ht 1.524 m (5')   Wt 74.4 kg (164 lb)   SpO2 99%   BMI 32.03 kg/m     GENERAL:  An elderly woman in no acute distress, appears younger than her stated age.  HEENT:  Normocephalic and atraumatic.  JVP: no JVD noted   HEART:  Regular with a 1/6 systolic murmur heard best at the right sternal border.  LUNGS:  Markedly diminished bilaterally, otherwise clear.   EXTREMITIES:  With trace peripheral edema.      ASSESSMENT & PLAN  1.  Acute on chronic heart failure with preserved EF with the cause of exacerbation being the patient intermittently did not take her Lasix.  There is no other real cause of why she does not continue to diurese, though, suspect she is so hypervolemic that she is not absorbing her medications or due to increase in creat.     Here today for diuretic infusion.   40 mg IV Lasix push followed by a 20 mg drip for 4 hours.       chest x-ray today.      2.  Hypertension, well controlled for her age   3.  Permanent pacemaker in place, nearing [facility], but the device is working normally.  We will continue with serial checks as prescribed.    4.  Paroxysmal atrial fibrillation.  Very brief mode switches noted, but on flec and metoprolol as well as anticoagulation with a 15 mg dose of Xarelto.  5.  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    Maybe contributing to her  shortness-of-breath.      MERCEDEZ Jacobson CNP 6/23/2021 11:59 AM    ADDENDUM:      Vitals:    06/23/21 1023   Weight: 74.4 kg (164 lb)       Intake/Output Summary (Last 24 hours) at 6/23/2021 1534  Last data filed at 6/23/2021 1300  Gross per 24 hour   Intake --   Output 200 ml   Net -200 ml       She was incontinent of urine. Moderate amount. Down 3 kg.   Post labs K+ 3.5, replaced per protocol 10meq      PLAN:  1. CXR after infusion   2. Consider changing furosemide to torsemide.   3. See Zaina Herman 7/1   4. Cardiology nurse to call Lindsey in am for weight and sx. Home weight today 161 pounds.     MERCEDEZ Jacobson CNP 3:34 PM       Orders this Visit:  Orders Placed This Encounter   Procedures     Urea nitrogen     Urea nitrogen     Creatinine     Creatinine     Magnesium     Potassium     Potassium     Sodium     Potassium     Urea nitrogen     EKG 12-lead, tracing only     Vital Signs     Pulse oximetry nursing     Weigh patient     Measure urine output     Patient Teaching: Heart Failure     Peripheral IV catheter     Activity: Up ad delia     Notify Provider     Notify Provider     May discharge when: other (specify in comments) IF Systolic blood pressure is greater than or equal to 90 mmHg AND patient is ambulatory without symptoms.     Discharge planning     Oxygen: Nasal cannula     Orders Placed This Encounter   Medications     sodium chloride (PF) 0.9% PF flush 3 mL     sodium chloride (PF) 0.9% PF flush 3 mL     furosemide (LASIX) injection 40 mg     furosemide (LASIX) 100 mg in sodium chloride 0.9 % 100 mL infusion     There are no discontinued medications.          CURRENT MEDICATIONS:  Medications Prior to Admission   Medication Sig Dispense Refill Last Dose     acetaminophen (TYLENOL) 500 MG tablet Take 1,000 mg by mouth 3 times daily   Past Week at Unknown time     albuterol (PROAIR HFA/PROVENTIL HFA/VENTOLIN HFA) 108 (90 Base) MCG/ACT inhaler Inhale 2 puffs into the lungs every  6 hours   6/23/2021 at Unknown time     Ascorbic Acid (VITAMIN C PO) Take 1 tablet by mouth daily   6/23/2021 at Unknown time     budesonide-formoterol (SYMBICORT) 160-4.5 MCG/ACT inhaler Inhale 2 puffs into the lungs 2 times daily    6/23/2021 at Unknown time     flecainide (TAMBOCOR) 150 MG tablet Take 1/2 tablet by mouth twice daily. Please call [phone] for an appointment. 90 tablet 0 6/23/2021 at Unknown time     furosemide (LASIX) 20 MG tablet Take 1 tablet (20 mg) by mouth daily (Patient states that she is supposed to be taking medication everyday but has only been using once in a while) 90 tablet 1 6/22/2021 at Unknown time     levothyroxine (SYNTHROID/LEVOTHROID) 75 MCG tablet Take 1 tablet (75 mcg) by mouth daily 90 tablet 3 6/23/2021 at Unknown time     metoprolol tartrate (LOPRESSOR) 50 MG tablet TAKE 1 AND 1/2 TABLETS BY MOUTH TWICE DAILY 270 tablet 0 6/23/2021 at Unknown time     polyethylene glycol (MIRALAX/GLYCOLAX) powder Take 17 g by mouth daily as needed for constipation   Past Week at Unknown time     rivaroxaban ANTICOAGULANT (XARELTO ANTICOAGULANT) 15 MG TABS tablet Take 1 tablet (15 mg) by mouth daily (with dinner) 90 tablet 3 6/22/2021 at Unknown time     vitamin (B COMPLEX-C) tablet Take 1 tablet by mouth daily   6/23/2021 at Unknown time     VITAMIN D PO Take 1 tablet by mouth daily (OTC: Patient unsure of strength)   6/23/2021 at Unknown time     VITAMIN E PO Take 1 capsule by mouth daily   6/23/2021 at Unknown time     traMADol (ULTRAM) 50 MG tablet Take one daily prn 20 tablet 0 More than a month at Unknown time       ALLERGIES     Allergies   Allergen Reactions     Clindamycin Hcl Other (See Comments)     Difficulty swallowing     Indocin Headache     [facility]ed [facility] with severe head ache     Xylocaine-Epinephrine [Epinephrine-Lidocaine-Na Metabisulfite]      Xylocaine with epi caused a rapid heart beat     Ampicillin Potassium Nausea and Vomiting and Rash      Celebrex [Celecoxib] Rash     Ciprofloxacin Rash     Erythromycin Rash     Penicillins Nausea and Vomiting and Rash     Vibramycin [Doxycycline Hyclate] Rash       PAST MEDICAL HISTORY:  Past Medical History:   Diagnosis Date     Abnormal echocardiogram 04/2017    mild mr, ar, mitral stenosis, nl ef, lvh.  Done for episode of vision change     Arthritis 89         Atrial fibrillation (H) 2011 and 2009    neg nuc est 2009, Dr. Vazquez, on coumadin     Chest pain 2000    neg est echo, neg ct chest abd, pelvis Mosque     Chronic LBP      COPD (chronic obstructive pulmonary disease) (H)     seen by pulmonary md Dr. Whitt, and given inhaler     Cysts, breast 1980s    bilat mastectomies     Diastolic dysfunction 12/2019    on echo done for sob, also mild to mod mr, nl ef, mild mitral stenosis and mild a.s     Dizzy 2007    ct neg, carotid us neg     Dysphagia 2005    egd nl     Elevated blood sugar      Environmental allergies      Hematuria 2004    neg cysto and us     Hemoptyses 2008    ct neg, bronch neg 2009     Hyperlipidemia      Hypertension      Hypothyroid 1995    Dr. Ortiz     Lumbar spinal stenosis 12/2018    seen on ct done for lbp     Mild aortic stenosis 12/2019     Mild mitral stenosis 12/2019    on echo done for sob     Mitral regurgitation 6/15, 12/19    on echo     nausea 2006    ct abd and ;pelvis neg     Osteopenia     fu dexa better 2011     Osteoporotic compression fracture of spine (H) 2018    got iv reclast 3/6/19     Pacemaker 2011    afib with pauses and syncope     Palpitations 2009    neg est     PMR (polymyalgia rheumatica) (H) 2004    not active in years     SOB (shortness of breath) 5/15    echo nl ef, 2+mr, cxr clear, seen by pulm and given inhaler     Supraventricular premature beats      T12 compression fracture (H) 12/2018    non traumatic     TIA (transient ischaemic attack) 2009    carotids neg, mri neg     Treadmill stress test negative for angina pectoris 2011    nuclear  est     Urine incontinence     Dr. Westfall     Urosepsis 2009    hospitalized     Vision changes 2011    eval by neuro and ophtho and no cause found       PAST SURGICAL HISTORY:  Past Surgical History:   Procedure Laterality Date     ARTHROPLASTY HIP Left 2017    Procedure: ARTHROPLASTY HIP;  LEFT HIP ARBEN ARTHROPLASTY(SORAYA);  Surgeon: Darell Nathan MD;  Location: [facility] OR     ARTHROPLASTY PATELLO-FEMORAL (KNEE)   and      ARTHROSCOPY KNEE  1997     BIOPSY BREAST Right 2014    Procedure: BIOPSY BREAST;  Surgeon: David Carter MD;  Location:  [facility]     breast implants      4x     cataract  2011     FOOT SURGERY       MASTECTOMY  1980    bilat, cysts     [place] not bso  70's    not for ca       FAMILY HISTORY:  Family History   Problem Relation Age of Onset     C.A.D. Father      Lymphoma Father      Cancer Mother      Lymphoma Brother      Breast Cancer Sister      Osteoporosis Sister      Myocardial Infarction Sister      Unknown/Adopted Maternal Grandmother      Unknown/Adopted Maternal Grandfather      Unknown/Adopted Paternal Grandmother      Unknown/Adopted Paternal Grandfather        SOCIAL HISTORY:  Social History     Socioeconomic History     Marital status:      Spouse name: Not on file     Number of children: 4     Years of education: Not on file     Highest education level: Not on file   Occupational History     Not on file   Social Needs     Financial resource strain: Not on file     Food insecurity     Worry: Not on file     Inability: Not on file     Transportation needs     Medical: Not on file     Non-medical: Not on file   Tobacco Use     Smoking status: Former Smoker     Types: Cigarettes     Quit date: 1955     Years since quittin.4     Smokeless tobacco: Never Used     Tobacco comment: quit in    had smoked about 2 cigarettes a day or more   Substance and Sexual Activity     Alcohol use: Yes     Alcohol/week: 0.0 standard drinks     Comment: occ wine      Drug use: No     Sexual activity: Not Currently   Lifestyle     Physical activity     Days per week: Not on file     Minutes per session: Not on file     Stress: Not on file   Relationships     Social connections     Talks on phone: Not on file     Gets together: Not on file     Attends [employer]ism service: Not on file     Active member of club or organization: Not on file     Attends meetings of clubs or organizations: Not on file     Relationship status: Not on file     Intimate partner violence     Fear of current or ex partner: Not on file     Emotionally abused: Not on file     Physically abused: Not on file     Forced sexual activity: Not on file   Other Topics Concern     Parent/sibling w/ CABG, MI or angioplasty before 65F 55M? Not Asked      Service Not Asked     Blood Transfusions Not Asked     Caffeine Concern No     Comment: coffee: 1 cup a week.  Occ green tea     Occupational Exposure Not Asked     Hobby Hazards Not Asked     Sleep Concern No     Stress Concern No     Weight Concern No     Special Diet No     Back Care Not Asked     Exercise Yes     Comment: walking daily     Bike Helmet Not Asked     Seat Belt Yes     Self-Exams Not Asked   Social History Narrative     Not on file       Review of Systems:  10 point review of systems negative except as listed in HPI    Recent Lab Results:  LIPID RESULTS:  Lab Results   Component Value Date    CHOL 222 (H) 05/23/2013    HDL 48 (L) 05/23/2013     (H) 05/23/2013    TRIG 157 (H) 05/23/2013    CHOLHDLRATIO 4.6 05/23/2013       LIVER ENZYME RESULTS:  Lab Results   Component Value Date    AST 20 06/12/2021    ALT 21 06/12/2021       CBC RESULTS:  Lab Results   Component Value Date    WBC 11.0 06/14/2021    RBC 2.64 (L) 06/14/2021    HGB 9.1 (L) 06/14/2021    HCT 28.2 (L) 06/14/2021     (H) 06/14/2021    MCH 34.5 (H) 06/14/2021    MCHC 32.3 06/14/2021    RDW 14.6 06/14/2021     06/14/2021       BMP RESULTS:  Lab Results    Component Value Date     06/23/2021    POTASSIUM 4.2 06/23/2021    CHLORIDE 111 (H) 06/16/2021    CO2 24 06/16/2021    ANIONGAP 2 (L) 06/16/2021     (H) 06/16/2021    BUN 37 (H) 06/23/2021    CR 1.69 (H) 06/23/2021    GFRESTIMATED 25 (L) 06/23/2021    GFRESTBLACK 29 (L) 06/23/2021    FRANCISCO 8.7 06/16/2021        A1C RESULTS:  Lab Results   Component Value Date    A1C 6.0 (H) 04/15/2021       INR RESULTS:  Lab Results   Component Value Date    INR 1.86 (H) 02/14/2019    INR 1.24 (H) 12/04/2017         CC  No referring provider defined for this encounter.

## 2021-06-24 NOTE — TELEPHONE ENCOUNTER
Call from Albaro Briggs asking if patient should start back on her Lasix 20 mg daily.   Messaged out to Providers plan for diuretics post infusion.  Oxana Gillis RN 06/24/21 11:54 AM

## 2021-06-24 NOTE — TELEPHONE ENCOUNTER
I think she needs to be seen by Zaina in core.  I arranged that when I saw her on Tuesday.  At that time Zaina can decide if she needs core or will be with her general nurses.      As for her wt.  I'm glad it's improving- it's possibly she'll diurese better now on the lasix without so much gut edema.  I'd like her to continue on the 20 mg daily.  She should call next week if wt don't continue to trend down.      As for her CXR I'll reach out to Dr. Sherwood to see if he'd like more imaging.  I suspect at the age of 89 he will not- it does NOT look suspicious for cancer.  No effusion and no significant pulmonary edema.      Thank you for reaching out to her.    Marcie An PA-C 6/24/2021 12:09 PM

## 2021-06-24 NOTE — TELEPHONE ENCOUNTER
re, Marcie Gaitan PA-C  6/24/2021  (12:09 PM)        I think she needs to be seen by Zaina in core.  I arranged that when I saw her on Tuesday.  At that time Zaina can decide if she needs core or will be with her general nurses.       As for her wt.  I'm glad it's improving- it's possibly she'll diurese better now on the lasix without so much gut edema.  I'd like her to continue on the 20 mg daily.  She should call next week if wt don't continue to trend down.       As for her CXR I'll reach out to Dr. Sherwood to see if he'd like more imaging.  I suspect at the age of 89 he will not- it does NOT look suspicious for cancer.  No effusion and no significant pulmonary edema.       Thank you for reaching out to her.    Marcie An PA-C 6/24/2021 12:09 PM        ==============================    Called and updated Daughter on plan.  Oxana Gillis RN 06/24/21 12:14 PM

## 2021-06-24 NOTE — TELEPHONE ENCOUNTER
----- Message from Elis Yeh RN sent at 6/23/2021  4:30 PM CDT -----  Regarding: FW: infusion pt - check on patient for King    ----- Message -----  From: Lissy Carvalho APRN CNP  Sent: 6/23/2021   4:12 PM CDT  To: MERCEDEZ Uriarte CNP, #  Subject: infusion pt                                    Update  Completed diuretic infusion   Urine output 200 + moderate amount of incontinence.   Lost 3 kg.   CXR per Marcie COX, to be done after infusion. Creat improved 1.69 to 1.6  Oxana, please call Lindsey tomorrow as follow up. Weight and how her shortness of breath is.   She just got a talking scale.   CXR follow up as well, probably won't be back by the time I leave.   Currently on PO furosemide, which is not working, consider torsemide and maybe more infusions.   Follow up with [facility] 7/1 with labs.   King   ===============================    Called to patient. Lindsey reports she is doing better today, she has not done much yet but does feel the shortness of breath is better.   Reported home weight 157.1 lb this am, reports was previously in 160s     Infusion 6/23/21 - 40 mg IV Lasix push followed by a 20 mg drip for 4 hours.    Diuretic:  Ordered Furosemide 20 mg daily; reportedly taking now daily per Marcie An office note but in past took intermittently due to frequent urination.see King COX note to consider torsemide.    CXR resutls 6/213 430 pm:   IMPRESSION: No pleural fluid or pneumothorax. A left basilar opacity is no longer seen. A right hilar opacity has not significantly changed and may be a confluence of tissues, though chest CT is recommended for further evaluation. The heart is normal in size. There is aortic calcification. There is a left chest wall pacemaker.     CORE return w/Zaina W[place] on 7/1 w/labs (as scheduled on 6/22) - patient states aware of date, times, and location.  Future Appointments   Date Time Provider Department Center   7/1/2021  1:15 PM [facility]   7/1/2021  2:30  PM Zaina Herman, APRN CNP [facility]   9/9/2021 11:30 AM Yusuf Francisco MD [facility]   9/22/2021 12:00 AM [facility] [place] [address] [facility]     Update to APPs.  Oxana Gillis RN 06/24/21 9:29 AM

## 2021-06-28 NOTE — TELEPHONE ENCOUNTER
Pt's daughter informed. She will stop at clinic and  urine cup and schedule lab only appt when she needs to drop it off.

## 2021-06-28 NOTE — TELEPHONE ENCOUNTER
Pt daughter Oxana calling- [facility] on file. [phone]    Daughter thinks that she has a bladder infection; reports urgency and frequency (got up 8 times through the night to urinate). Denies any burning on urination, blood in urine or fever.     Daughter is requesting medication for bladder infection.     Let pt know that we would need a urine sample to properly diagnose. Daughter is asking if Dr. Sherwood would be willing to place a UA order and daughter could drop off urine sample.     Routing to provider to advise.     Daughter Oxana would like a call back wither way.

## 2021-06-30 NOTE — TELEPHONE ENCOUNTER
Oxana called     Requesting results from pt's UA/UC. Noted UC is currently in process     She is wondering if pt needs to start abx as she is having sx of UTI     Call back: [phone] - ok to leave message     Pharmacy: [facility] in [place]

## 2021-07-01 NOTE — PROGRESS NOTES
HPI and Plan: #[id number]  See dictation    Orders Placed This Encounter   Procedures     Basic metabolic panel     N terminal pro BNP outpatient     Follow-Up with Cardiac Advanced Practice Provider       Orders Placed This Encounter   Medications     torsemide (DEMADEX) 10 MG tablet     Si tabs daily for 2 days then 1 tablet daily thereafter.     Dispense:  60 tablet     Refill:  3       Medications Discontinued During This Encounter   Medication Reason     furosemide (LASIX) 20 MG tablet Alternate therapy         Encounter Diagnoses   Name Primary?     Paroxysmal atrial fibrillation (H)      Diastolic dysfunction Yes     Heart failure with preserved ejection fraction, NYHA class I (H)        CURRENT MEDICATIONS:  Current Outpatient Medications   Medication Sig Dispense Refill     acetaminophen (TYLENOL) 500 MG tablet Take 1,000 mg by mouth 3 times daily       albuterol (PROAIR HFA/PROVENTIL HFA/VENTOLIN HFA) 108 (90 Base) MCG/ACT inhaler Inhale 2 puffs into the lungs every 6 hours       Ascorbic Acid (VITAMIN C PO) Take 1 tablet by mouth daily       budesonide-formoterol (SYMBICORT) 160-4.5 MCG/ACT inhaler Inhale 2 puffs into the lungs 2 times daily        flecainide (TAMBOCOR) 150 MG tablet Take 1/2 tablet by mouth twice daily. 90 tablet 3     levothyroxine (SYNTHROID/LEVOTHROID) 75 MCG tablet Take 1 tablet (75 mcg) by mouth daily 90 tablet 3     metoprolol tartrate (LOPRESSOR) 50 MG tablet TAKE 1 AND 1/2 TABLETS BY MOUTH TWICE DAILY 270 tablet 0     polyethylene glycol (MIRALAX/GLYCOLAX) powder Take 17 g by mouth daily as needed for constipation       rivaroxaban ANTICOAGULANT (XARELTO ANTICOAGULANT) 15 MG TABS tablet Take 1 tablet (15 mg) by mouth daily (with dinner) 90 tablet 3     sulfamethoxazole-trimethoprim (BACTRIM) 400-80 MG tablet Take 1 tablet by mouth 2 times daily 14 tablet 0     torsemide (DEMADEX) 10 MG tablet 2 tabs daily for 2 days then 1 tablet daily thereafter. 60 tablet 3     traMADol  (ULTRAM) 50 MG tablet Take one daily prn 20 tablet 0     vitamin (B COMPLEX-C) tablet Take 1 tablet by mouth daily       VITAMIN D PO Take 1 tablet by mouth daily (OTC: Patient unsure of strength)       VITAMIN E PO Take 1 capsule by mouth daily         ALLERGIES     Allergies   Allergen Reactions     Clindamycin Hcl Other (See Comments)     Difficulty swallowing     Indocin Headache     [facility]     Xylocaine-Epinephrine [Epinephrine-Lidocaine-Na Metabisulfite]      Xylocaine with epi caused a rapid heart beat     Ampicillin Potassium Nausea and Vomiting and Rash     Celebrex [Celecoxib] Rash     Ciprofloxacin Rash     Erythromycin Rash     Penicillins Nausea and Vomiting and Rash     Vibramycin [Doxycycline Hyclate] Rash       PAST MEDICAL HISTORY:  Past Medical History:   Diagnosis Date     Abnormal echocardiogram 04/2017    mild mr, ar, mitral stenosis, nl ef, lvh.  Done for episode of vision change     Arthritis 99         Atrial fibrillation (H) 2011 and 2009    neg nuc est 2009, Dr. Vazquez, on coumadin     Chest pain 2000    neg est echo, neg ct chest abd, pelvis Hoahaoism     Chronic LBP      COPD (chronic obstructive pulmonary disease) (H)     seen by pulmonary md Dr. Whitt, and given inhaler     Cysts, breast 1980s    bilat mastectomies     Diastolic dysfunction 12/2019    on echo done for sob, also mild to mod mr, nl ef, mild mitral stenosis and mild a.s     Dizzy 2007    ct neg, carotid us neg     Dysphagia 2005    egd nl     Elevated blood sugar      Environmental allergies      Hematuria 2004    neg cysto and us     Hemoptyses 2008    ct neg, bronch neg 2009     Hyperlipidemia      Hypertension      Hypothyroid 1995    Dr. Ortiz     Lumbar spinal stenosis 12/2018    seen on ct done for lbp     Mild aortic stenosis 12/2019     Mild mitral stenosis 12/2019    on echo done for sob     Mitral regurgitation 6/15, 12/19    on echo     nausea 2006     ct abd and ;pelvis neg     Osteopenia     fu dexa better 2011     Osteoporotic compression fracture of spine (H) 2018    got iv reclast 3/6/19     Pacemaker 2011    afib with pauses and syncope     Palpitations 2009    neg est     PMR (polymyalgia rheumatica) (H) 2004    not active in years     SOB (shortness of breath) 5/15    echo nl ef, 2+mr, cxr clear, seen by pulm and given inhaler     Supraventricular premature beats      T12 compression fracture (H) 12/2018    non traumatic     TIA (transient ischaemic attack) 2009    carotids neg, mri neg     Treadmill stress test negative for angina pectoris 2011    nuclear est     Urine incontinence     Dr. Westfall     Urosepsis 2009    hospitalized     Vision changes 2011    eval by neuro and ophtho and no cause found       PAST SURGICAL HISTORY:  Past Surgical History:   Procedure Laterality Date     ARTHROPLASTY HIP Left 12/5/2017    Procedure: ARTHROPLASTY HIP;  LEFT HIP ARBEN ARTHROPLASTY(SORAYA);  Surgeon: Darell Nathan MD;  Location: [facility]     ARTHROPLASTY PATELLO-FEMORAL (KNEE)  1998 and 2002     ARTHROSCOPY KNEE  1997     BIOPSY BREAST Right 9/23/2014    Procedure: BIOPSY BREAST;  Surgeon: David Carter MD;  Location:  [facility]     breast implants      4x     cataract  2011     FOOT SURGERY  2003     MASTECTOMY  1980    bilat, cysts     nj not bso  70's    not for ca       FAMILY HISTORY:  Family History   Problem Relation Age of Onset     C.A.D. Father      Lymphoma Father      Cancer Mother      Lymphoma Brother      Breast Cancer Sister      Osteoporosis Sister      Myocardial Infarction Sister      Unknown/Adopted Maternal Grandmother      Unknown/Adopted Maternal Grandfather      Unknown/Adopted Paternal Grandmother      Unknown/Adopted Paternal Grandfather        SOCIAL HISTORY:  Social History     Socioeconomic History     Marital status:      Spouse name: None     Number of children: 4     Years of education: None     Highest education level:  None   Occupational History     None   Social Needs     Financial resource strain: None     Food insecurity     Worry: None     Inability: None     Transportation needs     Medical: None     Non-medical: None   Tobacco Use     Smoking status: Former Smoker     Types: Cigarettes     Quit date: 1955     Years since quittin.4     Smokeless tobacco: Never Used     Tobacco comment: quit in    had smoked about 2 cigarettes a day or more   Substance and Sexual Activity     Alcohol use: Yes     Alcohol/week: 0.0 standard drinks     Comment: occ wine     Drug use: No     Sexual activity: Not Currently   Lifestyle     Physical activity     Days per week: None     Minutes per session: None     Stress: None   Relationships     Social connections     Talks on phone: None     Gets together: None     Attends Druze service: None     Active member of club or organization: None     Attends meetings of clubs or organizations: None     Relationship status: None     Intimate partner violence     Fear of current or ex partner: None     Emotionally abused: None     Physically abused: None     Forced sexual activity: None   Other Topics Concern     Parent/sibling w/ CABG, MI or angioplasty before 65F 55M? Not Asked      Service Not Asked     Blood Transfusions Not Asked     Caffeine Concern No     Comment: coffee: 1 cup a week.  Occ green tea     Occupational Exposure Not Asked     Hobby Hazards Not Asked     Sleep Concern No     Stress Concern No     Weight Concern No     Special Diet No     Back Care Not Asked     Exercise Yes     Comment: walking daily     Bike Helmet Not Asked     Seat Belt Yes     Self-Exams Not Asked   Social History Narrative     None       Review of Systems:  Skin:  Positive for bruising     Eyes:  Positive for glasses;cataracts macular degeration  ENT:  not assessed nasal congestion allergies  Respiratory:  Positive for dyspnea on exertion     Cardiovascular:  Negative edema;Positive for     Gastroenterology: Negative      Genitourinary:  Negative      Musculoskeletal:  Positive for arthritis    Neurologic:  Negative      Psychiatric:  Negative      Heme/Lymph/Imm:  Positive for allergies    Endocrine:  Positive for thyroid disorder      Physical Exam:  Vitals: /72   Pulse 60   Ht 1.524 m (5')   Wt 72.8 kg (160 lb 8 oz)   SpO2 99%   BMI 31.35 kg/m      Constitutional:  cooperative, alert and oriented, well developed, well nourished, in no acute distress        Skin:  warm and dry to the touch;no apparent skin lesions or masses noted          Head:  normocephalic, no masses or lesions        Eyes:  pupils equal and round        Lymph:      ENT:  no pallor or cyanosis, dentition good        Neck:    JVP 8-10      Respiratory:  normal breath sounds, clear to auscultation, normal A-P diameter, normal symmetry, normal respiratory excursion, no use of accessory muscles         Cardiac: regular rhythm;normal S1 and S2       grade 2;systolic murmur        not assessed this visit                                        GI:  abdomen soft;no HSM        Extremities and Muscular Skeletal:  no deformities, clubbing, cyanosis, erythema observed;no edema              Neurological:  affect appropriate   in a wheelchair    Psych:  Alert and Oriented x 3        CC  Efra Vazquez MD  [address]  [place] [id number]

## 2021-07-01 NOTE — LETTER
2021    Jeffery Sherwood MD  [address]  [facility] [address]    RE: Lindsey Hale       Dear Colleague,    I had the pleasure of seeing Lindsey Hale in the [facility].    HPI and Plan: #[id number]  See dictation    Orders Placed This Encounter   Procedures     Basic metabolic panel     N terminal pro BNP outpatient     Follow-Up with Cardiac Advanced Practice Provider       Orders Placed This Encounter   Medications     torsemide (DEMADEX) 10 MG tablet     Si tabs daily for 2 days then 1 tablet daily thereafter.     Dispense:  60 tablet     Refill:  3       Medications Discontinued During This Encounter   Medication Reason     furosemide (LASIX) 20 MG tablet Alternate therapy         Encounter Diagnoses   Name Primary?     Paroxysmal atrial fibrillation (H)      Diastolic dysfunction Yes     Heart failure with preserved ejection fraction, NYHA class I (H)        CURRENT MEDICATIONS:  Current Outpatient Medications   Medication Sig Dispense Refill     acetaminophen (TYLENOL) 500 MG tablet Take 1,000 mg by mouth 3 times daily       albuterol (PROAIR HFA/PROVENTIL HFA/VENTOLIN HFA) 108 (90 Base) MCG/ACT inhaler Inhale 2 puffs into the lungs every 6 hours       Ascorbic Acid (VITAMIN C PO) Take 1 tablet by mouth daily       budesonide-formoterol (SYMBICORT) 160-4.5 MCG/ACT inhaler Inhale 2 puffs into the lungs 2 times daily        flecainide (TAMBOCOR) 150 MG tablet Take 1/2 tablet by mouth twice daily. 90 tablet 3     levothyroxine (SYNTHROID/LEVOTHROID) 75 MCG tablet Take 1 tablet (75 mcg) by mouth daily 90 tablet 3     metoprolol tartrate (LOPRESSOR) 50 MG tablet TAKE 1 AND 1/2 TABLETS BY MOUTH TWICE DAILY 270 tablet 0     polyethylene glycol (MIRALAX/GLYCOLAX) powder Take 17 g by mouth daily as needed for constipation       rivaroxaban ANTICOAGULANT (XARELTO ANTICOAGULANT) 15 MG TABS tablet Take 1 tablet (15 mg) by mouth daily (with dinner) 90  tablet 3     sulfamethoxazole-trimethoprim (BACTRIM) 400-80 MG tablet Take 1 tablet by mouth 2 times daily 14 tablet 0     torsemide (DEMADEX) 10 MG tablet 2 tabs daily for 2 days then 1 tablet daily thereafter. 60 tablet 3     traMADol (ULTRAM) 50 MG tablet Take one daily prn 20 tablet 0     vitamin (B COMPLEX-C) tablet Take 1 tablet by mouth daily       VITAMIN D PO Take 1 tablet by mouth daily (OTC: Patient unsure of strength)       VITAMIN E PO Take 1 capsule by mouth daily         ALLERGIES     Allergies   Allergen Reactions     Clindamycin Hcl Other (See Comments)     Difficulty swallowing     Indocin Headache     Ended up [facility] with severe head ache     Xylocaine-Epinephrine [Epinephrine-Lidocaine-Na Metabisulfite]      Xylocaine with epi caused a rapid heart beat     Ampicillin Potassium Nausea and Vomiting and Rash     Celebrex [Celecoxib] Rash     Ciprofloxacin Rash     Erythromycin Rash     Penicillins Nausea and Vomiting and Rash     Vibramycin [Doxycycline Hyclate] Rash       PAST MEDICAL HISTORY:  Past Medical History:   Diagnosis Date     Abnormal echocardiogram 04/2017    mild mr, ar, mitral stenosis, nl ef, lvh.  Done for episode of vision change     Arthritis 99         Atrial fibrillation (H) 2011 and 2009    neg nuc est 2009, Dr. Vazquez, on coumadin     Chest pain 2000    neg est echo, neg ct chest abd, pelvis Baptism     Chronic LBP      COPD (chronic obstructive pulmonary disease) (H)     seen by pulmonary md Dr. Whitt, and given inhaler     Cysts, breast 1980s    bilat mastectomies     Diastolic dysfunction 12/2019    on echo done for sob, also mild to mod mr, nl ef, mild mitral stenosis and mild a.s     Dizzy 2007    ct neg, carotid us neg     Dysphagia 2005    egd nl     Elevated blood sugar      Environmental allergies      Hematuria 2004    neg cysto and us     Hemoptyses 2008    ct neg, bronch neg 2009     Hyperlipidemia      Hypertension      Hypothyroid  1995    Dr. Ortiz     Lumbar spinal stenosis 12/2018    seen on ct done for lbp     Mild aortic stenosis 12/2019     Mild mitral stenosis 12/2019    on echo done for sob     Mitral regurgitation 6/15, 12/19    on echo     nausea 2006    ct abd and ;pelvis neg     Osteopenia     fu dexa better 2011     Osteoporotic compression fracture of spine (H) 2018    got iv reclast 3/6/19     Pacemaker 2011    afib with pauses and syncope     Palpitations 2009    neg est     PMR (polymyalgia rheumatica) (H) 2004    not active in years     SOB (shortness of breath) 5/15    echo nl ef, 2+mr, cxr clear, seen by pulm and given inhaler     Supraventricular premature beats      T12 compression fracture (H) 12/2018    non traumatic     TIA (transient ischaemic attack) 2009    carotids neg, mri neg     Treadmill stress test negative for angina pectoris 2011    nuclear est     Urine incontinence     Dr. Westfall     Urosepsis 2009    hospitalized     Vision changes 2011    eval by neuro and ophtho and no cause found       PAST SURGICAL HISTORY:  Past Surgical History:   Procedure Laterality Date     ARTHROPLASTY HIP Left 12/5/2017    Procedure: ARTHROPLASTY HIP;  LEFT HIP ARBEN ARTHROPLASTY(SORAYA);  Surgeon: Darell Nathan MD;  Location:  OR     ARTHROPLASTY PATELLO-FEMORAL (KNEE)  1998 and 2002     ARTHROSCOPY KNEE  1997     BIOPSY BREAST Right 9/23/2014    Procedure: BIOPSY BREAST;  Surgeon: David Carter MD;  [facility]     breast implants      4x     cataract  2011     FOOT SURGERY  2003     MASTECTOMY  1980    bilat, cysts     nj not bso  70's    not for ca       FAMILY HISTORY:  Family History   Problem Relation Age of Onset     C.A.D. Father      Lymphoma Father      Cancer Mother      Lymphoma Brother      Breast Cancer Sister      Osteoporosis Sister      Myocardial Infarction Sister      Unknown/Adopted Maternal Grandmother      Unknown/Adopted Maternal Grandfather      Unknown/Adopted Paternal Grandmother       Unknown/Adopted Paternal Grandfather        SOCIAL HISTORY:  Social History     Socioeconomic History     Marital status:      Spouse name: None     Number of children: 4     Years of education: None     Highest education level: None   Occupational History     None   Social Needs     Financial resource strain: None     Food insecurity     Worry: None     Inability: None     Transportation needs     Medical: None     Non-medical: None   Tobacco Use     Smoking status: Former Smoker     Types: Cigarettes     Quit date: 1955     Years since quittin.4     Smokeless tobacco: Never Used     Tobacco comment: quit in    had smoked about 2 cigarettes a day or more   Substance and Sexual Activity     Alcohol use: Yes     Alcohol/week: 0.0 standard drinks     Comment: occ wine     Drug use: No     Sexual activity: Not Currently   Lifestyle     Physical activity     Days per week: None     Minutes per session: None     Stress: None   Relationships     Social connections     Talks on phone: None     Gets together: None     Attends Hindu service: None     Active member of club or organization: None     Attends meetings of clubs or organizations: None     Relationship status: None     Intimate partner violence     Fear of current or ex partner: None     Emotionally abused: None     Physically abused: None     Forced sexual activity: None   Other Topics Concern     Parent/sibling w/ CABG, MI or angioplasty before 65F 55M? Not Asked      Service Not Asked     Blood Transfusions Not Asked     Caffeine Concern No     Comment: coffee: 1 cup a week.  Occ green tea     Occupational Exposure Not Asked     Hobby Hazards Not Asked     Sleep Concern No     Stress Concern No     Weight Concern No     Special Diet No     Back Care Not Asked     Exercise Yes     Comment: walking daily     Bike Helmet Not Asked     Seat Belt Yes     Self-Exams Not Asked   Social History Narrative     None       Review of  Systems:  Skin:  Positive for bruising     Eyes:  Positive for glasses;cataracts macular degeration  ENT:  not assessed nasal congestion allergies  Respiratory:  Positive for dyspnea on exertion     Cardiovascular:  Negative edema;Positive for    Gastroenterology: Negative      Genitourinary:  Negative      Musculoskeletal:  Positive for arthritis    Neurologic:  Negative      Psychiatric:  Negative      Heme/Lymph/Imm:  Positive for allergies    Endocrine:  Positive for thyroid disorder      Physical Exam:  Vitals: /72   Pulse 60   Ht 1.524 m (5')   Wt 72.8 kg (160 lb 8 oz)   SpO2 99%   BMI 31.35 kg/m      Constitutional:  cooperative, alert and oriented, well developed, well nourished, in no acute distress        Skin:  warm and dry to the touch;no apparent skin lesions or masses noted          Head:  normocephalic, no masses or lesions        Eyes:  pupils equal and round        Lymph:      ENT:  no pallor or cyanosis, dentition good        Neck:    JVP 8-10      Respiratory:  normal breath sounds, clear to auscultation, normal A-P diameter, normal symmetry, normal respiratory excursion, no use of accessory muscles         Cardiac: regular rhythm;normal S1 and S2       grade 2;systolic murmur        not assessed this visit                                        GI:  abdomen soft;no HSM        Extremities and Muscular Skeletal:  no deformities, clubbing, cyanosis, erythema observed;no edema              Neurological:  affect appropriate   in a wheelchair    Psych:  Alert and Oriented x 3        CC  Efra Vazquez MD  [address]  [place] [address]                  Thank you for allowing me to participate in the care of your patient.      Sincerely,     Zaina Herman, MARIA ANTONIA, APRN [facility]  cc:   Efra Vazquez MD  [address]  [place] [address]

## 2021-07-01 NOTE — TELEPHONE ENCOUNTER
Oxana notified   States she is very thankful and received your voicemail last night     Wanted to be sure message was sent to provider on how grateful they are to Dr Kaela VELIZ RN

## 2021-07-01 NOTE — PROGRESS NOTES
Service Date: 07/01/2021    HISTORY OF PRESENT ILLNESS:  Ms. Hale is a delightful, 89-year-old woman that I am having the pleasure of seeing today.  She is here today with her daughter.  She was last seen by Marcie An PA-C, on 06/22.      She is an established patient of Dr. Vazquez.  Her past medical history includes:  1.  Sick sinus syndrome with pacemaker implantation in 2011.  She is nearing KRISTA and has about 5 months of battery longevity.  2.  Paroxysmal AFib, treated with flecainide 75 mg b.i.d., metoprolol and anticoagulation (Xarelto 15 mg).  3.  HFpEF.  4.  Chronic lung disease.  The patient was on amiodarone, but was stopped secondary to decline in PFTs.  5.  Osteoporosis.  6.  Macrocytic anemia, followed by Hematology.  7.  Mild mitral stenosis with moderate mitral regurgitation.    At today's visit, Lindsey is here for followup after undergoing IV Lasix infusion to try to avoid hospitalization.  Lindsey states that the experience was not good.  Unfortunately, she had a significant amount of incontinence, and the external catheter was not positioned correctly.  She was placed back on her oral Lasix, but states that it just does not seem to be working.  She also recently was told that she continues to have a urinary tract infection and was phoned in a new antibiotic by Dr. Smith.    Lab work today shows a bump of her creatinine to 1.92, GFR of 22, potassium of 3.8, BUN of 45.  Her baseline creatinine runs about 1.6-1.7.  Her last hemoglobin last month was 9.1.    She denies chest pain, but states that she has shortness of breath with minimal exertion.  She also has increased fatigue.  She states that this had not been the case several months ago.  She states that her energy levels have been so poor.  She denies orthopnea, PND or syncope.  She has no peripheral edema on exam.    Last echocardiogram was completed 06/13/2021 and showed an EF of 55%-60%. RV function was normal.  She had mild to moderate MR  with mild mitral stenosis and moderate trileaflet aortic sclerosis with mildly increased aortic gradients noted without significant regurgitation.  RVSP was slightly elevated at 30 mmHg plus RAP.    All other review of systems and past medical history are noted below.    ASSESSMENT AND PLAN:  Ms. Priest is a delightful, 89-year-old woman here today for followup.  1.  Sick sinus syndrome with pacemaker implantation in  ([facility]).  She is nearing KRISTA.  She will be needing a generator change in the near future.  Her next remote check is in September.    2.  Paroxysmal AFib, treated with flecainide, metoprolol and Xarelto.    3.  Chronic lung disease.  Possibly secondary to amiodarone use.  The patient does have complaints of shortness of breath with minimal exertion.  This could be multifactorial in the setting of chronic anemia.  She is meeting with her hematologist on .  I would like to see her back in followup to reassess her breathing at that time.  Perhaps she may be a candidate for Aranesp.    4.  HFpEF/diastolic heart failure.  The patient does not feel that her furosemide is working.  I have asked that she try torsemide 20 mg daily for 2 days, then 10 mg thereafter.  I would like to see her back in 3 weeks with a followup BMP and proBNP.  I have asked that she try to minimize her sodium intake.    5.  Urinary tract infection. Was recently called in [facility] for 1 week.  She does have a slight bump of her BUN and creatinine.  This could be secondary to the increase of her diuretic/IV diuresis several weeks ago and her first course of antibiotics.  I will reassess her BMP when I see her in 3 weeks.    If there are questions or concerns, I have asked that she please contact us.    As always, thank you for including us in her care.    Zaina Herman NP        D: 2021   T: 2021   MT: leon    Name:     CASIMIRO PRIEST  MRN:      [id number]        Account:      [id number]   :       09/16/1925           Service Date: 07/01/2021       Document: [id number]

## 2021-07-01 NOTE — PATIENT INSTRUCTIONS
Call my nurse with any questions or concerns:  [phone]  *If you have concerns after hours, please call [phone], option 2 to speak with on call Cardiologist.    1. Medication changes from today:    Stop furosemide   Start Torsemide 2 tablets daily for 2 days then 1 tablet daily thereafter   Shortness of breath can also be from your low hemoglobin  My goal is for your hemoglobin to be >10      2. Lab Results:     Component      Latest Ref Rng & Units 6/16/2021 7/1/2021   Sodium      133 - 144 mmol/L 137 140   Potassium      3.4 - 5.3 mmol/L 4.3 3.8   Chloride      94 - 109 mmol/L 111 (H) 107   Carbon Dioxide      20 - 32 mmol/L 24 30   Anion Gap      3 - 14 mmol/L 2 (L) 3   Glucose      70 - 99 mg/dL 133 (H) 140 (H)   Urea Nitrogen      7 - 30 mg/dL 42 (H) 45 (H)   Creatinine      0.52 - 1.04 mg/dL 1.45 (H) 1.92 (H)   GFR Estimate      >60 mL/min/1.73:m2 30 (L) 22 (L)   GFR Estimate If Black      >60 mL/min/1.73:m2 35 (L) 25 (L)   Calcium      8.5 - 10.1 mg/dL 8.7 9.1       Results for CASIMIRO PRIEST (MRN [id number]) as of 7/1/2021 14:51   Ref. Range 5/6/2021 13:12 6/12/2021 18:51 6/13/2021 07:35 6/14/2021 07:38   WBC Latest Ref Range: 4.0 - 11.0 10e9/L 7.7 9.2 8.7 11.0   Hemoglobin Latest Ref Range: 11.7 - 15.7 g/dL 9.7 (L) 9.2 (L) 8.9 (L) 9.1 (L)   Hematocrit Latest Ref Range: 35.0 - 47.0 % 29.7 (L) 29.1 (L) 27.4 (L) 28.2 (L)   Platelet Count Latest Ref Range: 150 - 450 10e9/L 303 288 273 309   RBC Count Latest Ref Range: 3.8 - 5.2 10e12/L 2.71 (L) 2.65 (L) 2.56 (L) 2.64 (L)   MCV Latest Ref Range: 78 - 100 fl 110 (H) 110 (H) 107 (H) 107 (H)   MCH Latest Ref Range: 26.5 - 33.0 pg 35.8 (H) 34.7 (H) 34.8 (H) 34.5 (H)   MCHC Latest Ref Range: 31.5 - 36.5 g/dL 32.7 31.6 32.5 32.3   RDW Latest Ref Range: 10.0 - 15.0 % 15.6 (H) 14.9 14.5 14.6   Diff Method Unknown Automated Method Automated Method     % Neutrophils Latest Units: % 72.0 79.0     % Lymphocytes Latest Units: % 17.4 11.1     % Monocytes Latest  Units: % 8.0 8.5     % Eosinophils Latest Units: % 2.2 0.4     % Basophils Latest Units: % 0.4 0.3     % Immature Granulocytes Latest Units: %  0.7     Nucleated RBCs Latest Ref Range: 0 /100  0     Absolute Neutrophil Latest Ref Range: 1.6 - 8.3 10e9/L 5.5 7.3     Absolute Lymphocytes Latest Ref Range: 0.8 - 5.3 10e9/L 1.3 1.0     Absolute Monocytes Latest Ref Range: 0.0 - 1.3 10e9/L 0.6 0.8     Absolute Eosinophils Latest Ref Range: 0.0 - 0.7 10e9/L 0.2 0.0     Absolute Basophils Latest Ref Range: 0.0 - 0.2 10e9/L 0.0 0.0     Abs Immature Granulocytes Latest Ref Range: 0 - 0.4 10e9/L  0.1

## 2021-07-22 NOTE — TELEPHONE ENCOUNTER
Received request to call patient's daughter to discuss pacemaker battery status. At last in-clinic device check patient had 5.2 months remaining on battery until device will trigger KRISTA.     Called patient's daughter back and reviewed what KRISTA means and the process of still having three months following the triggered date for patient to get a new pacemaker battery. Reviewed with patient's daughter that we will continue to keep a close eye on patient's battery and will happily keep them updated with the plan once patient's device triggers KRISTA. Patient's daughter stated she understood and was encouraged to call with additional questions or concerns.

## 2021-09-20 NOTE — TELEPHONE ENCOUNTER
Patient's daughter Oxana called to inform us that Isabel has been diagnosed with Multiple Myeloma and has been put on hospice care. She is wondering if her pacemaker still needs to be monitored.   Reviewed with Oxana that patient's on hospice do not need routine pacemaker follow up.   Patient has a routine remote device check scheduled 9/22/21. Offered to have them keep this check so we have a better estimate of her battery as she is pacemaker dependent and nearing KRISTA. Likely won't need further remotes after this check. Oxana and the patient are in agreement with this plan.

## 2021-10-24 NOTE — PROGRESS NOTES
Medical Assistant Note:  Lindsey Nuñez Alexia presents today for blood draw.    Patient seen by provider today: No.   present during visit today: Not Applicable.    Concerns: No Concerns.    Procedure:  Lab draw site: rac, Needle type: bf, Gauge: 23.    Post Assessment:  Labs drawn without difficulty: Yes.    Discharge Plan:  Departure Mode: Ambulatory.    Face to Face Time: 5 min  .    Marleni Martinez, CMA           Universal Safety Interventions

## 2021-11-16 NOTE — TELEPHONE ENCOUNTER
"Dr. Sherwood,    Called dtr to verify xarelto dose due to refill request from pharmacy.  Daughter reports pt is on hospice, and Xarelto discontinued so refused to pharmacy.    Daughter wished to pass on \"A heartfelt thank you for all you care for mom, and to whole team and staff. She always felt cared for by you and your team.\"   "

## 2022-09-02 NOTE — TELEPHONE ENCOUNTER
Medication is being filled for 1 time refill only due to:  due for f/u appointment per last OV.   Neida Crawford RN       
metoprolol (LOPRESSOR) 50 MG tablet        Last Written Prescription Date: 1/18/2016  Last Fill Quantity: 270, # refills: 3    Last Office Visit with [facility], [facility] or [facility] prescribing provider:  9/20/2016   Future Office Visit:        BP Readings from Last 3 Encounters:   01/24/17 118/60   09/20/16 145/66   05/16/16 144/66       
initiation of breastfeeding/breast milk feeding

## 2024-09-04 NOTE — TELEPHONE ENCOUNTER
Will route to PCP to review   Enoxaparin/Lovenox - Compliance/Enoxaparin/Lovenox - Dietary Advice/Enoxaparin/Lovenox - Follow up monitoring/Enoxaparin/Lovenox - Potential for adverse drug reactions and interactions

## 2025-03-07 NOTE — TELEPHONE ENCOUNTER
Noted urinary incontinence since after the surgery on 1/16/25   Denies fecal material or blood  Urine culture 2/5/25 was negative   Pelvic exam today with vaginal tenderness but otherwise unremarkable with no evidence of cuff dehiscence, fistula, or lesions    PLAN   Repeat UA with reflex, CBC, CMP  Referral to Pelvic Physical Therapy         Reason for Call:  Home Health Care    Kiara with [facility] called regarding (reason for call):     Orders are needed for this patient.     PT: continue 1x1 week, 2x4 weeks    OT: eval & treat    Skilled Nursing: eval & treat    Pt Provider: Developed productive cough prior to hospitalization (maybe 06.13.21) that is worsening. O2 levels at 88% after walking 50 ft.     Phone Number Homecare Nurse can be reached at: [phone]    Can we leave a detailed message on this number? YES    Phone number patient can be reached at: [phone]    Best Time: any    Call taken on 6/15/2021 at 3:18 PM by Leah Pat

## (undated) DEVICE — SU ETHIBOND 1 CTX 30" X865H

## (undated) DEVICE — MANIFOLD NEPTUNE 4 PORT 700-20

## (undated) DEVICE — BONE CLEANING TIP INTERPULSE FEMORAL CANAL 0210-008-000

## (undated) DEVICE — IMM PILLOW ABDUCT HIP MED 31143061

## (undated) DEVICE — BONE CLEANING TIP INTERPULSE  0210-010-000

## (undated) DEVICE — GLOVE PROTEXIS MICRO 8.0  2D73PM80

## (undated) DEVICE — SOL NACL 0.9% IRRIG 3000ML BAG 2B7477

## (undated) DEVICE — DEVICE RETRIEVER HEWSON 71111579

## (undated) DEVICE — GLOVE PROTEXIS W/NEU-THERA 8.0  2D73TE80

## (undated) DEVICE — SU MONOCRYL 3-0 PS-2 27" Y427H

## (undated) DEVICE — DRSG TELFA ISLAND 4X10"

## (undated) DEVICE — GLOVE PROTEXIS BLUE W/NEU-THERA 7.0  2D73EB70

## (undated) DEVICE — PACK TOTAL HIP W/POUCH SOP15HPFSM

## (undated) DEVICE — ESU GROUND PAD UNIVERSAL W/O CORD

## (undated) DEVICE — SOL WATER IRRIG 1000ML BOTTLE 07139-09

## (undated) DEVICE — PREP CHLORAPREP 26ML TINTED ORANGE  260815

## (undated) DEVICE — SU VICRYL 0 CT-1 27" J340H

## (undated) DEVICE — LINEN TOWEL PACK X5 5464

## (undated) DEVICE — SOL NACL 0.9% INJ 1000ML BAG 2B1324X

## (undated) DEVICE — GLOVE PROTEXIS POWDER FREE 7.0 ORTHOPEDIC 2D73ET70

## (undated) DEVICE — SPONGE LAP 18X18" X8435

## (undated) DEVICE — DRAPE IOBAN INCISE 23X17" 6650EZ

## (undated) DEVICE — GLOVE PROTEXIS POWDER FREE 8.0 ORTHOPEDIC 2D73ET80

## (undated) DEVICE — BONE CEMENT MIXEVAC III HI VAC KIT  0206-015-000

## (undated) DEVICE — SUCTION IRR SYSTEM W/O TIP INTERPULSE HANDPIECE 0210-100-000

## (undated) DEVICE — SU VICRYL 2-0 CP-1 27" UND J266H

## (undated) DEVICE — ESU ELEC BLADE 6" COATED E1450-6

## (undated) DEVICE — BLADE SAW SAGITTAL STRK 25X79.5X1.24MM 4/2000 2108-318-000

## (undated) DEVICE — SU FIBERWIRE 2 38"  AR-7200

## (undated) DEVICE — SU VICRYL 2-0 FS-1 27" UND  J443H

## (undated) DEVICE — SOLUTION WOUND CLEANSING 3/4OZ 10% PVP EA-L3011FB-50

## (undated) RX ORDER — LIDOCAINE HYDROCHLORIDE 20 MG/ML
INJECTION, SOLUTION EPIDURAL; INFILTRATION; INTRACAUDAL; PERINEURAL
Status: DISPENSED
Start: 2017-12-05

## (undated) RX ORDER — VANCOMYCIN HYDROCHLORIDE 500 MG/10ML
INJECTION, POWDER, LYOPHILIZED, FOR SOLUTION INTRAVENOUS
Status: DISPENSED
Start: 2017-12-05

## (undated) RX ORDER — GLYCOPYRROLATE 0.2 MG/ML
INJECTION, SOLUTION INTRAMUSCULAR; INTRAVENOUS
Status: DISPENSED
Start: 2017-12-05

## (undated) RX ORDER — HYDROMORPHONE HYDROCHLORIDE 1 MG/ML
INJECTION, SOLUTION INTRAMUSCULAR; INTRAVENOUS; SUBCUTANEOUS
Status: DISPENSED
Start: 2017-12-05

## (undated) RX ORDER — BUPIVACAINE HYDROCHLORIDE AND EPINEPHRINE 5; 5 MG/ML; UG/ML
INJECTION, SOLUTION EPIDURAL; INTRACAUDAL; PERINEURAL
Status: DISPENSED
Start: 2017-12-05

## (undated) RX ORDER — FENTANYL CITRATE 50 UG/ML
INJECTION, SOLUTION INTRAMUSCULAR; INTRAVENOUS
Status: DISPENSED
Start: 2017-12-05

## (undated) RX ORDER — POTASSIUM CHLORIDE 750 MG/1
TABLET, EXTENDED RELEASE ORAL
Status: DISPENSED
Start: 2021-01-01

## (undated) RX ORDER — ONDANSETRON 2 MG/ML
INJECTION INTRAMUSCULAR; INTRAVENOUS
Status: DISPENSED
Start: 2017-12-05

## (undated) RX ORDER — PROPOFOL 10 MG/ML
INJECTION, EMULSION INTRAVENOUS
Status: DISPENSED
Start: 2017-12-05

## (undated) RX ORDER — FUROSEMIDE 10 MG/ML
INJECTION INTRAMUSCULAR; INTRAVENOUS
Status: DISPENSED
Start: 2021-01-01